# Patient Record
Sex: FEMALE | Race: WHITE | NOT HISPANIC OR LATINO | Employment: UNEMPLOYED | URBAN - METROPOLITAN AREA
[De-identification: names, ages, dates, MRNs, and addresses within clinical notes are randomized per-mention and may not be internally consistent; named-entity substitution may affect disease eponyms.]

---

## 2023-11-29 ENCOUNTER — LAB REQUISITION (OUTPATIENT)
Dept: LAB | Facility: HOSPITAL | Age: 78
End: 2023-11-29
Payer: MEDICARE

## 2023-11-29 DIAGNOSIS — N39.0 URINARY TRACT INFECTION, SITE NOT SPECIFIED: ICD-10-CM

## 2023-11-29 PROBLEM — T81.89XA DELAYED SURGICAL WOUND HEALING: Status: ACTIVE | Noted: 2023-11-29

## 2023-11-29 PROBLEM — N64.4 MASTODYNIA: Status: ACTIVE | Noted: 2023-11-29

## 2023-11-29 PROBLEM — K62.89 MASS OF ANUS: Status: ACTIVE | Noted: 2023-11-29

## 2023-11-29 PROBLEM — I63.9 CEREBROVASCULAR ACCIDENT (CVA) (MULTI): Status: ACTIVE | Noted: 2023-11-29

## 2023-11-29 PROBLEM — N60.01 CYST OF RIGHT BREAST: Status: ACTIVE | Noted: 2023-11-29

## 2023-11-29 PROBLEM — E83.51 HYPOCALCEMIA: Status: ACTIVE | Noted: 2023-11-29

## 2023-11-29 PROBLEM — K52.9 CHRONIC DIARRHEA: Status: ACTIVE | Noted: 2023-11-29

## 2023-11-29 PROBLEM — N64.4 BREAST PAIN IN FEMALE: Status: ACTIVE | Noted: 2023-11-29

## 2023-11-29 PROBLEM — C50.919 BREAST CANCER (MULTI): Status: ACTIVE | Noted: 2023-11-29

## 2023-11-29 PROBLEM — Z90.49 HISTORY OF PARTIAL COLECTOMY: Status: ACTIVE | Noted: 2023-11-29

## 2023-11-29 PROBLEM — K86.89 PANCREATIC INSUFFICIENCY (HHS-HCC): Status: ACTIVE | Noted: 2023-11-29

## 2023-11-29 PROBLEM — Z85.3 PERSONAL HISTORY OF MALIGNANT NEOPLASM OF BREAST: Status: ACTIVE | Noted: 2023-11-29

## 2023-11-29 PROBLEM — C21.0 ANAL CANCER (MULTI): Status: ACTIVE | Noted: 2023-11-29

## 2023-11-29 PROBLEM — K64.9 HEMORRHOIDS: Status: ACTIVE | Noted: 2023-11-29

## 2023-11-29 PROBLEM — Z85.038 HISTORY OF MALIGNANT NEOPLASM OF COLON: Status: ACTIVE | Noted: 2023-11-29

## 2023-11-29 PROBLEM — K58.0 IRRITABLE BOWEL SYNDROME WITH DIARRHEA: Status: ACTIVE | Noted: 2023-11-29

## 2023-11-29 PROBLEM — R30.0 DYSURIA: Status: ACTIVE | Noted: 2023-11-29

## 2023-11-29 PROBLEM — Z85.048 HISTORY OF RECTAL CANCER: Status: ACTIVE | Noted: 2023-11-29

## 2023-11-29 PROBLEM — R19.00 ABDOMINAL WALL BULGE: Status: ACTIVE | Noted: 2023-11-29

## 2023-11-29 PROBLEM — N17.9 ACUTE KIDNEY INJURY (NONTRAUMATIC) (CMS-HCC): Status: ACTIVE | Noted: 2023-11-29

## 2023-11-29 PROBLEM — I82.90 VENOUS THROMBOSIS: Status: ACTIVE | Noted: 2023-11-29

## 2023-11-29 PROBLEM — R10.11 ABDOMINAL PAIN, RUQ (RIGHT UPPER QUADRANT): Status: ACTIVE | Noted: 2023-11-29

## 2023-11-29 PROBLEM — Z85.048 HISTORY OF ANAL CANCER: Status: ACTIVE | Noted: 2023-11-29

## 2023-11-29 PROBLEM — J30.9 ALLERGIC RHINITIS: Status: ACTIVE | Noted: 2023-11-29

## 2023-11-29 PROBLEM — M54.31 NEURALGIA OF RIGHT SCIATIC NERVE: Status: ACTIVE | Noted: 2023-11-29

## 2023-11-29 PROBLEM — N28.9 ABNORMAL KIDNEY FUNCTION: Status: ACTIVE | Noted: 2023-11-29

## 2023-11-29 PROBLEM — Z04.9 CONDITION NOT FOUND: Status: ACTIVE | Noted: 2023-11-29

## 2023-11-29 PROBLEM — E61.2 MAGNESIUM DEFICIENCY: Status: ACTIVE | Noted: 2023-11-29

## 2023-11-29 PROBLEM — R10.9 ABDOMINAL PAIN: Status: ACTIVE | Noted: 2023-11-29

## 2023-11-29 PROBLEM — R10.9 ABDOMINAL WALL PAIN: Status: ACTIVE | Noted: 2023-11-29

## 2023-11-29 PROBLEM — E83.42 HYPOMAGNESEMIA: Status: ACTIVE | Noted: 2023-11-29

## 2023-11-29 PROBLEM — K21.9 GERD (GASTROESOPHAGEAL REFLUX DISEASE): Status: ACTIVE | Noted: 2023-11-29

## 2023-11-29 PROBLEM — K92.1 BLOOD IN STOOL: Status: ACTIVE | Noted: 2023-11-29

## 2023-11-29 PROBLEM — K43.2 INCISIONAL HERNIA: Status: ACTIVE | Noted: 2023-11-29

## 2023-11-29 PROBLEM — K63.1 PERFORATED BOWEL (MULTI): Status: ACTIVE | Noted: 2023-11-29

## 2023-11-29 PROBLEM — E55.9 VITAMIN D DEFICIENCY: Status: ACTIVE | Noted: 2023-11-29

## 2023-11-29 PROBLEM — K60.2 PERIANAL FISSURE: Status: ACTIVE | Noted: 2023-11-29

## 2023-11-29 PROBLEM — R09.89 ALTERED CARDIOPULMONARY TISSUE PERFUSION: Status: ACTIVE | Noted: 2023-11-29

## 2023-11-29 PROCEDURE — 87086 URINE CULTURE/COLONY COUNT: CPT

## 2023-11-29 RX ORDER — PANTOPRAZOLE SODIUM 40 MG/1
1 TABLET, DELAYED RELEASE ORAL DAILY
COMMUNITY
End: 2023-12-01 | Stop reason: SDUPTHER

## 2023-11-29 RX ORDER — ACETAMINOPHEN 500 MG
1 TABLET ORAL EVERY 24 HOURS
COMMUNITY

## 2023-11-29 RX ORDER — ELECTROLYTES/DEXTROSE
SOLUTION, ORAL ORAL EVERY 24 HOURS
COMMUNITY

## 2023-11-29 RX ORDER — PRENATAL VIT CALC,IRON,FOLIC
TABLET ORAL
COMMUNITY
End: 2023-12-01

## 2023-11-29 RX ORDER — CHOLESTYRAMINE 4 G/9G
POWDER, FOR SUSPENSION ORAL DAILY
COMMUNITY

## 2023-11-29 RX ORDER — METRONIDAZOLE 500 MG/1
TABLET ORAL EVERY 8 HOURS
COMMUNITY
Start: 2023-02-22 | End: 2023-12-01

## 2023-11-29 RX ORDER — ESOMEPRAZOLE MAGNESIUM 40 MG/1
1 CAPSULE, DELAYED RELEASE ORAL EVERY 24 HOURS
COMMUNITY
End: 2023-12-01

## 2023-11-29 RX ORDER — PANCRELIPASE 24000; 76000; 120000 [USP'U]/1; [USP'U]/1; [USP'U]/1
CAPSULE, DELAYED RELEASE PELLETS ORAL
COMMUNITY
End: 2023-12-01 | Stop reason: SDUPTHER

## 2023-11-30 LAB — BACTERIA UR CULT: NORMAL

## 2023-12-01 ENCOUNTER — OFFICE VISIT (OUTPATIENT)
Dept: SURGERY | Facility: CLINIC | Age: 78
End: 2023-12-01
Payer: MEDICARE

## 2023-12-01 VITALS
TEMPERATURE: 97.3 F | SYSTOLIC BLOOD PRESSURE: 166 MMHG | HEART RATE: 85 BPM | BODY MASS INDEX: 27.6 KG/M2 | WEIGHT: 150 LBS | DIASTOLIC BLOOD PRESSURE: 98 MMHG | HEIGHT: 62 IN

## 2023-12-01 DIAGNOSIS — K52.9 CHRONIC DIARRHEA: Primary | ICD-10-CM

## 2023-12-01 DIAGNOSIS — K52.9 CHRONIC DIARRHEA: ICD-10-CM

## 2023-12-01 DIAGNOSIS — N60.01 BENIGN BREAST CYST IN FEMALE, RIGHT: Primary | ICD-10-CM

## 2023-12-01 PROCEDURE — 1036F TOBACCO NON-USER: CPT | Performed by: SURGERY

## 2023-12-01 PROCEDURE — 1159F MED LIST DOCD IN RCRD: CPT | Performed by: SURGERY

## 2023-12-01 PROCEDURE — 1126F AMNT PAIN NOTED NONE PRSNT: CPT | Performed by: SURGERY

## 2023-12-01 PROCEDURE — 19000 PUNCTURE ASPIR CYST BREAST: CPT | Performed by: SURGERY

## 2023-12-01 PROCEDURE — 99213 OFFICE O/P EST LOW 20 MIN: CPT | Performed by: SURGERY

## 2023-12-01 PROCEDURE — 1160F RVW MEDS BY RX/DR IN RCRD: CPT | Performed by: SURGERY

## 2023-12-01 RX ORDER — PANTOPRAZOLE SODIUM 40 MG/1
40 TABLET, DELAYED RELEASE ORAL DAILY
Qty: 90 TABLET | Refills: 0 | Status: SHIPPED | OUTPATIENT
Start: 2023-12-01 | End: 2024-02-01

## 2023-12-01 RX ORDER — PANCRELIPASE 24000; 76000; 120000 [USP'U]/1; [USP'U]/1; [USP'U]/1
CAPSULE, DELAYED RELEASE PELLETS ORAL
Qty: 390 CAPSULE | Refills: 0 | Status: SHIPPED | OUTPATIENT
Start: 2023-12-01 | End: 2023-12-22

## 2023-12-01 ASSESSMENT — ENCOUNTER SYMPTOMS: DIARRHEA: 1

## 2023-12-01 NOTE — PROGRESS NOTES
Patient ID: Padilla Campuzano is a 78 y.o. female.  Who is following up for right breast cyst and diarrhea.    Subjective   HPI  Patient has a known history of carcinoma of the right breast.  She has intermittent recurring seromas of the right chest wall.  She is also on Creon for diarrhea.  She has pancreatic insufficiency.  Review of Systems   Gastrointestinal:  Positive for diarrhea.       Objective   Physical Exam  Constitutional:       Appearance: Normal appearance.   Chest:   Breasts:     Left: Normal.      Comments: This with scar and seroma seen on ultrasound.  Abdominal:      Palpations: Abdomen is soft. There is no mass.      Tenderness: There is no abdominal tenderness. There is no guarding.     Patient ID: Padilla Campuzano is a 78 y.o. female.    General    Date/Time: 12/1/2023 11:31 AM    Performed by: Leander Sun MD  Authorized by: Leander Sun MD    Consent:     Consent obtained:  Verbal    Risks discussed:  Bleeding  Universal protocol:     Procedure explained and questions answered to patient or proxy's satisfaction: yes      Relevant documents present and verified: yes      Site/side marked: yes      Immediately prior to procedure, a time out was called: yes      Patient identity confirmed:  Verbally with patient  Pre-procedure details:     Skin preparation:  Chlorhexidine with alcohol  Anesthesia:     Anesthesia method:  Local infiltration  Procedure specific details:      Using the high-frequency ultrasound transducer.  The cyst was identified in the right lateral breast.  Under direct ultrasound guidance and a 16-gauge needle was introduced into the cavity.  6 mL of green fluid was aspirated.  There was complete resolution of the cyst.      Problem List Items Addressed This Visit       Benign breast cyst in female, right - Primary    Chronic diarrhea        Assessment/Plan   Status post aspiration of right breast cyst by ultrasound guidance.  Follow as needed   Creon sent for diarrhea.

## 2023-12-01 NOTE — PATIENT INSTRUCTIONS
Your right breast cyst was aspirated.  Continue to follow this as needed.  Creon has been sent to your pharmacy.  Follow as needed.

## 2023-12-22 DIAGNOSIS — K52.9 CHRONIC DIARRHEA: ICD-10-CM

## 2023-12-22 RX ORDER — PANCRELIPASE 24000; 76000; 120000 [USP'U]/1; [USP'U]/1; [USP'U]/1
CAPSULE, DELAYED RELEASE PELLETS ORAL
Qty: 400 CAPSULE | Refills: 10 | Status: SHIPPED | OUTPATIENT
Start: 2023-12-22

## 2024-02-01 DIAGNOSIS — K52.9 CHRONIC DIARRHEA: ICD-10-CM

## 2024-02-01 RX ORDER — PANTOPRAZOLE SODIUM 40 MG/1
40 TABLET, DELAYED RELEASE ORAL DAILY
Qty: 90 TABLET | Refills: 3 | Status: SHIPPED | OUTPATIENT
Start: 2024-02-01 | End: 2024-03-07 | Stop reason: SDUPTHER

## 2024-02-12 ENCOUNTER — LAB REQUISITION (OUTPATIENT)
Dept: LAB | Facility: HOSPITAL | Age: 79
End: 2024-02-12
Payer: MEDICARE

## 2024-02-12 DIAGNOSIS — N39.0 URINARY TRACT INFECTION, SITE NOT SPECIFIED: ICD-10-CM

## 2024-02-12 PROCEDURE — 87086 URINE CULTURE/COLONY COUNT: CPT

## 2024-02-13 LAB — BACTERIA UR CULT: NORMAL

## 2024-03-01 PROBLEM — N18.30 STAGE 3 CHRONIC KIDNEY DISEASE (MULTI): Status: ACTIVE | Noted: 2024-03-01

## 2024-03-06 ENCOUNTER — OFFICE VISIT (OUTPATIENT)
Dept: PRIMARY CARE | Facility: CLINIC | Age: 79
End: 2024-03-06
Payer: MEDICARE

## 2024-03-06 VITALS
DIASTOLIC BLOOD PRESSURE: 68 MMHG | BODY MASS INDEX: 26.44 KG/M2 | WEIGHT: 149.2 LBS | HEIGHT: 63 IN | HEART RATE: 91 BPM | OXYGEN SATURATION: 96 % | SYSTOLIC BLOOD PRESSURE: 144 MMHG

## 2024-03-06 DIAGNOSIS — Z85.048 HISTORY OF ANAL CANCER: ICD-10-CM

## 2024-03-06 DIAGNOSIS — Z86.79 HISTORY OF CEREBRAL HEMORRHAGE: ICD-10-CM

## 2024-03-06 DIAGNOSIS — Z85.038 HISTORY OF MALIGNANT NEOPLASM OF COLON: ICD-10-CM

## 2024-03-06 DIAGNOSIS — Z85.3 PERSONAL HISTORY OF MALIGNANT NEOPLASM OF BREAST: ICD-10-CM

## 2024-03-06 DIAGNOSIS — J37.0 CHRONIC LARYNGITIS: Primary | ICD-10-CM

## 2024-03-06 DIAGNOSIS — R91.1 LUNG NODULE SEEN ON IMAGING STUDY: ICD-10-CM

## 2024-03-06 DIAGNOSIS — N18.30 STAGE 3 CHRONIC KIDNEY DISEASE, UNSPECIFIED WHETHER STAGE 3A OR 3B CKD (MULTI): ICD-10-CM

## 2024-03-06 PROBLEM — C21.0 ANAL CANCER (MULTI): Status: RESOLVED | Noted: 2023-11-29 | Resolved: 2024-03-06

## 2024-03-06 PROCEDURE — 1036F TOBACCO NON-USER: CPT | Performed by: INTERNAL MEDICINE

## 2024-03-06 PROCEDURE — 99204 OFFICE O/P NEW MOD 45 MIN: CPT | Performed by: INTERNAL MEDICINE

## 2024-03-06 PROCEDURE — 1126F AMNT PAIN NOTED NONE PRSNT: CPT | Performed by: INTERNAL MEDICINE

## 2024-03-06 PROCEDURE — 1160F RVW MEDS BY RX/DR IN RCRD: CPT | Performed by: INTERNAL MEDICINE

## 2024-03-06 PROCEDURE — 1159F MED LIST DOCD IN RCRD: CPT | Performed by: INTERNAL MEDICINE

## 2024-03-06 ASSESSMENT — PATIENT HEALTH QUESTIONNAIRE - PHQ9
SUM OF ALL RESPONSES TO PHQ9 QUESTIONS 1 AND 2: 0
2. FEELING DOWN, DEPRESSED OR HOPELESS: NOT AT ALL
1. LITTLE INTEREST OR PLEASURE IN DOING THINGS: NOT AT ALL

## 2024-03-06 NOTE — PROGRESS NOTES
"Subjective   Patient ID: Padilla Campuzano is a 78 y.o. female who presents for New Patient Visit and Laryngitis (Started in December/Bumps on tongue/Seen at [facility]  HPI    New PCP    Spouse / Bryson present H&P    Patient presented in December with laryngitis.  She was seen and evaluated in the [place] urgent care.  Diagnosed with viral laryngitis.  Voice has still not returned.  Chronic laryngitis  Patient will be referred to ENT for further evaluation and management    H/o lung nodule on CT 2-23  Recheck / h/o smoker    History of breast cancer    History of colon cancer    History of anal cancer    History of cerebral hemorrhage 2012.    Diet and exercise reviewed      Review of Systems   All other systems reviewed and are negative.      Objective   /68   Pulse 91   Ht 1.6 m (5' 3\")   Wt 67.7 kg (149 lb 3.2 oz)   SpO2 96%   BMI 26.43 kg/m²   Lab Results   Component Value Date    WBC 7.7 02/08/2023    HGB 12.6 02/08/2023    HCT 38.8 02/08/2023     02/08/2023    ALT 5 02/08/2023    AST 16 02/08/2023     02/08/2023    K 3.7 02/08/2023     02/08/2023    CREATININE 1.2 02/08/2023    BUN 24 02/08/2023    CO2 21 (L) 02/08/2023    TSH 3.34 02/08/2023    ALBUR 80 (H) 01/10/2023           Physical Exam  Vitals reviewed.   Constitutional:       Appearance: Normal appearance. She is normal weight.   HENT:      Head: Normocephalic and atraumatic.      Comments: No audible voice, whispers     Mouth/Throat:      Pharynx: No posterior oropharyngeal erythema.   Eyes:      General: No scleral icterus.     Conjunctiva/sclera: Conjunctivae normal.      Pupils: Pupils are equal, round, and reactive to light.   Cardiovascular:      Rate and Rhythm: Normal rate and regular rhythm.      Heart sounds: Normal heart sounds.   Pulmonary:      Effort: No respiratory distress.      Breath sounds: No wheezing.   Abdominal:      General: Abdomen is flat. Bowel sounds are normal. There is no " distension.      Palpations: Abdomen is soft. There is no mass.      Tenderness: There is no abdominal tenderness. There is no rebound.   Musculoskeletal:         General: Normal range of motion.      Cervical back: Normal range of motion and neck supple.   Skin:     General: Skin is warm and dry.   Neurological:      General: No focal deficit present.      Mental Status: She is alert and oriented to person, place, and time. Mental status is at baseline.   Psychiatric:         Mood and Affect: Mood normal.         Behavior: Behavior normal.         Thought Content: Thought content normal.         Judgment: Judgment normal.         Problem List Items Addressed This Visit             ICD-10-CM    History of anal cancer Z85.048    Personal history of malignant neoplasm of breast Z85.3    History of malignant neoplasm of colon Z85.038    Stage 3 chronic kidney disease (CMS/HCC) N18.30     Other Visit Diagnoses         Codes    Chronic laryngitis    -  Primary J37.0    Relevant Orders    Referral to ENT    Lung nodule seen on imaging study     R91.1    Relevant Orders    CT chest wo IV contrast    History of cerebral hemorrhage     Z86.79    BMI 26.0-26.9,adult     Z68.26          Assessment/Plan     New PCP    Spouse / Bryson present H&P    Records rev'd    Patient presented in December with laryngitis.  She was seen and evaluated in the [place] urgent care.  Diagnosed with viral laryngitis.  Voice has still not returned.  Chronic laryngitis  Patient will be referred to ENT for further evaluation and management    H/o lung nodule on CT 2-23  Recheck / h/o smoker    History of breast cancer    History of colon cancer    History of anal cancer    History of cerebral hemorrhage 2012.    Diet and exercise reviewed    Follow up 1 month / medicare physical

## 2024-03-07 DIAGNOSIS — K52.9 CHRONIC DIARRHEA: ICD-10-CM

## 2024-03-07 RX ORDER — PANTOPRAZOLE SODIUM 40 MG/1
TABLET, DELAYED RELEASE ORAL
Qty: 180 TABLET | Refills: 0 | Status: SHIPPED | OUTPATIENT
Start: 2024-03-07 | End: 2024-05-02

## 2024-03-21 ENCOUNTER — HOSPITAL ENCOUNTER (OUTPATIENT)
Dept: RADIOLOGY | Facility: HOSPITAL | Age: 79
Discharge: HOME | End: 2024-03-21
Payer: MEDICARE

## 2024-03-21 DIAGNOSIS — R91.1 LUNG NODULE SEEN ON IMAGING STUDY: ICD-10-CM

## 2024-03-21 PROCEDURE — 71250 CT THORAX DX C-: CPT

## 2024-03-21 PROCEDURE — 71250 CT THORAX DX C-: CPT | Performed by: RADIOLOGY

## 2024-03-25 ENCOUNTER — TELEPHONE (OUTPATIENT)
Dept: PRIMARY CARE | Facility: CLINIC | Age: 79
End: 2024-03-25
Payer: MEDICARE

## 2024-03-25 DIAGNOSIS — Z85.3 PERSONAL HISTORY OF MALIGNANT NEOPLASM OF BREAST: ICD-10-CM

## 2024-03-25 DIAGNOSIS — R91.8 ABNORMAL CT SCAN OF LUNG: ICD-10-CM

## 2024-03-25 DIAGNOSIS — R91.1 LUNG NODULE SEEN ON IMAGING STUDY: Primary | ICD-10-CM

## 2024-03-25 DIAGNOSIS — R91.8 ABNORMAL CT LUNG SCREENING: ICD-10-CM

## 2024-03-25 NOTE — TELEPHONE ENCOUNTER
----- Message from Naheed Clements MD sent at 3/25/2024  9:32 AM EDT -----  Please call the patient regarding her abnormal result.  Move up appt to this week

## 2024-03-27 ENCOUNTER — OFFICE VISIT (OUTPATIENT)
Dept: PRIMARY CARE | Facility: CLINIC | Age: 79
End: 2024-03-27
Payer: MEDICARE

## 2024-03-27 VITALS
WEIGHT: 148.2 LBS | HEART RATE: 82 BPM | OXYGEN SATURATION: 92 % | BODY MASS INDEX: 26.25 KG/M2 | SYSTOLIC BLOOD PRESSURE: 134 MMHG | DIASTOLIC BLOOD PRESSURE: 76 MMHG

## 2024-03-27 DIAGNOSIS — R93.89 ABNORMAL CT OF THE CHEST: Primary | ICD-10-CM

## 2024-03-27 DIAGNOSIS — C50.919 MALIGNANT NEOPLASM OF FEMALE BREAST, UNSPECIFIED ESTROGEN RECEPTOR STATUS, UNSPECIFIED LATERALITY, UNSPECIFIED SITE OF BREAST (MULTI): ICD-10-CM

## 2024-03-27 DIAGNOSIS — Z85.048 HISTORY OF ANAL CANCER: ICD-10-CM

## 2024-03-27 DIAGNOSIS — J37.0 CHRONIC LARYNGITIS: ICD-10-CM

## 2024-03-27 DIAGNOSIS — C18.9 MALIGNANT NEOPLASM OF COLON, UNSPECIFIED PART OF COLON (MULTI): ICD-10-CM

## 2024-03-27 PROBLEM — Z86.79 HISTORY OF CEREBRAL HEMORRHAGE: Status: ACTIVE | Noted: 2024-03-27

## 2024-03-27 PROBLEM — K92.1 HEMATOCHEZIA: Status: ACTIVE | Noted: 2023-11-29

## 2024-03-27 PROBLEM — L02.91 ABSCESS: Status: ACTIVE | Noted: 2024-03-27

## 2024-03-27 PROBLEM — R91.1 LUNG NODULE: Status: ACTIVE | Noted: 2024-03-27

## 2024-03-27 PROCEDURE — 1036F TOBACCO NON-USER: CPT | Performed by: INTERNAL MEDICINE

## 2024-03-27 PROCEDURE — 1159F MED LIST DOCD IN RCRD: CPT | Performed by: INTERNAL MEDICINE

## 2024-03-27 PROCEDURE — 1160F RVW MEDS BY RX/DR IN RCRD: CPT | Performed by: INTERNAL MEDICINE

## 2024-03-27 PROCEDURE — 99214 OFFICE O/P EST MOD 30 MIN: CPT | Performed by: INTERNAL MEDICINE

## 2024-03-27 NOTE — PROGRESS NOTES
Subjective   Patient ID: Padilla Campuzano is a 78 y.o. female who presents for Results (CT Chest).  HPI    Spouse / Bryson present H&P    Follow up CT chest     Patient presented in December with laryngitis.  She was seen and evaluated in the [place] urgent care.  Diagnosed with viral laryngitis.  Voice has still not returned.  Chronic laryngitis  Patient will be referred to ENT for further evaluation and management / not done yet     H/o lung nodule on CT 2-23  h/o smoker     History of breast cancer     History of colon cancer     History of anal cancer     History of cerebral hemorrhage 2012.     Diet and exercise reviewed    Review of Systems   All other systems reviewed and are negative.      Objective   /76   Pulse 82   Wt 67.2 kg (148 lb 3.2 oz)   SpO2 92%   BMI 26.25 kg/m²   Lab Results   Component Value Date    WBC 7.7 02/08/2023    HGB 12.6 02/08/2023    HCT 38.8 02/08/2023     02/08/2023    ALT 5 02/08/2023    AST 16 02/08/2023     02/08/2023    K 3.7 02/08/2023     02/08/2023    CREATININE 1.2 02/08/2023    BUN 24 02/08/2023    CO2 21 (L) 02/08/2023    TSH 3.34 02/08/2023    ALBUR 80 (H) 01/10/2023           Physical Exam  Vitals reviewed.   Constitutional:       Appearance: Normal appearance. She is normal weight.   HENT:      Head: Normocephalic and atraumatic.      Comments: Raspy voice     Mouth/Throat:      Pharynx: No posterior oropharyngeal erythema.   Eyes:      General: No scleral icterus.     Conjunctiva/sclera: Conjunctivae normal.      Pupils: Pupils are equal, round, and reactive to light.   Cardiovascular:      Rate and Rhythm: Normal rate and regular rhythm.      Heart sounds: Normal heart sounds.   Pulmonary:      Effort: No respiratory distress.      Breath sounds: No wheezing.   Abdominal:      General: Abdomen is flat. Bowel sounds are normal. There is no distension.      Palpations: Abdomen is soft. There is no mass.      Tenderness: There is no abdominal  tenderness. There is no rebound.   Musculoskeletal:         General: Normal range of motion.      Cervical back: Normal range of motion and neck supple.   Skin:     General: Skin is warm and dry.   Neurological:      General: No focal deficit present.      Mental Status: She is alert and oriented to person, place, and time. Mental status is at baseline.   Psychiatric:         Mood and Affect: Mood normal.         Behavior: Behavior normal.         Thought Content: Thought content normal.         Judgment: Judgment normal.         Problem List Items Addressed This Visit             ICD-10-CM    Breast cancer (CMS/[facility]ly [facility]) C50.919    Relevant Orders    Referral to Hematology and Oncology    History of anal cancer Z85.048    Chronic laryngitis J37.0    Malignant neoplasm of colon (CMS/[facility]ly [facility]) C18.9    Relevant Orders    Referral to Hematology and Oncology     Other Visit Diagnoses         Codes    Abnormal CT of the chest    -  Primary R93.89    Relevant Orders    Referral to Hematology and Oncology    BMI 26.0-26.9,adult     Z68.26          Assessment/Plan     Spouse / Bryson present H&P    Follow up CT chest  CT/PET not approved by insurance  Will consult oncology 4-1-24     Patient presented in December with laryngitis.  She was seen and evaluated in the [place] urgent care.  Diagnosed with viral laryngitis.  Voice has still not returned.  Chronic laryngitis  Patient will be referred to ENT for further evaluation and management / not done yet 4-9-24     H/o lung nodule on CT 2-23  h/o smoker     History of breast cancer     History of colon cancer     History of anal cancer     History of cerebral hemorrhage 2012.     Diet and exercise reviewed    Follow up as scheduled / medicare physical    T=31 min

## 2024-04-01 ENCOUNTER — OFFICE VISIT (OUTPATIENT)
Dept: HEMATOLOGY/ONCOLOGY | Facility: HOSPITAL | Age: 79
End: 2024-04-01
Payer: MEDICARE

## 2024-04-01 VITALS
BODY MASS INDEX: 27.18 KG/M2 | SYSTOLIC BLOOD PRESSURE: 199 MMHG | WEIGHT: 147.71 LBS | HEIGHT: 62 IN | OXYGEN SATURATION: 96 % | TEMPERATURE: 96.8 F | RESPIRATION RATE: 20 BRPM | DIASTOLIC BLOOD PRESSURE: 77 MMHG | HEART RATE: 84 BPM

## 2024-04-01 DIAGNOSIS — C34.32 MALIGNANT NEOPLASM OF LOWER LOBE, LEFT BRONCHUS OR LUNG (MULTI): ICD-10-CM

## 2024-04-01 DIAGNOSIS — C18.9 MALIGNANT NEOPLASM OF COLON, UNSPECIFIED PART OF COLON (MULTI): ICD-10-CM

## 2024-04-01 DIAGNOSIS — C50.919 MALIGNANT NEOPLASM OF FEMALE BREAST, UNSPECIFIED ESTROGEN RECEPTOR STATUS, UNSPECIFIED LATERALITY, UNSPECIFIED SITE OF BREAST (MULTI): ICD-10-CM

## 2024-04-01 DIAGNOSIS — R93.89 ABNORMAL CT OF THE CHEST: ICD-10-CM

## 2024-04-01 DIAGNOSIS — R91.1 SOLITARY PULMONARY NODULE ON LUNG CT: Primary | ICD-10-CM

## 2024-04-01 PROCEDURE — 1125F AMNT PAIN NOTED PAIN PRSNT: CPT | Performed by: INTERNAL MEDICINE

## 2024-04-01 PROCEDURE — 1160F RVW MEDS BY RX/DR IN RCRD: CPT | Performed by: INTERNAL MEDICINE

## 2024-04-01 PROCEDURE — 99205 OFFICE O/P NEW HI 60 MIN: CPT | Performed by: INTERNAL MEDICINE

## 2024-04-01 PROCEDURE — 99215 OFFICE O/P EST HI 40 MIN: CPT | Performed by: INTERNAL MEDICINE

## 2024-04-01 PROCEDURE — 1159F MED LIST DOCD IN RCRD: CPT | Performed by: INTERNAL MEDICINE

## 2024-04-01 ASSESSMENT — PATIENT HEALTH QUESTIONNAIRE - PHQ9
1. LITTLE INTEREST OR PLEASURE IN DOING THINGS: NOT AT ALL
2. FEELING DOWN, DEPRESSED OR HOPELESS: NOT AT ALL
SUM OF ALL RESPONSES TO PHQ9 QUESTIONS 1 AND 2: 0

## 2024-04-01 ASSESSMENT — PAIN SCALES - GENERAL: PAINLEVEL: 1

## 2024-04-01 NOTE — PATIENT INSTRUCTIONS
Orders placed today:    Blood work  CT guided biopsy  Stat ENT referral  PET scan ordered  Referral to thoracic surgery  Please schedule follow up appointment with Dr. Martines    Thank you for coming in to see us today.

## 2024-04-01 NOTE — PROGRESS NOTES
Patient ID: Padilla Campuzano is a 78 y.o. female.  Referring Physician: Naheed Clements MD  [address] [facility], [place] [address]  Primary Care Provider: Naheed Clements MD     DIAGNOSIS    Suspicious left lower lobe pulmonary nodule      STAGING    Pending biopsy, staging with PET scan      CURRENT SITES OF DISEASE     Left lower lobe      MOLECULAR GENOMICS    Pathology not yet obtained      SERUM TUMOR MARKERS    Not applicable      PRIOR THERAPY    None      CURRENT THERAPY    To be determined based on final diagnosis      CURRENT ONCOLOGICAL PROBLEMS    1-history of multiple prior cancers including breast cancer colon cancer and anal cancer  2-hoarseness of voice since December 2023, refer to ENT for evaluation, suspect vocal cord paralysis      HISTORY OF PRESENT ILLNESS    This is a 78-year-old patient.  She presented in late December and January with voice changes diagnosed with possibly laryngitis.  She also noticed during this time a progressive decrease in her energy level.She was referred to ENT but has not yet seen them.  Given her history of malignancy further imaging was ordered.  She had a CT scan of the chest without contrast done on March 21, 2024 showing interval development of the left lower lobe cavitary pulmonary nodule measuring 1.1 cm, interval enlargement of the right lower lobe groundglass nodule measuring 1.1 cm that was previously 0.6 cm.  There was moderate emphysema.  Additional nonspecific pulmonary nodules were stable.  Her CT scan was compared to a chest the CT of January 20, 2022.  Given her prior history of malignancies and a new suspicious pulmonary nodule she has been referred to oncology for evaluation.      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  Diagnosed in 2017.    2- According to patient early stage colon cancer in 2001 s/p surgery alone    3- Breast  cancer in 2012.  Details not available, according to patient early stage with right-sided breast surgery this was treated at [facility] in [place] did receive chemotherapy and radiation.  She developed significant side effects from her chemotherapy and stopped it.  She received no hormonal therapy.    4-cerebral aneurysm with hemorrhage s/p coiling at [facility] in [place].      SOCIAL HISTORY    Patient lives in [facility], she lives with her .  She had 4 children but 1 has passed away.  She also has 3 stepchildren.  She started smoking at the age of 27 to the age of 64.  Smoked 33 years on average 1 pack/day.  She was a housewife.      CURRENT MEDS    See medication list, meds reviewed      ALLERGIES    Allergies include hydrocodone causing dizziness, she notes oxycodone, aspirin causing dizziness and sulfa causing rash.      FAMILY HISTORY     2 brothers had cancer 1 had lung cancer the other 1 colon cancer.  Mother also had colon cancer in her 70s.      Subjective    HPI    Review of Systems   Constitutional:  Positive for fatigue. Negative for appetite change, chills, diaphoresis, fever and unexpected weight change.   HENT:   Positive for voice change. Negative for hearing loss, lump/mass, mouth sores, nosebleeds, sore throat, tinnitus and trouble swallowing.    Eyes:  Negative for eye problems and icterus.   Respiratory:  Negative for chest tightness, cough, hemoptysis, shortness of breath and wheezing.    Cardiovascular:  Negative for chest pain, leg swelling and palpitations.   Gastrointestinal:  Negative for abdominal distention, abdominal pain, blood in stool, constipation, diarrhea, nausea, rectal pain and vomiting.   Endocrine: Negative for hot flashes.   Genitourinary:  Negative for bladder incontinence, difficulty urinating, dysuria, frequency, hematuria and pelvic pain.    Musculoskeletal:  Negative for arthralgias, back pain, flank pain, gait problem, myalgias, neck  "pain and neck stiffness.   Skin:  Negative for itching, rash and wound.   Neurological:  Negative for dizziness, extremity weakness, gait problem, headaches, light-headedness, numbness, seizures and speech difficulty.   Hematological:  Negative for adenopathy. Does not bruise/bleed easily.   Psychiatric/Behavioral:  Negative for confusion, decreased concentration, depression and sleep disturbance. The patient is not nervous/anxious.         Objective   BSA: 1.71 meters squared  BP (!) 199/77 (BP Location: Left arm, Patient Position: Sitting, BP Cuff Size: Adult)   Pulse 84   Temp 36 °C (96.8 °F) (Temporal)   Resp 20   Ht (S) 1.573 m (5' 1.93\")   Wt 67 kg (147 lb 11.3 oz)   SpO2 96%   BMI 27.08 kg/m²       Physical Exam  Constitutional:       General: She is not in acute distress.     Appearance: She is not ill-appearing, toxic-appearing or diaphoretic.   HENT:      Nose: No congestion or rhinorrhea.      Mouth/Throat:      Pharynx: No oropharyngeal exudate or posterior oropharyngeal erythema.   Eyes:      General: No scleral icterus.     Conjunctiva/sclera: Conjunctivae normal.   Cardiovascular:      Rate and Rhythm: Normal rate and regular rhythm.      Pulses: Normal pulses.      Heart sounds: Normal heart sounds. No murmur heard.     No friction rub. No gallop.   Pulmonary:      Effort: No respiratory distress.      Breath sounds: No stridor. No wheezing, rhonchi or rales.   Chest:      Chest wall: No tenderness.   Abdominal:      General: There is no distension.      Palpations: There is no mass.      Tenderness: There is no abdominal tenderness. There is no guarding or rebound.   Musculoskeletal:         General: No swelling, tenderness, deformity or signs of injury.      Cervical back: No tenderness.      Right lower leg: No edema.      Left lower leg: No edema.   Lymphadenopathy:      Cervical: No cervical adenopathy.   Skin:     Coloration: Skin is not jaundiced or pale.      Findings: No bruising or " rash.   Neurological:      General: No focal deficit present.      Mental Status: She is oriented to person, place, and time.      Cranial Nerves: No cranial nerve deficit.      Sensory: No sensory deficit.      Motor: No weakness.      Coordination: Coordination normal.      Gait: Gait normal.      Deep Tendon Reflexes: Reflexes normal.   Psychiatric:         Mood and Affect: Mood normal.         Behavior: Behavior normal.         Performance Status:  Symptomatic; in bed <50% of the day      CT Abdo/pelvis Feb 2023  Impression   1.  Distal descending and rectosigmoid colonic wall thickening and  hyperemia, concerning for infectious colitis.  2. Stable postoperative changes partial colectomy with left  hemiabdomen anastomosis. No bowel dilation.  3. Interval enlargement 9 mm right lower lobe ground-glass opacity,  previously 6 mm in 2022.       CT Chest 3/21/2024  IMPRESSION:  1.  Interval development of a left lower lobe cavitary pulmonary  nodule measuring 1.0 x 1.1 x 0.9 cm. Interval enlargement of a right  lower lobe ground-glass pulmonary nodule measuring 1.1 cm, previously  measuring 0.6 cm. Given patient's cancer history history of smoking,  recommend PET-CT for further evaluation.  2. Moderate emphysema.  3. Additional pulmonary nodules appear relatively stable compared to  prior examinations as described above.  4. Additional chronic findings as noted above.     Latest Reference Range & Units 04/02/24 10:46   GLUCOSE 74 - 99 mg/dL 84   SODIUM 136 - 145 mmol/L 142   POTASSIUM 3.5 - 5.3 mmol/L 3.9   CHLORIDE 98 - 107 mmol/L 109 (H)   Bicarbonate 21 - 32 mmol/L 27   Anion Gap 10 - 20 mmol/L 10   Blood Urea Nitrogen 6 - 23 mg/dL 18   Creatinine 0.50 - 1.05 mg/dL 1.31 (H)   EGFR >60 mL/min/1.73m*2 42 (L)   Calcium 8.6 - 10.3 mg/dL 8.6   Albumin 3.4 - 5.0 g/dL 4.1   Alkaline Phosphatase 33 - 136 U/L 68   ALT 7 - 45 U/L 7   AST 9 - 39 U/L 17   Bilirubin Total 0.0 - 1.2 mg/dL 0.4   Total Protein 6.4 - 8.2 g/dL 6.4    LEUKOCYTES (10*3/UL) IN BLOOD BY AUTOMATED COUNT, [place] 4.4 - 11.3 x10*3/uL 7.3   nRBC 0.0 - 0.0 /100 WBCs 0.3 (H)   ERYTHROCYTES (10*6/UL) IN BLOOD BY AUTOMATED COUNT, [place] 4.00 - 5.20 x10*6/uL 4.01   HEMOGLOBIN 12.0 - 16.0 g/dL 12.8   HEMATOCRIT 36.0 - 46.0 % 40.3   MCV 80 - 100 fL 101 (H)   MCH 26.0 - 34.0 pg 31.9   MCHC 32.0 - 36.0 g/dL 31.8 (L)   RED CELL DISTRIBUTION WIDTH 11.5 - 14.5 % 12.8   PLATELETS (10*3/UL) IN BLOOD AUTOMATED COUNT, [place] 150 - 450 x10*3/uL 320   NEUTROPHILS/100 LEUKOCYTES IN BLOOD BY AUTOMATED COUNT, [place] 40.0 - 80.0 % 55.3   Immature Granulocytes %, Automated 0.0 - 0.9 % 0.1   Lymphocytes % 13.0 - 44.0 % 32.4   Monocytes % 2.0 - 10.0 % 9.8   Eosinophils % 0.0 - 6.0 % 1.9   Basophils % 0.0 - 2.0 % 0.5   NEUTROPHILS (10*3/UL) IN BLOOD BY AUTOMATED COUNT, [place] 1.60 - 5.50 x10*3/uL 4.02   Immature Granulocytes Absolute, Automated 0.00 - 0.50 x10*3/uL 0.01   Lymphocytes Absolute 0.80 - 3.00 x10*3/uL 2.36   Monocytes Absolute 0.05 - 0.80 x10*3/uL 0.71   Eosinophils Absolute 0.00 - 0.40 x10*3/uL 0.14   Basophils Absolute 0.00 - 0.10 x10*3/uL 0.04   (H): Data is abnormally high  (L): Data is abnormally low      Assessment/Plan      This is a very nice 78-year-old patient with a history of multiple prior malignancies including breast cancer, colon cancer, anal cancer who now has a suspicious left lower lobe pulmonary nodule.  This needs to be considered malignant until proven otherwise.  Based on the solitary pulmonary nodule I recommend a PET scan, refer to thoracic surgery, and possibly a CT-guided biopsy.  Given her hoarseness of voice and possible vocal cord paralysis she has been referred to ENT for evaluation.  Upon completion of this she will return to see me for further discussions.      Carroll Martines MD  Professor of Medicine and Oncology  [facility]  Geena Zavala Chair in Lung Cancer  Thoracic Oncology  [facility]  [facility]

## 2024-04-02 ENCOUNTER — TELEPHONE (OUTPATIENT)
Dept: ADMISSION | Facility: HOSPITAL | Age: 79
End: 2024-04-02

## 2024-04-02 ENCOUNTER — OFFICE VISIT (OUTPATIENT)
Dept: CARDIAC SURGERY | Facility: CLINIC | Age: 79
End: 2024-04-02
Payer: MEDICARE

## 2024-04-02 ENCOUNTER — LAB (OUTPATIENT)
Dept: LAB | Facility: LAB | Age: 79
End: 2024-04-02
Payer: MEDICARE

## 2024-04-02 VITALS
DIASTOLIC BLOOD PRESSURE: 85 MMHG | RESPIRATION RATE: 18 BRPM | OXYGEN SATURATION: 95 % | HEIGHT: 61 IN | HEART RATE: 86 BPM | WEIGHT: 147 LBS | BODY MASS INDEX: 27.75 KG/M2 | SYSTOLIC BLOOD PRESSURE: 140 MMHG

## 2024-04-02 DIAGNOSIS — R91.1 SOLITARY PULMONARY NODULE ON LUNG CT: ICD-10-CM

## 2024-04-02 DIAGNOSIS — R91.1 LUNG NODULE: ICD-10-CM

## 2024-04-02 LAB
ALBUMIN SERPL BCP-MCNC: 4.1 G/DL (ref 3.4–5)
ALP SERPL-CCNC: 68 U/L (ref 33–136)
ALT SERPL W P-5'-P-CCNC: 7 U/L (ref 7–45)
ANION GAP SERPL CALC-SCNC: 10 MMOL/L (ref 10–20)
AST SERPL W P-5'-P-CCNC: 17 U/L (ref 9–39)
BASOPHILS # BLD AUTO: 0.04 X10*3/UL (ref 0–0.1)
BASOPHILS NFR BLD AUTO: 0.5 %
BILIRUB SERPL-MCNC: 0.4 MG/DL (ref 0–1.2)
BUN SERPL-MCNC: 18 MG/DL (ref 6–23)
CALCIUM SERPL-MCNC: 8.6 MG/DL (ref 8.6–10.3)
CHLORIDE SERPL-SCNC: 109 MMOL/L (ref 98–107)
CO2 SERPL-SCNC: 27 MMOL/L (ref 21–32)
CREAT SERPL-MCNC: 1.31 MG/DL (ref 0.5–1.05)
EGFRCR SERPLBLD CKD-EPI 2021: 42 ML/MIN/1.73M*2
EOSINOPHIL # BLD AUTO: 0.14 X10*3/UL (ref 0–0.4)
EOSINOPHIL NFR BLD AUTO: 1.9 %
ERYTHROCYTE [DISTWIDTH] IN BLOOD BY AUTOMATED COUNT: 12.8 % (ref 11.5–14.5)
GLUCOSE SERPL-MCNC: 84 MG/DL (ref 74–99)
HCT VFR BLD AUTO: 40.3 % (ref 36–46)
HGB BLD-MCNC: 12.8 G/DL (ref 12–16)
IMM GRANULOCYTES # BLD AUTO: 0.01 X10*3/UL (ref 0–0.5)
IMM GRANULOCYTES NFR BLD AUTO: 0.1 % (ref 0–0.9)
LYMPHOCYTES # BLD AUTO: 2.36 X10*3/UL (ref 0.8–3)
LYMPHOCYTES NFR BLD AUTO: 32.4 %
MCH RBC QN AUTO: 31.9 PG (ref 26–34)
MCHC RBC AUTO-ENTMCNC: 31.8 G/DL (ref 32–36)
MCV RBC AUTO: 101 FL (ref 80–100)
MONOCYTES # BLD AUTO: 0.71 X10*3/UL (ref 0.05–0.8)
MONOCYTES NFR BLD AUTO: 9.8 %
NEUTROPHILS # BLD AUTO: 4.02 X10*3/UL (ref 1.6–5.5)
NEUTROPHILS NFR BLD AUTO: 55.3 %
NRBC BLD-RTO: 0.3 /100 WBCS (ref 0–0)
PLATELET # BLD AUTO: 320 X10*3/UL (ref 150–450)
POTASSIUM SERPL-SCNC: 3.9 MMOL/L (ref 3.5–5.3)
PROT SERPL-MCNC: 6.4 G/DL (ref 6.4–8.2)
RBC # BLD AUTO: 4.01 X10*6/UL (ref 4–5.2)
SODIUM SERPL-SCNC: 142 MMOL/L (ref 136–145)
WBC # BLD AUTO: 7.3 X10*3/UL (ref 4.4–11.3)

## 2024-04-02 PROCEDURE — 36415 COLL VENOUS BLD VENIPUNCTURE: CPT

## 2024-04-02 PROCEDURE — 1126F AMNT PAIN NOTED NONE PRSNT: CPT | Performed by: THORACIC SURGERY (CARDIOTHORACIC VASCULAR SURGERY)

## 2024-04-02 PROCEDURE — 1036F TOBACCO NON-USER: CPT | Performed by: THORACIC SURGERY (CARDIOTHORACIC VASCULAR SURGERY)

## 2024-04-02 PROCEDURE — 99205 OFFICE O/P NEW HI 60 MIN: CPT | Performed by: THORACIC SURGERY (CARDIOTHORACIC VASCULAR SURGERY)

## 2024-04-02 PROCEDURE — 1160F RVW MEDS BY RX/DR IN RCRD: CPT | Performed by: THORACIC SURGERY (CARDIOTHORACIC VASCULAR SURGERY)

## 2024-04-02 PROCEDURE — 1159F MED LIST DOCD IN RCRD: CPT | Performed by: THORACIC SURGERY (CARDIOTHORACIC VASCULAR SURGERY)

## 2024-04-02 PROCEDURE — 99215 OFFICE O/P EST HI 40 MIN: CPT | Performed by: THORACIC SURGERY (CARDIOTHORACIC VASCULAR SURGERY)

## 2024-04-02 ASSESSMENT — ENCOUNTER SYMPTOMS
CHEST TIGHTNESS: 0
PSYCHIATRIC NEGATIVE: 1
UNEXPECTED WEIGHT CHANGE: 0
CHOKING: 0
NEUROLOGICAL NEGATIVE: 1
DIARRHEA: 0
PALPITATIONS: 0
LOSS OF SENSATION IN FEET: 0
DIAPHORESIS: 0
NAUSEA: 0
SHORTNESS OF BREATH: 0
ABDOMINAL DISTENTION: 0
MUSCULOSKELETAL NEGATIVE: 1
CHILLS: 0
APPETITE CHANGE: 0
OCCASIONAL FEELINGS OF UNSTEADINESS: 0
FATIGUE: 0
ALLERGIC/IMMUNOLOGIC NEGATIVE: 1
CONSTIPATION: 0
EYES NEGATIVE: 1
ABDOMINAL PAIN: 0
WHEEZING: 0
VOMITING: 0
ENDOCRINE NEGATIVE: 1
DEPRESSION: 0
FEVER: 0
COUGH: 0
HEMATOLOGIC/LYMPHATIC NEGATIVE: 1
STRIDOR: 0

## 2024-04-02 ASSESSMENT — LIFESTYLE VARIABLES
AUDIT-C TOTAL SCORE: 0
SKIP TO QUESTIONS 9-10: 1
HOW OFTEN DO YOU HAVE SIX OR MORE DRINKS ON ONE OCCASION: NEVER
HOW OFTEN DO YOU HAVE A DRINK CONTAINING ALCOHOL: NEVER
HOW MANY STANDARD DRINKS CONTAINING ALCOHOL DO YOU HAVE ON A TYPICAL DAY: PATIENT DOES NOT DRINK

## 2024-04-02 ASSESSMENT — PATIENT HEALTH QUESTIONNAIRE - PHQ9
2. FEELING DOWN, DEPRESSED OR HOPELESS: NOT AT ALL
1. LITTLE INTEREST OR PLEASURE IN DOING THINGS: NOT AT ALL
SUM OF ALL RESPONSES TO PHQ9 QUESTIONS 1 AND 2: 0

## 2024-04-02 ASSESSMENT — PAIN SCALES - GENERAL: PAINLEVEL: 0-NO PAIN

## 2024-04-02 NOTE — PROGRESS NOTES
Subjective   Patient ID: Padilla Campuzano is a 78 y.o. female who presents for Consult (Multiple lung nodule).  HPI    78-year-old female with multiple medical problems and history of breast cancer colon cancer and anal cancer.  She was found to have a left lower lobe nodule which is new in nature and some cavitation as well.  This nodule is concerning for malignancy.  She is scheduled to have a biopsy in a PET scan.  I will add PFTs.  I think based on those results we can formulate a better plan for her.  See her again with results.  She seems like an active woman we will assess her candidacy for resection if one is needed with her PFTs.    Review of Systems   Constitutional:  Negative for appetite change, chills, diaphoresis, fatigue, fever and unexpected weight change.   HENT: Negative.     Eyes: Negative.    Respiratory:  Negative for cough, choking, chest tightness, shortness of breath, wheezing and stridor.    Cardiovascular:  Negative for chest pain, palpitations and leg swelling.   Gastrointestinal:  Negative for abdominal distention, abdominal pain, constipation, diarrhea, nausea and vomiting.   Endocrine: Negative.    Genitourinary: Negative.    Musculoskeletal: Negative.    Skin: Negative.    Allergic/Immunologic: Negative.    Neurological: Negative.    Hematological: Negative.    Psychiatric/Behavioral: Negative.     All other systems reviewed and are negative.      Objective   Physical Exam  Constitutional:       Appearance: Normal appearance.   HENT:      Head: Normocephalic and atraumatic.      Nose: Nose normal.      Mouth/Throat:      Mouth: Mucous membranes are moist.      Pharynx: Oropharynx is clear.   Eyes:      Extraocular Movements: Extraocular movements intact.      Conjunctiva/sclera: Conjunctivae normal.      Pupils: Pupils are equal, round, and reactive to light.   Cardiovascular:      Rate and Rhythm: Normal rate and regular rhythm.      Pulses: Normal pulses.      Heart sounds: Normal heart  sounds.   Pulmonary:      Effort: Pulmonary effort is normal. No respiratory distress.      Breath sounds: Normal breath sounds. No stridor. No wheezing, rhonchi or rales.   Chest:      Chest wall: No tenderness.   Abdominal:      General: Abdomen is flat. Bowel sounds are normal.      Palpations: Abdomen is soft.   Musculoskeletal:         General: Normal range of motion.      Cervical back: Normal range of motion and neck supple.   Skin:     General: Skin is warm and dry.      Capillary Refill: Capillary refill takes less than 2 seconds.   Neurological:      General: No focal deficit present.      Mental Status: She is alert and oriented to person, place, and time.         Assessment/Plan   Diagnoses and all orders for this visit:  Solitary pulmonary nodule on lung CT  -     IR guided biopsy, PET scan, PFTs.  - Follow-up with results    Abad Pereyra MD  Thoracic and Esophageal Surgery         Abad Linda MD 04/02/24 3:11 PM

## 2024-04-02 NOTE — TELEPHONE ENCOUNTER
Spoke with Bryson, he received a call to schedule appointment in [facility]. The number he wrote down is incorrect, and he can not remember the name or specialty of MD Dr Martines referred them to at [facility]  Please advise

## 2024-04-03 ENCOUNTER — HOSPITAL ENCOUNTER (OUTPATIENT)
Dept: RADIOLOGY | Facility: HOSPITAL | Age: 79
Discharge: HOME | End: 2024-04-03
Payer: MEDICARE

## 2024-04-03 DIAGNOSIS — R93.89 ABNORMAL CT OF THE CHEST: ICD-10-CM

## 2024-04-03 DIAGNOSIS — C34.32 MALIGNANT NEOPLASM OF LOWER LOBE, LEFT BRONCHUS OR LUNG (MULTI): ICD-10-CM

## 2024-04-03 DIAGNOSIS — R91.1 SOLITARY PULMONARY NODULE ON LUNG CT: ICD-10-CM

## 2024-04-03 PROCEDURE — 3430000001 HC RX 343 DIAGNOSTIC RADIOPHARMACEUTICALS: Mod: MUE | Performed by: INTERNAL MEDICINE

## 2024-04-03 PROCEDURE — 78816 PET IMAGE W/CT FULL BODY: CPT | Mod: PS

## 2024-04-03 PROCEDURE — A9552 F18 FDG: HCPCS | Mod: MUE | Performed by: INTERNAL MEDICINE

## 2024-04-03 RX ORDER — FLUDEOXYGLUCOSE F 18 200 MCI/ML
11.9 INJECTION, SOLUTION INTRAVENOUS
Status: COMPLETED | OUTPATIENT
Start: 2024-04-03 | End: 2024-04-03

## 2024-04-03 RX ADMIN — FLUDEOXYGLUCOSE F 18 11.9 MILLICURIE: 200 INJECTION, SOLUTION INTRAVENOUS at 13:15

## 2024-04-09 ENCOUNTER — OFFICE VISIT (OUTPATIENT)
Dept: OTOLARYNGOLOGY | Facility: CLINIC | Age: 79
End: 2024-04-09
Payer: MEDICARE

## 2024-04-09 VITALS — WEIGHT: 146 LBS | TEMPERATURE: 96.8 F | BODY MASS INDEX: 27.56 KG/M2 | HEIGHT: 61 IN

## 2024-04-09 DIAGNOSIS — R91.1 SOLITARY PULMONARY NODULE ON LUNG CT: ICD-10-CM

## 2024-04-09 DIAGNOSIS — J38.01 UNILATERAL COMPLETE PARALYSIS OF VOCAL CORD: ICD-10-CM

## 2024-04-09 DIAGNOSIS — J37.0 CHRONIC LARYNGITIS: ICD-10-CM

## 2024-04-09 DIAGNOSIS — R49.0 HOARSENESS: Primary | ICD-10-CM

## 2024-04-09 PROCEDURE — 99203 OFFICE O/P NEW LOW 30 MIN: CPT | Performed by: OTOLARYNGOLOGY

## 2024-04-09 PROCEDURE — 1159F MED LIST DOCD IN RCRD: CPT | Performed by: OTOLARYNGOLOGY

## 2024-04-09 PROCEDURE — 1160F RVW MEDS BY RX/DR IN RCRD: CPT | Performed by: OTOLARYNGOLOGY

## 2024-04-09 PROCEDURE — 31575 DIAGNOSTIC LARYNGOSCOPY: CPT | Performed by: OTOLARYNGOLOGY

## 2024-04-09 PROCEDURE — 1036F TOBACCO NON-USER: CPT | Performed by: OTOLARYNGOLOGY

## 2024-04-09 NOTE — PROGRESS NOTES
FREDDY  Padilla Campuzano is a 78 y.o. female presents with a few months of hoarseness.  She has a complicated history and recently had a lung nodule identified which she is scheduled to undergo biopsy soon.  She is also scheduled for pulmonary function testing.  Has a history of stroke years ago but the hoarseness is basically new.  She is swallowing well without choking.  She had a PET scan demonstrating a variety of hypermetabolic findings but with regard to the larynx, asymmetric left greater than right activity      Past Medical History:   Diagnosis Date    Diverticulosis of intestine, part unspecified, without perforation or abscess without bleeding     Diverticulosis    Kidney disease     Malignant neoplasm of colon, unspecified (CMS/HCC)     Colon cancer    Personal history of colonic polyps     History of colonic polyps    Personal history of malignant neoplasm of breast     History of malignant neoplasm of female breast    Personal history of other diseases of the respiratory system     History of respiratory failure    Personal history of other malignant neoplasm of skin     Personal history of malignant neoplasm of skin    Personal history of transient ischemic attack (TIA), and cerebral infarction without residual deficits     History of stroke    Right lower quadrant pain 01/22/2016    Right lower quadrant abdominal pain    Right upper quadrant pain 01/22/2016    Abdominal pain, RUQ (right upper quadrant)    Unspecified symptoms and signs involving the genitourinary system 01/22/2016    Urinary symptom or sign            Medications:     Current Outpatient Medications:     biotin 5 mg capsule, once every 24 hours., Disp: , Rfl:     cholecalciferol (Vitamin D-3) 50 mcg (2,000 unit) capsule, 1 capsule (50 mcg) once every 24 hours., Disp: , Rfl:     cholestyramine (Questran) 4 gram packet, MIX 1 SCOOPFUL WITH AT  LEAST 2 TO 6 OUNCES LIQUID  AND DRINK TWICE DAILY for 90, Disp: , Rfl:     lipase-protease-amylase  "(Creon) 24,000-76,000 -120,000 unit capsule, TAKE 3 CAPSULES BY MOUTH 3 TIMES DAILY WITH MEALS AND 1 TO 2  CAPSULES BY MOUTH WITH SNACKS.  MAX 13 CAPSULE DAILY, Disp: 400 capsule, Rfl: 10    pantoprazole (ProtoNix) 40 mg EC tablet, Take 1 tablet by mouth twice a day, Disp: 180 tablet, Rfl: 0     Allergies:  Allergies   Allergen Reactions    Hydrocodone-Acetaminophen Dizziness    Oxycodone Hcl Unknown    Oxycodone-Acetaminophen Dizziness    Aspirin Dizziness and Unknown    Sulfa (Sulfonamide Antibiotics) Rash        Physical Exam:  Last Recorded Vitals  Temperature 36 °C (96.8 °F), height 1.549 m (5' 1\"), weight 66.2 kg (146 lb).  General:     General appearance: Well-developed, well-nourished in no acute distress.       Voice: Very hoarse, almost aphonic       Head/face: Normal appearance; nontender to palpation     Facial nerve function: Normal and symmetric bilaterally.    Oral/oropharynx:     Oral vestibule: Normal labial and gingival mucosa     Tongue/floor of mouth: Normal without lesion     Oropharynx: Clear.  No lesions present of the hard/soft palate, posterior pharynx    Neck:     Neck: Normal appearance, trachea midline     Salivary glands: Normal to palpation bilaterally     Lymph nodes: No cervical lymphadenopathy to palpation     Thyroid: No thyromegaly.  No palpable nodules     Range of motion: Normal    Neurological:     Cortical functions: Alert and oriented x3, appropriate affect       Larynx/hypopharynx:     Laryngeal findings: Mirror exam inadequate or limited secondary to enlarged base of tongue and/or excessive gagging.  Flexible laryngoscopy performed secondary to inadequate mirror examination.  Verbal informed consent the flexible laryngoscope was passed through the nasal cavity.  Normal epiglottis, aryepiglottic folds, arytenoids, false vocal cords; the left vocal cord is immobile in a paramedian position.  She had quite a bit of difficulty with the discomfort and a good view of the true " vocal cords was not able to be had on the left-hand side secondary to hooding from the false vocal cords.  I did not appreciate any obvious obstructive or irregular lesions however.  The right true vocal cord had full mobility    Nasopharynx:     Nasopharynx: Normal    Ear:     Ear canal: Normal bilaterally     Tympanic membrane: Intact and mobile bilaterally     Pinna: Normal bilaterally     Hearing:  Gross hearing assessment normal by voice    Nose:     Visualized using: Anterior rhinoscopy     Nasopharynx: Inadequate mirror exam secondary to gag, anatomy.       Nasal dorsum: Nontraumatic midline appearance     Septum: Deviation   inferior turbinates: Normally sized     Mucosa: Bilateral, pink, normal appearing       ASSESSMENT/PLAN:  She has left vocal cord paralysis which may be related to her thoracic findings although this is a left lower lobe nodule.  I do not appreciate any obvious lesion or mass of the larynx proper but the exam was somewhat limited by her tolerance of flexible laryngoscopy.  I have talked to her about proceeding for direct laryngoscopy and considering injection medialization of the left cord if no other lesions are seen.  The risks of failure, airway loss, bleeding, infection, need for further procedures were all discussed and informed consent was indicated.  We will see about scheduling this after she has had her upcoming procedures for biopsy and pulmonary function testing      Adrian Peralta MD

## 2024-04-15 ASSESSMENT — ENCOUNTER SYMPTOMS
DIAPHORESIS: 0
DIFFICULTY URINATING: 0
NUMBNESS: 0
ABDOMINAL DISTENTION: 0
DIARRHEA: 0
SORE THROAT: 0
HEMATURIA: 0
DIZZINESS: 0
BLOOD IN STOOL: 0
BACK PAIN: 0
SPEECH DIFFICULTY: 0
HEADACHES: 0
SHORTNESS OF BREATH: 0
PALPITATIONS: 0
VOICE CHANGE: 1
NERVOUS/ANXIOUS: 0
ADENOPATHY: 0
WHEEZING: 0
APPETITE CHANGE: 0
SEIZURES: 0
RECTAL PAIN: 0
LIGHT-HEADEDNESS: 0
DEPRESSION: 0
ARTHRALGIAS: 0
WOUND: 0
EYE PROBLEMS: 0
CONSTIPATION: 0
FATIGUE: 1
FEVER: 0
DYSURIA: 0
COUGH: 0
CONFUSION: 0
DECREASED CONCENTRATION: 0
LEG SWELLING: 0
NECK PAIN: 0
SLEEP DISTURBANCE: 0
UNEXPECTED WEIGHT CHANGE: 0
TROUBLE SWALLOWING: 0
HEMOPTYSIS: 0
FLANK PAIN: 0
HOT FLASHES: 0
VOMITING: 0
NECK STIFFNESS: 0
ABDOMINAL PAIN: 0
SCLERAL ICTERUS: 0
CHEST TIGHTNESS: 0
FREQUENCY: 0
CHILLS: 0
EXTREMITY WEAKNESS: 0
MYALGIAS: 0
NAUSEA: 0
BRUISES/BLEEDS EASILY: 0

## 2024-04-17 ENCOUNTER — HOSPITAL ENCOUNTER (OUTPATIENT)
Dept: RESPIRATORY THERAPY | Facility: HOSPITAL | Age: 79
Setting detail: THERAPIES SERIES
Discharge: HOME | End: 2024-04-17
Payer: MEDICARE

## 2024-04-17 ENCOUNTER — APPOINTMENT (OUTPATIENT)
Dept: PRIMARY CARE | Facility: CLINIC | Age: 79
End: 2024-04-17
Payer: MEDICARE

## 2024-04-17 DIAGNOSIS — R91.1 LUNG NODULE: ICD-10-CM

## 2024-04-17 PROCEDURE — 94726 PLETHYSMOGRAPHY LUNG VOLUMES: CPT

## 2024-04-22 ENCOUNTER — APPOINTMENT (OUTPATIENT)
Dept: HEMATOLOGY/ONCOLOGY | Facility: HOSPITAL | Age: 79
End: 2024-04-22
Payer: MEDICARE

## 2024-04-23 ENCOUNTER — HOSPITAL ENCOUNTER (OUTPATIENT)
Dept: RADIOLOGY | Facility: HOSPITAL | Age: 79
Discharge: HOME | End: 2024-04-23
Payer: MEDICARE

## 2024-04-23 ENCOUNTER — APPOINTMENT (OUTPATIENT)
Dept: CARDIAC SURGERY | Facility: CLINIC | Age: 79
End: 2024-04-23
Payer: MEDICARE

## 2024-04-23 VITALS
TEMPERATURE: 97.7 F | SYSTOLIC BLOOD PRESSURE: 148 MMHG | OXYGEN SATURATION: 94 % | RESPIRATION RATE: 18 BRPM | DIASTOLIC BLOOD PRESSURE: 117 MMHG | HEART RATE: 79 BPM

## 2024-04-23 DIAGNOSIS — R93.89 ABNORMAL CT OF THE CHEST: ICD-10-CM

## 2024-04-23 DIAGNOSIS — R91.1 SOLITARY PULMONARY NODULE ON LUNG CT: ICD-10-CM

## 2024-04-23 LAB
POCT INTERNATIONAL NORMALIZATION RATIO: 1
POCT PROTHROMBIN TIME: 11.8 SECONDS

## 2024-04-23 PROCEDURE — 88305 TISSUE EXAM BY PATHOLOGIST: CPT | Mod: TC | Performed by: STUDENT IN AN ORGANIZED HEALTH CARE EDUCATION/TRAINING PROGRAM

## 2024-04-23 PROCEDURE — 2720000007 HC OR 272 NO HCPCS

## 2024-04-23 PROCEDURE — 2500000005 HC RX 250 GENERAL PHARMACY W/O HCPCS: Performed by: STUDENT IN AN ORGANIZED HEALTH CARE EDUCATION/TRAINING PROGRAM

## 2024-04-23 PROCEDURE — 88305 TISSUE EXAM BY PATHOLOGIST: CPT | Performed by: PATHOLOGY

## 2024-04-23 PROCEDURE — 71045 X-RAY EXAM CHEST 1 VIEW: CPT

## 2024-04-23 PROCEDURE — 32408 CORE NDL BX LNG/MED PERQ: CPT

## 2024-04-23 PROCEDURE — 32408 CORE NDL BX LNG/MED PERQ: CPT | Performed by: STUDENT IN AN ORGANIZED HEALTH CARE EDUCATION/TRAINING PROGRAM

## 2024-04-23 RX ORDER — LIDOCAINE HYDROCHLORIDE 10 MG/ML
INJECTION, SOLUTION EPIDURAL; INFILTRATION; INTRACAUDAL; PERINEURAL
Status: COMPLETED | OUTPATIENT
Start: 2024-04-23 | End: 2024-04-23

## 2024-04-23 RX ORDER — DEXTROSE MONOHYDRATE AND SODIUM CHLORIDE 5; .45 G/100ML; G/100ML
50 INJECTION, SOLUTION INTRAVENOUS CONTINUOUS
Status: DISCONTINUED | OUTPATIENT
Start: 2024-04-23 | End: 2024-04-25 | Stop reason: HOSPADM

## 2024-04-23 RX ADMIN — LIDOCAINE HYDROCHLORIDE 7 ML: 10 INJECTION, SOLUTION EPIDURAL; INFILTRATION; INTRACAUDAL; PERINEURAL at 11:01

## 2024-04-23 ASSESSMENT — PAIN SCALES - GENERAL
PAINLEVEL_OUTOF10: 0 - NO PAIN
PAINLEVEL_OUTOF10: 3
PAINLEVEL_OUTOF10: 0 - NO PAIN
PAINLEVEL_OUTOF10: 0 - NO PAIN
PAINLEVEL_OUTOF10: 4
PAINLEVEL_OUTOF10: 0 - NO PAIN
PAINLEVEL_OUTOF10: 4
PAINLEVEL_OUTOF10: 0 - NO PAIN
PAINLEVEL_OUTOF10: 0 - NO PAIN

## 2024-04-23 ASSESSMENT — PAIN - FUNCTIONAL ASSESSMENT: PAIN_FUNCTIONAL_ASSESSMENT: 0-10

## 2024-04-23 ASSESSMENT — COLUMBIA-SUICIDE SEVERITY RATING SCALE - C-SSRS
1. IN THE PAST MONTH, HAVE YOU WISHED YOU WERE DEAD OR WISHED YOU COULD GO TO SLEEP AND NOT WAKE UP?: NO
2. HAVE YOU ACTUALLY HAD ANY THOUGHTS OF KILLING YOURSELF?: NO

## 2024-04-23 NOTE — Clinical Note
Biopsy samples taken.  Needle out and 4x4 folded with a clear tegaderm placed to left back chest.  Pt denies any pain

## 2024-04-23 NOTE — Clinical Note
Dr says no sedation at this time so dr can instruct pt to hold her breath at certain times.  Pt states she has to urinate.

## 2024-04-23 NOTE — Clinical Note
Pt able to scoot over onto cart with 1 person help.  Off the o2 and o2 sats 93% on room air.  Denies any shortness of breath.  Pt states she peed herself.  Will get pt upstairs to help change.  Pt denies any pain

## 2024-04-23 NOTE — Clinical Note
Pt right side down and left side up on ct table and secured pt on the ct table.  Placed on 3liters nc.

## 2024-04-24 LAB
LABORATORY COMMENT REPORT: NORMAL
PATH REPORT.FINAL DX SPEC: NORMAL
PATH REPORT.GROSS SPEC: NORMAL
PATH REPORT.RELEVANT HX SPEC: NORMAL
PATH REPORT.TOTAL CANCER: NORMAL

## 2024-04-29 ENCOUNTER — OFFICE VISIT (OUTPATIENT)
Dept: PRIMARY CARE | Facility: CLINIC | Age: 79
End: 2024-04-29
Payer: MEDICARE

## 2024-04-29 VITALS
BODY MASS INDEX: 27.28 KG/M2 | SYSTOLIC BLOOD PRESSURE: 152 MMHG | TEMPERATURE: 98.9 F | HEART RATE: 68 BPM | WEIGHT: 144.4 LBS | OXYGEN SATURATION: 92 % | DIASTOLIC BLOOD PRESSURE: 68 MMHG

## 2024-04-29 DIAGNOSIS — Z12.31 ENCOUNTER FOR SCREENING MAMMOGRAM FOR MALIGNANT NEOPLASM OF BREAST: ICD-10-CM

## 2024-04-29 DIAGNOSIS — C50.919 MALIGNANT NEOPLASM OF FEMALE BREAST, UNSPECIFIED ESTROGEN RECEPTOR STATUS, UNSPECIFIED LATERALITY, UNSPECIFIED SITE OF BREAST (MULTI): ICD-10-CM

## 2024-04-29 DIAGNOSIS — E78.00 HYPERCHOLESTEREMIA: ICD-10-CM

## 2024-04-29 DIAGNOSIS — I10 HYPERTENSION, UNSPECIFIED TYPE: ICD-10-CM

## 2024-04-29 DIAGNOSIS — Z85.048 HISTORY OF ANAL CANCER: ICD-10-CM

## 2024-04-29 DIAGNOSIS — J38.01 UNILATERAL VOCAL CORD PARALYSIS: ICD-10-CM

## 2024-04-29 DIAGNOSIS — R93.89 ABNORMAL CT OF THE CHEST: ICD-10-CM

## 2024-04-29 DIAGNOSIS — Z00.00 ENCOUNTER FOR SUBSEQUENT ANNUAL WELLNESS VISIT (AWV) IN MEDICARE PATIENT: Primary | ICD-10-CM

## 2024-04-29 DIAGNOSIS — E55.9 VITAMIN D DEFICIENCY: ICD-10-CM

## 2024-04-29 PROCEDURE — G0439 PPPS, SUBSEQ VISIT: HCPCS | Performed by: INTERNAL MEDICINE

## 2024-04-29 PROCEDURE — 1159F MED LIST DOCD IN RCRD: CPT | Performed by: INTERNAL MEDICINE

## 2024-04-29 PROCEDURE — 3078F DIAST BP <80 MM HG: CPT | Performed by: INTERNAL MEDICINE

## 2024-04-29 PROCEDURE — 1124F ACP DISCUSS-NO DSCNMKR DOCD: CPT | Performed by: INTERNAL MEDICINE

## 2024-04-29 PROCEDURE — 1170F FXNL STATUS ASSESSED: CPT | Performed by: INTERNAL MEDICINE

## 2024-04-29 PROCEDURE — 1160F RVW MEDS BY RX/DR IN RCRD: CPT | Performed by: INTERNAL MEDICINE

## 2024-04-29 PROCEDURE — 3077F SYST BP >= 140 MM HG: CPT | Performed by: INTERNAL MEDICINE

## 2024-04-29 PROCEDURE — 99214 OFFICE O/P EST MOD 30 MIN: CPT | Performed by: INTERNAL MEDICINE

## 2024-04-29 PROCEDURE — 1036F TOBACCO NON-USER: CPT | Performed by: INTERNAL MEDICINE

## 2024-04-29 RX ORDER — LOSARTAN POTASSIUM 25 MG/1
25 TABLET ORAL DAILY
Qty: 30 TABLET | Refills: 0 | Status: SHIPPED | OUTPATIENT
Start: 2024-04-29 | End: 2024-05-28 | Stop reason: SDUPTHER

## 2024-04-29 ASSESSMENT — ENCOUNTER SYMPTOMS
DEPRESSION: 0
HYPERTENSION: 1
LOSS OF SENSATION IN FEET: 0
OCCASIONAL FEELINGS OF UNSTEADINESS: 0

## 2024-04-29 ASSESSMENT — ACTIVITIES OF DAILY LIVING (ADL)
BATHING: INDEPENDENT
DOING_HOUSEWORK: INDEPENDENT
DRESSING: INDEPENDENT
TAKING_MEDICATION: INDEPENDENT

## 2024-04-29 ASSESSMENT — PATIENT HEALTH QUESTIONNAIRE - PHQ9
SUM OF ALL RESPONSES TO PHQ9 QUESTIONS 1 AND 2: 0
1. LITTLE INTEREST OR PLEASURE IN DOING THINGS: NOT AT ALL
2. FEELING DOWN, DEPRESSED OR HOPELESS: NOT AT ALL

## 2024-04-30 NOTE — PROGRESS NOTES
Subjective   Reason for Visit: Padilla Campuzano is an 78 y.o. female here for a Medicare Wellness visit / follow up    Spouse / Bryson present H&P    Past Medical, Surgical, and Family History reviewed and updated in chart.    Reviewed all medications by prescribing practitioner or clinical pharmacist (such as prescriptions, OTCs, herbal therapies and supplements) and documented in the medical record.    Hypertension        Medicare wellness    Mammogram  11-21  Dexa n/a  Colonoscopy n/a  CT chest lung cancer screening n/a  GYN n/a  immunizations rev'd RSV, shingrix, pneumo  BMI 27.2    Follow up    Hypertension  Counseled  Start losartan 25 mg daily  Risk / benefits rev'd    Follow up CT chest  CT/PET rev'd / abnormal  oncology following     Patient presented in December with laryngitis.  She was seen and evaluated in the [facility] urgent care.  Diagnosed with viral laryngitis.  Voice has still not returned.  Chronic laryngitis due to unilateral vocal cord paralysis  ENT following     H/o lung nodule on CT 2-23  Bx negative  h/o smoker     History of breast cancer  mammogram ordered     History of colon cancer     History of anal cancer     History of cerebral hemorrhage 2012.     Diet and exercise reviewed    Patient Care Team:  Naheed Clements MD as PCP - General (Internal Medicine)  DO Carroll Calabrese MD as Consulting Physician (Hematology and Oncology)     Review of Systems   All other systems reviewed and are negative.      Objective   Vitals:  /68   Pulse 68   Temp 37.2 °C (98.9 °F)   Wt 65.5 kg (144 lb 6.4 oz)   SpO2 92%   BMI 27.28 kg/m²       Physical Exam  Vitals reviewed.   Constitutional:       Appearance: Normal appearance. She is normal weight.      Comments: No voice / whispers   HENT:      Head: Normocephalic and atraumatic.      Mouth/Throat:      Pharynx: No posterior oropharyngeal erythema.   Eyes:      General: No scleral icterus.     Conjunctiva/sclera: Conjunctivae  normal.      Pupils: Pupils are equal, round, and reactive to light.   Cardiovascular:      Rate and Rhythm: Normal rate and regular rhythm.      Heart sounds: Normal heart sounds.   Pulmonary:      Effort: No respiratory distress.      Breath sounds: No wheezing.   Abdominal:      General: Abdomen is flat. Bowel sounds are normal. There is no distension.      Palpations: Abdomen is soft. There is no mass.      Tenderness: There is no abdominal tenderness. There is no rebound.   Musculoskeletal:         General: Normal range of motion.      Cervical back: Normal range of motion and neck supple.   Skin:     General: Skin is warm and dry.   Neurological:      General: No focal deficit present.      Mental Status: She is alert and oriented to person, place, and time. Mental status is at baseline.   Psychiatric:         Mood and Affect: Mood normal.         Behavior: Behavior normal.         Thought Content: Thought content normal.         Judgment: Judgment normal.         Assessment/Plan   Problem List Items Addressed This Visit       Breast cancer (Multi)    Relevant Orders    CBC and Auto Differential    History of anal cancer    Relevant Orders    CBC and Auto Differential    Vitamin D deficiency    Relevant Orders    Vitamin D 25-Hydroxy,Total (for eval of Vitamin D levels)     Other Visit Diagnoses       Encounter for subsequent annual wellness visit (AWV) in Medicare patient    -  Primary    Unilateral vocal cord paralysis        Abnormal CT of the chest        Hypercholesteremia        Relevant Orders    Comprehensive Metabolic Panel    Lipid Panel    TSH with reflex to Free T4 if abnormal    Hypertension, unspecified type        Relevant Medications    losartan (Cozaar) 25 mg tablet    Encounter for screening mammogram for malignant neoplasm of breast        Relevant Orders    BI mammo bilateral screening tomosynthesis    BMI 27.0-27.9,adult              Medicare wellness    Mammogram  11-21  Dexa  n/a  Colonoscopy n/a  CT chest lung cancer screening n/a  GYN n/a  immunizations rev'd RSV, shingrix, pneumo  BMI 27.2    Follow up    Hypertension  Counseled  Start losartan 25 mg daily  Risk / benefits rev'd    Follow up CT chest  CT/PET rev'd / abnormal  oncology following     Patient presented in December with laryngitis.  She was seen and evaluated in the [place] urgent care.  Diagnosed with viral laryngitis.  Voice has still not returned.  Chronic laryngitis due to unilateral vocal cord paralysis  ENT following     H/o lung nodule on CT 2-23  Bx negative  h/o smoker     History of breast cancer  mammogram ordered     History of colon cancer     History of anal cancer     History of cerebral hemorrhage 2012.     Diet and exercise reviewed    Check labs, mammogram  before appt    Follow up 1 month

## 2024-05-01 ENCOUNTER — PREP FOR PROCEDURE (OUTPATIENT)
Dept: OTOLARYNGOLOGY | Facility: HOSPITAL | Age: 79
End: 2024-05-01

## 2024-05-01 ENCOUNTER — TELEPHONE (OUTPATIENT)
Dept: OTOLARYNGOLOGY | Facility: CLINIC | Age: 79
End: 2024-05-01

## 2024-05-01 ENCOUNTER — LAB (OUTPATIENT)
Dept: LAB | Facility: LAB | Age: 79
End: 2024-05-01
Payer: MEDICARE

## 2024-05-01 DIAGNOSIS — E55.9 VITAMIN D DEFICIENCY: ICD-10-CM

## 2024-05-01 DIAGNOSIS — E78.00 HYPERCHOLESTEREMIA: ICD-10-CM

## 2024-05-01 DIAGNOSIS — C50.919 MALIGNANT NEOPLASM OF FEMALE BREAST, UNSPECIFIED ESTROGEN RECEPTOR STATUS, UNSPECIFIED LATERALITY, UNSPECIFIED SITE OF BREAST (MULTI): ICD-10-CM

## 2024-05-01 DIAGNOSIS — K52.9 CHRONIC DIARRHEA: ICD-10-CM

## 2024-05-01 DIAGNOSIS — Z85.048 HISTORY OF ANAL CANCER: ICD-10-CM

## 2024-05-01 DIAGNOSIS — J38.01 UNILATERAL COMPLETE PARALYSIS OF VOCAL CORD: Primary | ICD-10-CM

## 2024-05-01 LAB
25(OH)D3 SERPL-MCNC: 53 NG/ML (ref 30–100)
ALBUMIN SERPL BCP-MCNC: 4 G/DL (ref 3.4–5)
ALP SERPL-CCNC: 57 U/L (ref 33–136)
ALT SERPL W P-5'-P-CCNC: 9 U/L (ref 7–45)
ANION GAP SERPL CALC-SCNC: 12 MMOL/L (ref 10–20)
AST SERPL W P-5'-P-CCNC: 18 U/L (ref 9–39)
BASOPHILS # BLD AUTO: 0.05 X10*3/UL (ref 0–0.1)
BASOPHILS NFR BLD AUTO: 0.8 %
BILIRUB SERPL-MCNC: 0.4 MG/DL (ref 0–1.2)
BUN SERPL-MCNC: 29 MG/DL (ref 6–23)
CALCIUM SERPL-MCNC: 8.5 MG/DL (ref 8.6–10.3)
CHLORIDE SERPL-SCNC: 109 MMOL/L (ref 98–107)
CHOLEST SERPL-MCNC: 123 MG/DL (ref 0–199)
CHOLESTEROL/HDL RATIO: 2.7
CO2 SERPL-SCNC: 25 MMOL/L (ref 21–32)
CREAT SERPL-MCNC: 1.35 MG/DL (ref 0.5–1.05)
EGFRCR SERPLBLD CKD-EPI 2021: 40 ML/MIN/1.73M*2
EOSINOPHIL # BLD AUTO: 0.19 X10*3/UL (ref 0–0.4)
EOSINOPHIL NFR BLD AUTO: 3.1 %
ERYTHROCYTE [DISTWIDTH] IN BLOOD BY AUTOMATED COUNT: 12.8 % (ref 11.5–14.5)
GLUCOSE SERPL-MCNC: 85 MG/DL (ref 74–99)
HCT VFR BLD AUTO: 38.1 % (ref 36–46)
HDLC SERPL-MCNC: 44.8 MG/DL
HGB BLD-MCNC: 12.2 G/DL (ref 12–16)
IMM GRANULOCYTES # BLD AUTO: 0.01 X10*3/UL (ref 0–0.5)
IMM GRANULOCYTES NFR BLD AUTO: 0.2 % (ref 0–0.9)
LDLC SERPL CALC-MCNC: 52 MG/DL
LYMPHOCYTES # BLD AUTO: 2.31 X10*3/UL (ref 0.8–3)
LYMPHOCYTES NFR BLD AUTO: 37.6 %
MCH RBC QN AUTO: 31.3 PG (ref 26–34)
MCHC RBC AUTO-ENTMCNC: 32 G/DL (ref 32–36)
MCV RBC AUTO: 98 FL (ref 80–100)
MONOCYTES # BLD AUTO: 0.64 X10*3/UL (ref 0.05–0.8)
MONOCYTES NFR BLD AUTO: 10.4 %
NEUTROPHILS # BLD AUTO: 2.94 X10*3/UL (ref 1.6–5.5)
NEUTROPHILS NFR BLD AUTO: 47.9 %
NON HDL CHOLESTEROL: 78 MG/DL (ref 0–149)
NRBC BLD-RTO: 0 /100 WBCS (ref 0–0)
PLATELET # BLD AUTO: 335 X10*3/UL (ref 150–450)
POTASSIUM SERPL-SCNC: 3.7 MMOL/L (ref 3.5–5.3)
PROT SERPL-MCNC: 6.3 G/DL (ref 6.4–8.2)
RBC # BLD AUTO: 3.9 X10*6/UL (ref 4–5.2)
SODIUM SERPL-SCNC: 142 MMOL/L (ref 136–145)
TRIGL SERPL-MCNC: 130 MG/DL (ref 0–149)
TSH SERPL-ACNC: 3.83 MIU/L (ref 0.44–3.98)
VLDL: 26 MG/DL (ref 0–40)
WBC # BLD AUTO: 6.1 X10*3/UL (ref 4.4–11.3)

## 2024-05-01 PROCEDURE — 82306 VITAMIN D 25 HYDROXY: CPT

## 2024-05-01 PROCEDURE — 36415 COLL VENOUS BLD VENIPUNCTURE: CPT

## 2024-05-01 NOTE — TELEPHONE ENCOUNTER
Patient called to schedule DL with bx and vocal cord medialization. Workup for pulmonary nodule came back benign per her . She would like to schedule for 5/20/2024. Would you please place the order in epic? Thanks!

## 2024-05-02 RX ORDER — PANTOPRAZOLE SODIUM 40 MG/1
TABLET, DELAYED RELEASE ORAL
Qty: 180 TABLET | Refills: 0 | Status: SHIPPED | OUTPATIENT
Start: 2024-05-02

## 2024-05-06 ENCOUNTER — APPOINTMENT (OUTPATIENT)
Dept: HEMATOLOGY/ONCOLOGY | Facility: HOSPITAL | Age: 79
End: 2024-05-06
Payer: MEDICARE

## 2024-05-06 ENCOUNTER — OFFICE VISIT (OUTPATIENT)
Dept: HEMATOLOGY/ONCOLOGY | Facility: HOSPITAL | Age: 79
End: 2024-05-06
Payer: MEDICARE

## 2024-05-06 VITALS
RESPIRATION RATE: 20 BRPM | HEART RATE: 74 BPM | SYSTOLIC BLOOD PRESSURE: 163 MMHG | DIASTOLIC BLOOD PRESSURE: 73 MMHG | OXYGEN SATURATION: 94 % | WEIGHT: 144.18 LBS | BODY MASS INDEX: 27.24 KG/M2 | TEMPERATURE: 97.2 F

## 2024-05-06 DIAGNOSIS — R91.1 SOLITARY PULMONARY NODULE ON LUNG CT: ICD-10-CM

## 2024-05-06 PROCEDURE — 1125F AMNT PAIN NOTED PAIN PRSNT: CPT | Performed by: INTERNAL MEDICINE

## 2024-05-06 PROCEDURE — 1160F RVW MEDS BY RX/DR IN RCRD: CPT | Performed by: INTERNAL MEDICINE

## 2024-05-06 PROCEDURE — 1159F MED LIST DOCD IN RCRD: CPT | Performed by: INTERNAL MEDICINE

## 2024-05-06 PROCEDURE — 99214 OFFICE O/P EST MOD 30 MIN: CPT | Performed by: INTERNAL MEDICINE

## 2024-05-06 ASSESSMENT — PAIN SCALES - GENERAL: PAINLEVEL: 10-WORST PAIN EVER

## 2024-05-06 NOTE — PROGRESS NOTES
Patient ID: Padilla Campuzano is a 78 y.o. female.    DIAGNOSIS     Suspicious left lower lobe pulmonary nodule, biopsy showed absence of malignancy on April 23, 2024.  However lesion remains clinically suspicious.        STAGING     Solitary left pulmonary nodule       CURRENT SITES OF DISEASE      Left lower lobe        MOLECULAR GENOMICS     Not applicable        SERUM TUMOR MARKERS     Not applicable        PRIOR THERAPY     None        CURRENT THERAPY     Patient to see thoracic surgery for decision of resection versus follow-up CT scan.        CURRENT ONCOLOGICAL PROBLEMS     1-history of multiple prior cancers including breast cancer colon cancer and anal cancer,   2-hoarseness of voice since December 2023, refer to ENT for evaluation, suspect vocal cord paralysis, seen by ENT on April 9, 2024, left vocal cord paralysis.  No obvious CT scan finding to explain this.  Plan for medialization surgery.  3-May 2024  PET uptake in anorectal region given prior history of squamous cell cancer of the anus, contacted her surgeon Dr. Panchal to arrange for follow-up.         HISTORY OF PRESENT ILLNESS     This is a 78-year-old patient.  She presented in late December and January with voice changes diagnosed with possibly laryngitis.  She also noticed during this time a progressive decrease in her energy level.She was referred to ENT but has not yet seen them.  Given her history of malignancy further imaging was ordered.  She had a CT scan of the chest without contrast done on March 21, 2024 showing interval development of the left lower lobe cavitary pulmonary nodule measuring 1.1 cm, interval enlargement of the right lower lobe groundglass nodule measuring 1.1 cm that was previously 0.6 cm.  There was moderate emphysema.  Additional nonspecific pulmonary nodules were stable.  Her CT scan was compared to a chest the CT of January 20, 2022.  Given her prior history of malignancies and a new suspicious pulmonary nodule she has  been referred to oncology for evaluation.        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  Diagnosed in 2017.     2- According to patient early stage colon cancer in 2001 s/p surgery alone     3- Breast cancer in 2012.  Details not available, according to patient early stage with right-sided breast surgery this was treated at [facility] in [place] did receive chemotherapy and radiation.  She developed significant side effects from her chemotherapy and stopped it.  She received no hormonal therapy.     4-cerebral aneurysm with hemorrhage s/p coiling at [facility] in [place].        SOCIAL HISTORY     Patient lives in [facility], she lives with her .  She had 4 children but 1 has passed away.  She also has 3 stepchildren.  She started smoking at the age of 27 to the age of 64.  Smoked 33 years on average 1 pack/day.  She was a housewife.        CURRENT MEDS     See medication list, meds reviewed        ALLERGIES     Allergies include hydrocodone causing dizziness, she notes oxycodone, aspirin causing dizziness and sulfa causing rash.        FAMILY HISTORY      2 brothers had cancer 1 had lung cancer the other 1 colon cancer.  Mother also had colon cancer in her 70s.    Subjective    Cancer  Pertinent negatives include no abdominal pain, anorexia, arthralgias, change in bowel habit, chest pain, chills, congestion, coughing, diaphoresis, fatigue, fever, headaches, joint swelling, myalgias, nausea, neck pain, numbness, rash, sore throat, swollen glands, urinary symptoms, vertigo, visual change, vomiting or weakness.         Objective    BSA: 1.68 meters squared  /73 (BP Location: Left arm, Patient Position: Sitting, BP Cuff Size: Adult)   Pulse 74   Temp 36.2 °C (97.2 °F) (Temporal)   Resp 20   Wt 65.4 kg (144 lb 2.9 oz)   SpO2 94%   BMI 27.24 kg/m²      Physical Exam  Constitutional:       General:  She is not in acute distress.     Appearance: She is not ill-appearing, toxic-appearing or diaphoretic.   HENT:      Nose: No congestion or rhinorrhea.      Mouth/Throat:      Pharynx: No oropharyngeal exudate or posterior oropharyngeal erythema.   Eyes:      General: No scleral icterus.     Conjunctiva/sclera: Conjunctivae normal.   Cardiovascular:      Rate and Rhythm: Normal rate and regular rhythm.      Pulses: Normal pulses.      Heart sounds: Normal heart sounds. No murmur heard.     No friction rub. No gallop.   Pulmonary:      Effort: Pulmonary effort is normal. No respiratory distress.      Breath sounds: Normal breath sounds. No stridor. No wheezing, rhonchi or rales.   Chest:      Chest wall: No tenderness.   Abdominal:      General: Abdomen is flat. Bowel sounds are normal. There is no distension.      Palpations: Abdomen is soft. There is no mass.      Tenderness: There is no abdominal tenderness. There is no guarding or rebound.   Musculoskeletal:      Cervical back: No tenderness.      Right lower leg: No edema.      Left lower leg: No edema.   Lymphadenopathy:      Cervical: No cervical adenopathy.   Skin:     Coloration: Skin is not jaundiced.      Findings: No bruising.   Neurological:      General: No focal deficit present.      Mental Status: She is oriented to person, place, and time.   Psychiatric:         Mood and Affect: Mood normal.         Behavior: Behavior normal.         Performance Status:  Asymptomatic      PET 4/3/204  IMPRESSION:  1. Hypermetabolic cavitary left lower lobe nodule consistent with  malignancy.  2. Focal hypermetabolic activity in the anus is nonspecific, but  concerning for malignancy. Correlate with direct visualization given  patient's history.  3. Diffuse hypermetabolic activity in the rectosigmoid colon is also  nonspecific and possibly physiologic, but given the recent findings  on anatomic imaging, may be infectious or inflammatory in nature.  4. Postsurgical  changes from partial colectomy without evidence of  local disease recurrence.  5. Right upper quadrant hypermetabolic focus which appears to be  centered in the small bowel is favored to be physiologic, however a  small inferior liver lesion cannot be definitely excluded. Attention  on follow-up imaging versus MRI liver is recommended.  6. Nonspecific asymmetric left greater than right vocal cord  metabolic activity, correlate with concern for right vocal cord  dysfunction.    Collected 4/23/2024 11:18       Status: Final result       Visible to patient: Yes (seen)       Dx: Abnormal CT of the chest; Solitary pu...    0 Result Notes      Component    FINAL DIAGNOSIS      LEFT LUNG, LOWER LOBE, CORE BIOPSY:              Lung parenchyma with focal intra-alveolar fibrin/neutrophils, lymphoid aggregate and recent hemorrhage, negative for malignancy.           Assessment/Plan     This is a very nice 78-year-old patient with a history of multiple prior malignancies who has a PET positive small left cavitary lung lesion.  This is clearly suspicious however a CT-guided biopsy was negative.  I explained to the patient that this remains somewhat suspicious despite a negative biopsy.  Options include resection versus observation with repeat CT scans.  I have contacted her surgeon who will be seeing her soon and the decision for follow-up versus resection will be made by them.  She has a history of anal cancer, this area shows a PET positivity and most likely is postinflammatory from prior treatment however I did reach out to her colorectal surgeon and he will be seeing her for follow-up for repeat scoping.      Cancer Staging   No matching staging information was found for the patient.      Carroll Martines MD  Professor of Medicine and Oncology  [facility]  Geena Zavala Chair in Lung Cancer  Thoracic Oncology  [facility]

## 2024-05-07 ENCOUNTER — OFFICE VISIT (OUTPATIENT)
Dept: CARDIAC SURGERY | Facility: CLINIC | Age: 79
End: 2024-05-07
Payer: MEDICARE

## 2024-05-07 VITALS
WEIGHT: 144 LBS | HEART RATE: 78 BPM | RESPIRATION RATE: 18 BRPM | BODY MASS INDEX: 27.19 KG/M2 | OXYGEN SATURATION: 99 % | SYSTOLIC BLOOD PRESSURE: 165 MMHG | HEIGHT: 61 IN | DIASTOLIC BLOOD PRESSURE: 70 MMHG

## 2024-05-07 DIAGNOSIS — R91.1 LUNG NODULE: Primary | ICD-10-CM

## 2024-05-07 DIAGNOSIS — R91.1 LUNG NODULE: ICD-10-CM

## 2024-05-07 PROCEDURE — 1160F RVW MEDS BY RX/DR IN RCRD: CPT | Performed by: THORACIC SURGERY (CARDIOTHORACIC VASCULAR SURGERY)

## 2024-05-07 PROCEDURE — 1159F MED LIST DOCD IN RCRD: CPT | Performed by: THORACIC SURGERY (CARDIOTHORACIC VASCULAR SURGERY)

## 2024-05-07 PROCEDURE — 1126F AMNT PAIN NOTED NONE PRSNT: CPT | Performed by: THORACIC SURGERY (CARDIOTHORACIC VASCULAR SURGERY)

## 2024-05-07 PROCEDURE — 99215 OFFICE O/P EST HI 40 MIN: CPT | Performed by: THORACIC SURGERY (CARDIOTHORACIC VASCULAR SURGERY)

## 2024-05-07 PROCEDURE — 1036F TOBACCO NON-USER: CPT | Performed by: THORACIC SURGERY (CARDIOTHORACIC VASCULAR SURGERY)

## 2024-05-07 ASSESSMENT — ENCOUNTER SYMPTOMS
PSYCHIATRIC NEGATIVE: 1
APPETITE CHANGE: 0
DIAPHORESIS: 0
PALPITATIONS: 0
UNEXPECTED WEIGHT CHANGE: 0
DIARRHEA: 0
CHEST TIGHTNESS: 0
NEUROLOGICAL NEGATIVE: 1
EYES NEGATIVE: 1
FEVER: 0
ABDOMINAL PAIN: 0
ENDOCRINE NEGATIVE: 1
DEPRESSION: 0
SHORTNESS OF BREATH: 0
CONSTIPATION: 0
CHOKING: 0
VOMITING: 0
COUGH: 0
CHILLS: 0
WHEEZING: 0
ALLERGIC/IMMUNOLOGIC NEGATIVE: 1
HEMATOLOGIC/LYMPHATIC NEGATIVE: 1
FATIGUE: 0
STRIDOR: 0
NAUSEA: 0
LOSS OF SENSATION IN FEET: 0
MUSCULOSKELETAL NEGATIVE: 1
ABDOMINAL DISTENTION: 0
OCCASIONAL FEELINGS OF UNSTEADINESS: 0

## 2024-05-07 ASSESSMENT — LIFESTYLE VARIABLES
SKIP TO QUESTIONS 9-10: 1
HOW OFTEN DO YOU HAVE A DRINK CONTAINING ALCOHOL: NEVER
AUDIT-C TOTAL SCORE: 0
HOW OFTEN DO YOU HAVE SIX OR MORE DRINKS ON ONE OCCASION: NEVER
HOW MANY STANDARD DRINKS CONTAINING ALCOHOL DO YOU HAVE ON A TYPICAL DAY: PATIENT DOES NOT DRINK

## 2024-05-07 ASSESSMENT — PAIN SCALES - GENERAL: PAINLEVEL: 0-NO PAIN

## 2024-05-07 NOTE — PROGRESS NOTES
Subjective   Patient ID: Padilla Campuzano is a 78 y.o. female who presents for Follow-up (Test results).  HPI    78-year-old female with multiple medical problems and history of breast cancer colon cancer and anal cancer.  She was found to have a left lower lobe nodule which is new in nature and some cavitation as well.  This nodule is concerning for malignancy.  She is scheduled to have a biopsy in a PET scan.  I will add PFTs.  I think based on those results we can formulate a better plan for her.  See her again with results.  She seems like an active woman we will assess her candidacy for resection if one is needed with her PFTs.    Update 5/7/2024  PFTs show an FEV1 of 110% of predicted and a DLCO of 72%.  She underwent an IR guided biopsy that showed lung parenchyma with focal intra-alveolar fibrin and neutrophils with some lymphoid aggregates and recent hemorrhage but negative for malignancy.  I discussion with her about the next steps.  We discussed the option of close follow-up with a CT scan in 3 months versus surgical resection via wedge resection to deal with his lung nodule.  She wants to wait 3 months with a CT scan she is not interested in the surgery at this point.  I will see her again in 3 months with a CT of the chest.    Review of Systems   Constitutional:  Negative for appetite change, chills, diaphoresis, fatigue, fever and unexpected weight change.   HENT: Negative.     Eyes: Negative.    Respiratory:  Negative for cough, choking, chest tightness, shortness of breath, wheezing and stridor.    Cardiovascular:  Negative for chest pain, palpitations and leg swelling.   Gastrointestinal:  Negative for abdominal distention, abdominal pain, constipation, diarrhea, nausea and vomiting.   Endocrine: Negative.    Genitourinary: Negative.    Musculoskeletal: Negative.    Skin: Negative.    Allergic/Immunologic: Negative.    Neurological: Negative.    Hematological: Negative.    Psychiatric/Behavioral:  Negative.     All other systems reviewed and are negative.      Objective   Physical Exam  Constitutional:       Appearance: Normal appearance.   HENT:      Head: Normocephalic and atraumatic.      Nose: Nose normal.      Mouth/Throat:      Mouth: Mucous membranes are moist.      Pharynx: Oropharynx is clear.   Eyes:      Extraocular Movements: Extraocular movements intact.      Conjunctiva/sclera: Conjunctivae normal.      Pupils: Pupils are equal, round, and reactive to light.   Cardiovascular:      Rate and Rhythm: Normal rate and regular rhythm.      Pulses: Normal pulses.      Heart sounds: Normal heart sounds.   Pulmonary:      Effort: Pulmonary effort is normal. No respiratory distress.      Breath sounds: Normal breath sounds. No stridor. No wheezing, rhonchi or rales.   Chest:      Chest wall: No tenderness.   Abdominal:      General: Abdomen is flat. Bowel sounds are normal.      Palpations: Abdomen is soft.   Musculoskeletal:         General: Normal range of motion.      Cervical back: Normal range of motion and neck supple.   Skin:     General: Skin is warm and dry.      Capillary Refill: Capillary refill takes less than 2 seconds.   Neurological:      General: No focal deficit present.      Mental Status: She is alert and oriented to person, place, and time.         Assessment/Plan   Diagnoses and all orders for this visit:  Solitary pulmonary nodule on lung CT  -     Follow-up in 3 months with a CT of the chest.    Abad Pereyra MD  Thoracic and Esophageal Surgery         Abad Linda MD 05/07/24 11:56 AM

## 2024-05-13 ASSESSMENT — ENCOUNTER SYMPTOMS
ABDOMINAL PAIN: 0
FATIGUE: 0
JOINT SWELLING: 0
HEADACHES: 0
ARTHRALGIAS: 0
NUMBNESS: 0
NAUSEA: 0
ANOREXIA: 0
VOMITING: 0
COUGH: 0
FEVER: 0
WEAKNESS: 0
CHILLS: 0
SORE THROAT: 0
VISUAL CHANGE: 0
CHANGE IN BOWEL HABIT: 0
VERTIGO: 0
SWOLLEN GLANDS: 0
NECK PAIN: 0
DIAPHORESIS: 0
MYALGIAS: 0

## 2024-05-15 ENCOUNTER — PRE-ADMISSION TESTING (OUTPATIENT)
Dept: PREADMISSION TESTING | Facility: HOSPITAL | Age: 79
End: 2024-05-15
Payer: MEDICARE

## 2024-05-15 VITALS
SYSTOLIC BLOOD PRESSURE: 161 MMHG | HEART RATE: 81 BPM | WEIGHT: 142 LBS | BODY MASS INDEX: 26.13 KG/M2 | OXYGEN SATURATION: 95 % | RESPIRATION RATE: 16 BRPM | TEMPERATURE: 97.2 F | HEIGHT: 62 IN | DIASTOLIC BLOOD PRESSURE: 73 MMHG

## 2024-05-15 PROCEDURE — 99203 OFFICE O/P NEW LOW 30 MIN: CPT | Performed by: PHYSICIAN ASSISTANT

## 2024-05-15 RX ORDER — ACETAMINOPHEN 500 MG
1000 TABLET ORAL EVERY 6 HOURS PRN
COMMUNITY

## 2024-05-15 ASSESSMENT — DUKE ACTIVITY SCORE INDEX (DASI)
CAN YOU PARTICIPATE IN STRENOUS SPORTS LIKE SWIMMING, SINGLES TENNIS, FOOTBALL, BASKETBALL, OR SKIING: NO
CAN YOU DO LIGHT WORK AROUND THE HOUSE LIKE DUSTING OR WASHING DISHES: YES
CAN YOU RUN A SHORT DISTANCE: NO
CAN YOU DO YARD WORK LIKE RAKING LEAVES, WEEDING OR PUSHING A MOWER: YES
CAN YOU HAVE SEXUAL RELATIONS: NO
DASI METS SCORE: 5.6
CAN YOU PARTICIPATE IN MODERATE RECREATIONAL ACTIVITIES LIKE GOLF, BOWLING, DANCING, DOUBLES TENNIS OR THROWING A BASEBALL OR FOOTBALL: NO
CAN YOU DO HEAVY WORK AROUND THE HOUSE LIKE SCRUBBING FLOORS OR LIFTING AND MOVING HEAVY FURNITURE: NO
CAN YOU CLIMB A FLIGHT OF STAIRS OR WALK UP A HILL: YES
CAN YOU TAKE CARE OF YOURSELF (EAT, DRESS, BATHE, OR USE TOILET): YES
CAN YOU WALK A BLOCK OR TWO ON LEVEL GROUND: YES
CAN YOU DO MODERATE WORK AROUND THE HOUSE LIKE VACUUMING, SWEEPING FLOORS OR CARRYING GROCERIES: YES
CAN YOU WALK INDOORS, SUCH AS AROUND YOUR HOUSE: YES
TOTAL_SCORE: 23.45

## 2024-05-15 ASSESSMENT — ENCOUNTER SYMPTOMS
PSYCHIATRIC NEGATIVE: 1
DIARRHEA: 1
CONSTITUTIONAL NEGATIVE: 1
ENDOCRINE NEGATIVE: 1
ARTHRALGIAS: 1
NEUROLOGICAL NEGATIVE: 1
RESPIRATORY NEGATIVE: 1
CARDIOVASCULAR NEGATIVE: 1
EYES NEGATIVE: 1

## 2024-05-15 NOTE — H&P (VIEW-ONLY)
"CPM/PAT Evaluation       Name: Padilla Campuzano (Padilla Campuzano)  /Age: 1945/ y.o.     In-Person       Chief Complaint: \"hoarseness\"    HPI  The patient is a 78 year old female.  In  she developed sudden hoarseness.  This has been constant in nature.  She denies dysphagia.  Over the past month she has had a productive cough, but no fever or chills.  She was seen by Dr. Peralta and a direct laryngoscopy is recommended.    Past Medical History:   Diagnosis Date    Cerebral hemorrhage (Multi)     CKD (chronic kidney disease)     Colorectal cancer (Multi)     Diverticulosis of intestine, part unspecified, without perforation or abscess without bleeding     Diverticulosis    GERD (gastroesophageal reflux disease)     Hypertension     Irritable bowel syndrome     Kidney disease     Malignant neoplasm of colon, unspecified (Multi)     Colon cancer    Personal history of colonic polyps     History of colonic polyps    Personal history of malignant neoplasm of breast     History of malignant neoplasm of female breast    Personal history of other diseases of the respiratory system     History of respiratory failure    Personal history of other malignant neoplasm of skin     Personal history of malignant neoplasm of skin    Right upper quadrant pain 2016    Abdominal pain, RUQ (right upper quadrant)    Stroke (Multi)     Unspecified symptoms and signs involving the genitourinary system 2016    Urinary symptom or sign       Past Surgical History:   Procedure Laterality Date    BI BREAST CYST ASPIRATION RIGHT Right 2019    BI BREAST CYST ASPIRATION RIGHT [facility]    BREAST LUMPECTOMY  2014    Breast Surgery Lumpectomy    CHOLECYSTECTOMY      CT GUIDED IMAGING FOR ABSCESS DRAIN  2015    CT GUIDED IMAGING FOR ABSCESS DRAIN [facility]    HYSTERECTOMY  2014    Hysterectomy    ILEOSTOMY  2015    Ileostomy    ILEOSTOMY CLOSURE  2015    " Ileostomy Closure    OTHER SURGICAL HISTORY      Cerebral artery coiling       Family History   Problem Relation Name Age of Onset    Colon cancer Mother      Liver cancer Mother      Kidney cancer Mother      Heart attack Father      Blood clot Father      Cancer Brother      Cancer Brother       Social History     Tobacco Use    Smoking status: Former     Current packs/day: 0.00     Types: Cigarettes     Quit date:      Years since quittin.3     Passive exposure: Past    Smokeless tobacco: Never   Substance Use Topics    Alcohol use: Never     Social History     Substance and Sexual Activity   Drug Use Never     Allergies   Allergen Reactions    Hydrocodone-Acetaminophen Dizziness    Oxycodone Hcl Unknown    Oxycodone-Acetaminophen Dizziness    Aspirin Dizziness and Unknown    Sulfa (Sulfonamide Antibiotics) Rash     Current Outpatient Medications   Medication Sig Dispense Refill    acetaminophen (Tylenol) 500 mg tablet Take 2 tablets (1,000 mg) by mouth every 6 hours if needed for mild pain (1 - 3).      biotin 5 mg capsule once every 24 hours.      cholecalciferol (Vitamin D-3) 50 mcg (2,000 unit) capsule 1 capsule (50 mcg) once every 24 hours.      cholestyramine (Questran) 4 gram powder once daily.      lipase-protease-amylase (Creon) 24,000-76,000 -120,000 unit capsule TAKE 3 CAPSULES BY MOUTH 3 TIMES DAILY WITH MEALS AND 1 TO 2  CAPSULES BY MOUTH WITH SNACKS.  MAX 13 CAPSULE DAILY 400 capsule 10    losartan (Cozaar) 25 mg tablet Take 1 tablet (25 mg) by mouth once daily. 30 tablet 0    pantoprazole (ProtoNix) 40 mg EC tablet TAKE 1 TABLET BY MOUTH TWICE  DAILY 180 tablet 0     No current facility-administered medications for this visit.     Review of Systems   Constitutional: Negative.    HENT: Negative.     Eyes: Negative.    Respiratory: Negative.     Cardiovascular: Negative.    Gastrointestinal:  Positive for diarrhea.        Occasional bright red rectal bleeding   Endocrine: Negative.   "  Genitourinary: Negative.    Musculoskeletal:  Positive for arthralgias.   Neurological: Negative.    Psychiatric/Behavioral: Negative.       /73   Pulse 81   Temp 36.2 °C (97.2 °F) (Temporal)   Resp 16   Ht 1.575 m (5' 2\")   Wt 64.4 kg (142 lb)   SpO2 95%   BMI 25.97 kg/m²     Physical Exam  Vitals reviewed.   Constitutional:       Appearance: Normal appearance.   HENT:      Head: Normocephalic and atraumatic.      Mouth/Throat:      Mouth: Mucous membranes are moist.      Pharynx: Oropharynx is clear.   Eyes:      Extraocular Movements: Extraocular movements intact.      Pupils: Pupils are equal, round, and reactive to light.   Cardiovascular:      Rate and Rhythm: Normal rate and regular rhythm.      Heart sounds: Normal heart sounds.   Pulmonary:      Breath sounds: Normal breath sounds.   Abdominal:      General: Bowel sounds are normal.      Palpations: Abdomen is soft.   Musculoskeletal:         General: No swelling.   Skin:     General: Skin is warm and dry.   Neurological:      General: No focal deficit present.      Mental Status: She is alert and oriented to person, place, and time.   Psychiatric:         Mood and Affect: Mood normal.         Behavior: Behavior normal.        PAT AIRWAY:   Airway:     Mallampati::  II    TM distance::  >3 FB    Neck ROM::  Full   Teeth intact    ASA:  III  DASI RISK SCORE:  23.45  METS SCORE:  5.6  CHAD2 SCORE:  8.5%  REVISED CARDIAC RISK INDEX:  0.9%  STOP BANG SCORE:  2    CBC, CMP  5/1/2024    Assessment and Plan:     Unilateral complete paralysis of vocal cord:  Left direct laryngoscopy  H/O CVA/Cerebral hemorrhage - 2012 - s/p coiling procedure - currently stable  Chronic kidney disease - stage 3 - followed by PCP  Hypertension - currently managed with Estefani Quiñones PA-C          "

## 2024-05-15 NOTE — PREPROCEDURE INSTRUCTIONS
PAT DISCHARGE INSTRUCTIONS    Please call the Same Day Surgery (SDS) Department of the hospital where your procedure will be performed after 2:00 PM the day before your surgery. If you are scheduled on a Monday, or a Tuesday following a Monday holiday, you will need to call on the last business day prior to your surgery.    [facility]  [address], [address]  [phone]    [facility]  [address] [place] [address] [phone]    [facility]  [address]  [place] [id number]  [phone]    Please let your surgeon know if:      You develop any open sores, shingles, burning or painful urination as these may increase your risk of an infection.   You no longer wish to have the surgery.   Any other personal circumstances change that may lead to the need to cancel or defer this surgery-such as being sick or getting admitted to any hospital within one week of your planned procedure.    Your contact details change, such as a change of address or phone number.    Starting now:     Please DO NOT drink alcohol or smoke for 24 hours before surgery. It is well known that quitting smoking can make a huge difference to your health and recovery from surgery. The longer you abstain from smoking, the better your chances of a healthy recovery. If you need help with quitting, call 9-800-QUIT-NOW to be connected to a trained counselor who will discuss the best methods to help you quit.     Before your surgery:    Please stop all supplements 7 days prior to surgery. Or as directed by your surgeon.   Please stop taking NSAID pain medicine such as Advil and Motrin 7 days before surgery.    If you develop any fever, cough, cold, rashes, cuts, scratches, scrapes, urinary symptoms or infection anywhere on your body (including teeth and gums) prior to surgery, please call your surgeon’s office as soon as  possible. This may require treatment to reduce the chance of cancellation on the day of surgery.    The day before your surgery:   Get a good night’s rest.  Use the special soap for bathing if you have been instructed to use one.    Scheduled surgery times may change and you will be notified if this occurs - please check your personal voicemail for any updates.     On the morning of surgery:   Wear comfortable, loose fitting clothes which open in the front. Please do not wear moisturizers, creams, lotions, makeup or perfume.    Please bring with you to surgery:   Photo ID and insurance card   Current list of medicines and allergies   Pacemaker/ Defibrillator/Heart stent cards   CPAP machine and mask    Slings/ splints/ crutches   A copy of your complete advanced directive/DHPOA.    Please do NOT bring with you to surgery:   All jewelry and valuables should be left at home.   Prosthetic devices such as contact lenses, hearing aids, dentures, eyelash extensions, hairpins and body piercings must be removed prior to going in to the surgical suite.    After outpatient surgery:   A responsible adult MUST accompany you at the time of discharge and stay with you for 24 hours after your surgery. You may NOT drive yourself home after surgery.    Do not drive, operate machinery, make critical decisions or do activities that require co-ordination or balance until after a night’s sleep.   Do not drink alcoholic beverages for 24 hours.   Instructions for resuming your medications will be provided by your surgeon.    CALL YOUR DOCTOR AFTER SURGERY IF YOU HAVE:     Chills and/or a fever of 101° F or higher.    Redness, swelling, pus or drainage from your surgical wound or a bad smell from the wound.    Lightheadedness, fainting or confusion.    Persistent vomiting (throwing up) and are not able to eat or drink for 12 hours.    Three or more loose, watery bowel movements in 24 hours (diarrhea).   Difficulty or pain while urinating(  after non-urological surgery)    Pain and swelling in your legs, especially if it is only on one side.    Difficulty breathing or are breathing faster than normal.    Any new concerning symptoms.            Why must I stop eating and drinking near surgery time?  With sedation, food or liquid in your stomach can enter your lungs causing serious complications  Increases nausea and vomiting    When do I need to stop eating and drinking before my surgery?   Do not eat or drink after midnight the night before your surgery/procedure.  You may have small sips of water to take your medication.     Medication List            Accurate as of May 15, 2024 12:55 PM. Always use your most recent med list.                acetaminophen 500 mg tablet  Commonly known as: Tylenol  Medication Adjustments for Surgery: Other (Comment)  Notes to patient: CONTINUE IF NEEDED     biotin 5 mg capsule  Medication Adjustments for Surgery: Stop 7 days before surgery     cholecalciferol 50 mcg (2,000 unit) capsule  Commonly known as: Vitamin D-3  Medication Adjustments for Surgery: Stop 7 days before surgery     cholestyramine 4 gram powder  Commonly known as: Questran  Medication Adjustments for Surgery: Other (Comment)  Notes to patient: HOLD DAY OF SURGERY     Creon 24,000-76,000 -120,000 unit capsule  Generic drug: lipase-protease-amylase  TAKE 3 CAPSULES BY MOUTH 3 TIMES DAILY WITH MEALS AND 1 TO 2  CAPSULES BY MOUTH WITH SNACKS.  MAX 13 CAPSULE DAILY  Medication Adjustments for Surgery: Other (Comment)  Notes to patient: HOLD DAY OF SURGERY     losartan 25 mg tablet  Commonly known as: Cozaar  Take 1 tablet (25 mg) by mouth once daily.  Medication Adjustments for Surgery: Other (Comment)  Notes to patient: HOLD NIGHT BEFORE SURGERY DOSE/LAST DOSE WILL BE MAY 18 NIGHT     pantoprazole 40 mg EC tablet  Commonly known as: ProtoNix  TAKE 1 TABLET BY MOUTH TWICE  DAILY  Medication Adjustments for Surgery: Take morning of surgery with sip of  water, no other fluids

## 2024-05-20 ENCOUNTER — ANESTHESIA EVENT (OUTPATIENT)
Dept: OPERATING ROOM | Facility: HOSPITAL | Age: 79
End: 2024-05-20
Payer: MEDICARE

## 2024-05-20 ENCOUNTER — ANESTHESIA (OUTPATIENT)
Dept: OPERATING ROOM | Facility: HOSPITAL | Age: 79
End: 2024-05-20
Payer: MEDICARE

## 2024-05-20 ENCOUNTER — HOSPITAL ENCOUNTER (OUTPATIENT)
Facility: HOSPITAL | Age: 79
Setting detail: OUTPATIENT SURGERY
Discharge: HOME | End: 2024-05-20
Attending: OTOLARYNGOLOGY | Admitting: OTOLARYNGOLOGY
Payer: MEDICARE

## 2024-05-20 VITALS
RESPIRATION RATE: 16 BRPM | DIASTOLIC BLOOD PRESSURE: 52 MMHG | TEMPERATURE: 97.2 F | HEART RATE: 83 BPM | SYSTOLIC BLOOD PRESSURE: 107 MMHG | OXYGEN SATURATION: 94 %

## 2024-05-20 DIAGNOSIS — J38.01 UNILATERAL COMPLETE PARALYSIS OF VOCAL CORD: ICD-10-CM

## 2024-05-20 PROCEDURE — 2500000002 HC RX 250 W HCPCS SELF ADMINISTERED DRUGS (ALT 637 FOR MEDICARE OP, ALT 636 FOR OP/ED): Performed by: STUDENT IN AN ORGANIZED HEALTH CARE EDUCATION/TRAINING PROGRAM

## 2024-05-20 PROCEDURE — 7100000001 HC RECOVERY ROOM TIME - INITIAL BASE CHARGE: Performed by: OTOLARYNGOLOGY

## 2024-05-20 PROCEDURE — 2720000007 HC OR 272 NO HCPCS: Performed by: OTOLARYNGOLOGY

## 2024-05-20 PROCEDURE — 3600000003 HC OR TIME - INITIAL BASE CHARGE - PROCEDURE LEVEL THREE: Performed by: OTOLARYNGOLOGY

## 2024-05-20 PROCEDURE — A31535 PR LARYNGOSCOPY,DIRCT,OP,BIOPSY: Performed by: NURSE ANESTHETIST, CERTIFIED REGISTERED

## 2024-05-20 PROCEDURE — 88305 TISSUE EXAM BY PATHOLOGIST: CPT | Performed by: PATHOLOGY

## 2024-05-20 PROCEDURE — 2500000005 HC RX 250 GENERAL PHARMACY W/O HCPCS: Performed by: NURSE ANESTHETIST, CERTIFIED REGISTERED

## 2024-05-20 PROCEDURE — 31535 LARYNGOSCOPY W/BIOPSY: CPT | Performed by: OTOLARYNGOLOGY

## 2024-05-20 PROCEDURE — A31535 PR LARYNGOSCOPY,DIRCT,OP,BIOPSY: Performed by: STUDENT IN AN ORGANIZED HEALTH CARE EDUCATION/TRAINING PROGRAM

## 2024-05-20 PROCEDURE — 2500000004 HC RX 250 GENERAL PHARMACY W/ HCPCS (ALT 636 FOR OP/ED): Performed by: STUDENT IN AN ORGANIZED HEALTH CARE EDUCATION/TRAINING PROGRAM

## 2024-05-20 PROCEDURE — 2500000004 HC RX 250 GENERAL PHARMACY W/ HCPCS (ALT 636 FOR OP/ED): Performed by: NURSE ANESTHETIST, CERTIFIED REGISTERED

## 2024-05-20 PROCEDURE — 7100000009 HC PHASE TWO TIME - INITIAL BASE CHARGE: Performed by: OTOLARYNGOLOGY

## 2024-05-20 PROCEDURE — 7100000002 HC RECOVERY ROOM TIME - EACH INCREMENTAL 1 MINUTE: Performed by: OTOLARYNGOLOGY

## 2024-05-20 PROCEDURE — 3600000008 HC OR TIME - EACH INCREMENTAL 1 MINUTE - PROCEDURE LEVEL THREE: Performed by: OTOLARYNGOLOGY

## 2024-05-20 PROCEDURE — 2500000005 HC RX 250 GENERAL PHARMACY W/O HCPCS: Performed by: OTOLARYNGOLOGY

## 2024-05-20 PROCEDURE — 3700000002 HC GENERAL ANESTHESIA TIME - EACH INCREMENTAL 1 MINUTE: Performed by: OTOLARYNGOLOGY

## 2024-05-20 PROCEDURE — 99100 ANES PT EXTEME AGE<1 YR&>70: CPT | Performed by: STUDENT IN AN ORGANIZED HEALTH CARE EDUCATION/TRAINING PROGRAM

## 2024-05-20 PROCEDURE — 7100000010 HC PHASE TWO TIME - EACH INCREMENTAL 1 MINUTE: Performed by: OTOLARYNGOLOGY

## 2024-05-20 PROCEDURE — 3700000001 HC GENERAL ANESTHESIA TIME - INITIAL BASE CHARGE: Performed by: OTOLARYNGOLOGY

## 2024-05-20 PROCEDURE — 88305 TISSUE EXAM BY PATHOLOGIST: CPT | Mod: TC | Performed by: OTOLARYNGOLOGY

## 2024-05-20 RX ORDER — FENTANYL CITRATE 50 UG/ML
25 INJECTION, SOLUTION INTRAMUSCULAR; INTRAVENOUS EVERY 5 MIN PRN
Status: DISCONTINUED | OUTPATIENT
Start: 2024-05-20 | End: 2024-05-20 | Stop reason: HOSPADM

## 2024-05-20 RX ORDER — LABETALOL HYDROCHLORIDE 5 MG/ML
5 INJECTION, SOLUTION INTRAVENOUS ONCE AS NEEDED
Status: COMPLETED | OUTPATIENT
Start: 2024-05-20 | End: 2024-05-20

## 2024-05-20 RX ORDER — ALBUTEROL SULFATE 0.83 MG/ML
2.5 SOLUTION RESPIRATORY (INHALATION) ONCE AS NEEDED
Status: COMPLETED | OUTPATIENT
Start: 2024-05-20 | End: 2024-05-20

## 2024-05-20 RX ORDER — IPRATROPIUM BROMIDE 0.5 MG/2.5ML
500 SOLUTION RESPIRATORY (INHALATION) AS NEEDED
Status: DISCONTINUED | OUTPATIENT
Start: 2024-05-20 | End: 2024-05-20 | Stop reason: HOSPADM

## 2024-05-20 RX ORDER — HYDRALAZINE HYDROCHLORIDE 20 MG/ML
5 INJECTION INTRAMUSCULAR; INTRAVENOUS EVERY 30 MIN PRN
Status: DISCONTINUED | OUTPATIENT
Start: 2024-05-20 | End: 2024-05-20 | Stop reason: HOSPADM

## 2024-05-20 RX ORDER — PHENYLEPHRINE HCL IN 0.9% NACL 1 MG/10 ML
SYRINGE (ML) INTRAVENOUS AS NEEDED
Status: DISCONTINUED | OUTPATIENT
Start: 2024-05-20 | End: 2024-05-20

## 2024-05-20 RX ORDER — ONDANSETRON HYDROCHLORIDE 2 MG/ML
4 INJECTION, SOLUTION INTRAVENOUS ONCE AS NEEDED
Status: DISCONTINUED | OUTPATIENT
Start: 2024-05-20 | End: 2024-05-20 | Stop reason: HOSPADM

## 2024-05-20 RX ORDER — MEPERIDINE HYDROCHLORIDE 25 MG/ML
12.5 INJECTION INTRAMUSCULAR; INTRAVENOUS; SUBCUTANEOUS EVERY 10 MIN PRN
Status: DISCONTINUED | OUTPATIENT
Start: 2024-05-20 | End: 2024-05-20 | Stop reason: HOSPADM

## 2024-05-20 RX ORDER — LIDOCAINE HYDROCHLORIDE 40 MG/ML
SOLUTION TOPICAL AS NEEDED
Status: DISCONTINUED | OUTPATIENT
Start: 2024-05-20 | End: 2024-05-20 | Stop reason: HOSPADM

## 2024-05-20 RX ORDER — LIDOCAINE HYDROCHLORIDE 10 MG/ML
INJECTION INFILTRATION; PERINEURAL AS NEEDED
Status: DISCONTINUED | OUTPATIENT
Start: 2024-05-20 | End: 2024-05-20

## 2024-05-20 RX ORDER — DIPHENHYDRAMINE HYDROCHLORIDE 50 MG/ML
12.5 INJECTION INTRAMUSCULAR; INTRAVENOUS ONCE AS NEEDED
Status: DISCONTINUED | OUTPATIENT
Start: 2024-05-20 | End: 2024-05-20 | Stop reason: HOSPADM

## 2024-05-20 RX ORDER — ONDANSETRON HYDROCHLORIDE 2 MG/ML
INJECTION, SOLUTION INTRAVENOUS AS NEEDED
Status: DISCONTINUED | OUTPATIENT
Start: 2024-05-20 | End: 2024-05-20

## 2024-05-20 RX ORDER — PROCHLORPERAZINE EDISYLATE 5 MG/ML
5 INJECTION INTRAMUSCULAR; INTRAVENOUS ONCE AS NEEDED
Status: DISCONTINUED | OUTPATIENT
Start: 2024-05-20 | End: 2024-05-20 | Stop reason: HOSPADM

## 2024-05-20 RX ORDER — LABETALOL HYDROCHLORIDE 5 MG/ML
10 INJECTION, SOLUTION INTRAVENOUS
Status: DISCONTINUED | OUTPATIENT
Start: 2024-05-20 | End: 2024-05-20 | Stop reason: HOSPADM

## 2024-05-20 RX ORDER — FENTANYL CITRATE 50 UG/ML
50 INJECTION, SOLUTION INTRAMUSCULAR; INTRAVENOUS EVERY 5 MIN PRN
Status: DISCONTINUED | OUTPATIENT
Start: 2024-05-20 | End: 2024-05-20 | Stop reason: HOSPADM

## 2024-05-20 RX ORDER — PROPOFOL 10 MG/ML
INJECTION, EMULSION INTRAVENOUS AS NEEDED
Status: DISCONTINUED | OUTPATIENT
Start: 2024-05-20 | End: 2024-05-20

## 2024-05-20 RX ORDER — SUCCINYLCHOLINE CHLORIDE 20 MG/ML
INJECTION INTRAMUSCULAR; INTRAVENOUS AS NEEDED
Status: DISCONTINUED | OUTPATIENT
Start: 2024-05-20 | End: 2024-05-20

## 2024-05-20 RX ORDER — FENTANYL CITRATE 50 UG/ML
INJECTION, SOLUTION INTRAMUSCULAR; INTRAVENOUS AS NEEDED
Status: DISCONTINUED | OUTPATIENT
Start: 2024-05-20 | End: 2024-05-20

## 2024-05-20 RX ORDER — SODIUM CHLORIDE, SODIUM LACTATE, POTASSIUM CHLORIDE, CALCIUM CHLORIDE 600; 310; 30; 20 MG/100ML; MG/100ML; MG/100ML; MG/100ML
100 INJECTION, SOLUTION INTRAVENOUS CONTINUOUS
Status: DISCONTINUED | OUTPATIENT
Start: 2024-05-20 | End: 2024-05-20 | Stop reason: HOSPADM

## 2024-05-20 RX ADMIN — FENTANYL CITRATE 25 MCG: 0.05 INJECTION, SOLUTION INTRAMUSCULAR; INTRAVENOUS at 16:36

## 2024-05-20 RX ADMIN — LABETALOL HYDROCHLORIDE 10 MG: 5 INJECTION, SOLUTION INTRAVENOUS at 17:37

## 2024-05-20 RX ADMIN — PROPOFOL 50 MG: 10 INJECTION, EMULSION INTRAVENOUS at 16:27

## 2024-05-20 RX ADMIN — DEXAMETHASONE SODIUM PHOSPHATE 10 MG: 4 INJECTION, SOLUTION INTRAMUSCULAR; INTRAVENOUS at 16:22

## 2024-05-20 RX ADMIN — FENTANYL CITRATE 50 MCG: 0.05 INJECTION, SOLUTION INTRAMUSCULAR; INTRAVENOUS at 16:12

## 2024-05-20 RX ADMIN — FENTANYL CITRATE 25 MCG: 50 INJECTION INTRAMUSCULAR; INTRAVENOUS at 17:39

## 2024-05-20 RX ADMIN — HYDRALAZINE HYDROCHLORIDE 5 MG: 20 INJECTION INTRAMUSCULAR; INTRAVENOUS at 18:02

## 2024-05-20 RX ADMIN — IPRATROPIUM BROMIDE 500 MCG: 0.5 SOLUTION RESPIRATORY (INHALATION) at 18:58

## 2024-05-20 RX ADMIN — ONDANSETRON HYDROCHLORIDE 4 MG: 2 INJECTION INTRAMUSCULAR; INTRAVENOUS at 16:46

## 2024-05-20 RX ADMIN — SUCCINYLCHOLINE CHLORIDE 100 MG: 20 INJECTION, SOLUTION INTRAMUSCULAR; INTRAVENOUS at 16:12

## 2024-05-20 RX ADMIN — SODIUM CHLORIDE, SODIUM LACTATE, POTASSIUM CHLORIDE, AND CALCIUM CHLORIDE: 600; 310; 30; 20 INJECTION, SOLUTION INTRAVENOUS at 16:00

## 2024-05-20 RX ADMIN — Medication 100 MCG: at 16:22

## 2024-05-20 RX ADMIN — Medication 100 MCG: at 16:33

## 2024-05-20 RX ADMIN — FENTANYL CITRATE 25 MCG: 50 INJECTION INTRAMUSCULAR; INTRAVENOUS at 17:14

## 2024-05-20 RX ADMIN — PROPOFOL 150 MG: 10 INJECTION, EMULSION INTRAVENOUS at 16:12

## 2024-05-20 RX ADMIN — LABETALOL HYDROCHLORIDE 5 MG: 5 INJECTION, SOLUTION INTRAVENOUS at 17:26

## 2024-05-20 RX ADMIN — PROPOFOL 50 MG: 10 INJECTION, EMULSION INTRAVENOUS at 16:36

## 2024-05-20 RX ADMIN — Medication 100 MCG: at 16:36

## 2024-05-20 RX ADMIN — ALBUTEROL SULFATE 2.5 MG: 2.5 SOLUTION RESPIRATORY (INHALATION) at 18:58

## 2024-05-20 RX ADMIN — LIDOCAINE HYDROCHLORIDE 50 MG: 10 INJECTION, SOLUTION INFILTRATION; PERINEURAL at 16:12

## 2024-05-20 ASSESSMENT — COLUMBIA-SUICIDE SEVERITY RATING SCALE - C-SSRS
1. IN THE PAST MONTH, HAVE YOU WISHED YOU WERE DEAD OR WISHED YOU COULD GO TO SLEEP AND NOT WAKE UP?: NO
2. HAVE YOU ACTUALLY HAD ANY THOUGHTS OF KILLING YOURSELF?: NO
6. HAVE YOU EVER DONE ANYTHING, STARTED TO DO ANYTHING, OR PREPARED TO DO ANYTHING TO END YOUR LIFE?: NO

## 2024-05-20 ASSESSMENT — PAIN SCALES - GENERAL
PAINLEVEL_OUTOF10: 4
PAINLEVEL_OUTOF10: 0 - NO PAIN
PAINLEVEL_OUTOF10: 5 - MODERATE PAIN
PAINLEVEL_OUTOF10: 4
PAINLEVEL_OUTOF10: 4
PAINLEVEL_OUTOF10: 5 - MODERATE PAIN

## 2024-05-20 ASSESSMENT — PAIN - FUNCTIONAL ASSESSMENT
PAIN_FUNCTIONAL_ASSESSMENT: 0-10

## 2024-05-20 ASSESSMENT — PAIN DESCRIPTION - DESCRIPTORS
DESCRIPTORS: SORE

## 2024-05-20 ASSESSMENT — PAIN DESCRIPTION - LOCATION
LOCATION: THROAT
LOCATION: THROAT

## 2024-05-20 NOTE — ANESTHESIA POSTPROCEDURE EVALUATION
Patient: Padilla Campuzano    Procedure Summary       Date: 05/20/24 Room / Location: [facility] OR 01 / [facility]    Anesthesia Start: 1554 Anesthesia Stop: 1713    Procedure: Direct Laryngoscopy (Left: Throat) Diagnosis:       Unilateral complete paralysis of vocal cord      (Unilateral complete paralysis of vocal cord [J38.01])    Surgeons: Adrian Peralta MD Responsible Provider: Lincoln Parisi MD    Anesthesia Type: general ASA Status: 3            Anesthesia Type: general    Vitals Value Taken Time   /65 05/20/24 1746   Temp 36 °C (96.8 °F) 05/20/24 1707   Pulse 70 05/20/24 1748   Resp 13 05/20/24 1748   SpO2 95 % 05/20/24 1748   Vitals shown include unfiled device data.    Anesthesia Post Evaluation    Patient location during evaluation: PACU  Patient participation: complete - patient participated  Level of consciousness: awake  Pain management: adequate  Multimodal analgesia pain management approach  Airway patency: patent  Cardiovascular status: acceptable and hemodynamically stable  Respiratory status: acceptable and spontaneous ventilation  Hydration status: euvolemic  Postoperative Nausea and Vomiting: none  Comments: No nausea or vomiting  Her oxygen saturation at baseline is 94-95%, with a history of smoking. Post-op she was breathing fine with no complaints, but her finger pulse ox would read around 90-91, while an ear clip showed >95%. She received duoneb and used incentive spirometry with consistent findings. She was discharged with that reassuring ear saturation. There were no other complications.        There were no known notable events for this encounter.

## 2024-05-20 NOTE — ANESTHESIA PROCEDURE NOTES
Airway  Date/Time: 5/20/2024 4:16 PM  Urgency: elective    Airway not difficult    Staffing  Performed: CRNA   Authorized by: Lincoln Parisi MD    Performed by: ÁNGEL Garcia-CRNA  Patient location during procedure: OR    Indications and Patient Condition  Indications for airway management: anesthesia  Spontaneous Ventilation: absent  Sedation level: deep  Preoxygenated: yes  Patient position: sniffing  MILS maintained throughout  Mask difficulty assessment: 1 - vent by mask    Final Airway Details  Final airway type: endotracheal airway      Successful airway: ETT  Cuffed: yes   Successful intubation technique: direct laryngoscopy  Facilitating devices/methods: intubating stylet  Endotracheal tube insertion site: oral  Blade: Adore  Blade size: #3  ETT size (mm): 6.5  Cormack-Lehane Classification: grade I - full view of glottis  Placement verified by: chest auscultation and capnometry   Measured from: teeth  ETT to teeth (cm): 21  Number of attempts at approach: 1

## 2024-05-20 NOTE — OP NOTE
OP NOTE     Date: 2024  OR Location: [facility]    Name: Padilla Campuzano : 1945 Age: 78 y.o. MRN: [id number]  Sex: female      Diagnosis  Pre-op Diagnosis    Pre-Op Diagnosis Codes:     * Unilateral complete paralysis of vocal cord [J38.01] Post-op Diagnosis     Pre-Op Diagnosis Codes:     * Unilateral complete paralysis of vocal cord [J38.01]     Procedures    Direct Laryngoscopy  [address] - SD LARYNGOSCOPY DIRECT OPERATIVE W/BIOPSY       Surgeons      Adrian Peralta MD     Assistant:  See OR log    Procedure Summary  Anesthesia: General  Anesthesia Staff: Anesthesiologist: Lincoln Parisi MD  CRNA: LAUREANO Garcia    Estimated Blood Loss: 0      Specimen:   ID Type Source Tests Collected by Time   1 : sub glottis Tissue LARYNX BIOPSY (SPECIFY SITE) SURGICAL PATHOLOGY EXAM Adrian Peralta MD 2024 1634         Drains and/or Catheters: None     Implants: NONE     Findings: Irregular mucosa extending from the left true vocal cord subglottic around midline and beneath the right true vocal cord    Indications: Padilla Campuzano is a 78 y.o. year old female patient who is having surgery for hoarseness, left vocal cord paralysis, PET scan with irregularity left greater than right.      The patient was seen in the preoperative area. The risks, benefits, complications, treatment options, non-operative alternatives, expected recovery and outcomes were discussed with the patient. The patient concurred with the proposed plan, giving informed consent.  The site of surgery was properly noted/marked if necessary per policy. The patient has been actively warmed in preoperative area.     Procedure Details: The patient was seen and identified in the preoperative area and transported to the operating room and positioned on the table in a supine fashion. General anesthesia was induced and the patient was orotracheally intubated with a 6.5 endotracheal tube.    She was turned 90 degrees and draped for laryngoscopy.  A Isaiaho  laryngoscope was introduced into the oral cavity after protecting the gingiva with a moist Ray-Olya gauze.  Excellent visualization was had.  After advancing the laryngoscope into the supraglottis, the left true vocal cord could be seen to be irregular with mucosal irregularity extending into the ventricle on the left.  It extended inferiorly into the subglottis and around midline and up to the undersurface of the right true vocal cord.  Generous biopsies were taken of various locations and sent fresh for routine histopathology.  There was not much bleeding.  Epinephrine soaked pledgets were placed in the endolarynx and subglottis and removed and there was no bleeding.  Endotracheal anesthesia was administered topically and suctioned.  The hypopharynx appeared to be without lesion.  The laryngoscope was withdrawn and the gingival protection was removed she was returned to her initial position, awakened, extubated and transported to recovery room in good condition.  She tolerated the procedure well and there were no apparent intraoperative complications.      Complications:  None; patient tolerated the procedure well.    Disposition: PACU - hemodynamically stable.  Condition: stable             Adrian Peralta MD  Phone Number: [phone]

## 2024-05-20 NOTE — ANESTHESIA PREPROCEDURE EVALUATION
Patient: Padilla Campuzano    Procedure Information       Date/Time: 05/20/24 1415    Procedure: Direct Laryngoscopy (Left)    Location: [facility] / [facility]    Surgeons: Adrian Peralta MD            Relevant Problems   Anesthesia (within normal limits)      Cardiac   (+) Breast pain in female   (+) Mastodynia   (-) History of coronary artery bypass graft   (-) Pacemaker      Pulmonary   (-) Asthma (HHS-HCC)   (-) Chronic obstructive pulmonary disease (Multi)   (-) STACEY (obstructive sleep apnea)      Neuro  No residual deficits   (+) Cerebrovascular accident (CVA) (Multi)   (-) Seizures (Multi)      GI   (+) Chronic diarrhea   (+) GERD (gastroesophageal reflux disease)   (+) Irritable bowel syndrome with diarrhea   (+) Malignant neoplasm of colon (Multi)      /Renal   (+) Acute UTI   (+) Acute kidney injury (nontraumatic) (CMS-HCC)      Liver   (+) Malignant neoplasm of colon (Multi)      Endocrine   (-) Diabetes mellitus, type 2 (Multi)      Hematology   (-) Coagulopathy (Multi)      ID   (+) Acute UTI      GYN   (+) Breast cancer (Multi)       Clinical information reviewed:   Tobacco  Allergies  Meds   Med Hx  Surg Hx  OB Status  Fam Hx  Soc   Hx        NPO Detail:  NPO/Void Status  Carbohydrate Drink Given Prior to Surgery? : N  Date of Last Liquid: 05/19/24  Time of Last Liquid: 2200  Date of Last Solid: 05/19/24  Time of Last Solid: 1700  Last Intake Type: Clear fluids  Time of Last Void: 1300         Physical Exam    Airway  Mallampati: I  TM distance: >3 FB  Neck ROM: full     Cardiovascular    Dental   (+) upper dentures, lower dentures     Pulmonary    Abdominal            Anesthesia Plan    History of general anesthesia?: yes  History of complications of general anesthesia?: no    ASA 3     general     intravenous induction   Postoperative administration of opioids is intended.  Anesthetic plan and risks discussed with patient.

## 2024-05-20 NOTE — ANESTHESIA PROCEDURE NOTES
Peripheral IV  Date/Time: 5/20/2024 3:59 PM  Inserted by: ÁNGEL Garcia-KURTIS    Placement  Needle size: 22 G  Laterality: right  Location: wrist  Local anesthetic: none  Site prep: alcohol  Technique: anatomical landmarks  Attempts: 1

## 2024-05-22 LAB
LABORATORY COMMENT REPORT: NORMAL
PATH REPORT.COMMENTS IMP SPEC: NORMAL
PATH REPORT.FINAL DX SPEC: NORMAL
PATH REPORT.GROSS SPEC: NORMAL
PATH REPORT.RELEVANT HX SPEC: NORMAL
PATH REPORT.TOTAL CANCER: NORMAL

## 2024-05-28 ENCOUNTER — APPOINTMENT (OUTPATIENT)
Dept: SURGERY | Facility: CLINIC | Age: 79
End: 2024-05-28
Payer: MEDICARE

## 2024-05-28 ENCOUNTER — OFFICE VISIT (OUTPATIENT)
Dept: PRIMARY CARE | Facility: CLINIC | Age: 79
End: 2024-05-28
Payer: MEDICARE

## 2024-05-28 ENCOUNTER — OFFICE VISIT (OUTPATIENT)
Dept: OTOLARYNGOLOGY | Facility: CLINIC | Age: 79
End: 2024-05-28
Payer: MEDICARE

## 2024-05-28 ENCOUNTER — TELEPHONE (OUTPATIENT)
Dept: OTOLARYNGOLOGY | Facility: CLINIC | Age: 79
End: 2024-05-28
Payer: MEDICARE

## 2024-05-28 VITALS
SYSTOLIC BLOOD PRESSURE: 120 MMHG | WEIGHT: 144.4 LBS | DIASTOLIC BLOOD PRESSURE: 68 MMHG | HEART RATE: 80 BPM | BODY MASS INDEX: 27.28 KG/M2 | TEMPERATURE: 97.6 F | OXYGEN SATURATION: 98 %

## 2024-05-28 VITALS — BODY MASS INDEX: 27 KG/M2 | WEIGHT: 143 LBS | HEIGHT: 61 IN | TEMPERATURE: 96.8 F

## 2024-05-28 DIAGNOSIS — C32.9 MALIGNANT NEOPLASM OF LARYNX (MULTI): Primary | ICD-10-CM

## 2024-05-28 DIAGNOSIS — R49.0 HOARSENESS: ICD-10-CM

## 2024-05-28 DIAGNOSIS — Z85.038 HISTORY OF MALIGNANT NEOPLASM OF COLON: ICD-10-CM

## 2024-05-28 DIAGNOSIS — Z85.048 HISTORY OF ANAL CANCER: ICD-10-CM

## 2024-05-28 DIAGNOSIS — N18.32 STAGE 3B CHRONIC KIDNEY DISEASE (MULTI): ICD-10-CM

## 2024-05-28 DIAGNOSIS — Z71.2 ENCOUNTER TO DISCUSS TEST RESULTS: Primary | ICD-10-CM

## 2024-05-28 DIAGNOSIS — I10 HYPERTENSION, UNSPECIFIED TYPE: ICD-10-CM

## 2024-05-28 DIAGNOSIS — C32.9 LARYNGEAL CANCER (MULTI): ICD-10-CM

## 2024-05-28 DIAGNOSIS — R91.1 SOLITARY PULMONARY NODULE ON LUNG CT: ICD-10-CM

## 2024-05-28 DIAGNOSIS — R91.1 LUNG NODULE SEEN ON IMAGING STUDY: ICD-10-CM

## 2024-05-28 DIAGNOSIS — Z85.3 H/O MALIGNANT NEOPLASM OF FEMALE BREAST: ICD-10-CM

## 2024-05-28 PROCEDURE — 99214 OFFICE O/P EST MOD 30 MIN: CPT | Performed by: INTERNAL MEDICINE

## 2024-05-28 PROCEDURE — 3074F SYST BP LT 130 MM HG: CPT | Performed by: INTERNAL MEDICINE

## 2024-05-28 PROCEDURE — 1160F RVW MEDS BY RX/DR IN RCRD: CPT | Performed by: OTOLARYNGOLOGY

## 2024-05-28 PROCEDURE — 1159F MED LIST DOCD IN RCRD: CPT | Performed by: INTERNAL MEDICINE

## 2024-05-28 PROCEDURE — 1160F RVW MEDS BY RX/DR IN RCRD: CPT | Performed by: INTERNAL MEDICINE

## 2024-05-28 PROCEDURE — 1036F TOBACCO NON-USER: CPT | Performed by: OTOLARYNGOLOGY

## 2024-05-28 PROCEDURE — 3078F DIAST BP <80 MM HG: CPT | Performed by: INTERNAL MEDICINE

## 2024-05-28 PROCEDURE — 1036F TOBACCO NON-USER: CPT | Performed by: INTERNAL MEDICINE

## 2024-05-28 PROCEDURE — 99213 OFFICE O/P EST LOW 20 MIN: CPT | Performed by: OTOLARYNGOLOGY

## 2024-05-28 PROCEDURE — 1159F MED LIST DOCD IN RCRD: CPT | Performed by: OTOLARYNGOLOGY

## 2024-05-28 RX ORDER — LOSARTAN POTASSIUM 25 MG/1
25 TABLET ORAL DAILY
Qty: 90 TABLET | Refills: 0 | Status: SHIPPED | OUTPATIENT
Start: 2024-05-28 | End: 2024-08-26

## 2024-05-28 NOTE — PROGRESS NOTES
Subjective   Patient ID: Padilla Campuzano is a 78 y.o. female who presents for 1 month follow up  (Patient just found out today 5/28/24  that she has throat cancer ).  HPI    Follow up labs, bp    Hypertension stable on rx no side effects  On losartan 25 mg daily  Risk / benefits rev'd    CKD stage 3b  GFR 40     Follow up CT chest  CT/PET rev'd / abnormal  oncology following     Patient presented in December 2023  with laryngitis.  She was seen and evaluated in the [place] urgent care.  Diagnosed with laryngeal cancer  Voice has still not returned.  ENT following     H/o lung nodule on CT 2-23  Bx negative  h/o smoker     History of breast cancer  mammogram ordered     History of colon cancer     History of anal cancer     History of cerebral hemorrhage 2012.     Diet and exercise reviewed    Review of Systems   All other systems reviewed and are negative.      Objective   /68   Pulse 80   Temp 36.4 °C (97.6 °F)   Wt 65.5 kg (144 lb 6.4 oz)   SpO2 98%   BMI 27.28 kg/m²   Lab Results   Component Value Date    WBC 6.1 05/01/2024    HGB 12.2 05/01/2024    HCT 38.1 05/01/2024     05/01/2024    CHOL 123 05/01/2024    TRIG 130 05/01/2024    HDL 44.8 05/01/2024    ALT 9 05/01/2024    AST 18 05/01/2024     05/01/2024    K 3.7 05/01/2024     (H) 05/01/2024    CREATININE 1.35 (H) 05/01/2024    BUN 29 (H) 05/01/2024    CO2 25 05/01/2024    TSH 3.83 05/01/2024    INR 1.0 04/23/2024    ALBUR 80 (H) 01/10/2023           Physical Exam  Vitals reviewed.   Constitutional:       Appearance: Normal appearance. She is normal weight.   HENT:      Head: Normocephalic and atraumatic.      Mouth/Throat:      Pharynx: No posterior oropharyngeal erythema.      Comments: Raspy voice  Eyes:      General: No scleral icterus.     Conjunctiva/sclera: Conjunctivae normal.      Pupils: Pupils are equal, round, and reactive to light.   Cardiovascular:      Rate and Rhythm: Normal rate and regular rhythm.      Heart  sounds: Normal heart sounds.   Pulmonary:      Effort: No respiratory distress.      Breath sounds: No wheezing.   Abdominal:      General: Abdomen is flat. Bowel sounds are normal. There is no distension.      Palpations: Abdomen is soft. There is no mass.      Tenderness: There is no abdominal tenderness. There is no rebound.   Musculoskeletal:         General: Normal range of motion.      Cervical back: Normal range of motion and neck supple.   Skin:     General: Skin is warm and dry.   Neurological:      General: No focal deficit present.      Mental Status: She is alert and oriented to person, place, and time. Mental status is at baseline.   Psychiatric:         Mood and Affect: Mood normal.         Behavior: Behavior normal.         Thought Content: Thought content normal.         Judgment: Judgment normal.         Problem List Items Addressed This Visit             ICD-10-CM    History of anal cancer Z85.048    History of malignant neoplasm of colon Z85.038    Stage 3 chronic kidney disease (Multi) N18.30     Other Visit Diagnoses         Codes    Encounter to discuss test results    -  Primary Z71.2    Hypertension, unspecified type     I10    Relevant Medications    losartan (Cozaar) 25 mg tablet    Laryngeal cancer (Multi)     C32.9    Lung nodule seen on imaging study     R91.1    H/O malignant neoplasm of female breast     Z85.3    BMI 27.0-27.9,adult     Z68.27          Assessment/Plan     Follow up labs, bp    Hypertension stable on rx no side effects  On losartan 25 mg daily  Risk / benefits rev'd    CKD stage 3b  GFR 40     Follow up CT chest  CT/PET rev'd / abnormal  oncology following     Patient presented in December 2023  with laryngitis.  She was seen and evaluated in the [place] urgent care.  Diagnosed with laryngeal cancer  Voice has still not returned.  ENT following     H/o lung nodule on CT 2-23  Bx negative  h/o smoker     History of breast cancer  mammogram ordered     History of colon  cancer     History of anal cancer     History of cerebral hemorrhage 2012.     Diet and exercise reviewed    Mammogram  11-21 / ordered not done  Dexa n/a  Colonoscopy n/a  CT chest lung cancer screening n/a  GYN n/a  immunizations rev'd RSV, shingrix, pneumo  BMI 27.2    Follow up 3 months

## 2024-05-29 ENCOUNTER — HOSPITAL ENCOUNTER (OUTPATIENT)
Dept: RADIOLOGY | Facility: HOSPITAL | Age: 79
Discharge: HOME | End: 2024-05-29
Payer: MEDICARE

## 2024-05-29 VITALS — HEIGHT: 61 IN | WEIGHT: 144 LBS | BODY MASS INDEX: 27.19 KG/M2

## 2024-05-29 DIAGNOSIS — Z12.31 ENCOUNTER FOR SCREENING MAMMOGRAM FOR MALIGNANT NEOPLASM OF BREAST: ICD-10-CM

## 2024-05-29 PROCEDURE — 77067 SCR MAMMO BI INCL CAD: CPT

## 2024-05-29 PROCEDURE — 77063 BREAST TOMOSYNTHESIS BI: CPT | Performed by: RADIOLOGY

## 2024-05-29 PROCEDURE — 77067 SCR MAMMO BI INCL CAD: CPT | Performed by: RADIOLOGY

## 2024-06-03 ENCOUNTER — OFFICE VISIT (OUTPATIENT)
Dept: OTOLARYNGOLOGY | Facility: CLINIC | Age: 79
End: 2024-06-03
Payer: MEDICARE

## 2024-06-03 VITALS — TEMPERATURE: 97.2 F | HEIGHT: 62 IN | HEART RATE: 74 BPM | BODY MASS INDEX: 26.68 KG/M2 | WEIGHT: 145 LBS

## 2024-06-03 DIAGNOSIS — C32.9 MALIGNANT NEOPLASM OF LARYNX (MULTI): ICD-10-CM

## 2024-06-03 PROCEDURE — 31575 DIAGNOSTIC LARYNGOSCOPY: CPT | Performed by: OTOLARYNGOLOGY

## 2024-06-03 PROCEDURE — 99214 OFFICE O/P EST MOD 30 MIN: CPT | Performed by: OTOLARYNGOLOGY

## 2024-06-03 PROCEDURE — 1159F MED LIST DOCD IN RCRD: CPT | Performed by: OTOLARYNGOLOGY

## 2024-06-03 PROCEDURE — 99204 OFFICE O/P NEW MOD 45 MIN: CPT | Performed by: OTOLARYNGOLOGY

## 2024-06-03 PROCEDURE — 1125F AMNT PAIN NOTED PAIN PRSNT: CPT | Performed by: OTOLARYNGOLOGY

## 2024-06-03 PROCEDURE — 1036F TOBACCO NON-USER: CPT | Performed by: OTOLARYNGOLOGY

## 2024-06-03 SDOH — ECONOMIC STABILITY: FOOD INSECURITY: WITHIN THE PAST 12 MONTHS, YOU WORRIED THAT YOUR FOOD WOULD RUN OUT BEFORE YOU GOT MONEY TO BUY MORE.: NEVER TRUE

## 2024-06-03 SDOH — ECONOMIC STABILITY: FOOD INSECURITY: WITHIN THE PAST 12 MONTHS, THE FOOD YOU BOUGHT JUST DIDN'T LAST AND YOU DIDN'T HAVE MONEY TO GET MORE.: NEVER TRUE

## 2024-06-03 ASSESSMENT — ENCOUNTER SYMPTOMS
OCCASIONAL FEELINGS OF UNSTEADINESS: 0
DEPRESSION: 0
LOSS OF SENSATION IN FEET: 0

## 2024-06-03 ASSESSMENT — COLUMBIA-SUICIDE SEVERITY RATING SCALE - C-SSRS
6. HAVE YOU EVER DONE ANYTHING, STARTED TO DO ANYTHING, OR PREPARED TO DO ANYTHING TO END YOUR LIFE?: NO
2. HAVE YOU ACTUALLY HAD ANY THOUGHTS OF KILLING YOURSELF?: NO
1. IN THE PAST MONTH, HAVE YOU WISHED YOU WERE DEAD OR WISHED YOU COULD GO TO SLEEP AND NOT WAKE UP?: NO

## 2024-06-03 ASSESSMENT — PAIN SCALES - GENERAL: PAINLEVEL: 5

## 2024-06-03 NOTE — PROGRESS NOTES
ENT New Patient Visit    Chief complaint: vocal cord cancer    History Of Present Illness  Padilla Campuzano is a 78 y.o. 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 She has had hoarseness since about February of this. She had a CT chest that showed a left pulmonary nodule, FNA was neg but then she had a PET that showed it was hypermetabolic, but also found a vocal cord lesion.  She saw Dr. Peralta and was taken for a biopsy showing invasive scca.    She has no pain or trouble breathing, even with mild exertion. No bleeding and no trouble eating/drinking.    She has a history anal cancer, treated in 2018, breast cancer, and various cutaneous malignancies.    She smoked < 1ppd for 30 years, quit 15 years ago.     Past Medical History  She has a past medical history of Breast cancer (Multi), Cerebral hemorrhage (Multi) (2012), CKD (chronic kidney disease), Colorectal cancer (Multi), Diverticulosis of intestine, part unspecified, without perforation or abscess without bleeding, GERD (gastroesophageal reflux disease), Hypertension, Irritable bowel syndrome, Kidney disease, Malignant neoplasm of colon, unspecified (Multi), Personal history of colonic polyps, Personal history of irradiation, Personal history of malignant neoplasm of breast, Personal history of other diseases of the respiratory system, Personal history of other malignant neoplasm of skin, Right upper quadrant pain (01/22/2016), Stroke (Multi) (2012), and Unspecified symptoms and signs involving the genitourinary system (01/22/2016).    Surgical History  She has a past surgical history that includes Breast lumpectomy (05/16/2014); Hysterectomy (1984); Ileostomy closure (11/23/2015); Ileostomy (11/23/2015); BI breast right cyst aspiration (Right, 12/31/2019); CT guided imaging for abscess drain (07/08/2015); Cholecystectomy; and Other surgical history (2012).     Social History  She reports that she quit smoking about 13 years ago. Her smoking use  included cigarettes. She has been exposed to tobacco smoke. She has never used smokeless tobacco. She reports that she does not drink alcohol and does not use drugs.    Family History  Family History   Problem Relation Name Age of Onset    Colon cancer Mother      Liver cancer Mother      Kidney cancer Mother      Heart attack Father      Blood clot Father      Cancer Brother      Cancer Brother          Allergies  Hydrocodone-acetaminophen, Oxycodone hcl, Oxycodone-acetaminophen, Aspirin, and Sulfa (sulfonamide antibiotics)    Review of Systems     Physical Exam:  CONSTITUTIONAL:  No acute distress  VOICE:  voice breathy, rough, pitch breaks  RESPIRATION:  Breathing comfortably, no stridor  CV:  No clubbing/cyanosis/edema in hands  EYES:  EOM intact, sclera normal  NEURO:  Alert and oriented times 3, Cranial nerves II-XII grossly intact and symmetric bilaterally  HEAD AND FACE:  Symmetric facial features, no masses or lesions, sinuses non-tender to palpation  SALIVARY GLANDS:  Parotid and submandibular glands normal bilaterally  EARS:  Normal external ears, external auditory canals, and TMs to otoscopy, normal hearing to whispered voice.  NOSE:  External nose midline, anterior rhinoscopy is normal with limited visualization to the anterior aspect of the interior turbinates, no bleeding or drainage, no lesions  ORAL CAVITY/OROPHARYNX/LIPS:  Normal mucous membranes, normal floor of mouth/tongue/OP, no masses or lesions; full upper and lower dentures  PHARYNGEAL WALLS:  No masses or lesions  NECK/LYMPH:  No LAD, no thyroid masses, trachea midline  SKIN:  Neck skin is without scar or injury  PSYCH:  Alert and oriented with appropriate mood and affect    Procedure Note: Flexible Nasolaryngoscopy  Verbal informed consent was obtained from the patient/patient's guardian. 4% lidocaine mixed with phenylephrine was prepared and dripped into the nose. It was placed in the left naris. Following an appropriate amount of time to  "allow for adequate anesthesia, a flexible fiberoptic nasolaryngoscope was placed into the patient's left naris. The nasal cavity, nasopharynx, oropharynx, hypopharynx, and all endolaryngeal structures were visualized and were normal except as listed below. Significant findings included:  -left vocal cord mass involving the entire cord, extends anterior around the commissure to involved the anterior portion of the right cord (about 1/4). There is some extension subglottically. The left cord is immobile  Airway is still patent      Last Recorded Vitals  Pulse 74, temperature 36.2 °C (97.2 °F), temperature source Temporal, height 1.562 m (5' 1.5\"), weight 65.8 kg (145 lb).    Relevant Results  BI mammo bilateral screening tomosynthesis    Result Date: 5/31/2024  Interpreted By:  Solomon Santiago, STUDY: BI MAMMO BILATERAL SCREENING TOMOSYNTHESIS;  5/29/2024 1:16 pm   INDICATION: Screening.   COMPARISON: Mammograms dated 11/16/2021 and 09/14/2020   ORDERING CLINICIAN: FREDERICK READ   ACCESSION NUMBER(S): [id number]   TECHNIQUE: Tomosynthesis and 2D mammograms were reviewed at 1 mm slice thickness.   FINDINGS: Density:  There are areas of scattered fibroglandular tissue.   There is focal asymmetry in the central right breast at the mid to posterior depth. No suspicious mass or asymmetry is evident in the left breast.  No suspicious calcification or architectural distortion is evident.       1. Asymmetry in the right breast that requires further assessment with spot compression CC and MLO mammograms with tomosynthesis imaging and potentially ultrasound. 2. No mammographic finding is evident in the left breast to suggest malignancy.   BI-RADS CATEGORY: BI-RADS Category:  0 Incomplete; Need Additional Imaging Evaluation and/or Prior Mammograms for Comparison. Recommendation:  Additional Imaging Diagnostic Mammogram. Recommended Date:  Immediate. Laterality:  Right.   For any future breast imaging appointments, please " call [phone]).   MACRO: None   Signed by: Solomon Santiago 5/31/2024 2:45 PM Dictation workstation:   [id number]    Assessment and Plan  78 y.o. female with at least a T3N0M0 left glottic/subglottic scca  Reviewed PET- uptake left vocal cord extending  to the right with limited subglottic extension. No obvious thyroid cartilage erosion on limited CT scan with PET, no yamel disease, left lung hypermetabolic nodule, fna was neg    -discussed with pt that options include chemoradiation vs surgery + radiation.  -surgery would unfortunately involved a total laryngectomy in this case as a partial would not be possible due to extend of the involvement  of tumor  -I think chemoradiation is still reasonable, I do not see any obvious extralaryngeal extension on CT. As long as she can reasonably tolerate cisplatin  -pt does not want a laryngectomy at this point  -will make med/onc rad onc referrals      Ferny Rhoades MD

## 2024-06-05 ENCOUNTER — OFFICE VISIT (OUTPATIENT)
Dept: HEMATOLOGY/ONCOLOGY | Facility: HOSPITAL | Age: 79
End: 2024-06-05
Payer: MEDICARE

## 2024-06-05 ENCOUNTER — LAB (OUTPATIENT)
Dept: LAB | Facility: HOSPITAL | Age: 79
End: 2024-06-05
Payer: MEDICARE

## 2024-06-05 VITALS
BODY MASS INDEX: 26.29 KG/M2 | WEIGHT: 142.86 LBS | SYSTOLIC BLOOD PRESSURE: 144 MMHG | RESPIRATION RATE: 20 BRPM | OXYGEN SATURATION: 96 % | HEART RATE: 96 BPM | HEIGHT: 62 IN | DIASTOLIC BLOOD PRESSURE: 67 MMHG | TEMPERATURE: 96.8 F

## 2024-06-05 DIAGNOSIS — C32.9 MALIGNANT NEOPLASM OF LARYNX (MULTI): ICD-10-CM

## 2024-06-05 DIAGNOSIS — C32.9 MALIGNANT NEOPLASM OF LARYNX (MULTI): Primary | ICD-10-CM

## 2024-06-05 DIAGNOSIS — C32.0 SQUAMOUS CELL CARCINOMA OF LEFT VOCAL CORD (MULTI): ICD-10-CM

## 2024-06-05 LAB
ALBUMIN SERPL BCP-MCNC: 4 G/DL (ref 3.4–5)
ALP SERPL-CCNC: 62 U/L (ref 33–136)
ALT SERPL W P-5'-P-CCNC: 8 U/L (ref 7–45)
ANION GAP SERPL CALC-SCNC: 16 MMOL/L (ref 10–20)
AST SERPL W P-5'-P-CCNC: 17 U/L (ref 9–39)
BASOPHILS # BLD AUTO: 0.04 X10*3/UL (ref 0–0.1)
BASOPHILS NFR BLD AUTO: 0.5 %
BILIRUB SERPL-MCNC: 0.4 MG/DL (ref 0–1.2)
BUN SERPL-MCNC: 24 MG/DL (ref 6–23)
CALCIUM SERPL-MCNC: 8.8 MG/DL (ref 8.6–10.3)
CHLORIDE SERPL-SCNC: 107 MMOL/L (ref 98–107)
CO2 SERPL-SCNC: 24 MMOL/L (ref 21–32)
CREAT SERPL-MCNC: 1.36 MG/DL (ref 0.5–1.05)
EGFRCR SERPLBLD CKD-EPI 2021: 40 ML/MIN/1.73M*2
EOSINOPHIL # BLD AUTO: 0.12 X10*3/UL (ref 0–0.4)
EOSINOPHIL NFR BLD AUTO: 1.6 %
ERYTHROCYTE [DISTWIDTH] IN BLOOD BY AUTOMATED COUNT: 13 % (ref 11.5–14.5)
GLUCOSE SERPL-MCNC: 97 MG/DL (ref 74–99)
HCT VFR BLD AUTO: 36.9 % (ref 36–46)
HGB BLD-MCNC: 12 G/DL (ref 12–16)
IMM GRANULOCYTES # BLD AUTO: 0.02 X10*3/UL (ref 0–0.5)
IMM GRANULOCYTES NFR BLD AUTO: 0.3 % (ref 0–0.9)
LYMPHOCYTES # BLD AUTO: 2.57 X10*3/UL (ref 0.8–3)
LYMPHOCYTES NFR BLD AUTO: 33.8 %
MCH RBC QN AUTO: 31 PG (ref 26–34)
MCHC RBC AUTO-ENTMCNC: 32.5 G/DL (ref 32–36)
MCV RBC AUTO: 95 FL (ref 80–100)
MONOCYTES # BLD AUTO: 0.66 X10*3/UL (ref 0.05–0.8)
MONOCYTES NFR BLD AUTO: 8.7 %
NEUTROPHILS # BLD AUTO: 4.19 X10*3/UL (ref 1.6–5.5)
NEUTROPHILS NFR BLD AUTO: 55.1 %
NRBC BLD-RTO: 0 /100 WBCS (ref 0–0)
PLATELET # BLD AUTO: 300 X10*3/UL (ref 150–450)
POTASSIUM SERPL-SCNC: 4.1 MMOL/L (ref 3.5–5.3)
PROT SERPL-MCNC: 6.8 G/DL (ref 6.4–8.2)
RBC # BLD AUTO: 3.87 X10*6/UL (ref 4–5.2)
SODIUM SERPL-SCNC: 143 MMOL/L (ref 136–145)
WBC # BLD AUTO: 7.6 X10*3/UL (ref 4.4–11.3)

## 2024-06-05 PROCEDURE — 99215 OFFICE O/P EST HI 40 MIN: CPT | Performed by: INTERNAL MEDICINE

## 2024-06-05 PROCEDURE — 36415 COLL VENOUS BLD VENIPUNCTURE: CPT

## 2024-06-05 PROCEDURE — 80053 COMPREHEN METABOLIC PANEL: CPT

## 2024-06-05 PROCEDURE — 1126F AMNT PAIN NOTED NONE PRSNT: CPT | Performed by: INTERNAL MEDICINE

## 2024-06-05 PROCEDURE — 85025 COMPLETE CBC W/AUTO DIFF WBC: CPT

## 2024-06-05 PROCEDURE — 1159F MED LIST DOCD IN RCRD: CPT | Performed by: INTERNAL MEDICINE

## 2024-06-05 PROCEDURE — G2212 PROLONG OUTPT/OFFICE VIS: HCPCS | Performed by: INTERNAL MEDICINE

## 2024-06-05 PROCEDURE — 1036F TOBACCO NON-USER: CPT | Performed by: INTERNAL MEDICINE

## 2024-06-05 ASSESSMENT — PAIN SCALES - GENERAL: PAINLEVEL: 0-NO PAIN

## 2024-06-05 NOTE — PROGRESS NOTES
Patient ID: Padilla Campuzano is a 78 y.o. female.  Diagnosis: Laryngeal cancer  Staging: T3N0M0  Date of Diagnosis: 5/22/24    Providers:  ENT Surgeon: Dr. Ferny PearsonOn:   Dr. Kyunghee Burkitt/Megan Arauz PA-C  RadOnc: Dr. Sue Oliva    Ms. Campuzano is 79 y/o female with recent diagnosis of laryngeal cancer who is referred to  med onc  to discuss treatment plan.    -2/2024: pt presented with hoarse voice  -3/21/24: CT chest showed left pulmonary nodule  -4/3/24: PET/CT showed uptake in left vocal cord extending  to the right with limited subglottic extension  -4/23/24: biopsy of LLL nodule showed negative for malignancy  -5/7/24: seen by thoracic surgeon Dr. Linda, plan is to follow up image in 3 months  -5/20/24: s/p direct laryngoscopy. She was found to have transglottic lesion extending from primarily the left glottis into the subglottis and wrapping around anteriorly. Biopsy of subglottis lesion showed SCCa      Past Medical History:   Past Medical History:  No date: Breast cancer (Multi)  2012: Cerebral hemorrhage (Multi)  No date: CKD (chronic kidney disease)  No date: Colorectal cancer (Multi)  No date: Diverticulosis of intestine, part unspecified, without   perforation or abscess without bleeding      Comment:  Diverticulosis  No date: GERD (gastroesophageal reflux disease)  No date: Hypertension  No date: Irritable bowel syndrome  No date: Kidney disease  No date: Malignant neoplasm of colon, unspecified (Multi)      Comment:  Colon cancer  No date: Personal history of colonic polyps      Comment:  History of colonic polyps  No date: Personal history of irradiation  No date: Personal history of malignant neoplasm of breast      Comment:  History of malignant neoplasm of female breast  No date: Personal history of other diseases of the respiratory system      Comment:  History of respiratory failure  No date: Personal history of other malignant neoplasm of skin      Comment:  Personal history of  malignant neoplasm of skin  2016: Right upper quadrant pain      Comment:  Abdominal pain, RUQ (right upper quadrant)  2012: Stroke (Multi)  2016: Unspecified symptoms and signs involving the   genitourinary system      Comment:  Urinary symptom or sign   Surgical History:    Past Surgical History:   Procedure Laterality Date    BI BREAST CYST ASPIRATION RIGHT Right 2019    BI BREAST CYST ASPIRATION RIGHT [facility]    BREAST LUMPECTOMY  2014    Breast Surgery Lumpectomy    CHOLECYSTECTOMY      CT GUIDED IMAGING FOR ABSCESS DRAIN  2015    CT GUIDED IMAGING FOR ABSCESS DRAIN [facility]    HYSTERECTOMY  1984    Hysterectomy    ILEOSTOMY  2015    Ileostomy    ILEOSTOMY CLOSURE  2015    Ileostomy Closure    OTHER SURGICAL HISTORY      Cerebral artery coiling      Family History:    Family History   Problem Relation Name Age of Onset    Colon cancer Mother      Liver cancer Mother      Kidney cancer Mother      Heart attack Father      Blood clot Father      Cancer Brother      Cancer Brother       Family Oncology History:    Cancer-related family history includes Cancer in her brother and brother; Colon cancer in her mother; Kidney cancer in her mother; Liver cancer in her mother.  Social History:    Social History     Tobacco Use    Smoking status: Former     Current packs/day: 0.00     Types: Cigarettes     Quit date:      Years since quittin.4     Passive exposure: Past    Smokeless tobacco: Never   Vaping Use    Vaping status: Never Used   Substance Use Topics    Alcohol use: Never    Drug use: Never          Subjective   Chief Complaint: Laryngeal cancer    HPI    Interval History  Pt denies odynophagia, dysphagia, but admits hoarse voice and mild sore throat.  She has good appetite, but reports having mild fatigue. Wt has been largely stable.  Denies fever, chills, nausea, vomiting, abdominal pain, constipation and diarrhea.      ROS  Review  of Systems - Oncology    Allergies  Allergies   Allergen Reactions    Hydrocodone-Acetaminophen Dizziness    Oxycodone Hcl Unknown    Oxycodone-Acetaminophen Dizziness    Aspirin GI Upset and Unknown    Sulfa (Sulfonamide Antibiotics) Rash        Medications  Current Outpatient Medications   Medication Instructions    acetaminophen (TYLENOL) 1,000 mg, oral, Every 6 hours PRN    biotin 5 mg capsule Every 24 hours    cholecalciferol (Vitamin D-3) 50 mcg (2,000 unit) capsule 1 capsule, Every 24 hours    cholestyramine (Questran) 4 gram powder once daily.    lipase-protease-amylase (Creon) 24,000-76,000 -120,000 unit capsule TAKE 3 CAPSULES BY MOUTH 3 TIMES DAILY WITH MEALS AND 1 TO 2  CAPSULES BY MOUTH WITH SNACKS.  MAX 13 CAPSULE DAILY    losartan (COZAAR) 25 mg, oral, Daily    pantoprazole (ProtoNix) 40 mg EC tablet TAKE 1 TABLET BY MOUTH TWICE  DAILY          Objective   VS: There were no vitals taken for this visit.  Weight: Daily Weight  06/03/24 : 65.8 kg (145 lb)  05/29/24 : 65.3 kg (144 lb)  05/28/24 : 65.5 kg (144 lb 6.4 oz)  05/28/24 : 64.9 kg (143 lb)  05/15/24 : 64.4 kg (142 lb)  05/07/24 : 65.3 kg (144 lb)  05/06/24 : 65.4 kg (144 lb 2.9 oz)      Physical Exam  Constitutional:       Appearance: Normal appearance.   HENT:      Head: Normocephalic and atraumatic.      Mouth/Throat:      Mouth: Mucous membranes are moist.   Eyes:      Extraocular Movements: Extraocular movements intact.      Pupils: Pupils are equal, round, and reactive to light.   Cardiovascular:      Rate and Rhythm: Normal rate and regular rhythm.      Pulses: Normal pulses.   Pulmonary:      Effort: Pulmonary effort is normal.      Breath sounds: Normal breath sounds.   Abdominal:      General: Bowel sounds are normal.   Musculoskeletal:         General: Normal range of motion.      Cervical back: Normal range of motion.   Neurological:      General: No focal deficit present.      Mental Status: She is alert and oriented to person, place,  and time.         Diagnostic Results   Labs  Below labs are reviewed today.                         Images       Pathology  Lab Results   Component Value Date    PATHREP  12/06/2021                                                     MRN: [id number]  Patient Name RONALD LENNON  Accession #: [id number]  Date of Procedure:  12/6/2021       Date Reported: 12/9/2021  Date Received:  12/7/2021  Date of Birth / Sex 1945 (Age: 76) / F  Race: WHITE  Submitting Physician: ROD VALLE MD    Other External #          FINAL CYTOLOGICAL INTERPRETATION    A.   FINE NEEDLE ASPIRATION BREAST - RIGHT, CYTOLOGY AND CELL BLOCK:  --NO MALIGNANT CELLS IDENTIFIED.  --SPECIMEN CONSISTS OF PROTEINACEOUS DEBRIS, OCCASIONAL FOAMY MACROPHAGES, FEW  INFLAMMATORY CELLS AND RARE EPITHELIAL GROUP.        Slide(s) initially screened by a Cytotechnologist at [place] [id number]      Electronically Signed Out By GABINO GARCES MD    By the signature on this report, the individual or group listed as making the  Final Interpretation/Diagnosis certifies that they have reviewed this case.  Slide(s) initially screened by a Cytotechnologist at [facility]     Clinical History      Clinical Diagnosis History: Cyst of right breast - (N60.01)   Source of Specimen  A:  FINE NEEDLE ASPIRATION BREAST - RIGHT     Specimen Submitted as:  A:   FINE NEEDLE ASPIRATION BREAST - RIGHT        Pap non-gyn ThinPrep slide, CELL BLOCK, H&E, Initial    Gross Description  A.   FINE NEEDLE ASPIRATION BREAST - RIGHT:  35cc MILKY YELLOW NEEDLE RINSE IN  CYTOLYT                 [facility]  Department of Pathology  [address] [place] [address]        PATHREP  09/09/2019     Name RONALD LENNON                                                                                                   Accession #: [id number]            Pathologist:          "          YUE FIGUEROA MD  Date of Procedure:    9/9/2019  Date Received:          9/9/2019  Date Reported           9/13/2019  Submitting Physician:   SALIMA WHEELER MD  Location:                    [facility]  Other External #                                                                    FINAL DIAGNOSIS  A.  BIOPSY OF ANAL CANAL:  MINUTE FRAGMENTS OF SOFT TISSUE WITH NO SIGNIFICANT  PATHOLOGICAL FINDINGS, SEE NOTE.    Note: Deeper sections were obtained.                                                                                                                                                                                                                                                                                                                                                                                                                                                                                       Electronically Signed Out By YUE FIGUEROA MD/ANASTASIYA  By the signature on this report, the individual or group listed as making the  Final Interpretation/Diagnosis certifies that they have reviewed this case.           Clinical History:  Colorectal cancer, squamous cell, status post chemo radiation    Specimens Submitted As:  A: BIOPSY OF ANAL CANAL     Gross Description:  Received in formalin, labeled with the patient's name and hospital number and  \"anal canal\", are multiple fragments of tan, soft tissue aggregating to 1.7 x  1.0 x 0.1 cm. The specimen is submitted in toto in one cassette.  CJN    cjn/9/9/2019               [facility]  Department of Pathology   [address] [place] [address]        COMDX  05/20/2024     Case reviewed at ENT consensus conference via [employer] technology on 5/22/2024.           Assessment/Plan   Ms. Campuzano is 79 y/o female with recent diagnosis of laryngeal cancer who is referred to  med onc  to discuss treatment plan.    # " Laryngeal cancer (T3N0M0)  -Based on PET/CT from 4/3/24, there was uptake in LLL nodule, however, biopsy of LLL nodule showed negative for malignancy  -seen by Thoracic surgeon, plan to have repeat CT chest without contrast in 3 month  -since her GFR is 40 (no hearing deficit), he is not a candidate for cisplatin, will treat her with weekly carboplatin and paclitaxel, reviewed chemo related side effects with patient in detail.  Consent obtained.    # Hx of anal, breast and cutaneous malignancies  -s/p lumpectomy with radiation in 2012  -s/p radiation to anal cancer in 2018  -s/p resection of skin cancer on nose in 2010    # Chronic diarrhea   -from previous colonic surgery  -3-4 episodes per day, while on 3 imodium three times a day, 3-4 episodes of diarrhea daily.    PLAN  -treatment plan is tentatively set for 6/25, but will adjust start date accordingly with Dr. Oliva

## 2024-06-06 NOTE — PROGRESS NOTES
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 Campuzano is a 78 y.o. woman who was previously treated for breast and anal cancer, as above. She then presented to ENT in April, with a history of worsening hoarseness and throat pain since the end of January. Scope exam showed an immobile left vocal cord, with limited exam due to intolerance. She had a PET/CT on 4/3/2024 for workup of an enlarging left lower lobe pulmonary nodule that was seen on CT chest from 3/21/2024. Increased FDG uptake (SUV 5.4) was seen within the left lower lobe pulmonary nodule. Increased uptake was also seen within a left true vocal cord mass, with uptake that extended to the superior aspect of the subglottis. No evidence of regional lymphadenopathy or distant metastatic disease was seen. CT-guided biopsy of the left lower lobe lung mass on 2024 showed lung parenchyma with focal intra-alveolar fibrin/neutrophils, lymphoid aggregate, negative for malignancy. She then was taken to the operating room for direct  laryngoscopy and biopsy on 5/28/2024, with findings that showed mucosal irregularity of the left true vocal cord, which extended to the ventricle on the left. This continued to extend inferiorly to the subglottis to midline, and up to the undersurface of the right true vocal cord. Pathology was positive for invasive moderately differentiated keratinizing squamous cell carcinoma. She was seen by head and neck surgery earlier this month, with exam that showed an immobile left vocal cord with a left vocal cord mass, which involved the entire cord, and extended anteriorly around the commissure to involve the anterior portion of the right vocal cord, with some extension subglottically. Airway was patent.    Clinically, she is overall doing relatively well. She has very occasional mild discomfort with swallowing, but denies any significant odynophagia. She denies any dysphagia or coughing/choking on foods. She is tolerating a full p.o. diet without significant difficulty. She has significant hoarseness, but denies any breathing difficulties or stridor. Of note, asymmetry was seen in the right breast on mammogram from the end of May, with follow-up ultrasound scheduled next week. She also has a repeat lung CT scheduled for August.        The patient was seen for an opinion regarding radiation options for the diagnosis above. I personally reviewed the available outside records and imaging studies as detailed in the HPI. The allergies, medications, past medical history, surgical history, social history, family history, and review of systems were reviewed.     Prior Radiotherapy: Prior breast radiation in outside facility; anal cancer radiation in this department  No radiation treatments to show. (Treatments may have been administered in another system.)       Past Medical History:    Past Medical History:   Diagnosis Date    Anal cancer (Multi)     Breast cancer (Multi)     Cerebral hemorrhage (Multi) 2012    CKD (chronic  kidney disease)     Colon cancer (Multi)     Colorectal cancer (Multi)     Diverticulosis of intestine, part unspecified, without perforation or abscess without bleeding     Diverticulosis    GERD (gastroesophageal reflux disease)     Hypertension     Irritable bowel syndrome     Kidney disease     Malignant neoplasm of colon, unspecified (Multi)     Colon cancer    Personal history of colonic polyps     History of colonic polyps    Personal history of irradiation     Personal history of malignant neoplasm of breast     History of malignant neoplasm of female breast    Personal history of other diseases of the respiratory system     History of respiratory failure    Personal history of other malignant neoplasm of skin     Personal history of malignant neoplasm of skin    Right upper quadrant pain 2016    Abdominal pain, RUQ (right upper quadrant)    Stroke (Multi)     Unspecified symptoms and signs involving the genitourinary system 2016    Urinary symptom or sign        Past Surgical History:    Past Surgical History:   Procedure Laterality Date    BI BREAST CYST ASPIRATION RIGHT Right 2019    BI BREAST CYST ASPIRATION RIGHT [facility]    BREAST LUMPECTOMY  2014    Breast Surgery Lumpectomy    CHOLECYSTECTOMY      CT GUIDED IMAGING FOR ABSCESS DRAIN  2015    CT GUIDED IMAGING FOR ABSCESS DRAIN [facility]    HYSTERECTOMY  1984    Hysterectomy    ILEOSTOMY  2015    Ileostomy    ILEOSTOMY CLOSURE  2015    Ileostomy Closure    OTHER SURGICAL HISTORY      Cerebral artery coiling        Family History:  Cancer-related family history includes Cancer in her brother and brother; Colon cancer in her mother; Kidney cancer in her mother; Liver cancer in her mother.    Social History:    Social History     Tobacco Use    Smoking status: Former     Current packs/day: 0.00     Types: Cigarettes     Quit date:      Years since quittin.4     Passive  "exposure: Past    Smokeless tobacco: Never   Vaping Use    Vaping status: Never Used   Substance Use Topics    Alcohol use: Never    Drug use: Never       Allergies:    Allergies   Allergen Reactions    Hydrocodone-Acetaminophen Dizziness    Oxycodone Hcl Unknown    Oxycodone-Acetaminophen Dizziness    Aspirin GI Upset and Unknown    Sulfa (Sulfonamide Antibiotics) Rash        Medications:    Current Outpatient Medications:     acetaminophen (Tylenol) 500 mg tablet, Take 2 tablets (1,000 mg) by mouth every 6 hours if needed for mild pain (1 - 3)., Disp: , Rfl:     biotin 5 mg capsule, once every 24 hours., Disp: , Rfl:     cholecalciferol (Vitamin D-3) 50 mcg (2,000 unit) capsule, 1 capsule (50 mcg) once every 24 hours., Disp: , Rfl:     cholestyramine (Questran) 4 gram powder, once daily., Disp: , Rfl:     lipase-protease-amylase (Creon) 24,000-76,000 -120,000 unit capsule, TAKE 3 CAPSULES BY MOUTH 3 TIMES DAILY WITH MEALS AND 1 TO 2  CAPSULES BY MOUTH WITH SNACKS.  MAX 13 CAPSULE DAILY, Disp: 400 capsule, Rfl: 10    losartan (Cozaar) 25 mg tablet, Take 1 tablet (25 mg) by mouth once daily., Disp: 90 tablet, Rfl: 0    pantoprazole (ProtoNix) 40 mg EC tablet, TAKE 1 TABLET BY MOUTH TWICE  DAILY, Disp: 180 tablet, Rfl: 0  No current facility-administered medications for this encounter.      Review of Systems:  Review of Systems - Oncology    Performance Status:  The Karnofsky performance scale today is 90, Able to carry on normal activity; minor signs or symptoms of disease (ECOG equivalent 0).        OBJECTIVE  /81 (BP Location: Left arm, Patient Position: Sitting, BP Cuff Size: Adult long)   Pulse 69   Temp 36.6 °C (97.9 °F) (Temporal)   Resp 16   Ht 1.58 m (5' 2.21\")   Wt 65.7 kg (144 lb 13.1 oz)   SpO2 96%   BMI 26.31 kg/m²   Daily Weight  06/10/24 : 65.7 kg (144 lb 13.1 oz)  06/05/24 : 64.8 kg (142 lb 13.7 oz)  06/03/24 : 65.8 kg (145 lb)  05/29/24 : 65.3 kg (144 lb)  05/28/24 : 65.5 kg (144 lb 6.4 " oz)  05/28/24 : 64.9 kg (143 lb)  05/15/24 : 64.4 kg (142 lb)  05/07/24 : 65.3 kg (144 lb)  05/06/24 : 65.4 kg (144 lb 2.9 oz)  04/29/24 : 65.5 kg (144 lb 6.4 oz)     Physical Exam  Vitals reviewed.   Constitutional:       General: She is not in acute distress.     Appearance: Normal appearance. She is not ill-appearing.   HENT:      Head: Normocephalic and atraumatic.      Mouth/Throat:      Mouth: Mucous membranes are moist.      Comments: Examination of the oral cavity reveals no visible masses or lesions. Limited exam. Using indirect mirror, showed immobility of the left true vocal cord.    Nasopharyngoscopy was performed after oral consent was obtained. The nasopharyngoscope entered into the left nasal cavity without difficulty. The nasal cavity, septum and mucosa all appeared benign. The scope was then advanced to the nasopharynx where the torus tubarius and fossae were visualized and there were no significant findings. At this time, patient removed the scope due to intolerance, and declined to proceed with the rest of the examination.  Eyes:      Conjunctiva/sclera: Conjunctivae normal.   Cardiovascular:      Rate and Rhythm: Normal rate.   Pulmonary:      Effort: Pulmonary effort is normal.   Abdominal:      General: There is no distension.   Musculoskeletal:         General: Normal range of motion.   Lymphadenopathy:      Cervical: No cervical adenopathy.   Skin:     General: Skin is warm and dry.   Neurological:      Mental Status: She is alert and oriented to person, place, and time.   Psychiatric:         Mood and Affect: Mood normal.         Behavior: Behavior normal.          Laboratory Review:  There are no laboratory contraindications to radiation therapy.    The pertinent lab results were reviewed and discussed with the patient.  Notably, per HPI      Pathology Review:  The pertinent pathology results were reviewed and discussed with the patient.  Notably, per HPI     Imaging:  The pertinent imaging  results were reviewed and discussed with the patient.  Notably, per HPI       ASSESSMENT:   Ms. Campuzano is a 78 y.o. 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    PLAN:   We had an extensive discussion regarding the role of radiation in this setting. Given the locally advanced nature, and no definitive extralaryngeal extension, we would recommend definitive treatment with radiation and concurrent chemotherapy, to a dose of 70 Gy in 35 fractions. We will attempt to obtain her outside radiation records from her breast treatment, though any significant overlap would be unlikely. Given her extensive malignancy history, as well as smoking history, we agree with the plan to closely follow her lung lesion, and this could be addressed in the future if malignancy is proven. We will await for the follow-up ultrasound with regards to the breast changes seen on mammogram, but would favor addressing her head and neck cancer with priority over any additional breast treatment, if needed.    We reviewed what a typical course of radiation would entail, which would extend over a several-week time period and is given Monday through Friday, 5 days per week. The acute side effects of radiation and potential late toxicities were reviewed in detail. During the course of treatment, side effects could include, but are not limited to fatigue, skin irritation, taste changes, pain/difficulty swallowing, weight loss and possible feeding tube placement, dry mouth, and mouth/throat pain. The typical timeline for the resolution of these effects as well as available supportive therapies were reviewed. Potential long term side  effects could include, but are not limited to, neck fibrosis, skin changes, tooth decay, voice changes, long-term swallowing difficulties or taste changes, dry mouth, chronic vessel changes, bone damage, and a small risk of second malignancies.     After the discussion, the patient was given the opportunity to ask questions which were subsequently answered, and our contact information was given.    Please contact our department with any further questions regarding the plan of management.    The length of the visit was 60 minutes. We spent more than 50% of this time discussing treatment options with the patient.    Recommendations:  -Radiation treatment to a dose of 70 Gy in 35 fractions, with consideration of concurrent systemic therapy  -Follow-up breast ultrasound results, as well as CT lung in August  - CT simulation in the coming weeks.  NCCN Guidelines were applicable to guide this patients treatment plan.   Sue Oliva DO

## 2024-06-07 NOTE — PROGRESS NOTES
Chief Complaint:  FUV  Anal cancer      History Of Present Illness  Padilla Campuzano is a 78 y.o. female with history of SCCA of the anal canal and perianal skin, status post definitive chemoradiation therapy. She underwent EUA and transperineal Finn-Cut needle biopsies on 9/9/19 with no significant pathologic findings. Last clinic visit 7/26/22.    Recently diagnosed with laryngeal cancer. Staging PET scan showed hypermetabolic activity in anus and rectum. Here today for exam to exclude recurrent anal cancer. Bowel function is fairly poor with episodes of incontinence, but patient not interested in colostomy at this time.    4/3/24 PET/CT  IMPRESSION:  1. Hypermetabolic cavitary left lower lobe nodule consistent with  malignancy.  2. Focal hypermetabolic activity in the anus is nonspecific, but  concerning for malignancy. Correlate with direct visualization given  patient's history.  3. Diffuse hypermetabolic activity in the rectosigmoid colon is also  nonspecific and possibly physiologic, but given the recent findings  on anatomic imaging, may be infectious or inflammatory in nature.  4. Postsurgical changes from partial colectomy without evidence of  local disease recurrence.  5. Right upper quadrant hypermetabolic focus which appears to be  centered in the small bowel is favored to be physiologic, however a  small inferior liver lesion cannot be definitely excluded. Attention  on follow-up imaging versus MRI liver is recommended.  6. Nonspecific asymmetric left greater than right vocal cord  metabolic activity, correlate with concern for right vocal cord  dysfunction.      -4/23/24: biopsy of LLL nodule showed negative for malignancy  -5/7/24: seen by thoracic surgeon Dr. Linda, plan is to follow up image in 3 months         Past Medical History  She has a past medical history of Breast cancer (Multi), Cerebral hemorrhage (Multi) (2012), CKD (chronic kidney disease), Colorectal cancer (Multi), Diverticulosis of  intestine, part unspecified, without perforation or abscess without bleeding, GERD (gastroesophageal reflux disease), Hypertension, Irritable bowel syndrome, Kidney disease, Malignant neoplasm of colon, unspecified (Multi), Personal history of colonic polyps, Personal history of irradiation, Personal history of malignant neoplasm of breast, Personal history of other diseases of the respiratory system, Personal history of other malignant neoplasm of skin, Right upper quadrant pain (01/22/2016), Stroke (Multi) (2012), and Unspecified symptoms and signs involving the genitourinary system (01/22/2016).    Surgical History  She has a past surgical history that includes Breast lumpectomy (05/16/2014); Hysterectomy (1984); Ileostomy closure (11/23/2015); Ileostomy (11/23/2015); BI breast right cyst aspiration (Right, 12/31/2019); CT guided imaging for abscess drain (07/08/2015); Cholecystectomy; and Other surgical history (2012).     Social History  She reports that she quit smoking about 13 years ago. Her smoking use included cigarettes. She has been exposed to tobacco smoke. She has never used smokeless tobacco. She reports that she does not drink alcohol and does not use drugs.    Family History  Family History   Problem Relation Name Age of Onset    Colon cancer Mother      Liver cancer Mother      Kidney cancer Mother      Heart attack Father      Blood clot Father      Cancer Brother      Cancer Brother          Allergies  Hydrocodone-acetaminophen, Oxycodone hcl, Oxycodone-acetaminophen, Aspirin, and Sulfa (sulfonamide antibiotics)    Home Medications  Prior to Admission medications    Medication Sig Start Date End Date Taking? Authorizing Provider   acetaminophen (Tylenol) 500 mg tablet Take 2 tablets (1,000 mg) by mouth every 6 hours if needed for mild pain (1 - 3).    Historical Provider, MD   biotin 5 mg capsule once every 24 hours.    Historical Provider, MD   cholecalciferol (Vitamin D-3) 50 mcg (2,000 unit)  capsule 1 capsule (50 mcg) once every 24 hours.    Historical Provider, MD   cholestyramine (Questran) 4 gram powder once daily.    Historical Provider, MD   lipase-protease-amylase (Creon) 24,000-76,000 -120,000 unit capsule TAKE 3 CAPSULES BY MOUTH 3 TIMES DAILY WITH MEALS AND 1 TO 2  CAPSULES BY MOUTH WITH SNACKS.  MAX 13 CAPSULE DAILY 12/22/23   Leander Sun MD   losartan (Cozaar) 25 mg tablet Take 1 tablet (25 mg) by mouth once daily. 5/28/24 8/26/24  Naheed Clements MD   pantoprazole (ProtoNix) 40 mg EC tablet TAKE 1 TABLET BY MOUTH TWICE  DAILY 5/2/24   Naheed Clements MD       Review of Systems      Constitutional: Negative for fever, chills, anorexia, weight loss, malaise   ENMT: Negative for nasal discharge, congestion, ear pain, mouth pain, throat pain  Respiratory: Negative for cough, hemoptysis, wheezing, shortness of breath  Cardiac: Negative for chest pain, dyspnea on exertion, orthopnea, palpitations, syncope  Gastrointestinal: Negative for nausea, vomiting, diarrhea, constipation, abdominal pain  Genitourinary: Negative for discharge, dysuria, flank pain, frequency, hematuria  Musculoskeletal: Negative for decreased ROM, pain, swelling, weakness   Neurological: Negative for dizziness, confusion, headache, seizures, syncope   Psychiatric: Negative for mood changes, anxiety, hallucinations, sleep changes, suicidal ideas  Skin: Negative for mass, pain, itching, rash, ulcer   Endocrine: Negative for heat intolerance, cold intolerance, excessive sweating, polyuria, excess thirst   Hematologic/Lymph: Negative for anemia, bruising, easy bleeding, night sweats, petechiae, history of DVT/PE. + anal cancer  Allergic/Immunologic: Negative for anaphylaxis, itchy/ teary eyes, itching, sneezing, swelling       Physical Exam    Constitutional: Well developed, awake/alert/oriented x3, no distress, alert and cooperative   Eyes: Sclera anicteric, no conjunctival inflammation, conjugate gaze   ENMT:  mucous membranes moist, no apparent injury,   Head/Neck: Neck supple, no apparent injury, trachea midline, no bruits   Respiratory/Thorax: Patent airways, CTAB, normal breath sounds with good chest expansion  Cardiovascular: Regular, rate and rhythm, no murmurs, normal S1 and S2   Gastrointestinal: Nondistended, soft, non-tender, no rebound tenderness or guarding, no masses palpable, no organomegaly, +BS, no bruits   Extremities: normal extremities, no edema, no inguinal adenopathy  Neurological: alert and oriented x3, normal strength, Normal gait   Lymphatic: No palpable inguinal lymphadenopathy   Psychological: Appropriate mood and behavior   Skin: Warm and dry, no lesions, no rashes   Anorectal: radiation changes of perianal skin. Digital exam with radiation changes in anal canal but no masses and no evidence of recurrent anal cancer.     Last Recorded Vitals  BP (!) 189/67   Pulse 76   Temp 36.3 °C (97.3 °F)   Wt 65.5 kg (144 lb 8 oz)   BMI 26.26 kg/m²         Imaging  BI mammo bilateral screening tomosynthesis    Result Date: 5/31/2024  Interpreted By:  Solomon Santiago, STUDY: BI MAMMO BILATERAL SCREENING TOMOSYNTHESIS;  5/29/2024 1:16 pm   INDICATION: Screening.   COMPARISON: Mammograms dated 11/16/2021 and 09/14/2020   ORDERING CLINICIAN: FREDERICK READ   ACCESSION NUMBER(S): [id number]   TECHNIQUE: Tomosynthesis and 2D mammograms were reviewed at 1 mm slice thickness.   FINDINGS: Density:  There are areas of scattered fibroglandular tissue.   There is focal asymmetry in the central right breast at the mid to posterior depth. No suspicious mass or asymmetry is evident in the left breast.  No suspicious calcification or architectural distortion is evident.       1. Asymmetry in the right breast that requires further assessment with spot compression CC and MLO mammograms with tomosynthesis imaging and potentially ultrasound. 2. No mammographic finding is evident in the left breast to suggest  malignancy.   BI-RADS CATEGORY: BI-RADS Category:  0 Incomplete; Need Additional Imaging Evaluation and/or Prior Mammograms for Comparison. Recommendation:  Additional Imaging Diagnostic Mammogram. Recommended Date:  Immediate. Laterality:  Right.   For any future breast imaging appointments, please call [phone] ([phone]).   MACRO: None   Signed by: Solomon Santiago 5/31/2024 2:45 PM Dictation workstation:   [id number]          Lab Review  Lab Results   Component Value Date    AST 17 06/05/2024    ALT 8 06/05/2024    ALKPHOS 62 06/05/2024    PROT 6.8 06/05/2024    ALBUMIN 4.0 06/05/2024       Assessment/Plan   Newly diagnosed laryngeal cancer and PET scan with hypermetabolic activity in anus. No evidence of recurrent anal cancer on exam today. 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 Patient reassured. RTC in one year for surveillance.    Jovani Panchal MD    6/11/24

## 2024-06-10 ENCOUNTER — HOSPITAL ENCOUNTER (OUTPATIENT)
Dept: RADIATION ONCOLOGY | Facility: CLINIC | Age: 79
Setting detail: RADIATION/ONCOLOGY SERIES
Discharge: HOME | End: 2024-06-10
Payer: MEDICARE

## 2024-06-10 ENCOUNTER — SOCIAL WORK (OUTPATIENT)
Dept: CASE MANAGEMENT | Facility: HOSPITAL | Age: 79
End: 2024-06-10

## 2024-06-10 VITALS
WEIGHT: 144.82 LBS | SYSTOLIC BLOOD PRESSURE: 174 MMHG | OXYGEN SATURATION: 96 % | HEART RATE: 69 BPM | TEMPERATURE: 97.9 F | DIASTOLIC BLOOD PRESSURE: 81 MMHG | HEIGHT: 62 IN | RESPIRATION RATE: 16 BRPM | BODY MASS INDEX: 26.65 KG/M2

## 2024-06-10 DIAGNOSIS — C32.0 SQUAMOUS CELL CARCINOMA OF LEFT VOCAL CORD (MULTI): Primary | ICD-10-CM

## 2024-06-10 DIAGNOSIS — C32.9 MALIGNANT NEOPLASM OF LARYNX (MULTI): ICD-10-CM

## 2024-06-10 PROCEDURE — 2500000005 HC RX 250 GENERAL PHARMACY W/O HCPCS: Performed by: STUDENT IN AN ORGANIZED HEALTH CARE EDUCATION/TRAINING PROGRAM

## 2024-06-10 PROCEDURE — 99215 OFFICE O/P EST HI 40 MIN: CPT | Performed by: STUDENT IN AN ORGANIZED HEALTH CARE EDUCATION/TRAINING PROGRAM

## 2024-06-10 PROCEDURE — 99205 OFFICE O/P NEW HI 60 MIN: CPT | Performed by: STUDENT IN AN ORGANIZED HEALTH CARE EDUCATION/TRAINING PROGRAM

## 2024-06-10 RX ORDER — LIDOCAINE HYDROCHLORIDE 20 MG/ML
2 INJECTION, SOLUTION EPIDURAL; INFILTRATION; INTRACAUDAL; PERINEURAL ONCE
Status: COMPLETED | OUTPATIENT
Start: 2024-06-10 | End: 2024-06-10

## 2024-06-10 RX ADMIN — PHENYLEPHRINE HYDROCHLORIDE 2 SPRAY: 0.5 SPRAY NASAL at 10:50

## 2024-06-10 RX ADMIN — LIDOCAINE HYDROCHLORIDE 40 MG: 20 INJECTION, SOLUTION EPIDURAL; INFILTRATION; INTRACAUDAL; PERINEURAL at 10:50

## 2024-06-10 SDOH — ECONOMIC STABILITY: INCOME INSECURITY: IN THE LAST 12 MONTHS, WAS THERE A TIME WHEN YOU WERE NOT ABLE TO PAY THE MORTGAGE OR RENT ON TIME?: NO

## 2024-06-10 SDOH — ECONOMIC STABILITY: TRANSPORTATION INSECURITY
IN THE PAST 12 MONTHS, HAS THE LACK OF TRANSPORTATION KEPT YOU FROM MEDICAL APPOINTMENTS OR FROM GETTING MEDICATIONS?: NO

## 2024-06-10 SDOH — ECONOMIC STABILITY: HOUSING INSECURITY
IN THE LAST 12 MONTHS, WAS THERE A TIME WHEN YOU DID NOT HAVE A STEADY PLACE TO SLEEP OR SLEPT IN A SHELTER (INCLUDING NOW)?: NO

## 2024-06-10 SDOH — ECONOMIC STABILITY: TRANSPORTATION INSECURITY
IN THE PAST 12 MONTHS, HAS LACK OF TRANSPORTATION KEPT YOU FROM MEETINGS, WORK, OR FROM GETTING THINGS NEEDED FOR DAILY LIVING?: NO

## 2024-06-10 ASSESSMENT — SOCIAL DETERMINANTS OF HEALTH (SDOH)
WITHIN THE LAST YEAR, HAVE YOU BEEN KICKED, HIT, SLAPPED, OR OTHERWISE PHYSICALLY HURT BY YOUR PARTNER OR EX-PARTNER?: NO
WITHIN THE LAST YEAR, HAVE YOU BEEN HUMILIATED OR EMOTIONALLY ABUSED IN OTHER WAYS BY YOUR PARTNER OR EX-PARTNER?: NO
HOW HARD IS IT FOR YOU TO PAY FOR THE VERY BASICS LIKE FOOD, HOUSING, MEDICAL CARE, AND HEATING?: NOT HARD AT ALL
WITHIN THE LAST YEAR, HAVE YOU BEEN AFRAID OF YOUR PARTNER OR EX-PARTNER?: NO
WITHIN THE LAST YEAR, HAVE TO BEEN RAPED OR FORCED TO HAVE ANY KIND OF SEXUAL ACTIVITY BY YOUR PARTNER OR EX-PARTNER?: NO

## 2024-06-10 ASSESSMENT — ENCOUNTER SYMPTOMS
DEPRESSION: 1
OCCASIONAL FEELINGS OF UNSTEADINESS: 0
LOSS OF SENSATION IN FEET: 1

## 2024-06-10 ASSESSMENT — PAIN SCALES - GENERAL: PAINLEVEL: 0-NO PAIN

## 2024-06-10 NOTE — PROGRESS NOTES
CA  met with patient while she was here for a consultation. She was accompanied on this visit by her . She is retired and fully independent. She expresses no distress at this time. She and her  work very well together and help each other out. CA explained her role here at the center. SW provided business card and will remain available for future needs.

## 2024-06-10 NOTE — PROGRESS NOTES
New patient here today with spouse to see Dr. Oliva for cancer of the vocal cords. Patient states that she developed a hoarse voice in 1/2024. Patient denies trouble swallowing and east anything she wants. Dr. Oliva did scope the patient today. Per Dr. Oliva, we will set the patient up for CT simulation. Sent referrals for nutrition, speech, and palliative care.   We reviewed what the sim will entail including but not limited to IV contrast, shoulder retractors, large mask, head pad, bite block, knee sponge. We also reviewed side effects of radiation including but not limited to sore throat, hard to swallow, hoarseness, skin irritation,  dry mouth and fatigue and ways to manage side effects. Patient asking appropriate questions. Patient verbalizes understanding with verbal teach back. Marina Ahn MSN, RN, OCN

## 2024-06-10 NOTE — ADDENDUM NOTE
Encounter addended by: Marina Ahn RN on: 6/10/2024 12:41 PM   Actions taken: Diagnosis association updated, Order list changed

## 2024-06-10 NOTE — PROGRESS NOTES
Radiation Oncology Nursing Note    Prior Radiotherapy:  Yes, describe: radiation for anal cancer and right breast  No radiation treatments to show. (Treatments may have been administered in another system.)     Current Systemic Treatment:  No     Presence of Pacemaker or ICD:  No    History of Autoimmune or Connective Tissue Disorders:  No    Pain: The patient's current pain level was assessed.  They report currently having a pain of 0 out of 10.  They feel their pain is under control without the use of pain medications.    Review of Systems:  Review of Systems - Oncology

## 2024-06-10 NOTE — ADDENDUM NOTE
Encounter addended by: Sue Oliva DO on: 6/10/2024 12:27 PM   Actions taken: Order list changed, Diagnosis association updated

## 2024-06-10 NOTE — ADDENDUM NOTE
Encounter addended by: Marina Ahn RN on: 6/10/2024 12:29 PM   Actions taken: Patient Education assessment filed, Patient Education title added, Patient Education documented on

## 2024-06-11 ENCOUNTER — OFFICE VISIT (OUTPATIENT)
Dept: SURGERY | Facility: CLINIC | Age: 79
End: 2024-06-11
Payer: MEDICARE

## 2024-06-11 ENCOUNTER — TELEPHONE (OUTPATIENT)
Dept: PALLIATIVE MEDICINE | Facility: HOSPITAL | Age: 79
End: 2024-06-11

## 2024-06-11 VITALS
DIASTOLIC BLOOD PRESSURE: 67 MMHG | BODY MASS INDEX: 26.26 KG/M2 | HEART RATE: 76 BPM | TEMPERATURE: 97.3 F | WEIGHT: 144.5 LBS | SYSTOLIC BLOOD PRESSURE: 189 MMHG

## 2024-06-11 DIAGNOSIS — Z85.048 HISTORY OF ANAL CANCER: Primary | ICD-10-CM

## 2024-06-11 PROCEDURE — 1126F AMNT PAIN NOTED NONE PRSNT: CPT | Performed by: COLON & RECTAL SURGERY

## 2024-06-11 PROCEDURE — 1036F TOBACCO NON-USER: CPT | Performed by: COLON & RECTAL SURGERY

## 2024-06-11 PROCEDURE — 99213 OFFICE O/P EST LOW 20 MIN: CPT | Performed by: COLON & RECTAL SURGERY

## 2024-06-11 ASSESSMENT — PAIN SCALES - GENERAL: PAINLEVEL: 0-NO PAIN

## 2024-06-11 NOTE — TELEPHONE ENCOUNTER
1st attempt made to contact patient after referral to supportive oncology by Dr Sue Oliva.  Busy signal.  Unable to leave voicemail message at this time for patient to schedule appointment.

## 2024-06-11 NOTE — RESULT ENCOUNTER NOTE
Please call the patient regarding her abnormal result.  Mammogram is abnormal   Please call radiology to schedule follow up imaging now

## 2024-06-12 ENCOUNTER — TELEPHONE (OUTPATIENT)
Dept: PALLIATIVE MEDICINE | Facility: HOSPITAL | Age: 79
End: 2024-06-12
Payer: MEDICARE

## 2024-06-12 NOTE — TELEPHONE ENCOUNTER
Patient called a 2nd time for appointment with supportive oncology after referral from Dr Sue Oliva.  Patient not interested in scheduling at this time with a provider.  Patient provided with contact information to reach out to office if interested in scheduling in the future.

## 2024-06-14 ENCOUNTER — TUMOR BOARD CONFERENCE (OUTPATIENT)
Dept: HEMATOLOGY/ONCOLOGY | Facility: HOSPITAL | Age: 79
End: 2024-06-14
Payer: MEDICARE

## 2024-06-14 NOTE — TUMOR BOARD NOTE
OTOLARYNGOLOGY - HEAD AND NECK SURGERY MULTIDISCIPLINARY TUMOR BOARD NOTE    Padilla Campuzano is a 78 y.o. woman who was presented at the multidisciplinary Head and Neck Tumor Board on 2024.    Review of history:  She originally presented to Dr. Ferny Rhoades on 6/3/2024 for complaints of hoarseness for the past 6 months since 2023. 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 There is some extension subglottically. There were no signs of neck adenopathy.    Patient is also being followed by thoracic and GI for surveillance of left lower lung nodule and recurrent anal cancer.    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    Patient has a 33 pack year history but quit smoking 13 years ago in .    Social history:  Social History     Tobacco Use    Smoking status: Former     Current packs/day: 0.00     Types: Cigarettes     Quit date:      Years since quittin.4     Passive exposure: Past    Smokeless tobacco: Never   Vaping Use    Vaping status: Never Used   Substance Use Topics    Alcohol use: Never    Drug use: Never       Workup:  1. Imaging:   NM PET CT FD[facility] WHOLEBODY; 4/3/2024  FINDINGS:  NECK:  No focal hypermetabolic soft tissue lesion is seen in the neck.  No hypermetabolic cervical lymphadenopathy is present.  Asymmetric left greater than right metabolic activity in the region  of the vocal cords is favored seen.      CHEST:  Hypermetabolic cavitary left lower lobe nodule demonstrates max SUV  5.4. No evidence of hypermetabolic mediastinal, hilar or axillary  lymphadenopathy. A right breast fluid collection compatible with a  seroma is seen with minimal peripheral FDG avidity.      ABDOMEN AND  PELVIS:  Diffuse hypermetabolic rectosigmoid wall thickening is present.  More focal hypermetabolic activity seen in the anal region with max  SUV of 7.5. Postsurgical changes from partial colectomy with a left  lower quadrant anastomosis are seen. No focal hypermetabolic activity  seen along the surgical anastomosis. Apparent right upper quadrant  hypermetabolic focus in the small bowel at the inferior margin of the  liver demonstrates max SUV of 4.7 No evidence of hypermetabolic  lymphadenopathy. Physiologic radiotracer uptake is present in the  liver and spleen with excretion into the bowel and the urinary tract.      MUSCULOSKELETAL:  There is no focal hypermetabolic lesion to suggest osseous metastasis.      IMPRESSION:  1. Hypermetabolic cavitary left lower lobe nodule consistent with  malignancy.  2. Focal hypermetabolic activity in the anus is nonspecific, but  concerning for malignancy. Correlate with direct visualization given  patient's history.  3. Diffuse hypermetabolic activity in the rectosigmoid colon is also  nonspecific and possibly physiologic, but given the recent findings  on anatomic imaging, may be infectious or inflammatory in nature.  4. Postsurgical changes from partial colectomy without evidence of  local disease recurrence.  5. Right upper quadrant hypermetabolic focus which appears to be  centered in the small bowel is favored to be physiologic, however a  small inferior liver lesion cannot be definitely excluded. Attention  on follow-up imaging versus MRI liver is recommended.  6. Nonspecific asymmetric left greater than right vocal cord  metabolic activity, correlate with concern for right vocal cord  dysfunction.    2. Pathology:   Surgical Pathology, 5/20/2024  FINAL DIAGNOSIS   A. 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          Staging: [id number] left glottic and subglottic squamous cell carcinoma    Tumor board  recommendations:   Management would include either total laryngectomy and radiation or chemoradiation.    During the office visit, patient expressed that she did not want TL. Patient was referred to and seen by medical and radiation oncology on 6/5 and 6/10, respectively, who recommended definitive chemoradiation with carboplatin and paclitaxel, with treatment set to begin 6/25.    Note: Current national standards and recommendations, as well as review of the current literature, were included in the discussions that led to the recommendations above.

## 2024-06-17 ENCOUNTER — HOSPITAL ENCOUNTER (OUTPATIENT)
Dept: RADIOLOGY | Facility: HOSPITAL | Age: 79
Discharge: HOME | End: 2024-06-17
Payer: MEDICARE

## 2024-06-17 DIAGNOSIS — R92.8 ABNORMAL MAMMOGRAM: ICD-10-CM

## 2024-06-17 PROCEDURE — 77061 BREAST TOMOSYNTHESIS UNI: CPT | Mod: RT

## 2024-06-17 PROCEDURE — 76642 ULTRASOUND BREAST LIMITED: CPT | Mod: RIGHT SIDE | Performed by: RADIOLOGY

## 2024-06-17 PROCEDURE — 76642 ULTRASOUND BREAST LIMITED: CPT | Mod: RT

## 2024-06-17 PROCEDURE — G0279 TOMOSYNTHESIS, MAMMO: HCPCS | Mod: RIGHT SIDE | Performed by: RADIOLOGY

## 2024-06-17 PROCEDURE — 77065 DX MAMMO INCL CAD UNI: CPT | Mod: RIGHT SIDE | Performed by: RADIOLOGY

## 2024-06-20 ENCOUNTER — HOSPITAL ENCOUNTER (OUTPATIENT)
Dept: RADIOLOGY | Facility: HOSPITAL | Age: 79
Discharge: HOME | End: 2024-06-20
Payer: MEDICARE

## 2024-06-20 DIAGNOSIS — C32.0 SQUAMOUS CELL CARCINOMA OF LEFT VOCAL CORD (MULTI): ICD-10-CM

## 2024-06-20 PROCEDURE — 74230 X-RAY XM SWLNG FUNCJ C+: CPT

## 2024-06-20 PROCEDURE — 92611 MOTION FLUOROSCOPY/SWALLOW: CPT | Mod: GN

## 2024-06-20 PROCEDURE — 2500000005 HC RX 250 GENERAL PHARMACY W/O HCPCS: Performed by: STUDENT IN AN ORGANIZED HEALTH CARE EDUCATION/TRAINING PROGRAM

## 2024-06-20 PROCEDURE — 92526 ORAL FUNCTION THERAPY: CPT | Mod: GN

## 2024-06-20 NOTE — PROCEDURES
Speech-Language Pathology      Modified Barium Swallow Study completed. Informed verbal consent obtained prior to completion of exam. The study was completed per protocol with various liquid barium consistencies, pudding, solids and a 13mm barium tablet. A 1.9 cm ring was placed on the chin in the lateral view and on the lateral left side of the neck in the a-p view to; complete objective measurements during swallowing. The anatomic structures and function of the oropharynx, larynx, hypopharynx and cervical esophagus were evaluated.      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 Campuzano is a 78 y.o. woman who was previously treated for breast and anal cancer, as above. She then presented to ENT in April, with a history of worsening hoarseness and throat pain since the end of January. Scope exam showed an immobile left vocal cord, with limited exam due to intolerance. She had a PET/CT on 4/3/2024 for workup of an enlarging left lower lobe pulmonary nodule that was seen on CT chest from 3/21/2024. Increased FDG uptake (SUV 5.4) was seen within the left lower lobe pulmonary nodule. Increased uptake was also seen within a left true vocal cord mass, with uptake that extended to the superior aspect of the subglottis. No evidence of regional lymphadenopathy or distant metastatic disease was seen. CT-guided biopsy of the left lower lobe lung mass on 4/23/2024 showed lung parenchyma with focal intra-alveolar fibrin/neutrophils, lymphoid aggregate, negative for malignancy. She then was taken to the operating room for direct laryngoscopy and biopsy on 5/28/2024, with findings that showed mucosal irregularity of the left true vocal cord, which extended to the ventricle on the left. This continued to extend inferiorly to the subglottis to midline, and up to the undersurface of the right true vocal  cord. Pathology was positive for invasive moderately differentiated keratinizing squamous cell carcinoma. She was seen by head and neck surgery earlier this month, with exam that showed an immobile left vocal cord with a left vocal cord mass, which involved the entire cord, and extended anteriorly around the commissure to involve the anterior portion of the right vocal cord, with some extension subglottically. Airway was patent.    Respiratory status: Room air  Current diet: regular/ thin liquids    FINAL SPEECH RECOMMENDATIONS  Diet/Follow up indicated:   1. Regular/thin liquids  2. Medications in puree or thin liquids. If not coated - place more posterior on tongue to assist with swallow onset.  3. Complete lingual stretching and anterior neck massage/stretching throughout treatment as able.  4. Contact SLP with any needs throughout treatment  5. Call for a follow-up appointment with SLP to be completed 2 weeks following completion of radiation treatment     Swallow Strategies:  - Medications whole in puree or as best tolerated    OUTCOME MEASURES:  Rosenbek's Penetration Aspiration Scale  Thin Liquids: 1. NO ASPIRATION & NO PENETRATION - no aspiration, contrast does not enter airway  [place] Thick Liquids: 1. NO ASPIRATION & NO PENETRATION - no aspiration, contrast does not enter airway  Puree: 1. NO ASPIRATION & NO PENETRATION - no aspiration, contrast does not enter airway  Solids: 1. NO ASPIRATION & NO PENETRATION - no aspiration, contrast does not enter airway       Eating Assessment Tool (EAT-10)   0=No problem, 1=Mild problem, 2=Mild to moderate problem, 3=Moderate problem, 4=Severe problem  Score: 0     A total score of 3 or above may indicate difficulty with swallowing safely and/or efficiently    Functional Oral Intake Scale  Functional Oral Intake Scale: Level 7        total oral diet with no restrictions    Plan:  Treatment/Interventions: Pharyngeal exercises, Oral motor exercises, Patient/family  education, Bolus trials. Diet tolerance/advancement      Discussed POC: Patient  Discussed Risks/Benefits: Yes  Patient/Caregiver Agreeable: Yes    Short term goals established 06/20/24:   1. Patient will maintain oral intake throughout radiation treatment  2. Patient will tolerate oral intake without pulmonary compromise  3. Patient will complete home exercise program throughout XRT to maintain swallow function  4. Patient/family education throughout treatment   5. Management of taste and saliva function.       Long term goals 06/20/24:   Patient will resume/maintain oral intake in order to promote optimal oropharyngeal swallow function and reduce risk for dehydration, malnutrition and weight loss.     Education provided: ST provided education to Patient regarding MBSS impressions, recommendations and POC at this time. Verbal understanding and agreement given on all accounts.     Treatment Provided today:   Reviewed MBS results. Education of role of SLP, side effects of radiation treatment and instruction on compensatory techniques provided for xerostomia, changes in taste and saliva. Instructed anterior neck and lingual stretching. Patient provided with SLP contact information. He will contact SLP throughout treatment as indicated. He will contact SLP following completion of radiation to set up a reassessment appointment. He understands all aspiration risks and effects of radiation treatment on swallow function.       SLP Impressions with Severity Rating:   Swallowing is within functional limits at this time. No pharyngeal residue, no laryngeal penetration, no aspiration oral stage normal function despite mass. SLP reviewed MBS results, role of SLP throughout treatment and following, and instructed anterior neck and lingual stretches to be completed during treatment. Reviewed effects of radiation and instructed patient to contact SLP with any questions. Contact information provided. Patient will schedule a virtual  appointment and an appointment for a follow-up 2 weeks following completion of radiation treatment. She understands and agrees with plan.     Strategies attempted-n/a    Mechanics of the swallow summary:    ORAL PHASE:  Lip Closure - No labial escape/anterior loss of bolus   Tongue Control During Bolus Hold - Escape to lateral buccal cavity and/or floor of mouth   Bolus prep/mastication - Timely and efficient mastication skills   Bolus transport/lingual motion - Brisk tongue motion for A-P movement of the bolus   Oral residue - Residue collection on oral structure , reduced with reswallow    PHARYNGEAL PHASE:  Initiation of pharyngeal swallow - Bolus head at vallecular pit   Soft palate elevation - No bolus between soft palate/pharyngeal wall   Laryngeal elevation - Complete superior movement of thyroid cartilage with contact of arytenoids to epiglottic petiole   Anterior hyoid excursion - Complete anterior movement   Epiglottic movement - Complete inversion    Laryngeal vestibule closure - Complete - no air/contrast in laryngeal vestibule   Pharyngeal stripping wave - Complete  Pharyngoesophageal segment opening - Complete distension and complete duration/no obstruction of flow of bolus   Tongue base retraction - No bolus between tongue base and posterior pharyngeal wall   Pharyngeal residue - Trace residue within or on the pharyngeal structures     ESOPHAGEAL PHASE:  Esophageal clearance - Complete clearance

## 2024-06-21 ENCOUNTER — LAB (OUTPATIENT)
Dept: LAB | Facility: LAB | Age: 79
End: 2024-06-21
Payer: MEDICARE

## 2024-06-21 ENCOUNTER — TELEPHONE (OUTPATIENT)
Dept: RADIATION ONCOLOGY | Facility: CLINIC | Age: 79
End: 2024-06-21
Payer: MEDICARE

## 2024-06-21 DIAGNOSIS — C32.0 SQUAMOUS CELL CARCINOMA OF LEFT VOCAL CORD (MULTI): ICD-10-CM

## 2024-06-21 LAB
ALBUMIN SERPL BCP-MCNC: 4.1 G/DL (ref 3.4–5)
ALP SERPL-CCNC: 56 U/L (ref 33–136)
ALT SERPL W P-5'-P-CCNC: 7 U/L (ref 7–45)
ANION GAP SERPL CALC-SCNC: 11 MMOL/L (ref 10–20)
AST SERPL W P-5'-P-CCNC: 15 U/L (ref 9–39)
BASOPHILS # BLD AUTO: 0.04 X10*3/UL (ref 0–0.1)
BASOPHILS NFR BLD AUTO: 0.6 %
BILIRUB SERPL-MCNC: 0.3 MG/DL (ref 0–1.2)
BUN SERPL-MCNC: 25 MG/DL (ref 6–23)
CALCIUM SERPL-MCNC: 9 MG/DL (ref 8.6–10.3)
CHLORIDE SERPL-SCNC: 108 MMOL/L (ref 98–107)
CO2 SERPL-SCNC: 24 MMOL/L (ref 21–32)
CREAT SERPL-MCNC: 1.26 MG/DL (ref 0.5–1.05)
EGFRCR SERPLBLD CKD-EPI 2021: 44 ML/MIN/1.73M*2
EOSINOPHIL # BLD AUTO: 0.09 X10*3/UL (ref 0–0.4)
EOSINOPHIL NFR BLD AUTO: 1.3 %
ERYTHROCYTE [DISTWIDTH] IN BLOOD BY AUTOMATED COUNT: 13.1 % (ref 11.5–14.5)
GLUCOSE SERPL-MCNC: 87 MG/DL (ref 74–99)
HBV CORE AB SER QL: NONREACTIVE
HBV SURFACE AB SER-ACNC: <3.1 MIU/ML
HBV SURFACE AG SERPL QL IA: NONREACTIVE
HCT VFR BLD AUTO: 36.3 % (ref 36–46)
HGB BLD-MCNC: 11.6 G/DL (ref 12–16)
IMM GRANULOCYTES # BLD AUTO: 0.01 X10*3/UL (ref 0–0.5)
IMM GRANULOCYTES NFR BLD AUTO: 0.1 % (ref 0–0.9)
LYMPHOCYTES # BLD AUTO: 2.68 X10*3/UL (ref 0.8–3)
LYMPHOCYTES NFR BLD AUTO: 37.4 %
MAGNESIUM SERPL-MCNC: 1.38 MG/DL (ref 1.6–2.4)
MCH RBC QN AUTO: 31.5 PG (ref 26–34)
MCHC RBC AUTO-ENTMCNC: 32 G/DL (ref 32–36)
MCV RBC AUTO: 99 FL (ref 80–100)
MONOCYTES # BLD AUTO: 0.63 X10*3/UL (ref 0.05–0.8)
MONOCYTES NFR BLD AUTO: 8.8 %
NEUTROPHILS # BLD AUTO: 3.72 X10*3/UL (ref 1.6–5.5)
NEUTROPHILS NFR BLD AUTO: 51.8 %
NRBC BLD-RTO: 0 /100 WBCS (ref 0–0)
PLATELET # BLD AUTO: 324 X10*3/UL (ref 150–450)
POTASSIUM SERPL-SCNC: 4.1 MMOL/L (ref 3.5–5.3)
PROT SERPL-MCNC: 6.5 G/DL (ref 6.4–8.2)
RBC # BLD AUTO: 3.68 X10*6/UL (ref 4–5.2)
SODIUM SERPL-SCNC: 139 MMOL/L (ref 136–145)
T4 FREE SERPL-MCNC: 1.16 NG/DL (ref 0.61–1.12)
TSH SERPL-ACNC: 4.06 MIU/L (ref 0.44–3.98)
WBC # BLD AUTO: 7.2 X10*3/UL (ref 4.4–11.3)

## 2024-06-21 PROCEDURE — 86704 HEP B CORE ANTIBODY TOTAL: CPT

## 2024-06-21 PROCEDURE — 36415 COLL VENOUS BLD VENIPUNCTURE: CPT

## 2024-06-21 PROCEDURE — 87340 HEPATITIS B SURFACE AG IA: CPT

## 2024-06-21 PROCEDURE — 86706 HEP B SURFACE ANTIBODY: CPT

## 2024-06-21 NOTE — TELEPHONE ENCOUNTER
Patient to go to a  lab today for labs for CT sim on Monday. Patient verbalizes understanding with verbal teach back. Marina Ahn MSN, RN, OCN

## 2024-06-24 ENCOUNTER — APPOINTMENT (OUTPATIENT)
Dept: HEMATOLOGY/ONCOLOGY | Facility: HOSPITAL | Age: 79
End: 2024-06-24
Payer: MEDICARE

## 2024-06-24 ENCOUNTER — APPOINTMENT (OUTPATIENT)
Dept: HEMATOLOGY/ONCOLOGY | Facility: CLINIC | Age: 79
End: 2024-06-24
Payer: MEDICARE

## 2024-06-24 ENCOUNTER — HOSPITAL ENCOUNTER (OUTPATIENT)
Dept: RADIATION ONCOLOGY | Facility: CLINIC | Age: 79
Setting detail: RADIATION/ONCOLOGY SERIES
Discharge: HOME | End: 2024-06-24
Payer: MEDICARE

## 2024-06-24 ENCOUNTER — HOSPITAL ENCOUNTER (OUTPATIENT)
Dept: RADIOLOGY | Facility: EXTERNAL LOCATION | Age: 79
Discharge: HOME | End: 2024-06-24

## 2024-06-24 DIAGNOSIS — C32.0 SQUAMOUS CELL CARCINOMA OF LEFT VOCAL CORD (MULTI): ICD-10-CM

## 2024-06-24 DIAGNOSIS — C32.0 MALIGNANT NEOPLASM OF GLOTTIS (MULTI): ICD-10-CM

## 2024-06-24 PROCEDURE — 77334 RADIATION TREATMENT AID(S): CPT | Performed by: STUDENT IN AN ORGANIZED HEALTH CARE EDUCATION/TRAINING PROGRAM

## 2024-06-24 PROCEDURE — 77470 SPECIAL RADIATION TREATMENT: CPT | Performed by: STUDENT IN AN ORGANIZED HEALTH CARE EDUCATION/TRAINING PROGRAM

## 2024-06-25 ENCOUNTER — APPOINTMENT (OUTPATIENT)
Dept: HEMATOLOGY/ONCOLOGY | Facility: CLINIC | Age: 79
End: 2024-06-25
Payer: MEDICARE

## 2024-06-26 DIAGNOSIS — C32.0 SQUAMOUS CELL CARCINOMA OF LEFT VOCAL CORD (MULTI): Primary | ICD-10-CM

## 2024-06-26 RX ORDER — DIPHENHYDRAMINE HCL 25 MG
50 CAPSULE ORAL ONCE
OUTPATIENT
Start: 2024-07-02

## 2024-06-26 RX ORDER — FAMOTIDINE 10 MG/ML
20 INJECTION INTRAVENOUS ONCE AS NEEDED
OUTPATIENT
Start: 2024-07-16

## 2024-06-26 RX ORDER — FAMOTIDINE 10 MG/ML
20 INJECTION INTRAVENOUS ONCE AS NEEDED
OUTPATIENT
Start: 2024-07-02

## 2024-06-26 RX ORDER — PALONOSETRON 0.05 MG/ML
0.25 INJECTION, SOLUTION INTRAVENOUS ONCE
OUTPATIENT
Start: 2024-07-02

## 2024-06-26 RX ORDER — FAMOTIDINE 10 MG/ML
20 INJECTION INTRAVENOUS ONCE
OUTPATIENT
Start: 2024-07-16

## 2024-06-26 RX ORDER — PROCHLORPERAZINE EDISYLATE 5 MG/ML
10 INJECTION INTRAMUSCULAR; INTRAVENOUS EVERY 6 HOURS PRN
OUTPATIENT
Start: 2024-07-16

## 2024-06-26 RX ORDER — DIPHENHYDRAMINE HYDROCHLORIDE 50 MG/ML
50 INJECTION INTRAMUSCULAR; INTRAVENOUS AS NEEDED
OUTPATIENT
Start: 2024-07-16

## 2024-06-26 RX ORDER — PALONOSETRON 0.05 MG/ML
0.25 INJECTION, SOLUTION INTRAVENOUS ONCE
OUTPATIENT
Start: 2024-07-16

## 2024-06-26 RX ORDER — EPINEPHRINE 0.3 MG/.3ML
0.3 INJECTION SUBCUTANEOUS EVERY 5 MIN PRN
OUTPATIENT
Start: 2024-07-16

## 2024-06-26 RX ORDER — ALBUTEROL SULFATE 0.83 MG/ML
3 SOLUTION RESPIRATORY (INHALATION) AS NEEDED
OUTPATIENT
Start: 2024-07-02

## 2024-06-26 RX ORDER — PROCHLORPERAZINE EDISYLATE 5 MG/ML
10 INJECTION INTRAMUSCULAR; INTRAVENOUS EVERY 6 HOURS PRN
OUTPATIENT
Start: 2024-07-02

## 2024-06-26 RX ORDER — EPINEPHRINE 0.3 MG/.3ML
0.3 INJECTION SUBCUTANEOUS EVERY 5 MIN PRN
OUTPATIENT
Start: 2024-07-02

## 2024-06-26 RX ORDER — ALBUTEROL SULFATE 0.83 MG/ML
3 SOLUTION RESPIRATORY (INHALATION) AS NEEDED
OUTPATIENT
Start: 2024-07-16

## 2024-06-26 RX ORDER — DIPHENHYDRAMINE HYDROCHLORIDE 50 MG/ML
50 INJECTION INTRAMUSCULAR; INTRAVENOUS AS NEEDED
OUTPATIENT
Start: 2024-07-02

## 2024-06-26 RX ORDER — DIPHENHYDRAMINE HCL 25 MG
50 CAPSULE ORAL ONCE
OUTPATIENT
Start: 2024-07-16

## 2024-06-26 RX ORDER — PROCHLORPERAZINE MALEATE 10 MG
10 TABLET ORAL EVERY 6 HOURS PRN
OUTPATIENT
Start: 2024-07-02

## 2024-06-26 RX ORDER — FAMOTIDINE 10 MG/ML
20 INJECTION INTRAVENOUS ONCE
OUTPATIENT
Start: 2024-07-02

## 2024-06-26 RX ORDER — PROCHLORPERAZINE MALEATE 10 MG
10 TABLET ORAL EVERY 6 HOURS PRN
OUTPATIENT
Start: 2024-07-16

## 2024-06-27 ENCOUNTER — APPOINTMENT (OUTPATIENT)
Dept: HEMATOLOGY/ONCOLOGY | Facility: CLINIC | Age: 79
End: 2024-06-27
Payer: MEDICARE

## 2024-06-27 DIAGNOSIS — C32.0 SQUAMOUS CELL CARCINOMA OF LEFT VOCAL CORD (MULTI): Primary | ICD-10-CM

## 2024-06-28 ENCOUNTER — HOSPITAL ENCOUNTER (OUTPATIENT)
Dept: RADIATION ONCOLOGY | Facility: CLINIC | Age: 79
Setting detail: RADIATION/ONCOLOGY SERIES
Discharge: HOME | End: 2024-06-28
Payer: MEDICARE

## 2024-06-28 ENCOUNTER — APPOINTMENT (OUTPATIENT)
Dept: HEMATOLOGY/ONCOLOGY | Facility: CLINIC | Age: 79
End: 2024-06-28
Payer: MEDICARE

## 2024-06-28 PROCEDURE — 77338 DESIGN MLC DEVICE FOR IMRT: CPT | Performed by: STUDENT IN AN ORGANIZED HEALTH CARE EDUCATION/TRAINING PROGRAM

## 2024-06-28 PROCEDURE — 77300 RADIATION THERAPY DOSE PLAN: CPT | Performed by: STUDENT IN AN ORGANIZED HEALTH CARE EDUCATION/TRAINING PROGRAM

## 2024-06-28 PROCEDURE — 77301 RADIOTHERAPY DOSE PLAN IMRT: CPT | Performed by: STUDENT IN AN ORGANIZED HEALTH CARE EDUCATION/TRAINING PROGRAM

## 2024-07-01 ENCOUNTER — OFFICE VISIT (OUTPATIENT)
Dept: HEMATOLOGY/ONCOLOGY | Facility: CLINIC | Age: 79
End: 2024-07-01
Payer: MEDICARE

## 2024-07-01 VITALS
TEMPERATURE: 96.9 F | DIASTOLIC BLOOD PRESSURE: 74 MMHG | HEART RATE: 72 BPM | WEIGHT: 142.86 LBS | OXYGEN SATURATION: 96 % | SYSTOLIC BLOOD PRESSURE: 162 MMHG | RESPIRATION RATE: 18 BRPM | BODY MASS INDEX: 25.96 KG/M2

## 2024-07-01 DIAGNOSIS — M54.9 UPPER BACK PAIN ON LEFT SIDE: ICD-10-CM

## 2024-07-01 DIAGNOSIS — E83.42 HYPOMAGNESEMIA: ICD-10-CM

## 2024-07-01 DIAGNOSIS — C32.0 SQUAMOUS CELL CARCINOMA OF LEFT VOCAL CORD (MULTI): Primary | ICD-10-CM

## 2024-07-01 DIAGNOSIS — Z51.11 ENCOUNTER FOR ANTINEOPLASTIC CHEMOTHERAPY: ICD-10-CM

## 2024-07-01 PROCEDURE — 99215 OFFICE O/P EST HI 40 MIN: CPT | Performed by: NURSE PRACTITIONER

## 2024-07-01 PROCEDURE — 1126F AMNT PAIN NOTED NONE PRSNT: CPT | Performed by: NURSE PRACTITIONER

## 2024-07-01 PROCEDURE — 1036F TOBACCO NON-USER: CPT | Performed by: NURSE PRACTITIONER

## 2024-07-01 RX ORDER — PROCHLORPERAZINE MALEATE 10 MG
10 TABLET ORAL EVERY 6 HOURS PRN
Qty: 30 TABLET | Refills: 5 | Status: SHIPPED | OUTPATIENT
Start: 2024-07-01

## 2024-07-01 RX ORDER — ONDANSETRON HYDROCHLORIDE 8 MG/1
8 TABLET, FILM COATED ORAL EVERY 8 HOURS PRN
Qty: 30 TABLET | Refills: 5 | Status: SHIPPED | OUTPATIENT
Start: 2024-07-01

## 2024-07-01 ASSESSMENT — PAIN SCALES - GENERAL: PAINLEVEL: 0-NO PAIN

## 2024-07-01 ASSESSMENT — PATIENT HEALTH QUESTIONNAIRE - PHQ9
2. FEELING DOWN, DEPRESSED OR HOPELESS: NOT AT ALL
SUM OF ALL RESPONSES TO PHQ9 QUESTIONS 1 AND 2: 0
1. LITTLE INTEREST OR PLEASURE IN DOING THINGS: NOT AT ALL

## 2024-07-01 NOTE — PROGRESS NOTES
Patient ID: Padilla Campuzano is a 78 y.o. female.  Diagnosis: Laryngeal cancer  Staging: T3N0M0  Date of Diagnosis: 5/22/24    Providers:  ENT Surgeon: Dr. Ferny Rhoades  [facility]:   Dr. Kyunghee Burkitt/Megan Arauz APRARNEL  [facility]: Dr. Sue Oliva    Ms. Campuzano is 79 y/o female with recent diagnosis of laryngeal cancer who is referred to  med onc  to discuss treatment plan.    -2/2024: pt presented with hoarse voice  -3/21/24: CT chest showed left pulmonary nodule  -4/3/24: PET/CT showed uptake in left vocal cord extending  to the right with limited subglottic extension  -4/23/24: biopsy of LLL nodule showed negative for malignancy  -5/7/24: seen by thoracic surgeon Dr. Linda, plan is to follow up image in 3 months  -5/20/24: s/p direct laryngoscopy. She was found to have transglottic lesion extending from primarily the left glottis into the subglottis and wrapping around anteriorly. 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)  No date: Breast cancer (Multi)  2012: Cerebral hemorrhage (Multi)  No date: CKD (chronic kidney disease)  No date: Colon cancer (Multi)  No date: Colorectal cancer (Multi)  No date: Diverticulosis of intestine, part unspecified, without   perforation or abscess without bleeding      Comment:  Diverticulosis  No date: GERD (gastroesophageal reflux disease)  No date: Hypertension  No date: Irritable bowel syndrome  No date: Kidney disease  No date: Malignant neoplasm of colon, unspecified (Multi)      Comment:  Colon cancer  No date: Personal history of colonic polyps      Comment:  History of colonic polyps  No date: Personal history of irradiation  No date: Personal history of malignant neoplasm of breast      Comment:  History of malignant neoplasm of female breast  No date: Personal history of other diseases of the respiratory system      Comment:  History of respiratory failure  No date: Personal history of other  malignant neoplasm of skin      Comment:  Personal history of malignant neoplasm of skin  2016: Right upper quadrant pain      Comment:  Abdominal pain, RUQ (right upper quadrant)  2012: Stroke (Multi)  2016: Unspecified symptoms and signs involving the   genitourinary system      Comment:  Urinary symptom or sign   Surgical History:    Past Surgical History:   Procedure Laterality Date    BI BREAST RIGHT CYST ASPIRATION Right 2019    BI BREAST CYST ASPIRATION RIGHT [facility]    BREAST LUMPECTOMY  2014    Breast Surgery Lumpectomy    CHOLECYSTECTOMY      CT GUIDED IMAGING FOR ABSCESS DRAIN  2015    CT GUIDED IMAGING FOR ABSCESS DRAIN [facility] LEGAC[facility]    HYSTERECTOMY  1984    Hysterectomy    ILEOSTOMY  2015    Ileostomy    ILEOSTOMY CLOSURE  2015    Ileostomy Closure    OTHER SURGICAL HISTORY      Cerebral artery coiling      Family History:    Family History   Problem Relation Name Age of Onset    Colon cancer Mother      Liver cancer Mother      Kidney cancer Mother      Heart attack Father      Blood clot Father      Cancer Brother      Cancer Brother       Family Oncology History:    Cancer-related family history includes Cancer in her brother and brother; Colon cancer in her mother; Kidney cancer in her mother; Liver cancer in her mother.  Social History:    Social History     Tobacco Use    Smoking status: Former     Current packs/day: 0.00     Types: Cigarettes     Quit date:      Years since quittin.5     Passive exposure: Past    Smokeless tobacco: Never   Vaping Use    Vaping status: Never Used   Substance Use Topics    Alcohol use: Never    Drug use: Never        Subjective   Chief Complaint: Laryngeal cancer    HPI    Interval History  Patient present in clinic for readiness to treat visit.  Does not have home Rx's - will send.  Breathing is stable.  Appetite is good.  Denies dysphagia or odynophagia.  Denies n/v/c.  Hx bowel resection.   Routine creon and questran to manage her BM's.  Concern for diarrhea/loose stools with chemotherapy.  Has planned ahead.  Chronic since 2014.  Reports pain on the left side of her neck.  Rates pain 2/10.  Managed with PRN tylenol.  Also experiencing back pain. Area between her shoulder blades.  More on the left side.  Started a few months ago.  Has gotten worse in the last few weeks.  Pain with activity.  Pain resolves with rest.  No acute injury.  No falls.  No other concerns today.      ROS  Review of Systems   Constitutional: Negative.    HENT:   Positive for voice change.    Respiratory: Negative.     Gastrointestinal:  Positive for diarrhea.   Genitourinary: Negative.     Musculoskeletal:  Positive for neck pain.   Neurological: Negative.        Allergies  Allergies   Allergen Reactions    Hydrocodone-Acetaminophen Dizziness    Oxycodone Hcl Unknown    Oxycodone-Acetaminophen Dizziness    Aspirin GI Upset and Unknown    Sulfa (Sulfonamide Antibiotics) Rash        Medications  Current Outpatient Medications   Medication Instructions    acetaminophen (TYLENOL) 1,000 mg, oral, Every 6 hours PRN    biotin 5 mg capsule Every 24 hours    cholecalciferol (Vitamin D-3) 50 mcg (2,000 unit) capsule 1 capsule, Every 24 hours    cholestyramine (Questran) 4 gram powder once daily.    lipase-protease-amylase (Creon) 24,000-76,000 -120,000 unit capsule TAKE 3 CAPSULES BY MOUTH 3 TIMES DAILY WITH MEALS AND 1 TO 2  CAPSULES BY MOUTH WITH SNACKS.  MAX 13 CAPSULE DAILY    losartan (COZAAR) 25 mg, oral, Daily    ondansetron (ZOFRAN) 8 mg, oral, Every 8 hours PRN    pantoprazole (ProtoNix) 40 mg EC tablet TAKE 1 TABLET BY MOUTH TWICE  DAILY    prochlorperazine (COMPAZINE) 10 mg, oral, Every 6 hours PRN          Objective   VS: /74 (BP Location: Left arm, Patient Position: Sitting, BP Cuff Size: Adult long) Comment: manual  Pulse 72   Temp 36.1 °C (96.9 °F) (Temporal)   Resp 18   Wt 64.8 kg (142 lb 13.7 oz)   SpO2 96%    BMI 25.96 kg/m²   Weight: Daily Weight  07/02/24 : 65 kg (143 lb 4.8 oz)  07/02/24 : 65 kg (143 lb 4.8 oz)  07/01/24 : 64.8 kg (142 lb 13.7 oz)  06/11/24 : 65.5 kg (144 lb 8 oz)  06/10/24 : 65.7 kg (144 lb 13.1 oz)  06/05/24 : 64.8 kg (142 lb 13.7 oz)  06/03/24 : 65.8 kg (145 lb)      Physical Exam  Constitutional:       Appearance: Normal appearance.   HENT:      Head: Normocephalic and atraumatic.      Mouth/Throat:      Mouth: Mucous membranes are moist.   Eyes:      Extraocular Movements: Extraocular movements intact.      Pupils: Pupils are equal, round, and reactive to light.   Cardiovascular:      Rate and Rhythm: Normal rate and regular rhythm.      Pulses: Normal pulses.   Pulmonary:      Effort: Pulmonary effort is normal.      Breath sounds: Normal breath sounds.   Abdominal:      General: Bowel sounds are normal.   Musculoskeletal:         General: Normal range of motion.      Cervical back: Normal range of motion and neck supple.   Neurological:      General: No focal deficit present.      Mental Status: She is alert and oriented to person, place, and time.   Psychiatric:         Mood and Affect: Mood normal.         Behavior: Behavior normal.       Diagnostic Results   Labs    Results from last 7 days   Lab Units 07/02/24  1250   WBC AUTO x10*3/uL 7.8   HEMOGLOBIN g/dL 12.1   HEMATOCRIT % 36.7   PLATELETS AUTO x10*3/uL 306   NEUTROS ABS x10*3/uL 4.40   LYMPHS ABS AUTO x10*3/uL 2.56   MONOS ABS AUTO x10*3/uL 0.68   EOS ABS AUTO x10*3/uL 0.10   NEUTROS PCT AUTO % 56.3   LYMPHS PCT AUTO % 32.7   MONOS PCT AUTO % 8.7   EOS PCT AUTO % 1.3      Results from last 7 days   Lab Units 07/02/24  1250   GLUCOSE mg/dL 92   SODIUM mmol/L 143   POTASSIUM mmol/L 4.1   CHLORIDE mmol/L 109*   CO2 mmol/L 26   BUN mg/dL 24*   CREATININE mg/dL 1.24*   EGFR mL/min/1.73m*2 45*   CALCIUM mg/dL 8.9   MAGNESIUM mg/dL 1.41*   ALBUMIN g/dL 4.1   PROTEIN TOTAL g/dL 6.8   BILIRUBIN TOTAL mg/dL 0.4   ALK PHOS U/L 70   ALT U/L 6*    AST U/L 15                   Images  No new imaging.     Pathology  Lab Results   Component Value Date    PATHREP  12/06/2021                                                     MRN: [id number]  Patient Name RONALD LENNON  Accession #: [id number]  Date of Procedure:  12/6/2021       Date Reported: 12/9/2021  Date Received:  12/7/2021  Date of Birth / Sex 1945 (Age: 76) / F  Race: WHITE  Submitting Physician: ROD VALLE MD    Other External #          FINAL CYTOLOGICAL INTERPRETATION    A.   FINE NEEDLE ASPIRATION BREAST - RIGHT, CYTOLOGY AND CELL BLOCK:  --NO MALIGNANT CELLS IDENTIFIED.  --SPECIMEN CONSISTS OF PROTEINACEOUS DEBRIS, OCCASIONAL FOAMY MACROPHAGES, FEW  INFLAMMATORY CELLS AND RARE EPITHELIAL GROUP.        Slide(s) initially screened by a Cytotechnologist at [place] [id number]      Electronically Signed Out By GABINO GARCES MD    By the signature on this report, the individual or group listed as making the  Final Interpretation/Diagnosis certifies that they have reviewed this case.  Slide(s) initially screened by a Cytotechnologist at [facility]     Clinical History      Clinical Diagnosis History: Cyst of right breast - (N60.01)   Source of Specimen  A:  FINE NEEDLE ASPIRATION BREAST - RIGHT     Specimen Submitted as:  A:   FINE NEEDLE ASPIRATION BREAST - RIGHT        Pap non-gyn ThinPrep slide, CELL BLOCK, H&E, Initial    Gross Description  A.   FINE NEEDLE ASPIRATION BREAST - RIGHT:  35cc MILKY YELLOW NEEDLE RINSE IN  CYTOLYT                 [facility]  Department of Pathology  [address] [place] [address]        PATHREP  09/09/2019     Name RONALD LENNON                                                                                                   Accession #: [id number]            Pathologist:                   YUE FIGUEROA MD  Date of Procedure:     "9/9/2019  Date Received:          9/9/2019  Date Reported           9/13/2019  Submitting Physician:   SALIMA WHEELER MD  Location:                    [facility]  Other External #                                                                    FINAL DIAGNOSIS  A.  BIOPSY OF ANAL CANAL:  MINUTE FRAGMENTS OF SOFT TISSUE WITH NO SIGNIFICANT  PATHOLOGICAL FINDINGS, SEE NOTE.    Note: Deeper sections were obtained.                                                                                                                                                                                                                                                                                                                                                                                                                                                                                       Electronically Signed Out By YUE FIGUEROA MD/ANASTASIYA  By the signature on this report, the individual or group listed as making the  Final Interpretation/Diagnosis certifies that they have reviewed this case.           Clinical History:  Colorectal cancer, squamous cell, status post chemo radiation    Specimens Submitted As:  A: BIOPSY OF ANAL CANAL     Gross Description:  Received in formalin, labeled with the patient's name and hospital number and  \"anal canal\", are multiple fragments of tan, soft tissue aggregating to 1.7 x  1.0 x 0.1 cm. The specimen is submitted in toto in one cassette.  CJN    cjn/9/9/2019               [facility]  Department of Pathology   [address] [place] [id number]        COMDX  05/20/2024     Case reviewed at ENT consensus conference via Q-Bot technology on 5/22/2024.       Assessment/Plan   Ms. Campuzano is 77 y/o female with recent diagnosis of laryngeal cancer who is referred to  med onc  to discuss treatment plan.    # Laryngeal cancer (T3N0M0)  -Based on PET/CT from 4/3/24, " there was uptake in LLL nodule, however, biopsy of LLL nodule showed negative for malignancy  -seen by Thoracic surgeon, plan to have repeat CT chest without contrast in 3 month  -since her GFR is 40 (no hearing deficit), she is not a candidate for cisplatin, will treat her with weekly carboplatin and paclitaxel, reviewed chemo related side effects with patient in detail.  Consent obtained.    # Hx of anal, breast and cutaneous malignancies  -s/p lumpectomy with radiation in 2012  -s/p radiation to anal cancer in 2018  -s/p resection of skin cancer on nose in 2010    # Chronic diarrhea   - from previous colonic surgery  -3-4 episodes per day, while on 3 imodium three times a day, 3-4 episodes of diarrhea daily.  - Concern for flares with chemotherapy tx's  - Currently managed with Creon and Jose   - ongoing monitoring    # Back pain, area between her shoulder blades, more so on the left.  Has worsened over the last few weeks.  Pain with activity.  Resolves with rest.  No acute injury.  No falls.    - Secure chat with Dr. Burkitt.  Will order MRI C & T (+/-) next available.  MRI's scheduled 7/8/24.      # Hypomagnesia: 7/2/24 Mg 1.41  - Will start oral Mag Oxide - 1 tab/day, Rx sent (not covered by pt's insurance)  - Will replete with IV hydration visit 7/5/24.  - If consistently low, will replete weekly via IV     # Mediport placement, per pt request  - IR consult placed  - INR lab ordered    # Elevated Cr/BUN 1.24/24  - at/near baseline  - ongoing monitoring      PLAN  - CRT 7/2/24  - RTC 7/2 for C1.  - supportive onc referral placed.  - IV hydration - each Friday

## 2024-07-02 ENCOUNTER — INFUSION (OUTPATIENT)
Dept: HEMATOLOGY/ONCOLOGY | Facility: CLINIC | Age: 79
End: 2024-07-02
Payer: MEDICARE

## 2024-07-02 ENCOUNTER — APPOINTMENT (OUTPATIENT)
Dept: RADIATION ONCOLOGY | Facility: CLINIC | Age: 79
End: 2024-07-02
Payer: MEDICARE

## 2024-07-02 ENCOUNTER — APPOINTMENT (OUTPATIENT)
Dept: OTOLARYNGOLOGY | Facility: CLINIC | Age: 79
End: 2024-07-02
Payer: MEDICARE

## 2024-07-02 ENCOUNTER — NUTRITION (OUTPATIENT)
Dept: HEMATOLOGY/ONCOLOGY | Facility: CLINIC | Age: 79
End: 2024-07-02

## 2024-07-02 ENCOUNTER — APPOINTMENT (OUTPATIENT)
Dept: HEMATOLOGY/ONCOLOGY | Facility: CLINIC | Age: 79
End: 2024-07-02
Payer: MEDICARE

## 2024-07-02 VITALS — HEIGHT: 62 IN | BODY MASS INDEX: 26.37 KG/M2 | WEIGHT: 143.3 LBS

## 2024-07-02 VITALS
HEIGHT: 62 IN | DIASTOLIC BLOOD PRESSURE: 74 MMHG | SYSTOLIC BLOOD PRESSURE: 158 MMHG | RESPIRATION RATE: 18 BRPM | TEMPERATURE: 96.8 F | WEIGHT: 143.3 LBS | OXYGEN SATURATION: 97 % | HEART RATE: 71 BPM | BODY MASS INDEX: 26.37 KG/M2

## 2024-07-02 DIAGNOSIS — C32.0 SQUAMOUS CELL CARCINOMA OF LEFT VOCAL CORD (MULTI): ICD-10-CM

## 2024-07-02 DIAGNOSIS — C32.0 SQUAMOUS CELL CARCINOMA OF LEFT VOCAL CORD (MULTI): Primary | ICD-10-CM

## 2024-07-02 PROBLEM — M54.9 UPPER BACK PAIN ON LEFT SIDE: Status: ACTIVE | Noted: 2024-07-02

## 2024-07-02 PROBLEM — Z51.11 ENCOUNTER FOR ANTINEOPLASTIC CHEMOTHERAPY: Status: ACTIVE | Noted: 2024-07-02

## 2024-07-02 LAB
ALBUMIN SERPL BCP-MCNC: 4.1 G/DL (ref 3.4–5)
ALP SERPL-CCNC: 70 U/L (ref 33–136)
ALT SERPL W P-5'-P-CCNC: 6 U/L (ref 7–45)
ANION GAP SERPL CALC-SCNC: 12 MMOL/L (ref 10–20)
AST SERPL W P-5'-P-CCNC: 15 U/L (ref 9–39)
BASOPHILS # BLD AUTO: 0.05 X10*3/UL (ref 0–0.1)
BASOPHILS NFR BLD AUTO: 0.6 %
BILIRUB SERPL-MCNC: 0.4 MG/DL (ref 0–1.2)
BUN SERPL-MCNC: 24 MG/DL (ref 6–23)
CALCIUM SERPL-MCNC: 8.9 MG/DL (ref 8.6–10.3)
CHLORIDE SERPL-SCNC: 109 MMOL/L (ref 98–107)
CO2 SERPL-SCNC: 26 MMOL/L (ref 21–32)
CREAT SERPL-MCNC: 1.24 MG/DL (ref 0.5–1.05)
EGFRCR SERPLBLD CKD-EPI 2021: 45 ML/MIN/1.73M*2
EOSINOPHIL # BLD AUTO: 0.1 X10*3/UL (ref 0–0.4)
EOSINOPHIL NFR BLD AUTO: 1.3 %
ERYTHROCYTE [DISTWIDTH] IN BLOOD BY AUTOMATED COUNT: 13.1 % (ref 11.5–14.5)
GLUCOSE SERPL-MCNC: 92 MG/DL (ref 74–99)
HCT VFR BLD AUTO: 36.7 % (ref 36–46)
HGB BLD-MCNC: 12.1 G/DL (ref 12–16)
IMM GRANULOCYTES # BLD AUTO: 0.03 X10*3/UL (ref 0–0.5)
IMM GRANULOCYTES NFR BLD AUTO: 0.4 % (ref 0–0.9)
INR PPP: 1 (ref 0.9–1.1)
LYMPHOCYTES # BLD AUTO: 2.56 X10*3/UL (ref 0.8–3)
LYMPHOCYTES NFR BLD AUTO: 32.7 %
MAGNESIUM SERPL-MCNC: 1.41 MG/DL (ref 1.6–2.4)
MCH RBC QN AUTO: 31.7 PG (ref 26–34)
MCHC RBC AUTO-ENTMCNC: 33 G/DL (ref 32–36)
MCV RBC AUTO: 96 FL (ref 80–100)
MONOCYTES # BLD AUTO: 0.68 X10*3/UL (ref 0.05–0.8)
MONOCYTES NFR BLD AUTO: 8.7 %
NEUTROPHILS # BLD AUTO: 4.4 X10*3/UL (ref 1.6–5.5)
NEUTROPHILS NFR BLD AUTO: 56.3 %
NRBC BLD-RTO: 0 /100 WBCS (ref 0–0)
PLATELET # BLD AUTO: 306 X10*3/UL (ref 150–450)
POTASSIUM SERPL-SCNC: 4.1 MMOL/L (ref 3.5–5.3)
PROT SERPL-MCNC: 6.8 G/DL (ref 6.4–8.2)
PROTHROMBIN TIME: 11 SECONDS (ref 9.8–12.8)
RBC # BLD AUTO: 3.82 X10*6/UL (ref 4–5.2)
SODIUM SERPL-SCNC: 143 MMOL/L (ref 136–145)
WBC # BLD AUTO: 7.8 X10*3/UL (ref 4.4–11.3)

## 2024-07-02 PROCEDURE — 85025 COMPLETE CBC W/AUTO DIFF WBC: CPT | Performed by: NURSE PRACTITIONER

## 2024-07-02 PROCEDURE — 83735 ASSAY OF MAGNESIUM: CPT | Performed by: NURSE PRACTITIONER

## 2024-07-02 PROCEDURE — 2500000004 HC RX 250 GENERAL PHARMACY W/ HCPCS (ALT 636 FOR OP/ED): Performed by: NURSE PRACTITIONER

## 2024-07-02 PROCEDURE — 96417 CHEMO IV INFUS EACH ADDL SEQ: CPT

## 2024-07-02 PROCEDURE — 96413 CHEMO IV INFUSION 1 HR: CPT

## 2024-07-02 PROCEDURE — 84075 ASSAY ALKALINE PHOSPHATASE: CPT | Performed by: NURSE PRACTITIONER

## 2024-07-02 PROCEDURE — 2500000001 HC RX 250 WO HCPCS SELF ADMINISTERED DRUGS (ALT 637 FOR MEDICARE OP): Performed by: NURSE PRACTITIONER

## 2024-07-02 PROCEDURE — 96375 TX/PRO/DX INJ NEW DRUG ADDON: CPT | Mod: INF

## 2024-07-02 PROCEDURE — 85610 PROTHROMBIN TIME: CPT | Performed by: NURSE PRACTITIONER

## 2024-07-02 RX ORDER — FAMOTIDINE 10 MG/ML
20 INJECTION INTRAVENOUS ONCE AS NEEDED
Status: DISCONTINUED | OUTPATIENT
Start: 2024-07-02 | End: 2024-07-02 | Stop reason: HOSPADM

## 2024-07-02 RX ORDER — FAMOTIDINE 10 MG/ML
20 INJECTION INTRAVENOUS ONCE
Status: COMPLETED | OUTPATIENT
Start: 2024-07-02 | End: 2024-07-02

## 2024-07-02 RX ORDER — PROCHLORPERAZINE MALEATE 10 MG
10 TABLET ORAL EVERY 6 HOURS PRN
Status: DISCONTINUED | OUTPATIENT
Start: 2024-07-02 | End: 2024-07-02 | Stop reason: HOSPADM

## 2024-07-02 RX ORDER — PROCHLORPERAZINE EDISYLATE 5 MG/ML
10 INJECTION INTRAMUSCULAR; INTRAVENOUS EVERY 6 HOURS PRN
Status: DISCONTINUED | OUTPATIENT
Start: 2024-07-02 | End: 2024-07-02 | Stop reason: HOSPADM

## 2024-07-02 RX ORDER — ALBUTEROL SULFATE 0.83 MG/ML
3 SOLUTION RESPIRATORY (INHALATION) AS NEEDED
Status: DISCONTINUED | OUTPATIENT
Start: 2024-07-02 | End: 2024-07-02 | Stop reason: HOSPADM

## 2024-07-02 RX ORDER — LANOLIN ALCOHOL/MO/W.PET/CERES
400 CREAM (GRAM) TOPICAL DAILY
Qty: 30 TABLET | Refills: 2 | Status: SHIPPED | OUTPATIENT
Start: 2024-07-02 | End: 2024-08-01

## 2024-07-02 RX ORDER — DIPHENHYDRAMINE HYDROCHLORIDE 50 MG/ML
50 INJECTION INTRAMUSCULAR; INTRAVENOUS AS NEEDED
Status: DISCONTINUED | OUTPATIENT
Start: 2024-07-02 | End: 2024-07-02 | Stop reason: HOSPADM

## 2024-07-02 RX ORDER — MAGNESIUM SULFATE HEPTAHYDRATE 40 MG/ML
2 INJECTION, SOLUTION INTRAVENOUS ONCE
Status: CANCELLED | OUTPATIENT
Start: 2024-07-02

## 2024-07-02 RX ORDER — PALONOSETRON 0.05 MG/ML
0.25 INJECTION, SOLUTION INTRAVENOUS ONCE
Status: COMPLETED | OUTPATIENT
Start: 2024-07-02 | End: 2024-07-02

## 2024-07-02 RX ORDER — EPINEPHRINE 0.3 MG/.3ML
0.3 INJECTION SUBCUTANEOUS EVERY 5 MIN PRN
Status: DISCONTINUED | OUTPATIENT
Start: 2024-07-02 | End: 2024-07-02 | Stop reason: HOSPADM

## 2024-07-02 RX ORDER — DIPHENHYDRAMINE HCL 25 MG
50 CAPSULE ORAL ONCE
Status: COMPLETED | OUTPATIENT
Start: 2024-07-02 | End: 2024-07-02

## 2024-07-02 ASSESSMENT — ENCOUNTER SYMPTOMS
NEUROLOGICAL NEGATIVE: 1
DIARRHEA: 1
CONSTITUTIONAL NEGATIVE: 1
NECK PAIN: 1
RESPIRATORY NEGATIVE: 1
VOICE CHANGE: 1

## 2024-07-02 ASSESSMENT — PAIN SCALES - GENERAL: PAINLEVEL: 0-NO PAIN

## 2024-07-02 NOTE — PROGRESS NOTES
Patient here for 1st dose chemotherapy. Patient was educated on the following topics: Chemo medication, infusion routine, red chemo bag, safe handling of body fluids, thermometer, yellow card for ED, the need to call the doctor for any signs of infection, how to safely eat food while getting chemo, the supplementation of nutritional drinks if patient loses appetite, height/weight for chemo, follow up plan, hypersensitivity reaction S&S, how to reach the doctor, lab results, nutrition & fluid, and line access. Patient is understanding of this information and teaches it back. Patient handed a [employer] print out about Paclitaxel & Carboplatin.         Patient is a difficult IV stick - pt educated on the option of getting a chest mediport - pt states she is interested in getting a mediport as she has had issues with IV access for a while. Made Dr. Burkitt & Megan Arauz CNP aware of the fact pt would like to get a port - scheduling orders were placed & INR was drawn before patient left infusion center. Pt was directed to  schedulers on the way out to get her mediport placement scheduled.     Pt tolerated 1st chemo well. States that she feels well upon discharge with no questions or complaints. Pt ambulates independently with  upon discharge.      Sign & held Magnesium that was ordered by Megan Arauz CNP was not given to patient today - RN made Megan aware that the magnesium was not given today, incase Megan wants to order PO mag for patient to take at home. Patient is back this Friday for IVF - Magnesium can be given with fluids on Friday as long as that's what Megan would like.

## 2024-07-02 NOTE — PROGRESS NOTES
"NUTRITION Assessment NOTE    Nutrition Assessment     Reason for Visit:  Padilla Campuzano is a 78 y.o. female who presents for nutrition consult 2/2 dx.  DX scc L vocal cord  TX concurrent chemoradiation, Paclitaxel/Carboplatin. XRT 7/2-8/20  Hx anal and breast cancer. Chronic diarrhea not related to anal cancer per     7/2- Week 1. Visit in infusion with pt and .     Lab Results   Component Value Date/Time    GLUCOSE 92 07/02/2024 1250     07/02/2024 1250    K 4.1 07/02/2024 1250     (H) 07/02/2024 1250    CO2 26 07/02/2024 1250    ANIONGAP 12 07/02/2024 1250    BUN 24 (H) 07/02/2024 1250    CREATININE 1.24 (H) 07/02/2024 1250    EGFR 45 (L) 07/02/2024 1250    CALCIUM 8.9 07/02/2024 1250    ALBUMIN 4.1 07/02/2024 1250    ALKPHOS 70 07/02/2024 1250    PROT 6.8 07/02/2024 1250    AST 15 07/02/2024 1250    BILITOT 0.4 07/02/2024 1250    ALT 6 (L) 07/02/2024 1250     Lab Results   Component Value Date/Time    VITD25 53 05/01/2024 0904       Anthropometrics:  Anthropometrics  Height: 158.1 cm (5' 2.24\")  Weight: 65 kg (143 lb 4.8 oz)  BMI (Calculated): 26  IBW/kg (Dietitian Calculated): 50.4 kg  Percent of IBW: 122 %  Weight Change  Weight History / % Weight Change: none        Wt Readings from Last 10 Encounters:   07/02/24 65 kg (143 lb 4.8 oz)   07/02/24 65 kg (143 lb 4.8 oz)   07/01/24 64.8 kg (142 lb 13.7 oz)   06/11/24 65.5 kg (144 lb 8 oz)   06/10/24 65.7 kg (144 lb 13.1 oz)   06/05/24 64.8 kg (142 lb 13.7 oz)   06/03/24 65.8 kg (145 lb)   05/29/24 65.3 kg (144 lb)   05/28/24 65.5 kg (144 lb 6.4 oz)   05/28/24 64.9 kg (143 lb)   7/2- first chemotherapy 65 kg     Food And Nutrient Intake:  Food and Nutrient History  Food and Nutrient History: tolerating regular diet  Energy Intake: Good > 75 %  GI Symptoms: diarrhea (on Questran and Creon. Diarrhea is not every day, intermittent but can come on without warning)  GI Symptoms greater than 2 weeks: yes  Oral Problems: denies (hoarse voice. Has " "Bliss lozenges)  Dentition: lower denture/partial, upper denture/partial     Food Intake  Amount of Food: eating consistent meals. Consumes protein sources like eggs, cottage cheese, yogurt, PNB    Food Preparation  Cooking: Spouse/Significant Other, Patient  Grocery Shopping: Patient, Spouse/Significant Other              Enteral Nutrition Intake  Enteral Nutrition Formula/Solution: had a feeding tube when she had a stroke                             Medication and Complementary/Alternative Medicine Use  OTC Medication Use: Biotin, Vitamin D      Nutrition Focused Physical Exam Findings: 7/2/24      Subcutaneous Fat Loss  Orbital Fat Pads: Well nourished (slightly bulging fat pads)  Buccal Fat Pads: Well nourished (full, rounded cheeks)  Triceps: Defer  Ribs: Defer    Muscle Wasting  Temporalis: Well nourished (well-defined muscle)  Pectoralis (Clavicular Region): Well nourished (clavicle not visible)  Deltoid/Trapezius: Well nourished (rounded appearance at arm, shoulder, neck)  Interosseous: Mild-Moderate (slightly depressed area between thumb and forefinger)  Trapezius/Infraspinatus/Supraspinatus (Scapular Region): Well nourished (bones not prominent, muscle taut)    Edema  Edema: none    Physical Findings (Nutrition Deficiency/Toxicity)  Hair: Negative  Eyes: Negative  Mouth: Negative  Nails: Negative    Energy Needs  Calculated Energy Needs Using Equations  Height: 158.1 cm (5' 2.24\")  Weight Used for Equation Calculations: 65 kg (143 lb 4.8 oz)  Samantha- [place] Equation (Overweight or Obese Patients): 1087  Estimated Energy Needs  Total Energy Estimated Needs (kCal): 1825 kCal  Total Estimated Energy Need per Day (kCal/kg): 28 kCal/kg (actual BW used 65 kg)  Estimated Fluid Needs  Total Fluid Estimated Needs (mL): 1825 mL  Method for Estimating Needs: 1 ml/kcal  Estimated Protein Needs  Total Protein Estimated Needs (g): 78 g  Total Protein Estimated Needs (g/kg): 1.2 g/kg        Nutrition Diagnosis "   Malnutrition Diagnosis  Patient has Malnutrition Diagnosis: No    Nutrition Diagnosis  Patient has Nutrition Diagnosis: Yes  Diagnosis Status (1): New  Nutrition Diagnosis 1: Predicted inadequate energy intake  Related to (1): pathophysiology of disease  As Evidenced by (1): high risk for nutrition impact symptoms impairing ability to meet estimated energy needs, affect oral intake and weight status    Nutrition Interventions/Recommendations   Nutrition Prescription  Individualized Nutrition Prescription Provided for : diet during cancer treatment    Food and Nutrition Delivery  Food and Nutrition Delivery  Meals & Snacks: Energy-modified diet, Modify Composition of Meals/Snacks, Protein-modified diet, Fluid-modified diet  Goals: incorporate soft high calorie high protein foods modified texture as needed, moist foods as needed. Include consistent meals and snacks. Avoid dry harsh  foods and acidic foods/beverages. Include adequate fluids  Medical Food Supplement: Commercial beverage (provided with sample of Ensure Complete and information on Boost VHC/obtaining from [facility] retail pharmacy. Encouraged to  both, start with 1 Ensure/day, have VHC for later on in treatment)    Nutrition Education  Nutrition Education  Nutrition Education Content: Content related nutrition education  Goals: Provided with Eating Hints Book    Coordination of Care       Explained calorie and protein needs are increased with diagnosis and treatment. The importance of maintaining weight, adequate oral intake and fluids. Explained the role of protein, examples of protein sources provided and how to work into diet.    Circled recipe for s/s rinses in Eating Hints book, encouraged to start  Provided information on Medtrition Banatrol Plus which may be helpful to add to regime for diarrhea      Nutrition Monitoring and Evaluation   Food/Nutrient Related History Monitoring  Monitoring and Evaluation Plan: Energy intake, Meal/snack  pattern, Protein intake, Fluid intake  Energy Intake: Estimated energy intake  Criteria: Meet >75% estimated energy needs  Fluid Intake: Estimated fluid intake  Criteria: maintain hydration  Meal/Snack Pattern: Estimated meal and snack pattern  Criteria: 4-6 meals/snacks daily  Estimated protein intake: Estimated protein intake  Criteria: include high protein foods with meals and/or snacks 75% of meals  Body Composition/Growth/Weight History  Monitoring and Evaluation Plan: Weight  Weight: Measured weight  Criteria: maintain weight    Following through treatment  Contact information provided     Time Spent  Prep time on day of patient encounter: 5 minutes  Time spent directly with patient, family or caregiver: 25 minutes  Additional Time Spent on Patient Care Activities: 5 minutes  Documentation Time: 25 minutes  Other Time Spent: 0 minutes  Total: 60 minutes        Readiness to Change : Good  Level of Understanding : Good  Anticipated Compliant : Good

## 2024-07-03 ENCOUNTER — TELEPHONE (OUTPATIENT)
Dept: PALLIATIVE MEDICINE | Facility: HOSPITAL | Age: 79
End: 2024-07-03
Payer: MEDICARE

## 2024-07-03 ENCOUNTER — HOSPITAL ENCOUNTER (OUTPATIENT)
Dept: RADIATION ONCOLOGY | Facility: CLINIC | Age: 79
Setting detail: RADIATION/ONCOLOGY SERIES
Discharge: HOME | End: 2024-07-03
Payer: MEDICARE

## 2024-07-03 DIAGNOSIS — C32.0 MALIGNANT NEOPLASM OF GLOTTIS (MULTI): ICD-10-CM

## 2024-07-03 DIAGNOSIS — Z51.0 ENCOUNTER FOR ANTINEOPLASTIC RADIATION THERAPY: ICD-10-CM

## 2024-07-03 LAB
RAD ONC MSQ ACTUAL FRACTIONS DELIVERED: 1
RAD ONC MSQ ACTUAL SESSION DELIVERED DOSE: 200 CGRAY
RAD ONC MSQ ACTUAL TOTAL DOSE: 200 CGRAY
RAD ONC MSQ ELAPSED DAYS: 0
RAD ONC MSQ LAST DATE: NORMAL
RAD ONC MSQ PRESCRIBED FRACTIONAL DOSE: 200 CGRAY
RAD ONC MSQ PRESCRIBED NUMBER OF FRACTIONS: 35
RAD ONC MSQ PRESCRIBED TECHNIQUE: NORMAL
RAD ONC MSQ PRESCRIBED TOTAL DOSE: 7000 CGRAY
RAD ONC MSQ PRESCRIPTION PATTERN COMMENT: NORMAL
RAD ONC MSQ START DATE: NORMAL
RAD ONC MSQ TREATMENT COURSE NUMBER: 2
RAD ONC MSQ TREATMENT SITE: NORMAL

## 2024-07-03 PROCEDURE — 77386 HC INTENSITY-MODULATED RADIATION THERAPY (IMRT), COMPLEX: CPT | Performed by: STUDENT IN AN ORGANIZED HEALTH CARE EDUCATION/TRAINING PROGRAM

## 2024-07-03 NOTE — TELEPHONE ENCOUNTER
Attempted to contact patient again after referral for supportive oncology by Megan Arauz NP.  No answer.  Voicemail message left with patient to call office to schedule appointment.

## 2024-07-05 ENCOUNTER — INFUSION (OUTPATIENT)
Dept: HEMATOLOGY/ONCOLOGY | Facility: CLINIC | Age: 79
End: 2024-07-05
Payer: MEDICARE

## 2024-07-05 ENCOUNTER — HOSPITAL ENCOUNTER (OUTPATIENT)
Dept: RADIATION ONCOLOGY | Facility: CLINIC | Age: 79
Setting detail: RADIATION/ONCOLOGY SERIES
Discharge: HOME | End: 2024-07-05
Payer: MEDICARE

## 2024-07-05 ENCOUNTER — APPOINTMENT (OUTPATIENT)
Dept: HEMATOLOGY/ONCOLOGY | Facility: CLINIC | Age: 79
End: 2024-07-05
Payer: MEDICARE

## 2024-07-05 VITALS
SYSTOLIC BLOOD PRESSURE: 158 MMHG | BODY MASS INDEX: 26.03 KG/M2 | TEMPERATURE: 96.6 F | DIASTOLIC BLOOD PRESSURE: 67 MMHG | RESPIRATION RATE: 16 BRPM | OXYGEN SATURATION: 95 % | HEART RATE: 83 BPM | WEIGHT: 143.43 LBS

## 2024-07-05 DIAGNOSIS — C32.0 MALIGNANT NEOPLASM OF GLOTTIS (MULTI): ICD-10-CM

## 2024-07-05 DIAGNOSIS — C32.0 SQUAMOUS CELL CARCINOMA OF LEFT VOCAL CORD (MULTI): ICD-10-CM

## 2024-07-05 DIAGNOSIS — Z51.0 ENCOUNTER FOR ANTINEOPLASTIC RADIATION THERAPY: ICD-10-CM

## 2024-07-05 LAB
RAD ONC MSQ ACTUAL FRACTIONS DELIVERED: 2
RAD ONC MSQ ACTUAL SESSION DELIVERED DOSE: 200 CGRAY
RAD ONC MSQ ACTUAL TOTAL DOSE: 400 CGRAY
RAD ONC MSQ ELAPSED DAYS: 2
RAD ONC MSQ LAST DATE: NORMAL
RAD ONC MSQ PRESCRIBED FRACTIONAL DOSE: 200 CGRAY
RAD ONC MSQ PRESCRIBED NUMBER OF FRACTIONS: 35
RAD ONC MSQ PRESCRIBED TECHNIQUE: NORMAL
RAD ONC MSQ PRESCRIBED TOTAL DOSE: 7000 CGRAY
RAD ONC MSQ PRESCRIPTION PATTERN COMMENT: NORMAL
RAD ONC MSQ START DATE: NORMAL
RAD ONC MSQ TREATMENT COURSE NUMBER: 2
RAD ONC MSQ TREATMENT SITE: NORMAL

## 2024-07-05 PROCEDURE — 2500000004 HC RX 250 GENERAL PHARMACY W/ HCPCS (ALT 636 FOR OP/ED): Performed by: NURSE PRACTITIONER

## 2024-07-05 PROCEDURE — 96365 THER/PROPH/DIAG IV INF INIT: CPT | Mod: INF

## 2024-07-05 PROCEDURE — 77386 HC INTENSITY-MODULATED RADIATION THERAPY (IMRT), COMPLEX: CPT | Performed by: STUDENT IN AN ORGANIZED HEALTH CARE EDUCATION/TRAINING PROGRAM

## 2024-07-05 RX ORDER — MAGNESIUM SULFATE HEPTAHYDRATE 40 MG/ML
2 INJECTION, SOLUTION INTRAVENOUS ONCE
Status: COMPLETED | OUTPATIENT
Start: 2024-07-05 | End: 2024-07-05

## 2024-07-05 ASSESSMENT — PAIN SCALES - GENERAL: PAINLEVEL: 4

## 2024-07-05 NOTE — PROGRESS NOTES
Pt tolerated IVF & magnesium infusion well. States that she feels well upon discharge with no questions or complaints. Ambulates independently to exit.

## 2024-07-08 ENCOUNTER — LAB (OUTPATIENT)
Dept: LAB | Facility: CLINIC | Age: 79
End: 2024-07-08
Payer: MEDICARE

## 2024-07-08 ENCOUNTER — HOSPITAL ENCOUNTER (OUTPATIENT)
Dept: RADIOLOGY | Facility: CLINIC | Age: 79
Discharge: HOME | End: 2024-07-08
Payer: MEDICARE

## 2024-07-08 ENCOUNTER — OFFICE VISIT (OUTPATIENT)
Dept: HEMATOLOGY/ONCOLOGY | Facility: CLINIC | Age: 79
End: 2024-07-08
Payer: MEDICARE

## 2024-07-08 ENCOUNTER — HOSPITAL ENCOUNTER (OUTPATIENT)
Dept: RADIATION ONCOLOGY | Facility: CLINIC | Age: 79
Setting detail: RADIATION/ONCOLOGY SERIES
Discharge: HOME | End: 2024-07-08
Payer: MEDICARE

## 2024-07-08 VITALS
DIASTOLIC BLOOD PRESSURE: 65 MMHG | TEMPERATURE: 96.5 F | BODY MASS INDEX: 25.95 KG/M2 | OXYGEN SATURATION: 91 % | SYSTOLIC BLOOD PRESSURE: 96 MMHG | WEIGHT: 141.87 LBS | RESPIRATION RATE: 18 BRPM | HEART RATE: 84 BPM

## 2024-07-08 DIAGNOSIS — C32.0 SQUAMOUS CELL CARCINOMA OF LEFT VOCAL CORD (MULTI): Primary | ICD-10-CM

## 2024-07-08 DIAGNOSIS — Z51.0 ENCOUNTER FOR ANTINEOPLASTIC RADIATION THERAPY: ICD-10-CM

## 2024-07-08 DIAGNOSIS — M54.9 UPPER BACK PAIN ON LEFT SIDE: ICD-10-CM

## 2024-07-08 DIAGNOSIS — Z51.11 ENCOUNTER FOR ANTINEOPLASTIC CHEMOTHERAPY: ICD-10-CM

## 2024-07-08 DIAGNOSIS — C32.0 MALIGNANT NEOPLASM OF GLOTTIS (MULTI): ICD-10-CM

## 2024-07-08 DIAGNOSIS — C32.0 SQUAMOUS CELL CARCINOMA OF LEFT VOCAL CORD (MULTI): ICD-10-CM

## 2024-07-08 LAB
ALBUMIN SERPL BCP-MCNC: 4.1 G/DL (ref 3.4–5)
ALP SERPL-CCNC: 60 U/L (ref 33–136)
ALT SERPL W P-5'-P-CCNC: 6 U/L (ref 7–45)
ANION GAP SERPL CALC-SCNC: 15 MMOL/L (ref 10–20)
AST SERPL W P-5'-P-CCNC: 15 U/L (ref 9–39)
BASOPHILS # BLD AUTO: 0.03 X10*3/UL (ref 0–0.1)
BASOPHILS NFR BLD AUTO: 0.5 %
BILIRUB SERPL-MCNC: 0.4 MG/DL (ref 0–1.2)
BUN SERPL-MCNC: 23 MG/DL (ref 6–23)
CALCIUM SERPL-MCNC: 8.8 MG/DL (ref 8.6–10.3)
CHLORIDE SERPL-SCNC: 107 MMOL/L (ref 98–107)
CO2 SERPL-SCNC: 23 MMOL/L (ref 21–32)
CREAT SERPL-MCNC: 1.17 MG/DL (ref 0.5–1.05)
EGFRCR SERPLBLD CKD-EPI 2021: 48 ML/MIN/1.73M*2
EOSINOPHIL # BLD AUTO: 0.08 X10*3/UL (ref 0–0.4)
EOSINOPHIL NFR BLD AUTO: 1.3 %
ERYTHROCYTE [DISTWIDTH] IN BLOOD BY AUTOMATED COUNT: 12.9 % (ref 11.5–14.5)
GLUCOSE SERPL-MCNC: 96 MG/DL (ref 74–99)
HCT VFR BLD AUTO: 36.3 % (ref 36–46)
HGB BLD-MCNC: 11.7 G/DL (ref 12–16)
IMM GRANULOCYTES # BLD AUTO: 0.04 X10*3/UL (ref 0–0.5)
IMM GRANULOCYTES NFR BLD AUTO: 0.6 % (ref 0–0.9)
LYMPHOCYTES # BLD AUTO: 2.22 X10*3/UL (ref 0.8–3)
LYMPHOCYTES NFR BLD AUTO: 36 %
MAGNESIUM SERPL-MCNC: 1.71 MG/DL (ref 1.6–2.4)
MCH RBC QN AUTO: 31.5 PG (ref 26–34)
MCHC RBC AUTO-ENTMCNC: 32.2 G/DL (ref 32–36)
MCV RBC AUTO: 98 FL (ref 80–100)
MONOCYTES # BLD AUTO: 0.3 X10*3/UL (ref 0.05–0.8)
MONOCYTES NFR BLD AUTO: 4.9 %
NEUTROPHILS # BLD AUTO: 3.5 X10*3/UL (ref 1.6–5.5)
NEUTROPHILS NFR BLD AUTO: 56.7 %
NRBC BLD-RTO: 0 /100 WBCS (ref 0–0)
PLATELET # BLD AUTO: 276 X10*3/UL (ref 150–450)
POTASSIUM SERPL-SCNC: 4.5 MMOL/L (ref 3.5–5.3)
PROT SERPL-MCNC: 6.7 G/DL (ref 6.4–8.2)
RAD ONC MSQ ACTUAL FRACTIONS DELIVERED: 3
RAD ONC MSQ ACTUAL SESSION DELIVERED DOSE: 200 CGRAY
RAD ONC MSQ ACTUAL TOTAL DOSE: 600 CGRAY
RAD ONC MSQ ELAPSED DAYS: 5
RAD ONC MSQ LAST DATE: NORMAL
RAD ONC MSQ PRESCRIBED FRACTIONAL DOSE: 200 CGRAY
RAD ONC MSQ PRESCRIBED NUMBER OF FRACTIONS: 35
RAD ONC MSQ PRESCRIBED TECHNIQUE: NORMAL
RAD ONC MSQ PRESCRIBED TOTAL DOSE: 7000 CGRAY
RAD ONC MSQ PRESCRIPTION PATTERN COMMENT: NORMAL
RAD ONC MSQ START DATE: NORMAL
RAD ONC MSQ TREATMENT COURSE NUMBER: 2
RAD ONC MSQ TREATMENT SITE: NORMAL
RBC # BLD AUTO: 3.72 X10*6/UL (ref 4–5.2)
SODIUM SERPL-SCNC: 140 MMOL/L (ref 136–145)
WBC # BLD AUTO: 6.2 X10*3/UL (ref 4.4–11.3)

## 2024-07-08 PROCEDURE — 72157 MRI CHEST SPINE W/O & W/DYE: CPT

## 2024-07-08 PROCEDURE — 77386 HC INTENSITY-MODULATED RADIATION THERAPY (IMRT), COMPLEX: CPT | Performed by: STUDENT IN AN ORGANIZED HEALTH CARE EDUCATION/TRAINING PROGRAM

## 2024-07-08 PROCEDURE — 2500000004 HC RX 250 GENERAL PHARMACY W/ HCPCS (ALT 636 FOR OP/ED): Performed by: NURSE PRACTITIONER

## 2024-07-08 PROCEDURE — 85025 COMPLETE CBC W/AUTO DIFF WBC: CPT

## 2024-07-08 PROCEDURE — 83735 ASSAY OF MAGNESIUM: CPT

## 2024-07-08 PROCEDURE — 99215 OFFICE O/P EST HI 40 MIN: CPT | Performed by: NURSE PRACTITIONER

## 2024-07-08 PROCEDURE — 36415 COLL VENOUS BLD VENIPUNCTURE: CPT

## 2024-07-08 PROCEDURE — A9575 INJ GADOTERATE MEGLUMI 0.1ML: HCPCS | Performed by: NURSE PRACTITIONER

## 2024-07-08 PROCEDURE — 1126F AMNT PAIN NOTED NONE PRSNT: CPT | Performed by: NURSE PRACTITIONER

## 2024-07-08 PROCEDURE — 72156 MRI NECK SPINE W/O & W/DYE: CPT

## 2024-07-08 PROCEDURE — 82040 ASSAY OF SERUM ALBUMIN: CPT

## 2024-07-08 RX ORDER — PALONOSETRON 0.05 MG/ML
0.25 INJECTION, SOLUTION INTRAVENOUS ONCE
Status: CANCELLED | OUTPATIENT
Start: 2024-07-09

## 2024-07-08 RX ORDER — PROCHLORPERAZINE EDISYLATE 5 MG/ML
10 INJECTION INTRAMUSCULAR; INTRAVENOUS EVERY 6 HOURS PRN
Status: CANCELLED | OUTPATIENT
Start: 2024-07-09

## 2024-07-08 RX ORDER — GADOTERATE MEGLUMINE 376.9 MG/ML
13 INJECTION INTRAVENOUS
Status: COMPLETED | OUTPATIENT
Start: 2024-07-08 | End: 2024-07-08

## 2024-07-08 RX ORDER — EPINEPHRINE 0.3 MG/.3ML
0.3 INJECTION SUBCUTANEOUS EVERY 5 MIN PRN
Status: CANCELLED | OUTPATIENT
Start: 2024-07-09

## 2024-07-08 RX ORDER — DIPHENHYDRAMINE HCL 25 MG
50 CAPSULE ORAL ONCE
Status: CANCELLED | OUTPATIENT
Start: 2024-07-09

## 2024-07-08 RX ORDER — ALBUTEROL SULFATE 0.83 MG/ML
3 SOLUTION RESPIRATORY (INHALATION) AS NEEDED
Status: CANCELLED | OUTPATIENT
Start: 2024-07-09

## 2024-07-08 RX ORDER — FAMOTIDINE 10 MG/ML
20 INJECTION INTRAVENOUS ONCE
Status: CANCELLED | OUTPATIENT
Start: 2024-07-09

## 2024-07-08 RX ORDER — DIPHENHYDRAMINE HYDROCHLORIDE 50 MG/ML
50 INJECTION INTRAMUSCULAR; INTRAVENOUS AS NEEDED
Status: CANCELLED | OUTPATIENT
Start: 2024-07-09

## 2024-07-08 RX ORDER — PROCHLORPERAZINE MALEATE 10 MG
10 TABLET ORAL EVERY 6 HOURS PRN
Status: CANCELLED | OUTPATIENT
Start: 2024-07-09

## 2024-07-08 RX ORDER — FAMOTIDINE 10 MG/ML
20 INJECTION INTRAVENOUS ONCE AS NEEDED
Status: CANCELLED | OUTPATIENT
Start: 2024-07-09

## 2024-07-08 ASSESSMENT — ENCOUNTER SYMPTOMS
NEUROLOGICAL NEGATIVE: 1
CARDIOVASCULAR NEGATIVE: 1
DIARRHEA: 0
FATIGUE: 1
VOICE CHANGE: 1
BACK PAIN: 1
NECK PAIN: 1
GASTROINTESTINAL NEGATIVE: 1
RESPIRATORY NEGATIVE: 1

## 2024-07-08 ASSESSMENT — PAIN SCALES - GENERAL: PAINLEVEL: 0-NO PAIN

## 2024-07-08 NOTE — H&P (VIEW-ONLY)
Patient ID: Padilla Campuzano is a 78 y.o. female.  Diagnosis: Laryngeal cancer  Staging: T3N0M0  Date of Diagnosis: 5/22/24    Providers:  ENT Surgeon: Dr. Ferny Rhoades  [facility]:   Dr. Kyunghee Burkitt/Megan Arauz APRARNEL  [facility]: Dr. Sue Oliva    Ms. Campuzano is 77 y/o female with recent diagnosis of laryngeal cancer who is referred to  med onc  to discuss treatment plan.    -2/2024: pt presented with hoarse voice  -3/21/24: CT chest showed left pulmonary nodule  -4/3/24: PET/CT showed uptake in left vocal cord extending  to the right with limited subglottic extension  -4/23/24: biopsy of LLL nodule showed negative for malignancy  -5/7/24: seen by thoracic surgeon Dr. Linda, plan is to follow up image in 3 months  -5/20/24: s/p direct laryngoscopy. She was found to have transglottic lesion extending from primarily the left glottis into the subglottis and wrapping around anteriorly. 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)  No date: Breast cancer (Multi)  2012: Cerebral hemorrhage (Multi)  No date: CKD (chronic kidney disease)  No date: Colon cancer (Multi)  No date: Colorectal cancer (Multi)  No date: Diverticulosis of intestine, part unspecified, without   perforation or abscess without bleeding      Comment:  Diverticulosis  No date: GERD (gastroesophageal reflux disease)  No date: Hypertension  No date: Irritable bowel syndrome  No date: Kidney disease  No date: Malignant neoplasm of colon, unspecified (Multi)      Comment:  Colon cancer  No date: Personal history of colonic polyps      Comment:  History of colonic polyps  No date: Personal history of irradiation  No date: Personal history of malignant neoplasm of breast      Comment:  History of malignant neoplasm of female breast  No date: Personal history of other diseases of the respiratory system      Comment:  History of respiratory failure  No date: Personal history of other malignant  neoplasm of skin      Comment:  Personal history of malignant neoplasm of skin  2016: Right upper quadrant pain      Comment:  Abdominal pain, RUQ (right upper quadrant)  2012: Stroke (Multi)  2016: Unspecified symptoms and signs involving the   genitourinary system      Comment:  Urinary symptom or sign   Surgical History:    Past Surgical History:   Procedure Laterality Date    BI BREAST RIGHT CYST ASPIRATION Right 2019    BI BREAST CYST ASPIRATION RIGHT [facility]    BREAST LUMPECTOMY  2014    Breast Surgery Lumpectomy    CHOLECYSTECTOMY      CT GUIDED IMAGING FOR ABSCESS DRAIN  2015    CT GUIDED IMAGING FOR ABSCESS DRAIN [facility] LEGAC[facility]    HYSTERECTOMY  1984    Hysterectomy    ILEOSTOMY  2015    Ileostomy    ILEOSTOMY CLOSURE  2015    Ileostomy Closure    OTHER SURGICAL HISTORY      Cerebral artery coiling      Family History:    Family History   Problem Relation Name Age of Onset    Colon cancer Mother      Liver cancer Mother      Kidney cancer Mother      Heart attack Father      Blood clot Father      Cancer Brother      Cancer Brother       Family Oncology History:    Cancer-related family history includes Cancer in her brother and brother; Colon cancer in her mother; Kidney cancer in her mother; Liver cancer in her mother.  Social History:    Social History     Tobacco Use    Smoking status: Former     Current packs/day: 0.00     Types: Cigarettes     Quit date:      Years since quittin.5     Passive exposure: Past    Smokeless tobacco: Never   Vaping Use    Vaping status: Never Used   Substance Use Topics    Alcohol use: Never    Drug use: Never        Subjective   Chief Complaint: Laryngeal cancer    HPI    Interval History  Patient present in clinic for follow-up and readiness to treat visit.  Her spouse is present for visit.  Reports she tolerated her C1 tx.  Mild fatigue, taking naps.  No change in her breathing.    Appetite is good.   Using blis lozenges. Denies n/v/c.  Norm - formed, soft stools.  Back pain - denies pain as long as she is sitting. Pain starts once she gets up.  Pain 0/10 at time of visit.  Yesterday evening 7/10.  Took a nap -> 0/10.  Managed with tylenol - 1000mg - taken in the afternoon.   No fevers, chills, or CP.  Labs WNL.  Ready for treatment tomorrow.  MRI C&T results pending.        ROS  Review of Systems   Constitutional:  Positive for fatigue.   HENT:   Positive for voice change.    Respiratory: Negative.     Cardiovascular: Negative.    Gastrointestinal: Negative.  Negative for diarrhea.   Genitourinary: Negative.     Musculoskeletal:  Positive for back pain and neck pain.   Skin: Negative.    Neurological: Negative.        Allergies  Allergies   Allergen Reactions    Hydrocodone-Acetaminophen Dizziness    Oxycodone Hcl Unknown    Oxycodone-Acetaminophen Dizziness    Aspirin GI Upset and Unknown    Sulfa (Sulfonamide Antibiotics) Rash        Medications  Current Outpatient Medications   Medication Instructions    acetaminophen (TYLENOL) 1,000 mg, oral, Every 6 hours PRN    biotin 5 mg capsule Every 24 hours    cholecalciferol (Vitamin D-3) 50 mcg (2,000 unit) capsule 1 capsule, Every 24 hours    cholestyramine (Questran) 4 gram powder once daily.    lipase-protease-amylase (Creon) 24,000-76,000 -120,000 unit capsule TAKE 3 CAPSULES BY MOUTH 3 TIMES DAILY WITH MEALS AND 1 TO 2  CAPSULES BY MOUTH WITH SNACKS.  MAX 13 CAPSULE DAILY    losartan (COZAAR) 25 mg, oral, Daily    magnesium oxide (MAG-OX) 400 mg, oral, Daily    ondansetron (ZOFRAN) 8 mg, oral, Every 8 hours PRN    pantoprazole (ProtoNix) 40 mg EC tablet TAKE 1 TABLET BY MOUTH TWICE  DAILY    prochlorperazine (COMPAZINE) 10 mg, oral, Every 6 hours PRN          Objective   VS: BP 96/65 (BP Location: Right arm, Patient Position: Sitting, BP Cuff Size: Adult long)   Pulse 84   Temp 35.8 °C (96.5 °F) (Temporal)   Resp 18   Wt 64.4 kg (141 lb 13.9 oz)   SpO2  91%   BMI 25.95 kg/m²   Weight: Daily Weight  07/08/24 : 64.4 kg (141 lb 13.9 oz)  07/05/24 : 65.1 kg (143 lb 6.9 oz)  07/02/24 : 65 kg (143 lb 4.8 oz)  07/02/24 : 65 kg (143 lb 4.8 oz)  07/08/24 : 64.4 kg (142 lb)  07/01/24 : 64.8 kg (142 lb 13.7 oz)  06/11/24 : 65.5 kg (144 lb 8 oz)      Physical Exam  Constitutional:       Appearance: Normal appearance.   HENT:      Head: Normocephalic and atraumatic.      Mouth/Throat:      Mouth: Mucous membranes are moist.   Eyes:      Extraocular Movements: Extraocular movements intact.      Pupils: Pupils are equal, round, and reactive to light.   Cardiovascular:      Rate and Rhythm: Normal rate and regular rhythm.      Pulses: Normal pulses.      Heart sounds: Normal heart sounds.   Pulmonary:      Effort: Pulmonary effort is normal.      Breath sounds: Normal breath sounds.   Abdominal:      General: Bowel sounds are normal.      Palpations: Abdomen is soft.   Musculoskeletal:         General: Normal range of motion.      Cervical back: Normal range of motion and neck supple.   Skin:     General: Skin is warm and dry.   Neurological:      General: No focal deficit present.      Mental Status: She is alert and oriented to person, place, and time.   Psychiatric:         Mood and Affect: Mood normal.         Behavior: Behavior normal.       Diagnostic Results   Labs    Results from last 7 days   Lab Units 07/08/24  1258 07/02/24  1250   WBC AUTO x10*3/uL 6.2 7.8   HEMOGLOBIN g/dL 11.7* 12.1   HEMATOCRIT % 36.3 36.7   PLATELETS AUTO x10*3/uL 276 306   NEUTROS ABS x10*3/uL 3.50 4.40   LYMPHS ABS AUTO x10*3/uL 2.22 2.56   MONOS ABS AUTO x10*3/uL 0.30 0.68   EOS ABS AUTO x10*3/uL 0.08 0.10   NEUTROS PCT AUTO % 56.7 56.3   LYMPHS PCT AUTO % 36.0 32.7   MONOS PCT AUTO % 4.9 8.7   EOS PCT AUTO % 1.3 1.3      Results from last 7 days   Lab Units 07/08/24  1258 07/02/24  1250   GLUCOSE mg/dL 96 92   SODIUM mmol/L 140 143   POTASSIUM mmol/L 4.5 4.1   CHLORIDE mmol/L 107 109*   CO2  mmol/L 23 26   BUN mg/dL 23 24*   CREATININE mg/dL 1.17* 1.24*   EGFR mL/min/1.73m*2 48* 45*   CALCIUM mg/dL 8.8 8.9   MAGNESIUM mg/dL 1.71 1.41*   ALBUMIN g/dL 4.1 4.1   PROTEIN TOTAL g/dL 6.7 6.8   BILIRUBIN TOTAL mg/dL 0.4 0.4   ALK PHOS U/L 60 70   ALT U/L 6* 6*   AST U/L 15 15                 Images  MRI Cervical and Thoracic Spine 7/8/24:    Cervical, Thoracic Spine: Diffuse soft tissue thickening and enhancement of the glottic larynx extending into the subglottic larynx and involving the left cricoid, arytenoid, and thyroid cartilages, consistent with known tumor.  No evidence of metastatic disease within the cervical or thoracic  spine.    Pathology  Lab Results   Component Value Date    PATHREP  12/06/2021                                                     MRN: [id number]  Patient Name RONALD LENNON  Accession #: [id number]  Date of Procedure:  12/6/2021       Date Reported: 12/9/2021  Date Received:  12/7/2021  Date of Birth / Sex 1945 (Age: 76) / F  Race: WHITE  Submitting Physician: ROD VALLE MD    Other External #          FINAL CYTOLOGICAL INTERPRETATION    A.   FINE NEEDLE ASPIRATION BREAST - RIGHT, CYTOLOGY AND CELL BLOCK:  --NO MALIGNANT CELLS IDENTIFIED.  --SPECIMEN CONSISTS OF PROTEINACEOUS DEBRIS, OCCASIONAL FOAMY MACROPHAGES, FEW  INFLAMMATORY CELLS AND RARE EPITHELIAL GROUP.        Slide(s) initially screened by a Cytotechnologist at [place] [address]      Electronically Signed Out By GABINO GARCES MD    By the signature on this report, the individual or group listed as making the  Final Interpretation/Diagnosis certifies that they have reviewed this case.  Slide(s) initially screened by a Cytotechnologist at [facility]     Clinical History      Clinical Diagnosis History: Cyst of right breast - (N60.01)   Source of Specimen  A:  FINE NEEDLE ASPIRATION BREAST - RIGHT     Specimen Submitted as:  A:   " FINE NEEDLE ASPIRATION BREAST - RIGHT        Pap non-gyn ThinPrep slide, CELL BLOCK, H&E, Initial    Gross Description  A.   FINE NEEDLE ASPIRATION BREAST - RIGHT:  35cc MILKY YELLOW NEEDLE RINSE IN  CYTOLYT                 [facility]  Department of Pathology  [address] [place] [address]        PATHREP  09/09/2019     Name RONALD LENNON                                                                                                   Accession #: [id number]            Pathologist:                   YUE FIGUEROA MD  Date of Procedure:    9/9/2019  Date Received:          9/9/2019  Date Reported           9/13/2019  Submitting Physician:   SALIMA WHEELER MD  [facility]                    [facility]  Other External #                                                                    FINAL DIAGNOSIS  A.  BIOPSY OF ANAL CANAL:  MINUTE FRAGMENTS OF SOFT TISSUE WITH NO SIGNIFICANT  PATHOLOGICAL FINDINGS, SEE NOTE.    Note: Deeper sections were obtained.                                                                                                                                                                                                                                                                                                                                                                                                                                                                                       Electronically Signed Out By YUE FIGUEROA MD/ANASTASIYA  By the signature on this report, the individual or group listed as making the  Final Interpretation/Diagnosis certifies that they have reviewed this case.           Clinical History:  Colorectal cancer, squamous cell, status post chemo radiation    Specimens Submitted As:  A: BIOPSY OF ANAL CANAL     Gross Description:  Received in formalin, labeled with the patient's name and hospital number and  \"anal canal\", " are multiple fragments of tan, soft tissue aggregating to 1.7 x  1.0 x 0.1 cm. The specimen is submitted in toto in one cassette.  CJN    cjn/9/9/2019               [facility]  Department of Pathology   [address] [place] [address]        COMDX  05/20/2024     Case reviewed at ENT consensus conference via [employer] technology on 5/22/2024.       Assessment/Plan   Ms. Campuzano is 77 y/o female with recent diagnosis of laryngeal cancer who is referred to  med onc  to discuss treatment plan.    # Laryngeal cancer (T3N0M0)  -Based on PET/CT from 4/3/24, there was uptake in LLL nodule, however, biopsy of LLL nodule showed negative for malignancy  -seen by Thoracic surgeon, plan to have repeat CT chest without contrast in 3 month  -since her GFR is 40 (no hearing deficit), she is not a candidate for cisplatin, will treat her with weekly carboplatin and paclitaxel, reviewed chemo related side effects with patient in detail.  Consent obtained.    # Hx of anal, breast and cutaneous malignancies  -s/p lumpectomy with radiation in 2012  -s/p radiation to anal cancer in 2018  -s/p resection of skin cancer on nose in 2010    # Chronic diarrhea   - from previous colonic surgery  -3-4 episodes per day, while on 3 imodium three times a day, 3-4 episodes of diarrhea daily.  - Concern for flares with chemotherapy tx's  - Tolerated C1  - Currently managed with Creon and Questran - stools forms, soft  - ongoing monitoring    # Back pain, area between her shoulder blades, more so on the left.  Has worsened over the last few weeks.  Pain with activity.  Resolves with rest.  No acute injury.  No falls.    - Secure chat with Dr. Burkitt.  Will order MRI C & T (+/-) next available.  MRI's scheduled 7/8/24.    - 7/8:  Cervical, Thoracic Spine: Diffuse soft tissue thickening and enhancement of the glottic larynx extending into the subglottic larynx and involving the left cricoid, arytenoid, and thyroid  cartilages, consistent with known tumor.  No evidence of metastatic disease within the cervical or thoracic  spine.   - Currently managed with routine Tylenol 1,000mg/daily  - Supportive onc referral placed.  Pt to return sup onc call.    # Hypomagnesia: 7/2/24 Mg 1.41  - Will start oral Mag Oxide - 1 tab/day, Rx sent (not covered by pt's insurance)  - Will replete with IV hydration visit 7/5/24.  - If consistently low, will replete weekly via IV   - 7/8/24 Mg 1.71.      # Mediport placement, per pt request  - IR consult order in place  - INR lab completed.  - 7/8:  Encouraged pt to return [facility] call to schedule placement.      # Elevated Cr/BUN 1/24/24  - at/near baseline  - 1.17 7/8/24  - ongoing monitoring      PLAN  - CRT 7/2/24  - RTC 7/9 for C2  - NP visit 7/15/24  - IV hydration - each Friday

## 2024-07-08 NOTE — PROGRESS NOTES
Patient ID: Padilla Campuzano is a 78 y.o. female.  Diagnosis: Laryngeal cancer  Staging: T3N0M0  Date of Diagnosis: 5/22/24    Providers:  ENT Surgeon: Dr. Ferny Rhoades  [facility]:   Dr. Kyunghee Burkitt/Megan Arauz APRARNEL  [facility]: Dr. Sue Oliva    Ms. Campuzano is 77 y/o female with recent diagnosis of laryngeal cancer who is referred to  med onc  to discuss treatment plan.    -2/2024: pt presented with hoarse voice  -3/21/24: CT chest showed left pulmonary nodule  -4/3/24: PET/CT showed uptake in left vocal cord extending  to the right with limited subglottic extension  -4/23/24: biopsy of LLL nodule showed negative for malignancy  -5/7/24: seen by thoracic surgeon Dr. Linda, plan is to follow up image in 3 months  -5/20/24: s/p direct laryngoscopy. She was found to have transglottic lesion extending from primarily the left glottis into the subglottis and wrapping around anteriorly. 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)  No date: Breast cancer (Multi)  2012: Cerebral hemorrhage (Multi)  No date: CKD (chronic kidney disease)  No date: Colon cancer (Multi)  No date: Colorectal cancer (Multi)  No date: Diverticulosis of intestine, part unspecified, without   perforation or abscess without bleeding      Comment:  Diverticulosis  No date: GERD (gastroesophageal reflux disease)  No date: Hypertension  No date: Irritable bowel syndrome  No date: Kidney disease  No date: Malignant neoplasm of colon, unspecified (Multi)      Comment:  Colon cancer  No date: Personal history of colonic polyps      Comment:  History of colonic polyps  No date: Personal history of irradiation  No date: Personal history of malignant neoplasm of breast      Comment:  History of malignant neoplasm of female breast  No date: Personal history of other diseases of the respiratory system      Comment:  History of respiratory failure  No date: Personal history of other malignant  neoplasm of skin      Comment:  Personal history of malignant neoplasm of skin  2016: Right upper quadrant pain      Comment:  Abdominal pain, RUQ (right upper quadrant)  2012: Stroke (Multi)  2016: Unspecified symptoms and signs involving the   genitourinary system      Comment:  Urinary symptom or sign   Surgical History:    Past Surgical History:   Procedure Laterality Date    BI BREAST RIGHT CYST ASPIRATION Right 2019    BI BREAST CYST ASPIRATION RIGHT [facility]    BREAST LUMPECTOMY  2014    Breast Surgery Lumpectomy    CHOLECYSTECTOMY      CT GUIDED IMAGING FOR ABSCESS DRAIN  2015    CT GUIDED IMAGING FOR ABSCESS DRAIN [facility]    HYSTERECTOMY  1984    Hysterectomy    ILEOSTOMY  2015    Ileostomy    ILEOSTOMY CLOSURE  2015    Ileostomy Closure    OTHER SURGICAL HISTORY      Cerebral artery coiling      Family History:    Family History   Problem Relation Name Age of Onset    Colon cancer Mother      Liver cancer Mother      Kidney cancer Mother      Heart attack Father      Blood clot Father      Cancer Brother      Cancer Brother       Family Oncology History:    Cancer-related family history includes Cancer in her brother and brother; Colon cancer in her mother; Kidney cancer in her mother; Liver cancer in her mother.  Social History:    Social History     Tobacco Use    Smoking status: Former     Current packs/day: 0.00     Types: Cigarettes     Quit date:      Years since quittin.5     Passive exposure: Past    Smokeless tobacco: Never   Vaping Use    Vaping status: Never Used   Substance Use Topics    Alcohol use: Never    Drug use: Never        Subjective   Chief Complaint: Laryngeal cancer    HPI    Interval History  Patient present in clinic for follow-up and readiness to treat visit.  Her spouse is present for visit.  Reports she tolerated her C1 tx.  Mild fatigue, taking naps.  No change in her breathing.    Appetite is good.   Using blis lozenges. Denies n/v/c.  Norm - formed, soft stools.  Back pain - denies pain as long as she is sitting. Pain starts once she gets up.  Pain 0/10 at time of visit.  Yesterday evening 7/10.  Took a nap -> 0/10.  Managed with tylenol - 1000mg - taken in the afternoon.   No fevers, chills, or CP.  Labs WNL.  Ready for treatment tomorrow.  MRI C&T results pending.        ROS  Review of Systems   Constitutional:  Positive for fatigue.   HENT:   Positive for voice change.    Respiratory: Negative.     Cardiovascular: Negative.    Gastrointestinal: Negative.  Negative for diarrhea.   Genitourinary: Negative.     Musculoskeletal:  Positive for back pain and neck pain.   Skin: Negative.    Neurological: Negative.        Allergies  Allergies   Allergen Reactions    Hydrocodone-Acetaminophen Dizziness    Oxycodone Hcl Unknown    Oxycodone-Acetaminophen Dizziness    Aspirin GI Upset and Unknown    Sulfa (Sulfonamide Antibiotics) Rash        Medications  Current Outpatient Medications   Medication Instructions    acetaminophen (TYLENOL) 1,000 mg, oral, Every 6 hours PRN    biotin 5 mg capsule Every 24 hours    cholecalciferol (Vitamin D-3) 50 mcg (2,000 unit) capsule 1 capsule, Every 24 hours    cholestyramine (Questran) 4 gram powder once daily.    lipase-protease-amylase (Creon) 24,000-76,000 -120,000 unit capsule TAKE 3 CAPSULES BY MOUTH 3 TIMES DAILY WITH MEALS AND 1 TO 2  CAPSULES BY MOUTH WITH SNACKS.  MAX 13 CAPSULE DAILY    losartan (COZAAR) 25 mg, oral, Daily    magnesium oxide (MAG-OX) 400 mg, oral, Daily    ondansetron (ZOFRAN) 8 mg, oral, Every 8 hours PRN    pantoprazole (ProtoNix) 40 mg EC tablet TAKE 1 TABLET BY MOUTH TWICE  DAILY    prochlorperazine (COMPAZINE) 10 mg, oral, Every 6 hours PRN          Objective   VS: BP 96/65 (BP Location: Right arm, Patient Position: Sitting, BP Cuff Size: Adult long)   Pulse 84   Temp 35.8 °C (96.5 °F) (Temporal)   Resp 18   Wt 64.4 kg (141 lb 13.9 oz)   SpO2  91%   BMI 25.95 kg/m²   Weight: Daily Weight  07/08/24 : 64.4 kg (141 lb 13.9 oz)  07/05/24 : 65.1 kg (143 lb 6.9 oz)  07/02/24 : 65 kg (143 lb 4.8 oz)  07/02/24 : 65 kg (143 lb 4.8 oz)  07/08/24 : 64.4 kg (142 lb)  07/01/24 : 64.8 kg (142 lb 13.7 oz)  06/11/24 : 65.5 kg (144 lb 8 oz)      Physical Exam  Constitutional:       Appearance: Normal appearance.   HENT:      Head: Normocephalic and atraumatic.      Mouth/Throat:      Mouth: Mucous membranes are moist.   Eyes:      Extraocular Movements: Extraocular movements intact.      Pupils: Pupils are equal, round, and reactive to light.   Cardiovascular:      Rate and Rhythm: Normal rate and regular rhythm.      Pulses: Normal pulses.      Heart sounds: Normal heart sounds.   Pulmonary:      Effort: Pulmonary effort is normal.      Breath sounds: Normal breath sounds.   Abdominal:      General: Bowel sounds are normal.      Palpations: Abdomen is soft.   Musculoskeletal:         General: Normal range of motion.      Cervical back: Normal range of motion and neck supple.   Skin:     General: Skin is warm and dry.   Neurological:      General: No focal deficit present.      Mental Status: She is alert and oriented to person, place, and time.   Psychiatric:         Mood and Affect: Mood normal.         Behavior: Behavior normal.       Diagnostic Results   Labs    Results from last 7 days   Lab Units 07/08/24  1258 07/02/24  1250   WBC AUTO x10*3/uL 6.2 7.8   HEMOGLOBIN g/dL 11.7* 12.1   HEMATOCRIT % 36.3 36.7   PLATELETS AUTO x10*3/uL 276 306   NEUTROS ABS x10*3/uL 3.50 4.40   LYMPHS ABS AUTO x10*3/uL 2.22 2.56   MONOS ABS AUTO x10*3/uL 0.30 0.68   EOS ABS AUTO x10*3/uL 0.08 0.10   NEUTROS PCT AUTO % 56.7 56.3   LYMPHS PCT AUTO % 36.0 32.7   MONOS PCT AUTO % 4.9 8.7   EOS PCT AUTO % 1.3 1.3      Results from last 7 days   Lab Units 07/08/24  1258 07/02/24  1250   GLUCOSE mg/dL 96 92   SODIUM mmol/L 140 143   POTASSIUM mmol/L 4.5 4.1   CHLORIDE mmol/L 107 109*   CO2  mmol/L 23 26   BUN mg/dL 23 24*   CREATININE mg/dL 1.17* 1.24*   EGFR mL/min/1.73m*2 48* 45*   CALCIUM mg/dL 8.8 8.9   MAGNESIUM mg/dL 1.71 1.41*   ALBUMIN g/dL 4.1 4.1   PROTEIN TOTAL g/dL 6.7 6.8   BILIRUBIN TOTAL mg/dL 0.4 0.4   ALK PHOS U/L 60 70   ALT U/L 6* 6*   AST U/L 15 15                 Images  MRI Cervical and Thoracic Spine 7/8/24:    Cervical, Thoracic Spine: Diffuse soft tissue thickening and enhancement of the glottic larynx extending into the subglottic larynx and involving the left cricoid, arytenoid, and thyroid cartilages, consistent with known tumor.  No evidence of metastatic disease within the cervical or thoracic  spine.    Pathology  Lab Results   Component Value Date    PATHREP  12/06/2021                                                     MRN: [id number]  Patient Name RONALD LENNON  Accession #: [id number]  Date of Procedure:  12/6/2021       Date Reported: 12/9/2021  Date Received:  12/7/2021  Date of Birth / Sex 1945 (Age: 76) / F  Race: WHITE  Submitting Physician: ROD VALLE MD    Other External #          FINAL CYTOLOGICAL INTERPRETATION    A.   FINE NEEDLE ASPIRATION BREAST - RIGHT, CYTOLOGY AND CELL BLOCK:  --NO MALIGNANT CELLS IDENTIFIED.  --SPECIMEN CONSISTS OF PROTEINACEOUS DEBRIS, OCCASIONAL FOAMY MACROPHAGES, FEW  INFLAMMATORY CELLS AND RARE EPITHELIAL GROUP.        Slide(s) initially screened by a Cytotechnologist at [place] [address]      Electronically Signed Out By GABINO GARCES MD    By the signature on this report, the individual or group listed as making the  Final Interpretation/Diagnosis certifies that they have reviewed this case.  Slide(s) initially screened by a Cytotechnologist at [facility]     Clinical History      Clinical Diagnosis History: Cyst of right breast - (N60.01)   Source of Specimen  A:  FINE NEEDLE ASPIRATION BREAST - RIGHT     Specimen Submitted as:  A:   " FINE NEEDLE ASPIRATION BREAST - RIGHT        Pap non-gyn ThinPrep slide, CELL BLOCK, H&E, Initial    Gross Description  A.   FINE NEEDLE ASPIRATION BREAST - RIGHT:  35cc MILKY YELLOW NEEDLE RINSE IN  CYTOLYT                 [facility]  Department of Pathology  [address] [place] [address]        PATHREP  09/09/2019     Name RONALD LENNON                                                                                                   Accession #: [id number]            Pathologist:                   YUE FIGUEROA MD  Date of Procedure:    9/9/2019  Date Received:          9/9/2019  Date Reported           9/13/2019  Submitting Physician:   SALIMA WHEELER MD  [facility]  Other External #                                                                    FINAL DIAGNOSIS  A.  BIOPSY OF ANAL CANAL:  MINUTE FRAGMENTS OF SOFT TISSUE WITH NO SIGNIFICANT  PATHOLOGICAL FINDINGS, SEE NOTE.    Note: Deeper sections were obtained.                                                                                                                                                                                                                                                                                                                                                                                                                                                                                       Electronically Signed Out By YUE FIGUEROA MD/ANASTASIYA  By the signature on this report, the individual or group listed as making the  Final Interpretation/Diagnosis certifies that they have reviewed this case.           Clinical History:  Colorectal cancer, squamous cell, status post chemo radiation    Specimens Submitted As:  A: BIOPSY OF ANAL CANAL     Gross Description:  Received in formalin, labeled with the patient's name and hospital number and  \"anal canal\", " are multiple fragments of tan, soft tissue aggregating to 1.7 x  1.0 x 0.1 cm. The specimen is submitted in toto in one cassette.  CJN    cjn/9/9/2019               [facility]  Department of Pathology   [address] [place] [address]        COMDX  05/20/2024     Case reviewed at ENT consensus conference via [employer] technology on 5/22/2024.       Assessment/Plan   Ms. Campuzano is 77 y/o female with recent diagnosis of laryngeal cancer who is referred to  med onc  to discuss treatment plan.    # Laryngeal cancer (T3N0M0)  -Based on PET/CT from 4/3/24, there was uptake in LLL nodule, however, biopsy of LLL nodule showed negative for malignancy  -seen by Thoracic surgeon, plan to have repeat CT chest without contrast in 3 month  -since her GFR is 40 (no hearing deficit), she is not a candidate for cisplatin, will treat her with weekly carboplatin and paclitaxel, reviewed chemo related side effects with patient in detail.  Consent obtained.    # Hx of anal, breast and cutaneous malignancies  -s/p lumpectomy with radiation in 2012  -s/p radiation to anal cancer in 2018  -s/p resection of skin cancer on nose in 2010    # Chronic diarrhea   - from previous colonic surgery  -3-4 episodes per day, while on 3 imodium three times a day, 3-4 episodes of diarrhea daily.  - Concern for flares with chemotherapy tx's  - Tolerated C1  - Currently managed with Creon and Questran - stools forms, soft  - ongoing monitoring    # Back pain, area between her shoulder blades, more so on the left.  Has worsened over the last few weeks.  Pain with activity.  Resolves with rest.  No acute injury.  No falls.    - Secure chat with Dr. Burkitt.  Will order MRI C & T (+/-) next available.  MRI's scheduled 7/8/24.    - 7/8:  Cervical, Thoracic Spine: Diffuse soft tissue thickening and enhancement of the glottic larynx extending into the subglottic larynx and involving the left cricoid, arytenoid, and thyroid  cartilages, consistent with known tumor.  No evidence of metastatic disease within the cervical or thoracic  spine.   - Currently managed with routine Tylenol 1,000mg/daily  - Supportive onc referral placed.  Pt to return sup onc call.    # Hypomagnesia: 7/2/24 Mg 1.41  - Will start oral Mag Oxide - 1 tab/day, Rx sent (not covered by pt's insurance)  - Will replete with IV hydration visit 7/5/24.  - If consistently low, will replete weekly via IV   - 7/8/24 Mg 1.71.      # Mediport placement, per pt request  - IR consult order in place  - INR lab completed.  - 7/8:  Encouraged pt to return [place] [place] call to schedule placement.      # Elevated Cr/BUN 1/24/24  - at/near baseline  - 1.17 7/8/24  - ongoing monitoring      PLAN  - CRT 7/2/24  - RTC 7/9 for C2  - NP visit 7/15/24  - IV hydration - each Friday

## 2024-07-09 ENCOUNTER — APPOINTMENT (OUTPATIENT)
Dept: RADIATION ONCOLOGY | Facility: CLINIC | Age: 79
End: 2024-07-09
Payer: MEDICARE

## 2024-07-09 ENCOUNTER — INFUSION (OUTPATIENT)
Dept: HEMATOLOGY/ONCOLOGY | Facility: CLINIC | Age: 79
End: 2024-07-09
Payer: MEDICARE

## 2024-07-09 ENCOUNTER — TELEPHONE (OUTPATIENT)
Dept: HEMATOLOGY/ONCOLOGY | Facility: CLINIC | Age: 79
End: 2024-07-09
Payer: MEDICARE

## 2024-07-09 ENCOUNTER — NUTRITION (OUTPATIENT)
Dept: HEMATOLOGY/ONCOLOGY | Facility: CLINIC | Age: 79
End: 2024-07-09
Payer: MEDICARE

## 2024-07-09 ENCOUNTER — APPOINTMENT (OUTPATIENT)
Dept: HEMATOLOGY/ONCOLOGY | Facility: CLINIC | Age: 79
End: 2024-07-09
Payer: MEDICARE

## 2024-07-09 VITALS
TEMPERATURE: 97.2 F | OXYGEN SATURATION: 93 % | RESPIRATION RATE: 18 BRPM | BODY MASS INDEX: 26.23 KG/M2 | WEIGHT: 143.41 LBS | DIASTOLIC BLOOD PRESSURE: 86 MMHG | HEART RATE: 79 BPM | SYSTOLIC BLOOD PRESSURE: 161 MMHG

## 2024-07-09 DIAGNOSIS — C32.0 SQUAMOUS CELL CARCINOMA OF LEFT VOCAL CORD (MULTI): Primary | ICD-10-CM

## 2024-07-09 DIAGNOSIS — C32.0 SQUAMOUS CELL CARCINOMA OF LEFT VOCAL CORD (MULTI): ICD-10-CM

## 2024-07-09 PROCEDURE — 2500000004 HC RX 250 GENERAL PHARMACY W/ HCPCS (ALT 636 FOR OP/ED): Performed by: NURSE PRACTITIONER

## 2024-07-09 PROCEDURE — 77336 RADIATION PHYSICS CONSULT: CPT | Performed by: STUDENT IN AN ORGANIZED HEALTH CARE EDUCATION/TRAINING PROGRAM

## 2024-07-09 PROCEDURE — 2500000001 HC RX 250 WO HCPCS SELF ADMINISTERED DRUGS (ALT 637 FOR MEDICARE OP): Performed by: NURSE PRACTITIONER

## 2024-07-09 PROCEDURE — 96375 TX/PRO/DX INJ NEW DRUG ADDON: CPT | Mod: INF

## 2024-07-09 PROCEDURE — 96413 CHEMO IV INFUSION 1 HR: CPT

## 2024-07-09 RX ORDER — PROCHLORPERAZINE MALEATE 10 MG
10 TABLET ORAL EVERY 6 HOURS PRN
Status: DISCONTINUED | OUTPATIENT
Start: 2024-07-09 | End: 2024-07-09 | Stop reason: HOSPADM

## 2024-07-09 RX ORDER — ALBUTEROL SULFATE 0.83 MG/ML
3 SOLUTION RESPIRATORY (INHALATION) AS NEEDED
Status: DISCONTINUED | OUTPATIENT
Start: 2024-07-09 | End: 2024-07-09 | Stop reason: HOSPADM

## 2024-07-09 RX ORDER — EPINEPHRINE 0.3 MG/.3ML
0.3 INJECTION SUBCUTANEOUS EVERY 5 MIN PRN
Status: DISCONTINUED | OUTPATIENT
Start: 2024-07-09 | End: 2024-07-09 | Stop reason: HOSPADM

## 2024-07-09 RX ORDER — PALONOSETRON 0.05 MG/ML
0.25 INJECTION, SOLUTION INTRAVENOUS ONCE
Status: COMPLETED | OUTPATIENT
Start: 2024-07-09 | End: 2024-07-09

## 2024-07-09 RX ORDER — DIPHENHYDRAMINE HCL 25 MG
50 CAPSULE ORAL ONCE
Status: COMPLETED | OUTPATIENT
Start: 2024-07-09 | End: 2024-07-09

## 2024-07-09 RX ORDER — PROCHLORPERAZINE EDISYLATE 5 MG/ML
10 INJECTION INTRAMUSCULAR; INTRAVENOUS EVERY 6 HOURS PRN
Status: DISCONTINUED | OUTPATIENT
Start: 2024-07-09 | End: 2024-07-09 | Stop reason: HOSPADM

## 2024-07-09 RX ORDER — FAMOTIDINE 10 MG/ML
20 INJECTION INTRAVENOUS ONCE AS NEEDED
Status: DISCONTINUED | OUTPATIENT
Start: 2024-07-09 | End: 2024-07-09 | Stop reason: HOSPADM

## 2024-07-09 RX ORDER — DIPHENHYDRAMINE HYDROCHLORIDE 50 MG/ML
50 INJECTION INTRAMUSCULAR; INTRAVENOUS AS NEEDED
Status: DISCONTINUED | OUTPATIENT
Start: 2024-07-09 | End: 2024-07-09 | Stop reason: HOSPADM

## 2024-07-09 RX ORDER — FAMOTIDINE 10 MG/ML
20 INJECTION INTRAVENOUS ONCE
Status: COMPLETED | OUTPATIENT
Start: 2024-07-09 | End: 2024-07-09

## 2024-07-09 ASSESSMENT — PAIN SCALES - GENERAL: PAINLEVEL: 0-NO PAIN

## 2024-07-09 NOTE — PROGRESS NOTES
"Adverse Event Note     Name:Padilla Campuzano  : 1945  MRN: [id number]      Adverse Event:  Medication: Paclitaxel  Administered Date/Time: 24 at 1333  Reactions/Symptoms Started Time: 1419   Symptoms: (check all that apply)   [] Back Pain   [] Erythema Face     [] Hypotension     [] Rash/Rigors [x] Other   [] Bleeding    [] Erythema Hands  [] Itching  [] Swelling/Edema [] Unknown   [] Chest Pain [] Hives/Urticaria     [] Low platelet Ct  [] Syncope   [] Cytopenia  [x] Hypertension       [] Neutropenia    Severity: Mild   Provider Notified: Yes   Medications Given(Add all medications to the chart via , or treatment plan)   [] Acetaminophen-Tylenol       [] Famotidine-Pepcid  [] Nitroglycerine-NTG   [] Albuterol                               [] Hydrocortisone       [] Ondansetron-Zofran   [] Diphenhydramine-Benadryl  [] Lorazepam-Ativan  [] Naloxone-Narcan   [] Epinephrine-Epi                     [] Methylprednisone   Additional Details/ Comments:   Pt rings at 1419 stating that her stomach hurts, she feels nauseated & she's hot/sweaty. Taxol infusion immediately stopped. Patient was given compazine for upset stomach. Yusef Pierce CNP came to chairside to assess patient - she states to have patient rest & not to restart taxol until Dr. Burkitt is made aware of the situation. Pt keeps stating that her \"body just doesn't like chemo\" as she has had this same issue in the past when she got chemo for breast & anal cancer. Dr. Burkitt wanted to restart taxol at 50% of the original rate, patient refuses to get anymore chemo at all. Pt states her body doesn't like chemo and she just wants to go home. Pt educated on the importance of getting chemo to help kill her cancer & the fact that a slower rate may not cause these side effects, but pt is adamant that she does not want to re challenge her chemo at all. Patient observed until her BP came down & she was feeling better then she was discharged home.   "

## 2024-07-09 NOTE — TELEPHONE ENCOUNTER
TC to pt with MRI C & T results.   Call goes to [facility].  No message left.  Will attempt repeat call on Wednesday.      Dr. Burkitt notified via secure chat of below results.      MPRESSION:  Diffuse soft tissue thickening and enhancement of the glottic larynx extending into the subglottic larynx and involving the left cricoid, arytenoid, and thyroid cartilages, consistent with known tumor.      No evidence of metastatic disease within the cervical or thoracic spine.

## 2024-07-09 NOTE — PROGRESS NOTES
"Pt rings at 1419 stating that her stomach hurts, she feels nauseated & she's hot/sweaty. Taxol infusion immediately stopped. Patient was given compazine for upset stomach. Yusef Pierce CNP came to chairside to assess patient within 5 minutes of symptom onset- she states to have patient rest & not to restart taxol until Dr. Burkitt is made aware of the situation. Pt keeps stating that her \"body just doesn't like chemo\" as she has had this same issue in the past when she got chemo for breast & anal cancer. Dr. Burkitt wanted to restart taxol at 50% of the original rate, patient refuses to get anymore chemo at all. Pt states her body doesn't like chemo and she just wants to go home. Pt educated on the importance of getting chemo to help kill her cancer & the fact that a slower rate may not cause these side effects, but pt is adamant that she does not want to re challenge her chemo at all. Patient observed until her BP came down & she was feeling better. Patient also refused to go to radiation, once again patient educated on the importance of not missing treatments as that can prolong treatment time or even decrease effectiveness of treatment. Patient still states that she wants to go home & rest. Dr. Burkitt & her team were kept up to date on all of this - Dr. Burkitt is having a phone visit with pt tomorrow 7/10 to discuss further treatment options. Upon discharge pt states that her stomach is still a little upset, but is much better than it was - RN asked pt if she felt safe to go home at this time & pt insisted that she was ready to go home. BP came down since the start of her reaction. Pt ambulates independently with her  to exit. Schedule reviewed with pt before discharge. Pt educated to go to ED with any new or worsening symptoms - pt understands & uses teach back method   "

## 2024-07-09 NOTE — PROGRESS NOTES
"NUTRITION Assessment NOTE    Nutrition Assessment     Reason for Visit:  Padilla Campuzano is a 78 y.o. female who presents for nutrition consult 2/2 dx.  DX scc L vocal cord  TX concurrent chemoradiation, Paclitaxel/Carboplatin. XRT 7/2-8/20  Hx anal and breast cancer. Chronic diarrhea not related to anal cancer per   7/9- Week 2. CNP notes from 7/8 reviewed. Pt seen in infusion with . Tolerated cycle 1, feels well, eating well    Lab Results   Component Value Date/Time    GLUCOSE 96 07/08/2024 1258     07/08/2024 1258    K 4.5 07/08/2024 1258     07/08/2024 1258    CO2 23 07/08/2024 1258    ANIONGAP 15 07/08/2024 1258    BUN 23 07/08/2024 1258    CREATININE 1.17 (H) 07/08/2024 1258    EGFR 48 (L) 07/08/2024 1258    CALCIUM 8.8 07/08/2024 1258    ALBUMIN 4.1 07/08/2024 1258    ALKPHOS 60 07/08/2024 1258    PROT 6.7 07/08/2024 1258    AST 15 07/08/2024 1258    BILITOT 0.4 07/08/2024 1258    ALT 6 (L) 07/08/2024 1258     Lab Results   Component Value Date/Time    VITD25 53 05/01/2024 0904       Anthropometrics:           Wt Readings from Last 10 Encounters:   07/09/24 65.1 kg (143 lb 6.6 oz)   07/08/24 64.4 kg (141 lb 13.9 oz)   07/05/24 65.1 kg (143 lb 6.9 oz)   07/02/24 65 kg (143 lb 4.8 oz)   07/02/24 65 kg (143 lb 4.8 oz)   07/08/24 64.4 kg (142 lb)   07/01/24 64.8 kg (142 lb 13.7 oz)   06/11/24 65.5 kg (144 lb 8 oz)   06/10/24 65.7 kg (144 lb 13.1 oz)   06/05/24 64.8 kg (142 lb 13.7 oz)   7/2- first chemotherapy 65 kg  7/9- 65.1 kg    Food And Nutrient Intake:  Food and Nutrient History  Food and Nutrient History: tolerating regular diet  Energy Intake: Good > 75 %  GI Symptoms: diarrhea (+Questran and Creon. Diarrhea is intermittent,can come without warning. Purchased Banatrol Plus, tried once \"went right through me\"  states they will try again as it is hard to determine if it was the actual product vs her normal diarrhea episodes)  GI Symptoms greater than 2 weeks: yes  Oral " Problems: denies (hoarse voice. Has Darragh lozenges, has taken a few thinking there would be some immediate affect. Advised to take as directed by blaine NAYAK beginning now through treatment)  Dentition: lower denture/partial, upper denture/partial     Food Intake  Amount of Food: eating consistent meals. Consumes protein sources like eggs, cottage cheese, yogurt, PNB  Meal 3: spaghetti in slow cooker for today, cautioned about acidic foods going forward    Food Preparation  Cooking: Spouse/Significant Other, Patient  Grocery Shopping: Patient, Spouse/Significant Other              Enteral Nutrition Intake  Enteral Nutrition Formula/Solution: had a feeding tube when she had a stroke                             Medication and Complementary/Alternative Medicine Use  OTC Medication Use: Biotin, Vitamin D      Nutrition Focused Physical Exam Findings: 7/2/24                          Energy Needs  Estimated Energy Needs  Total Energy Estimated Needs (kCal): 1825 kCal  Total Estimated Energy Need per Day (kCal/kg): 28 kCal/kg  Estimated Fluid Needs  Total Fluid Estimated Needs (mL): 1825 mL  Method for Estimating Needs: 1 ml/kcal  Estimated Protein Needs  Total Protein Estimated Needs (g): 78 g  Total Protein Estimated Needs (g/kg): 1.2 g/kg        Nutrition Diagnosis   Malnutrition Diagnosis  Patient has Malnutrition Diagnosis: No    Nutrition Diagnosis  Patient has Nutrition Diagnosis: Yes  Diagnosis Status (1): Ongoing  Nutrition Diagnosis 1: Predicted inadequate energy intake  Related to (1): pathophysiology of disease  As Evidenced by (1): high risk for nutrition impact symptoms impairing ability to meet estimated energy needs, affect oral intake and weight status    Nutrition Interventions/Recommendations   Nutrition Prescription  Individualized Nutrition Prescription Provided for : diet during cancer treatment    Food and Nutrition Delivery  Food and Nutrition Delivery  Meals & Snacks: Energy-modified diet, Modify  Composition of Meals/Snacks, Protein-modified diet, Fluid-modified diet  Goals: incorporate soft high calorie high protein foods modified texture as needed, moist foods as needed. Include consistent meals and snacks. Avoid dry harsh  foods and acidic foods/beverages. Include adequate fluids  Medical Food Supplement: Commercial beverage (Encouraged by next week ONS once daily. Given chronic diarrhea, suggested FK std 1.4. Provided  ordering information. She feels she tolerated sample of Ens last week and has taken in the past, but difficult to conclude with chronic diarrhea)    Nutrition Education - provided 7/22  Nutrition Education  Nutrition Education Content: Content related nutrition education  Goals: Eating Hints Book    Coordination of Care     7/9- Explained Banatrol Plus is banana flakes with prebiotics, so food based. She will try again to see if helps with diarrhea  7/2- Explained calorie and protein needs are increased with diagnosis and treatment. The importance of maintaining weight, adequate oral intake and fluids. Explained the role of protein, examples of protein sources provided and how to work into diet.    Circled recipe for s/s rinses in Eating Hints book, encouraged to start  Provided information on Medtrition Banatrol Plus which may be helpful to add to regime for diarrhea      Nutrition Monitoring and Evaluation   Food/Nutrient Related History Monitoring  Monitoring and Evaluation Plan: Energy intake, Meal/snack pattern, Protein intake, Fluid intake  Energy Intake: Estimated energy intake  Criteria: Meet >75% estimated energy needs  Fluid Intake: Estimated fluid intake  Criteria: maintain hydration  Meal/Snack Pattern: Estimated meal and snack pattern  Criteria: 4-6 meals/snacks daily  Estimated protein intake: Estimated protein intake  Criteria: include high protein foods with meals and/or snacks 75% of meals  Body Composition/Growth/Weight History  Monitoring and Evaluation Plan:  Weight  Weight: Measured weight  Criteria: maintain weight    Following through treatment  Contact information provided     Time Spent  Prep time on day of patient encounter: 5 minutes  Time spent directly with patient, family or caregiver: 20 minutes  Additional Time Spent on Patient Care Activities: 0 minutes  Documentation Time: 20 minutes  Other Time Spent: 0 minutes  Total: 45 minutes          Readiness to Change : Good  Level of Understanding : Good  Anticipated Compliant : Good

## 2024-07-10 ENCOUNTER — HOSPITAL ENCOUNTER (OUTPATIENT)
Dept: RADIATION ONCOLOGY | Facility: CLINIC | Age: 79
Setting detail: RADIATION/ONCOLOGY SERIES
Discharge: HOME | End: 2024-07-10
Payer: MEDICARE

## 2024-07-10 ENCOUNTER — TELEMEDICINE (OUTPATIENT)
Dept: HEMATOLOGY/ONCOLOGY | Facility: HOSPITAL | Age: 79
End: 2024-07-10
Payer: MEDICARE

## 2024-07-10 DIAGNOSIS — C32.0 SQUAMOUS CELL CARCINOMA OF LEFT VOCAL CORD (MULTI): Primary | ICD-10-CM

## 2024-07-10 DIAGNOSIS — Z51.0 ENCOUNTER FOR ANTINEOPLASTIC RADIATION THERAPY: ICD-10-CM

## 2024-07-10 DIAGNOSIS — K52.9 CHRONIC DIARRHEA: ICD-10-CM

## 2024-07-10 DIAGNOSIS — C32.0 MALIGNANT NEOPLASM OF GLOTTIS (MULTI): ICD-10-CM

## 2024-07-10 LAB
RAD ONC MSQ ACTUAL FRACTIONS DELIVERED: 4
RAD ONC MSQ ACTUAL SESSION DELIVERED DOSE: 200 CGRAY
RAD ONC MSQ ACTUAL TOTAL DOSE: 800 CGRAY
RAD ONC MSQ ELAPSED DAYS: 7
RAD ONC MSQ LAST DATE: NORMAL
RAD ONC MSQ PRESCRIBED FRACTIONAL DOSE: 200 CGRAY
RAD ONC MSQ PRESCRIBED NUMBER OF FRACTIONS: 35
RAD ONC MSQ PRESCRIBED TECHNIQUE: NORMAL
RAD ONC MSQ PRESCRIBED TOTAL DOSE: 7000 CGRAY
RAD ONC MSQ PRESCRIPTION PATTERN COMMENT: NORMAL
RAD ONC MSQ START DATE: NORMAL
RAD ONC MSQ TREATMENT COURSE NUMBER: 2
RAD ONC MSQ TREATMENT SITE: NORMAL

## 2024-07-10 PROCEDURE — 77386 HC INTENSITY-MODULATED RADIATION THERAPY (IMRT), COMPLEX: CPT | Performed by: STUDENT IN AN ORGANIZED HEALTH CARE EDUCATION/TRAINING PROGRAM

## 2024-07-10 PROCEDURE — 99215 OFFICE O/P EST HI 40 MIN: CPT | Performed by: INTERNAL MEDICINE

## 2024-07-10 RX ORDER — DIPHENHYDRAMINE HYDROCHLORIDE 50 MG/ML
50 INJECTION INTRAMUSCULAR; INTRAVENOUS AS NEEDED
OUTPATIENT
Start: 2024-07-16

## 2024-07-10 RX ORDER — PROCHLORPERAZINE MALEATE 10 MG
10 TABLET ORAL EVERY 6 HOURS PRN
OUTPATIENT
Start: 2024-07-16

## 2024-07-10 RX ORDER — PROCHLORPERAZINE EDISYLATE 5 MG/ML
10 INJECTION INTRAMUSCULAR; INTRAVENOUS EVERY 6 HOURS PRN
OUTPATIENT
Start: 2024-07-16

## 2024-07-10 RX ORDER — MAGNESIUM SULFATE HEPTAHYDRATE 40 MG/ML
2 INJECTION, SOLUTION INTRAVENOUS ONCE AS NEEDED
OUTPATIENT
Start: 2024-07-16

## 2024-07-10 RX ORDER — ALBUTEROL SULFATE 0.83 MG/ML
3 SOLUTION RESPIRATORY (INHALATION) AS NEEDED
OUTPATIENT
Start: 2024-07-16

## 2024-07-10 RX ORDER — EPINEPHRINE 0.3 MG/.3ML
0.3 INJECTION SUBCUTANEOUS EVERY 5 MIN PRN
OUTPATIENT
Start: 2024-07-16

## 2024-07-10 RX ORDER — MAGNESIUM SULFATE HEPTAHYDRATE 40 MG/ML
4 INJECTION, SOLUTION INTRAVENOUS ONCE AS NEEDED
OUTPATIENT
Start: 2024-07-16

## 2024-07-10 RX ORDER — FAMOTIDINE 10 MG/ML
20 INJECTION INTRAVENOUS ONCE AS NEEDED
OUTPATIENT
Start: 2024-07-16

## 2024-07-10 ASSESSMENT — ENCOUNTER SYMPTOMS
GASTROINTESTINAL NEGATIVE: 1
CARDIOVASCULAR NEGATIVE: 1
FATIGUE: 1
VOICE CHANGE: 1
DIARRHEA: 0
NECK PAIN: 1
BACK PAIN: 1
NEUROLOGICAL NEGATIVE: 1
RESPIRATORY NEGATIVE: 1

## 2024-07-10 NOTE — PROGRESS NOTES
Patient ID: Padilla Campuzano is a 78 y.o. female.  Diagnosis: Laryngeal cancer  Staging: T3N0M0  Date of Diagnosis: 5/22/24    Providers:  ENT Surgeon: Dr. Ferny Rhoades  [facility]:   Dr. Kyunghee Burkitt/Megan Arauz APRARNEL  [facility]: Dr. Sue Oliva    Ms. Campuzano is 79 y/o female with recent diagnosis of laryngeal cancer who is referred to  med onc  to discuss treatment plan.    -2/2024: pt presented with hoarse voice  -3/21/24: CT chest showed left pulmonary nodule  -4/3/24: PET/CT showed uptake in left vocal cord extending  to the right with limited subglottic extension  -4/23/24: biopsy of LLL nodule showed negative for malignancy  -5/7/24: seen by thoracic surgeon Dr. Linda, plan is to follow up image in 3 months  -5/20/24: s/p direct laryngoscopy. She was found to have transglottic lesion extending from primarily the left glottis into the subglottis and wrapping around anteriorly. 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)  No date: Breast cancer (Multi)  2012: Cerebral hemorrhage (Multi)  No date: CKD (chronic kidney disease)  No date: Colon cancer (Multi)  No date: Colorectal cancer (Multi)  No date: Diverticulosis of intestine, part unspecified, without   perforation or abscess without bleeding      Comment:  Diverticulosis  No date: GERD (gastroesophageal reflux disease)  No date: Hypertension  No date: Irritable bowel syndrome  No date: Kidney disease  No date: Malignant neoplasm of colon, unspecified (Multi)      Comment:  Colon cancer  No date: Personal history of colonic polyps      Comment:  History of colonic polyps  No date: Personal history of irradiation  No date: Personal history of malignant neoplasm of breast      Comment:  History of malignant neoplasm of female breast  No date: Personal history of other diseases of the respiratory system      Comment:  History of respiratory failure  No date: Personal history of other malignant  neoplasm of skin      Comment:  Personal history of malignant neoplasm of skin  2016: Right upper quadrant pain      Comment:  Abdominal pain, RUQ (right upper quadrant)  2012: Stroke (Multi)  2016: Unspecified symptoms and signs involving the   genitourinary system      Comment:  Urinary symptom or sign   Surgical History:    Past Surgical History:   Procedure Laterality Date    BI BREAST RIGHT CYST ASPIRATION Right 2019    BI BREAST CYST ASPIRATION RIGHT [facility]    BREAST LUMPECTOMY  2014    Breast Surgery Lumpectomy    CHOLECYSTECTOMY      CT GUIDED IMAGING FOR ABSCESS DRAIN  2015    CT GUIDED IMAGING FOR ABSCESS DRAIN [facility] LEGAC[facility]    HYSTERECTOMY  1984    Hysterectomy    ILEOSTOMY  2015    Ileostomy    ILEOSTOMY CLOSURE  2015    Ileostomy Closure    OTHER SURGICAL HISTORY      Cerebral artery coiling      Family History:    Family History   Problem Relation Name Age of Onset    Colon cancer Mother      Liver cancer Mother      Kidney cancer Mother      Heart attack Father      Blood clot Father      Cancer Brother      Cancer Brother       Family Oncology History:    Cancer-related family history includes Cancer in her brother and brother; Colon cancer in her mother; Kidney cancer in her mother; Liver cancer in her mother.  Social History:    Social History     Tobacco Use    Smoking status: Former     Current packs/day: 0.00     Types: Cigarettes     Quit date:      Years since quittin.5     Passive exposure: Past    Smokeless tobacco: Never   Vaping Use    Vaping status: Never Used   Substance Use Topics    Alcohol use: Never    Drug use: Never        Subjective   Chief Complaint: Laryngeal cancer    HPI    Interval History  Patient is on the phone to discuss about treatment changes.  Pt started to have abdominal pain after C1 last chemo, and yesterday  she was having worsening abodominal pain from paclitaxel and refused to receive  further chemo.  She reported having similar side effects when she was treated for anal cancer. She is feeling little better today.  Denies fever, chills, nausea, vomiting,  diarrhea and constipation.    ROS  Review of Systems   Constitutional:  Positive for fatigue.   HENT:   Positive for voice change.    Respiratory: Negative.     Cardiovascular: Negative.    Gastrointestinal: Negative.  Negative for diarrhea.   Genitourinary: Negative.     Musculoskeletal:  Positive for back pain and neck pain.   Skin: Negative.    Neurological: Negative.        Allergies  Allergies   Allergen Reactions    Hydrocodone-Acetaminophen Dizziness    Oxycodone Hcl Unknown    Oxycodone-Acetaminophen Dizziness    Aspirin GI Upset and Unknown    Sulfa (Sulfonamide Antibiotics) Rash        Medications  Current Outpatient Medications   Medication Instructions    acetaminophen (TYLENOL) 1,000 mg, oral, Every 6 hours PRN    biotin 5 mg capsule Every 24 hours    cholecalciferol (Vitamin D-3) 50 mcg (2,000 unit) capsule 1 capsule, Every 24 hours    cholestyramine (Questran) 4 gram powder once daily.    lipase-protease-amylase (Creon) 24,000-76,000 -120,000 unit capsule TAKE 3 CAPSULES BY MOUTH 3 TIMES DAILY WITH MEALS AND 1 TO 2  CAPSULES BY MOUTH WITH SNACKS.  MAX 13 CAPSULE DAILY    losartan (COZAAR) 25 mg, oral, Daily    magnesium oxide (MAG-OX) 400 mg, oral, Daily    ondansetron (ZOFRAN) 8 mg, oral, Every 8 hours PRN    pantoprazole (ProtoNix) 40 mg EC tablet TAKE 1 TABLET BY MOUTH TWICE  DAILY    prochlorperazine (COMPAZINE) 10 mg, oral, Every 6 hours PRN          Objective   VS: There were no vitals taken for this visit.  Weight: Daily Weight  07/09/24 : 65.1 kg (143 lb 6.6 oz)  07/08/24 : 64.4 kg (141 lb 13.9 oz)  07/05/24 : 65.1 kg (143 lb 6.9 oz)  07/02/24 : 65 kg (143 lb 4.8 oz)  07/02/24 : 65 kg (143 lb 4.8 oz)  07/08/24 : 64.4 kg (142 lb)  07/01/24 : 64.8 kg (142 lb 13.7 oz)      Physical Exam  Constitutional:       Appearance:  Normal appearance.   HENT:      Head: Normocephalic and atraumatic.      Mouth/Throat:      Mouth: Mucous membranes are moist.   Eyes:      Extraocular Movements: Extraocular movements intact.      Pupils: Pupils are equal, round, and reactive to light.   Cardiovascular:      Rate and Rhythm: Normal rate and regular rhythm.      Pulses: Normal pulses.      Heart sounds: Normal heart sounds.   Pulmonary:      Effort: Pulmonary effort is normal.      Breath sounds: Normal breath sounds.   Abdominal:      General: Bowel sounds are normal.      Palpations: Abdomen is soft.   Musculoskeletal:         General: Normal range of motion.      Cervical back: Normal range of motion and neck supple.   Skin:     General: Skin is warm and dry.   Neurological:      General: No focal deficit present.      Mental Status: She is alert and oriented to person, place, and time.   Psychiatric:         Mood and Affect: Mood normal.         Behavior: Behavior normal.       Diagnostic Results   Labs    Results from last 7 days   Lab Units 07/08/24  1258   WBC AUTO x10*3/uL 6.2   HEMOGLOBIN g/dL 11.7*   HEMATOCRIT % 36.3   PLATELETS AUTO x10*3/uL 276   NEUTROS ABS x10*3/uL 3.50   LYMPHS ABS AUTO x10*3/uL 2.22   MONOS ABS AUTO x10*3/uL 0.30   EOS ABS AUTO x10*3/uL 0.08   NEUTROS PCT AUTO % 56.7   LYMPHS PCT AUTO % 36.0   MONOS PCT AUTO % 4.9   EOS PCT AUTO % 1.3      Results from last 7 days   Lab Units 07/08/24  1258   GLUCOSE mg/dL 96   SODIUM mmol/L 140   POTASSIUM mmol/L 4.5   CHLORIDE mmol/L 107   CO2 mmol/L 23   BUN mg/dL 23   CREATININE mg/dL 1.17*   EGFR mL/min/1.73m*2 48*   CALCIUM mg/dL 8.8   MAGNESIUM mg/dL 1.71   ALBUMIN g/dL 4.1   PROTEIN TOTAL g/dL 6.7   BILIRUBIN TOTAL mg/dL 0.4   ALK PHOS U/L 60   ALT U/L 6*   AST U/L 15                 Images  MRI Cervical and Thoracic Spine 7/8/24:    Cervical, Thoracic Spine: Diffuse soft tissue thickening and enhancement of the glottic larynx extending into the subglottic larynx and  involving the left cricoid, arytenoid, and thyroid cartilages, consistent with known tumor.  No evidence of metastatic disease within the cervical or thoracic  spine.    Pathology  Lab Results   Component Value Date    PATHREP  12/06/2021                                                     MRN: [id number]  Patient Name RONALD LENNON  Accession #: [id number]  Date of Procedure:  12/6/2021       Date Reported: 12/9/2021  Date Received:  12/7/2021  Date of Birth / Sex 1945 (Age: 76) / F  Race: WHITE  Submitting Physician: ROD VALLE MD    Other External #          FINAL CYTOLOGICAL INTERPRETATION    A.   FINE NEEDLE ASPIRATION BREAST - RIGHT, CYTOLOGY AND CELL BLOCK:  --NO MALIGNANT CELLS IDENTIFIED.  --SPECIMEN CONSISTS OF PROTEINACEOUS DEBRIS, OCCASIONAL FOAMY MACROPHAGES, FEW  INFLAMMATORY CELLS AND RARE EPITHELIAL GROUP.        Slide(s) initially screened by a Cytotechnologist at [place] [address]      Electronically Signed Out By GABINO GARCES MD    By the signature on this report, the individual or group listed as making the  Final Interpretation/Diagnosis certifies that they have reviewed this case.  Slide(s) initially screened by a Cytotechnologist at [facility]     Clinical History      Clinical Diagnosis History: Cyst of right breast - (N60.01)   Source of Specimen  A:  FINE NEEDLE ASPIRATION BREAST - RIGHT     Specimen Submitted as:  A:   FINE NEEDLE ASPIRATION BREAST - RIGHT        Pap non-gyn ThinPrep slide, CELL BLOCK, H&E, Initial    Gross Description  A.   FINE NEEDLE ASPIRATION BREAST - RIGHT:  35cc MILKY YELLOW NEEDLE RINSE IN  CYTOLYT                 [facility]  Department of Pathology  [address] [place] [address]        PATHREP  09/09/2019     Name RONALD LENNON.                                                                                                  "  Accession #: [id number]            Pathologist:                   YUE FIGUEROA MD  Date of Procedure:    9/9/2019  Date Received:          9/9/2019  Date Reported           9/13/2019  Submitting Physician:   SALIMA WHEELER MD  [facility]  Other External #                                                                    FINAL DIAGNOSIS  A.  BIOPSY OF ANAL CANAL:  MINUTE FRAGMENTS OF SOFT TISSUE WITH NO SIGNIFICANT  PATHOLOGICAL FINDINGS, SEE NOTE.    Note: Deeper sections were obtained.                                                                                                                                                                                                                                                                                                                                                                                                                                                                                       Electronically Signed Out By YUE FIGUEROA MD/ANASTASIYA  By the signature on this report, the individual or group listed as making the  Final Interpretation/Diagnosis certifies that they have reviewed this case.           Clinical History:  Colorectal cancer, squamous cell, status post chemo radiation    Specimens Submitted As:  A: BIOPSY OF ANAL CANAL     Gross Description:  Received in formalin, labeled with the patient's name and hospital number and  \"anal canal\", are multiple fragments of tan, soft tissue aggregating to 1.7 x  1.0 x 0.1 cm. The specimen is submitted in toto in one cassette.  CJN    cjn/9/9/2019               [facility]  Department of Pathology   [address] [place] [id number]        COMDX  05/20/2024     Case reviewed at ENT consensus conference via [employer] on 5/22/2024.       Assessment/Plan   Ms. Campuzano is 79 y/o female with recent diagnosis of laryngeal cancer who is " referred to  med onc  to discuss treatment plan.    # Laryngeal cancer (T3N0M0)  -Based on PET/CT from 4/3/24, there was uptake in LLL nodule, however, biopsy of LLL nodule showed negative for malignancy  -seen by Thoracic surgeon, plan to have repeat CT chest without contrast in 3 month  -since her GFR is 40 (no hearing deficit), she is not a candidate for cisplatin, will treat her with weekly carboplatin and paclitaxel, reviewed chemo related side effects with patient in detail.  Consent obtained.  -pt developed abdominal pain post C1, during C2 infusion of paclitaxel,  she experienced worsening of abdominal pain, pt wanted to stop chemo on 7/9 and also didn't want further chemotherapy.  -7/10/24: discussed with aptient about changing treatment to cetuximab. Expalined to patient about main side effects (skin rash and hypomagnesemia), she is good with the plan.  I couldn't place consent due to epic issue, I will place it later.    # Hx of anal, breast and cutaneous malignancies  -s/p lumpectomy with radiation in 2012  -s/p radiation to anal cancer in 2018  -s/p resection of skin cancer on nose in 2010    # Chronic diarrhea   - from previous colonic surgery  -3-4 episodes per day, while on 3 imodium three times a day, 3-4 episodes of diarrhea daily.  - Concern for flares with chemotherapy tx's  - Tolerated C1  - Currently managed with Creon and Questran - stools forms, soft  - ongoing monitoring    # Back pain, area between her shoulder blades, more so on the left.  Has worsened over the last few weeks.  Pain with activity.  Resolves with rest.  No acute injury.  No falls.    - Will order MRI C & T (+/-) next available.  MRI's scheduled 7/8/24.    - 7/8:  Cervical, Thoracic Spine: Diffuse soft tissue thickening and enhancement of the glottic larynx extending into the subglottic larynx and involving the left cricoid, arytenoid, and thyroid cartilages, consistent with known tumor.  No evidence of metastatic disease  within the cervical or thoracic  spine.   - Currently managed with routine Tylenol 1,000mg/daily  - Supportive onc referral placed.  Pt to return sup onc call.    # Hypomagnesia: 7/2/24 Mg 1.41  - Will start oral Mag Oxide - 1 tab/day, Rx sent (not covered by pt's insurance)  - Will replete with IV hydration visit 7/5/24.  - If consistently low, will replete weekly via IV   - 7/8/24 Mg 1.71.      # Mediport placement, per pt request  - IR consult order in place  - INR lab completed.  - 7/8:  Encouraged pt to return [facility] [place], operated by [facility] call to schedule placement.      # Elevated Cr/BUN 1/24/24  - at/near baseline  - 1.17 7/8/24  - ongoing monitoring      PLAN  - Will change treatment to Cetuximab starting 7/16  - IV hydration - each Friday

## 2024-07-11 ENCOUNTER — RADIATION ONCOLOGY OTV (OUTPATIENT)
Dept: RADIATION ONCOLOGY | Facility: CLINIC | Age: 79
End: 2024-07-11
Payer: MEDICARE

## 2024-07-11 ENCOUNTER — HOSPITAL ENCOUNTER (OUTPATIENT)
Dept: RADIATION ONCOLOGY | Facility: CLINIC | Age: 79
Setting detail: RADIATION/ONCOLOGY SERIES
Discharge: HOME | End: 2024-07-11
Payer: MEDICARE

## 2024-07-11 VITALS
TEMPERATURE: 96.1 F | RESPIRATION RATE: 18 BRPM | BODY MASS INDEX: 25.79 KG/M2 | OXYGEN SATURATION: 98 % | SYSTOLIC BLOOD PRESSURE: 132 MMHG | WEIGHT: 140.98 LBS | DIASTOLIC BLOOD PRESSURE: 64 MMHG | HEART RATE: 87 BPM

## 2024-07-11 DIAGNOSIS — C32.0 MALIGNANT NEOPLASM OF GLOTTIS (MULTI): ICD-10-CM

## 2024-07-11 DIAGNOSIS — Z51.0 ENCOUNTER FOR ANTINEOPLASTIC RADIATION THERAPY: ICD-10-CM

## 2024-07-11 LAB
RAD ONC MSQ ACTUAL FRACTIONS DELIVERED: 5
RAD ONC MSQ ACTUAL SESSION DELIVERED DOSE: 200 CGRAY
RAD ONC MSQ ACTUAL TOTAL DOSE: 1000 CGRAY
RAD ONC MSQ ELAPSED DAYS: 8
RAD ONC MSQ LAST DATE: NORMAL
RAD ONC MSQ PRESCRIBED FRACTIONAL DOSE: 200 CGRAY
RAD ONC MSQ PRESCRIBED NUMBER OF FRACTIONS: 35
RAD ONC MSQ PRESCRIBED TECHNIQUE: NORMAL
RAD ONC MSQ PRESCRIBED TOTAL DOSE: 7000 CGRAY
RAD ONC MSQ PRESCRIPTION PATTERN COMMENT: NORMAL
RAD ONC MSQ START DATE: NORMAL
RAD ONC MSQ TREATMENT COURSE NUMBER: 2
RAD ONC MSQ TREATMENT SITE: NORMAL

## 2024-07-11 PROCEDURE — 77386 HC INTENSITY-MODULATED RADIATION THERAPY (IMRT), COMPLEX: CPT | Performed by: STUDENT IN AN ORGANIZED HEALTH CARE EDUCATION/TRAINING PROGRAM

## 2024-07-11 RX ORDER — PANTOPRAZOLE SODIUM 40 MG/1
TABLET, DELAYED RELEASE ORAL
Qty: 60 TABLET | Refills: 0 | Status: SHIPPED | OUTPATIENT
Start: 2024-07-11 | End: 2024-07-14

## 2024-07-11 ASSESSMENT — PAIN SCALES - GENERAL: PAINLEVEL: 0-NO PAIN

## 2024-07-11 NOTE — PROGRESS NOTES
Radiation Oncology On Treatment Visit    Patient Name:  Padilla Campuzano  MRN:  [id number]  :  1945    Referring Provider: No ref. provider found  Primary Care Provider: Naheed Clements MD  Care Team: Patient Care Team:  Naheed Clements MD as PCP - General (Internal Medicine)  Naheed Clements MD as PCP - [facility] Attributed Provider  DO Carroll Calabrese MD as Consulting Physician (Hematology and Oncology)  Kyunghee Burkitt, DO as Consulting Physician (Hematology and Oncology)  Eden Moss MD as Medical Oncologist (Hematology and Oncology)    Date of Service: 2024     Diagnosis:   Specialty Problems          Radiation Oncology Problems    Breast cancer (Multi)        Malignant neoplasm of colon (Multi)        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 Significant abdominal pain after first cycle of carbo/Taxol, planned to be changed to cetuximab. Otherwise denies any pain or discomfort.      OBJECTIVE:   Vital Signs:  /64 (BP Location: Left arm, Patient Position: Sitting, BP Cuff Size: Adult)   Pulse 87   Temp 35.6 °C (96.1 °F) (Temporal)   Resp 18   Wt 64 kg (140 lb 15.8 oz)   SpO2 98%   BMI 25.79 kg/m²    Pain Scale: The patient's current pain level was assessed.  They report currently having a pain of 0 out of 10.    Other Pertinent Findings:   Wt Readings from Last 10 Encounters:   24 64 kg (140 lb 15.8 oz)   24 65.1 kg (143 lb 6.6 oz)   24 64.4 kg (141 lb 13.9 oz)   24 65.1 kg (143 lb 6.9 oz)   24 65 kg (143 lb 4.8 oz)   24 65 kg (143 lb 4.8 oz)   24 64.4 kg (142 lb)   24  64.8 kg (142 lb 13.7 oz)   06/11/24 65.5 kg (144 lb 8 oz)   06/10/24 65.7 kg (144 lb 13.1 oz)       Toxicity Assessment          7/11/2024    13:38   Toxicity Assessment   Treatment  and neck   Anorexia Grade 0   Anxiety Grade 0   Dehydration Grade 0   Depression Grade 1   Dermatitis Radiation Grade 0   Diarrhea Grade 1       last night   Fatigue Grade 1   Fibrosis Deep Connective Tissue Grade 0   Fracture Grade 0   Nausea Grade 0   Pain Grade 0   Treatment Related Secondary Malignancy Grade 0   Tumor Pain Grade 0   Vomiting Grade 0   Dysphagia Grade 0   Esophagitis Grade 0   Mucositis Oral Grade 0       instructed patient to start salt and soda rinses and Blis losenges   Hearing Impaired Grade 0   Blurred Vision Grade 0   Dry Eye Grade 1   Eye Pain Grade 0   Retinopathy Grade 0   Central Nervous System Necrosis Grade 0   Edema Cerebral Grade 0   Dry Mouth Grade 0   Pneumonitis Grade 0   Febrile Neutropenia Grade 0   Ear Pain Grade 0   External Ear Pain Grade 0   Tinnitus Grade 0   Watering Eyes Grade 0   Oral Pain Grade 0   Salivary Duct Inflammation Grade 0   Edema Face Grade 0   Malaise Grade 0   Neck Edema Grade 0   Corneal Infection Grade 0   Mucosal Infection Grade 0   Otitis Media Grade 0   Sepsis Grade 0   Sinusitis Grade 0   Skin Infection Grade 0   Soft Tissue Infection Grade 0   Head Soft Tissue Necrosis Grade 0   Neck Soft Tissue Necrosis Grade 0   Osteonecrosis of Jaw Grade 0   Superficial Soft Tissue Fibrosis Grade 0   Trismus Grade 0   Dysarthria Grade 0   Dysesthesia Grade 0   Dysgeusia Grade 0   Facial Nerve Disorder Grade 0   Hypoglossal Nerve Disorder Grade 0   Oculomotor Nerve Disorder Grade 0   Paresthesia Grade 0   Stroke Grade 0   Transient Ischemic Attacks Grade 0   Trigeminal Nerve Disorder Grade 0   Aspiration Grade 0   Hoarseness Grade 1   Laryngeal Edema Grade 0   Stridor Grade 0   Tracheal Mucositis Grade 0   Voice Alteration Grade 1        Assessment / Plan:  The patient is  tolerating radiation therapy as anticipated.  Continue per current treatment plan.

## 2024-07-12 ENCOUNTER — HOSPITAL ENCOUNTER (OUTPATIENT)
Dept: RADIATION ONCOLOGY | Facility: CLINIC | Age: 79
Setting detail: RADIATION/ONCOLOGY SERIES
Discharge: HOME | End: 2024-07-12
Payer: MEDICARE

## 2024-07-12 ENCOUNTER — APPOINTMENT (OUTPATIENT)
Dept: HEMATOLOGY/ONCOLOGY | Facility: CLINIC | Age: 79
End: 2024-07-12
Payer: MEDICARE

## 2024-07-12 ENCOUNTER — INFUSION (OUTPATIENT)
Dept: HEMATOLOGY/ONCOLOGY | Facility: CLINIC | Age: 79
End: 2024-07-12
Payer: MEDICARE

## 2024-07-12 VITALS
TEMPERATURE: 96.8 F | BODY MASS INDEX: 25.81 KG/M2 | RESPIRATION RATE: 18 BRPM | DIASTOLIC BLOOD PRESSURE: 79 MMHG | SYSTOLIC BLOOD PRESSURE: 159 MMHG | WEIGHT: 141.09 LBS | OXYGEN SATURATION: 98 %

## 2024-07-12 DIAGNOSIS — K52.9 CHRONIC DIARRHEA: ICD-10-CM

## 2024-07-12 DIAGNOSIS — Z51.0 ENCOUNTER FOR ANTINEOPLASTIC RADIATION THERAPY: ICD-10-CM

## 2024-07-12 DIAGNOSIS — C32.0 MALIGNANT NEOPLASM OF GLOTTIS (MULTI): ICD-10-CM

## 2024-07-12 DIAGNOSIS — C32.0 SQUAMOUS CELL CARCINOMA OF LEFT VOCAL CORD (MULTI): ICD-10-CM

## 2024-07-12 LAB
RAD ONC MSQ ACTUAL FRACTIONS DELIVERED: 6
RAD ONC MSQ ACTUAL SESSION DELIVERED DOSE: 200 CGRAY
RAD ONC MSQ ACTUAL TOTAL DOSE: 1200 CGRAY
RAD ONC MSQ ELAPSED DAYS: 9
RAD ONC MSQ LAST DATE: NORMAL
RAD ONC MSQ PRESCRIBED FRACTIONAL DOSE: 200 CGRAY
RAD ONC MSQ PRESCRIBED NUMBER OF FRACTIONS: 35
RAD ONC MSQ PRESCRIBED TECHNIQUE: NORMAL
RAD ONC MSQ PRESCRIBED TOTAL DOSE: 7000 CGRAY
RAD ONC MSQ PRESCRIPTION PATTERN COMMENT: NORMAL
RAD ONC MSQ START DATE: NORMAL
RAD ONC MSQ TREATMENT COURSE NUMBER: 2
RAD ONC MSQ TREATMENT SITE: NORMAL

## 2024-07-12 PROCEDURE — 2500000004 HC RX 250 GENERAL PHARMACY W/ HCPCS (ALT 636 FOR OP/ED): Performed by: NURSE PRACTITIONER

## 2024-07-12 PROCEDURE — 96360 HYDRATION IV INFUSION INIT: CPT | Mod: INF

## 2024-07-12 PROCEDURE — 77386 HC INTENSITY-MODULATED RADIATION THERAPY (IMRT), COMPLEX: CPT | Performed by: STUDENT IN AN ORGANIZED HEALTH CARE EDUCATION/TRAINING PROGRAM

## 2024-07-12 ASSESSMENT — PAIN SCALES - GENERAL: PAINLEVEL: 0-NO PAIN

## 2024-07-14 RX ORDER — PANTOPRAZOLE SODIUM 40 MG/1
TABLET, DELAYED RELEASE ORAL
Qty: 180 TABLET | Refills: 0 | Status: SHIPPED | OUTPATIENT
Start: 2024-07-14

## 2024-07-15 ENCOUNTER — HOSPITAL ENCOUNTER (OUTPATIENT)
Dept: CARDIOLOGY | Facility: HOSPITAL | Age: 79
Discharge: HOME | End: 2024-07-15
Payer: MEDICARE

## 2024-07-15 ENCOUNTER — HOSPITAL ENCOUNTER (OUTPATIENT)
Dept: RADIATION ONCOLOGY | Facility: CLINIC | Age: 79
Setting detail: RADIATION/ONCOLOGY SERIES
Discharge: HOME | End: 2024-07-15
Payer: MEDICARE

## 2024-07-15 ENCOUNTER — TELEMEDICINE (OUTPATIENT)
Dept: HEMATOLOGY/ONCOLOGY | Facility: CLINIC | Age: 79
End: 2024-07-15
Payer: MEDICARE

## 2024-07-15 ENCOUNTER — LAB (OUTPATIENT)
Dept: LAB | Facility: CLINIC | Age: 79
End: 2024-07-15
Payer: MEDICARE

## 2024-07-15 VITALS
TEMPERATURE: 96.8 F | OXYGEN SATURATION: 96 % | HEART RATE: 78 BPM | DIASTOLIC BLOOD PRESSURE: 92 MMHG | RESPIRATION RATE: 12 BRPM | SYSTOLIC BLOOD PRESSURE: 124 MMHG

## 2024-07-15 DIAGNOSIS — E83.42 HYPOMAGNESEMIA: ICD-10-CM

## 2024-07-15 DIAGNOSIS — C32.0 MALIGNANT NEOPLASM OF GLOTTIS (MULTI): ICD-10-CM

## 2024-07-15 DIAGNOSIS — Z51.11 ENCOUNTER FOR ANTINEOPLASTIC CHEMOTHERAPY: ICD-10-CM

## 2024-07-15 DIAGNOSIS — C32.0 SQUAMOUS CELL CARCINOMA OF LEFT VOCAL CORD (MULTI): ICD-10-CM

## 2024-07-15 DIAGNOSIS — Z51.0 ENCOUNTER FOR ANTINEOPLASTIC RADIATION THERAPY: ICD-10-CM

## 2024-07-15 DIAGNOSIS — C32.0 SQUAMOUS CELL CARCINOMA OF LEFT VOCAL CORD (MULTI): Primary | ICD-10-CM

## 2024-07-15 LAB
ALBUMIN SERPL BCP-MCNC: 3.9 G/DL (ref 3.4–5)
ALP SERPL-CCNC: 52 U/L (ref 33–136)
ALT SERPL W P-5'-P-CCNC: 9 U/L (ref 7–45)
ANION GAP SERPL CALC-SCNC: 14 MMOL/L (ref 10–20)
AST SERPL W P-5'-P-CCNC: 19 U/L (ref 9–39)
BASOPHILS # BLD AUTO: 0.01 X10*3/UL (ref 0–0.1)
BASOPHILS NFR BLD AUTO: 0.2 %
BILIRUB SERPL-MCNC: 0.3 MG/DL (ref 0–1.2)
BUN SERPL-MCNC: 25 MG/DL (ref 6–23)
CALCIUM SERPL-MCNC: 8.4 MG/DL (ref 8.6–10.3)
CHLORIDE SERPL-SCNC: 109 MMOL/L (ref 98–107)
CO2 SERPL-SCNC: 22 MMOL/L (ref 21–32)
CREAT SERPL-MCNC: 1.43 MG/DL (ref 0.5–1.05)
EGFRCR SERPLBLD CKD-EPI 2021: 38 ML/MIN/1.73M*2
EOSINOPHIL # BLD AUTO: 0.04 X10*3/UL (ref 0–0.4)
EOSINOPHIL NFR BLD AUTO: 0.9 %
ERYTHROCYTE [DISTWIDTH] IN BLOOD BY AUTOMATED COUNT: 13.2 % (ref 11.5–14.5)
GLUCOSE SERPL-MCNC: 112 MG/DL (ref 74–99)
HCT VFR BLD AUTO: 32.8 % (ref 36–46)
HGB BLD-MCNC: 10.8 G/DL (ref 12–16)
IMM GRANULOCYTES # BLD AUTO: 0.03 X10*3/UL (ref 0–0.5)
IMM GRANULOCYTES NFR BLD AUTO: 0.6 % (ref 0–0.9)
LYMPHOCYTES # BLD AUTO: 1.21 X10*3/UL (ref 0.8–3)
LYMPHOCYTES NFR BLD AUTO: 26 %
MAGNESIUM SERPL-MCNC: 1.31 MG/DL (ref 1.6–2.4)
MCH RBC QN AUTO: 32 PG (ref 26–34)
MCHC RBC AUTO-ENTMCNC: 32.9 G/DL (ref 32–36)
MCV RBC AUTO: 97 FL (ref 80–100)
MONOCYTES # BLD AUTO: 0.29 X10*3/UL (ref 0.05–0.8)
MONOCYTES NFR BLD AUTO: 6.2 %
NEUTROPHILS # BLD AUTO: 3.08 X10*3/UL (ref 1.6–5.5)
NEUTROPHILS NFR BLD AUTO: 66.1 %
NRBC BLD-RTO: 0 /100 WBCS (ref 0–0)
PLATELET # BLD AUTO: 235 X10*3/UL (ref 150–450)
POTASSIUM SERPL-SCNC: 3.8 MMOL/L (ref 3.5–5.3)
PROT SERPL-MCNC: 6.2 G/DL (ref 6.4–8.2)
RAD ONC MSQ ACTUAL FRACTIONS DELIVERED: 7
RAD ONC MSQ ACTUAL SESSION DELIVERED DOSE: 200 CGRAY
RAD ONC MSQ ACTUAL TOTAL DOSE: 1400 CGRAY
RAD ONC MSQ ELAPSED DAYS: 12
RAD ONC MSQ LAST DATE: NORMAL
RAD ONC MSQ PRESCRIBED FRACTIONAL DOSE: 200 CGRAY
RAD ONC MSQ PRESCRIBED NUMBER OF FRACTIONS: 35
RAD ONC MSQ PRESCRIBED TECHNIQUE: NORMAL
RAD ONC MSQ PRESCRIBED TOTAL DOSE: 7000 CGRAY
RAD ONC MSQ PRESCRIPTION PATTERN COMMENT: NORMAL
RAD ONC MSQ START DATE: NORMAL
RAD ONC MSQ TREATMENT COURSE NUMBER: 2
RAD ONC MSQ TREATMENT SITE: NORMAL
RBC # BLD AUTO: 3.37 X10*6/UL (ref 4–5.2)
SODIUM SERPL-SCNC: 141 MMOL/L (ref 136–145)
WBC # BLD AUTO: 4.7 X10*3/UL (ref 4.4–11.3)

## 2024-07-15 PROCEDURE — 2720000007 HC OR 272 NO HCPCS

## 2024-07-15 PROCEDURE — 77386 HC INTENSITY-MODULATED RADIATION THERAPY (IMRT), COMPLEX: CPT | Performed by: STUDENT IN AN ORGANIZED HEALTH CARE EDUCATION/TRAINING PROGRAM

## 2024-07-15 PROCEDURE — 2500000005 HC RX 250 GENERAL PHARMACY W/O HCPCS: Performed by: RADIOLOGY

## 2024-07-15 PROCEDURE — 83735 ASSAY OF MAGNESIUM: CPT

## 2024-07-15 PROCEDURE — 77001 FLUOROGUIDE FOR VEIN DEVICE: CPT | Performed by: RADIOLOGY

## 2024-07-15 PROCEDURE — C1788 PORT, INDWELLING, IMP: HCPCS

## 2024-07-15 PROCEDURE — 99214 OFFICE O/P EST MOD 30 MIN: CPT | Performed by: NURSE PRACTITIONER

## 2024-07-15 PROCEDURE — C1894 INTRO/SHEATH, NON-LASER: HCPCS

## 2024-07-15 PROCEDURE — 77001 FLUOROGUIDE FOR VEIN DEVICE: CPT

## 2024-07-15 PROCEDURE — 99152 MOD SED SAME PHYS/QHP 5/>YRS: CPT

## 2024-07-15 PROCEDURE — 80053 COMPREHEN METABOLIC PANEL: CPT

## 2024-07-15 PROCEDURE — 76937 US GUIDE VASCULAR ACCESS: CPT

## 2024-07-15 PROCEDURE — 7100000009 HC PHASE TWO TIME - INITIAL BASE CHARGE

## 2024-07-15 PROCEDURE — 7100000010 HC PHASE TWO TIME - EACH INCREMENTAL 1 MINUTE

## 2024-07-15 PROCEDURE — 2780000003 HC OR 278 NO HCPCS

## 2024-07-15 PROCEDURE — 99152 MOD SED SAME PHYS/QHP 5/>YRS: CPT | Performed by: RADIOLOGY

## 2024-07-15 PROCEDURE — 36561 INSERT TUNNELED CV CATH: CPT

## 2024-07-15 PROCEDURE — 85025 COMPLETE CBC W/AUTO DIFF WBC: CPT

## 2024-07-15 PROCEDURE — 76937 US GUIDE VASCULAR ACCESS: CPT | Performed by: RADIOLOGY

## 2024-07-15 PROCEDURE — 36558 INSERT TUNNELED CV CATH: CPT | Performed by: RADIOLOGY

## 2024-07-15 PROCEDURE — 2500000004 HC RX 250 GENERAL PHARMACY W/ HCPCS (ALT 636 FOR OP/ED): Performed by: RADIOLOGY

## 2024-07-15 RX ORDER — MIDAZOLAM HYDROCHLORIDE 1 MG/ML
INJECTION, SOLUTION INTRAMUSCULAR; INTRAVENOUS AS NEEDED
Status: DISCONTINUED | OUTPATIENT
Start: 2024-07-15 | End: 2024-07-15 | Stop reason: HOSPADM

## 2024-07-15 RX ORDER — HEPARIN 100 UNIT/ML
SYRINGE INTRAVENOUS AS NEEDED
Status: DISCONTINUED | OUTPATIENT
Start: 2024-07-15 | End: 2024-07-15 | Stop reason: HOSPADM

## 2024-07-15 RX ORDER — CEFAZOLIN SODIUM 2 G/100ML
2 INJECTION, SOLUTION INTRAVENOUS ONCE
Status: DISCONTINUED | OUTPATIENT
Start: 2024-07-15 | End: 2024-07-16 | Stop reason: HOSPADM

## 2024-07-15 RX ORDER — FENTANYL CITRATE 50 UG/ML
INJECTION, SOLUTION INTRAMUSCULAR; INTRAVENOUS AS NEEDED
Status: DISCONTINUED | OUTPATIENT
Start: 2024-07-15 | End: 2024-07-15 | Stop reason: HOSPADM

## 2024-07-15 RX ORDER — LIDOCAINE HYDROCHLORIDE AND EPINEPHRINE 10; 10 MG/ML; UG/ML
INJECTION, SOLUTION INFILTRATION; PERINEURAL AS NEEDED
Status: DISCONTINUED | OUTPATIENT
Start: 2024-07-15 | End: 2024-07-15 | Stop reason: HOSPADM

## 2024-07-15 RX ORDER — HEPARIN SODIUM,PORCINE/PF 10 UNIT/ML
50 SYRINGE (ML) INTRAVENOUS AS NEEDED
Status: CANCELLED | OUTPATIENT
Start: 2024-07-15

## 2024-07-15 RX ORDER — PROCHLORPERAZINE MALEATE 10 MG
10 TABLET ORAL EVERY 6 HOURS PRN
Qty: 30 TABLET | Refills: 5 | Status: SHIPPED | OUTPATIENT
Start: 2024-07-15

## 2024-07-15 RX ORDER — HEPARIN 100 UNIT/ML
500 SYRINGE INTRAVENOUS AS NEEDED
Status: CANCELLED | OUTPATIENT
Start: 2024-07-15

## 2024-07-15 RX ORDER — LOPERAMIDE HYDROCHLORIDE 2 MG/1
CAPSULE ORAL
Qty: 100 CAPSULE | Refills: 2 | Status: SHIPPED | OUTPATIENT
Start: 2024-07-15

## 2024-07-15 RX ORDER — DOXYCYCLINE 100 MG/1
100 CAPSULE ORAL 2 TIMES DAILY
Qty: 56 CAPSULE | Refills: 3 | Status: SHIPPED | OUTPATIENT
Start: 2024-07-15

## 2024-07-15 RX ORDER — CLINDAMYCIN PHOSPHATE 10 UG/ML
LOTION TOPICAL
Qty: 60 ML | Refills: 3 | Status: SHIPPED | OUTPATIENT
Start: 2024-07-15

## 2024-07-15 RX ORDER — SODIUM CHLORIDE 9 MG/ML
100 INJECTION, SOLUTION INTRAVENOUS CONTINUOUS
Status: DISCONTINUED | OUTPATIENT
Start: 2024-07-15 | End: 2024-07-16 | Stop reason: HOSPADM

## 2024-07-15 RX ORDER — HYDROCORTISONE 1 %
CREAM (GRAM) TOPICAL
Qty: 453.6 G | Refills: 3 | Status: SHIPPED | OUTPATIENT
Start: 2024-07-15

## 2024-07-15 ASSESSMENT — PAIN - FUNCTIONAL ASSESSMENT: PAIN_FUNCTIONAL_ASSESSMENT: 0-10

## 2024-07-15 ASSESSMENT — ENCOUNTER SYMPTOMS
FATIGUE: 1
RESPIRATORY NEGATIVE: 1
NECK PAIN: 1
VOICE CHANGE: 0
GASTROINTESTINAL NEGATIVE: 1
NECK PAIN: 0
CARDIOVASCULAR NEGATIVE: 1
VOICE CHANGE: 1
NEUROLOGICAL NEGATIVE: 1
DIARRHEA: 0
BACK PAIN: 0
BACK PAIN: 1

## 2024-07-15 ASSESSMENT — COLUMBIA-SUICIDE SEVERITY RATING SCALE - C-SSRS
2. HAVE YOU ACTUALLY HAD ANY THOUGHTS OF KILLING YOURSELF?: NO
6. HAVE YOU EVER DONE ANYTHING, STARTED TO DO ANYTHING, OR PREPARED TO DO ANYTHING TO END YOUR LIFE?: NO
1. IN THE PAST MONTH, HAVE YOU WISHED YOU WERE DEAD OR WISHED YOU COULD GO TO SLEEP AND NOT WAKE UP?: NO

## 2024-07-15 ASSESSMENT — PAIN SCALES - GENERAL: PAINLEVEL_OUTOF10: 0 - NO PAIN

## 2024-07-15 NOTE — PROGRESS NOTES
Patient ID: Padilla Campuzano is a 78 y.o. female.  Diagnosis: Laryngeal cancer  Staging: T3N0M0  Date of Diagnosis: 5/22/24    Providers:  ENT Surgeon: Dr. Ferny Rhoades  Med[facility]:   Dr. Kyunghee Burkitt/Megan Arauz APRARNEL  [facility]: Dr. Sue Oliva    Ms. Campuzano is 77 y/o female with recent diagnosis of laryngeal cancer who is referred to  med onc  to discuss treatment plan.    -2/2024: pt presented with hoarse voice  -3/21/24: CT chest showed left pulmonary nodule  -4/3/24: PET/CT showed uptake in left vocal cord extending  to the right with limited subglottic extension  -4/23/24: biopsy of LLL nodule showed negative for malignancy  -5/7/24: seen by thoracic surgeon Dr. Linda, plan is to follow up image in 3 months  -5/20/24: s/p direct laryngoscopy. She was found to have transglottic lesion extending from primarily the left glottis into the subglottis and wrapping around anteriorly. 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)  No date: Breast cancer (Multi)  2012: Cerebral hemorrhage (Multi)  No date: CKD (chronic kidney disease)  No date: Colon cancer (Multi)  No date: Colorectal cancer (Multi)  No date: Diverticulosis of intestine, part unspecified, without   perforation or abscess without bleeding      Comment:  Diverticulosis  No date: GERD (gastroesophageal reflux disease)  No date: Hypertension  No date: Irritable bowel syndrome  No date: Kidney disease  No date: Malignant neoplasm of colon, unspecified (Multi)      Comment:  Colon cancer  No date: Personal history of colonic polyps      Comment:  History of colonic polyps  No date: Personal history of irradiation  No date: Personal history of malignant neoplasm of breast      Comment:  History of malignant neoplasm of female breast  No date: Personal history of other diseases of the respiratory system      Comment:  History of respiratory failure  No date: Personal history of other malignant  neoplasm of skin      Comment:  Personal history of malignant neoplasm of skin  2016: Right upper quadrant pain      Comment:  Abdominal pain, RUQ (right upper quadrant)  2012: Stroke (Multi)  2016: Unspecified symptoms and signs involving the   genitourinary system      Comment:  Urinary symptom or sign   Surgical History:    Past Surgical History:   Procedure Laterality Date    BI BREAST RIGHT CYST ASPIRATION Right 2019    BI BREAST CYST ASPIRATION RIGHT [facility]    BREAST LUMPECTOMY  2014    Breast Surgery Lumpectomy    CHOLECYSTECTOMY      CT GUIDED IMAGING FOR ABSCESS DRAIN  2015    CT GUIDED IMAGING FOR ABSCESS DRAIN [facility]    HYSTERECTOMY  1984    Hysterectomy    ILEOSTOMY  2015    Ileostomy    ILEOSTOMY CLOSURE  2015    Ileostomy Closure    OTHER SURGICAL HISTORY      Cerebral artery coiling      Family History:    Family History   Problem Relation Name Age of Onset    Colon cancer Mother      Liver cancer Mother      Kidney cancer Mother      Heart attack Father      Blood clot Father      Cancer Brother      Cancer Brother       Family Oncology History:    Cancer-related family history includes Cancer in her brother and brother; Colon cancer in her mother; Kidney cancer in her mother; Liver cancer in her mother.  Social History:    Social History     Tobacco Use    Smoking status: Former     Current packs/day: 0.00     Types: Cigarettes     Quit date:      Years since quittin.5     Passive exposure: Past    Smokeless tobacco: Never   Vaping Use    Vaping status: Never Used   Substance Use Topics    Alcohol use: Never    Drug use: Never        Subjective   Chief Complaint: Laryngeal cancer    HPI    Interval History              Patient is on the phone to discuss about treatment changes.  Pt started to have abdominal pain after C1 last chemo, and yesterday  she was having worsening abodominal pain from paclitaxel and refused to  receive further chemo.  She reported having similar side effects when she was treated for anal cancer. She is feeling little better today.  Denies fever, chills, nausea, vomiting,  diarrhea and constipation.    ROS  Review of Systems   Constitutional:  Positive for fatigue.   HENT:   Positive for voice change.    Respiratory: Negative.     Cardiovascular: Negative.    Gastrointestinal: Negative.  Negative for diarrhea.   Genitourinary: Negative.     Musculoskeletal:  Positive for back pain and neck pain.   Skin: Negative.    Neurological: Negative.        Allergies  Allergies   Allergen Reactions    Hydrocodone-Acetaminophen Dizziness    Oxycodone Hcl Unknown    Oxycodone-Acetaminophen Dizziness    Aspirin GI Upset and Unknown    Sulfa (Sulfonamide Antibiotics) Rash        Medications  Current Outpatient Medications   Medication Instructions    acetaminophen (TYLENOL) 1,000 mg, oral, Every 6 hours PRN    biotin 5 mg capsule Every 24 hours    cholecalciferol (Vitamin D-3) 50 mcg (2,000 unit) capsule 1 capsule, Every 24 hours    cholestyramine (Questran) 4 gram powder once daily.    lipase-protease-amylase (Creon) 24,000-76,000 -120,000 unit capsule TAKE 3 CAPSULES BY MOUTH 3 TIMES DAILY WITH MEALS AND 1 TO 2  CAPSULES BY MOUTH WITH SNACKS.  MAX 13 CAPSULE DAILY    losartan (COZAAR) 25 mg, oral, Daily    magnesium oxide (MAG-OX) 400 mg, oral, Daily    ondansetron (ZOFRAN) 8 mg, oral, Every 8 hours PRN    pantoprazole (ProtoNix) 40 mg EC tablet TAKE 1 TABLET BY MOUTH TWICE  DAILY    prochlorperazine (COMPAZINE) 10 mg, oral, Every 6 hours PRN          Objective   VS: There were no vitals taken for this visit.  Weight: Daily Weight  07/12/24 : 64 kg (141 lb 1.5 oz)  07/11/24 : 64 kg (140 lb 15.8 oz)  07/09/24 : 65.1 kg (143 lb 6.6 oz)  07/08/24 : 64.4 kg (141 lb 13.9 oz)  07/05/24 : 65.1 kg (143 lb 6.9 oz)  07/02/24 : 65 kg (143 lb 4.8 oz)  07/02/24 : 65 kg (143 lb 4.8 oz)      Physical Exam  Constitutional:        Appearance: Normal appearance.   HENT:      Head: Normocephalic and atraumatic.      Mouth/Throat:      Mouth: Mucous membranes are moist.   Eyes:      Extraocular Movements: Extraocular movements intact.      Pupils: Pupils are equal, round, and reactive to light.   Cardiovascular:      Rate and Rhythm: Normal rate and regular rhythm.      Pulses: Normal pulses.      Heart sounds: Normal heart sounds.   Pulmonary:      Effort: Pulmonary effort is normal.      Breath sounds: Normal breath sounds.   Abdominal:      General: Bowel sounds are normal.      Palpations: Abdomen is soft.   Musculoskeletal:         General: Normal range of motion.      Cervical back: Normal range of motion and neck supple.   Skin:     General: Skin is warm and dry.   Neurological:      General: No focal deficit present.      Mental Status: She is alert and oriented to person, place, and time.   Psychiatric:         Mood and Affect: Mood normal.         Behavior: Behavior normal.       Diagnostic Results   Labs    Results from last 7 days   Lab Units 07/15/24  1238   WBC AUTO x10*3/uL 4.7   HEMOGLOBIN g/dL 10.8*   HEMATOCRIT % 32.8*   PLATELETS AUTO x10*3/uL 235   NEUTROS ABS x10*3/uL 3.08   LYMPHS ABS AUTO x10*3/uL 1.21   MONOS ABS AUTO x10*3/uL 0.29   EOS ABS AUTO x10*3/uL 0.04   NEUTROS PCT AUTO % 66.1   LYMPHS PCT AUTO % 26.0   MONOS PCT AUTO % 6.2   EOS PCT AUTO % 0.9      Results from last 7 days   Lab Units 07/15/24  1238   GLUCOSE mg/dL 112*   SODIUM mmol/L 141   POTASSIUM mmol/L 3.8   CHLORIDE mmol/L 109*   CO2 mmol/L 22   BUN mg/dL 25*   CREATININE mg/dL 1.43*   EGFR mL/min/1.73m*2 38*   CALCIUM mg/dL 8.4*   MAGNESIUM mg/dL 1.31*   ALBUMIN g/dL 3.9   PROTEIN TOTAL g/dL 6.2*   BILIRUBIN TOTAL mg/dL 0.3   ALK PHOS U/L 52   ALT U/L 9   AST U/L 19                 Images  MRI Cervical and Thoracic Spine 7/8/24:    Cervical, Thoracic Spine: Diffuse soft tissue thickening and enhancement of the glottic larynx extending into the subglottic  larynx and involving the left cricoid, arytenoid, and thyroid cartilages, consistent with known tumor.  No evidence of metastatic disease within the cervical or thoracic  spine.    Pathology  Lab Results   Component Value Date    PATHREP  12/06/2021                                                     MRN: [id number]  Patient Name RONALD LENNON  Accession #: [id number]  Date of Procedure:  12/6/2021       Date Reported: 12/9/2021  Date Received:  12/7/2021  Date of Birth / Sex 1945 (Age: 76) / F  Race: WHITE  Submitting Physician: ROD VALLE MD    Other External #          FINAL CYTOLOGICAL INTERPRETATION    A.   FINE NEEDLE ASPIRATION BREAST - RIGHT, CYTOLOGY AND CELL BLOCK:  --NO MALIGNANT CELLS IDENTIFIED.  --SPECIMEN CONSISTS OF PROTEINACEOUS DEBRIS, OCCASIONAL FOAMY MACROPHAGES, FEW  INFLAMMATORY CELLS AND RARE EPITHELIAL GROUP.        Slide(s) initially screened by a Cytotechnologist at [place] [address]      Electronically Signed Out By GABINO GARCES MD    By the signature on this report, the individual or group listed as making the  Final Interpretation/Diagnosis certifies that they have reviewed this case.  Slide(s) initially screened by a Cytotechnologist at [facility]     Clinical History      Clinical Diagnosis History: Cyst of right breast - (N60.01)   Source of Specimen  A:  FINE NEEDLE ASPIRATION BREAST - RIGHT     Specimen Submitted as:  A:   FINE NEEDLE ASPIRATION BREAST - RIGHT        Pap non-gyn ThinPrep slide, CELL BLOCK, H&E, Initial    Gross Description  A.   FINE NEEDLE ASPIRATION BREAST - RIGHT:  35cc MILKY YELLOW NEEDLE RINSE IN  CYTOLYT                 [facility]  Department of Pathology  [address] [place] [address]        PATHREP  09/09/2019     Name RONALD LENNON.                                                                                       "             Accession #: [id number]            Pathologist:                   YUE FIGUEROA MD  Date of Procedure:    9/9/2019  Date Received:          9/9/2019  Date Reported           9/13/2019  Submitting Physician:   SALIMA WHEELER MD  [facility]  Other External #                                                                    FINAL DIAGNOSIS  A.  BIOPSY OF ANAL CANAL:  MINUTE FRAGMENTS OF SOFT TISSUE WITH NO SIGNIFICANT  PATHOLOGICAL FINDINGS, SEE NOTE.    Note: Deeper sections were obtained.                                                                                                                                                                                                                                                                                                                                                                                                                                                                                       Electronically Signed Out By YUE FIGUEROA MD/ANASTASIYA  By the signature on this report, the individual or group listed as making the  Final Interpretation/Diagnosis certifies that they have reviewed this case.           Clinical History:  Colorectal cancer, squamous cell, status post chemo radiation    Specimens Submitted As:  A: BIOPSY OF ANAL CANAL     Gross Description:  Received in formalin, labeled with the patient's name and hospital number and  \"anal canal\", are multiple fragments of tan, soft tissue aggregating to 1.7 x  1.0 x 0.1 cm. The specimen is submitted in toto in one cassette.  CJN    cjn/9/9/2019               [facility]  Department of Pathology   [address] [place] [id number]        COMDX  05/20/2024     Case reviewed at ENT consensus conference via [employer] technology on 5/22/2024.       Assessment/Plan   Ms. Campuzano is 77 y/o female with recent diagnosis of laryngeal " cancer who is referred to  med onc  to discuss treatment plan.    # Laryngeal cancer (T3N0M0)  -Based on PET/CT from 4/3/24, there was uptake in LLL nodule, however, biopsy of LLL nodule showed negative for malignancy  -seen by Thoracic surgeon, plan to have repeat CT chest without contrast in 3 month  -since her GFR is 40 (no hearing deficit), she is not a candidate for cisplatin, will treat her with weekly carboplatin and paclitaxel, reviewed chemo related side effects with patient in detail.  Consent obtained.  -pt developed abdominal pain post C1, during C2 infusion of paclitaxel,  she experienced worsening of abdominal pain, pt wanted to stop chemo on 7/9 and also didn't want further chemotherapy.  -7/10/24: discussed with aptient about changing treatment to cetuximab. Expalined to patient about main side effects (skin rash and hypomagnesemia), she is good with the plan.  I couldn't place consent due to epic issue, I will place it later.    # Hx of anal, breast and cutaneous malignancies  -s/p lumpectomy with radiation in 2012  -s/p radiation to anal cancer in 2018  -s/p resection of skin cancer on nose in 2010    # Chronic diarrhea   - from previous colonic surgery  -3-4 episodes per day, while on 3 imodium three times a day, 3-4 episodes of diarrhea daily.  - Concern for flares with chemotherapy tx's  - Tolerated C1  - Currently managed with Creon and Questran - stools forms, soft  - ongoing monitoring    # Back pain, area between her shoulder blades, more so on the left.  Has worsened over the last few weeks.  Pain with activity.  Resolves with rest.  No acute injury.  No falls.    - Will order MRI C & T (+/-) next available.  MRI's scheduled 7/8/24.    - 7/8:  Cervical, Thoracic Spine: Diffuse soft tissue thickening and enhancement of the glottic larynx extending into the subglottic larynx and involving the left cricoid, arytenoid, and thyroid cartilages, consistent with known tumor.  No evidence of  metastatic disease within the cervical or thoracic  spine.   - Currently managed with routine Tylenol 1,000mg/daily  - Supportive onc referral placed.  Pt to return sup onc call.    # Hypomagnesia: 7/2/24 Mg 1.41  - Will start oral Mag Oxide - 1 tab/day, Rx sent (not covered by pt's insurance)  - Will replete with IV hydration visit 7/5/24.  - If consistently low, will replete weekly via IV   - 7/8/24 Mg 1.71.      # Mediport placement, per pt request  - IR consult order in place  - INR lab completed.  - 7/8:  Encouraged pt to return [facility] call to schedule placement.      # Elevated Cr/BUN 1/24/24  - at/near baseline  - 1.17 7/8/24  - ongoing monitoring      PLAN  - Will change treatment to Cetuximab starting 7/16  - IV hydration - each Friday

## 2024-07-15 NOTE — DISCHARGE INSTRUCTIONS
Patient and Family Education  If you have questions or need to change the time or date of your Central Venous Access Port  Appointment, call  Radiology Scheduling at [phone].  What is a Central Venous Access Port?  A central venous access port is a small device made up of 2 parts:  ? The port, which is a hollow metal or plastic disk with a rubber center and  ? The tube (also called a catheter) that connects to the port. The catheter is  threaded through a vein that leads to your heart.  A doctor places the port under the skin in the chest near the collarbone, or in the arm. The port  feels like a small bump. Once your port site heals it should not hurt. A port provides easy  access to a vein. A port is useful if you need frequent intravenous (IV) treatments, medicines,  blood tests or blood products. A port can stay in for months or years if it is cared for correctly  and working as it should. A port may also be called a Mediport, Power Port or Port-a-cath.   Images source: [employer] [place], 2014     Page 2 of 4  Before your Port is Placed:  ? If you take any medicine that thins your blood or helps prevent clots, talk with  your doctor. Before your port is placed, ask your doctor if your dose of these  medicines needs to be changed to help prevent bleeding problems. If your doctor tells  you to stop taking these medicines, ask him or her when you should restart them. If  your port placement is cancelled, call your doctor and ask if you need to restart your  medicine or change your dose. These medicines include, but are not limited to:  Warfarin (Coumadin), Lovenox (enoxaparin), Eliquis (apixaban), Plavix (clopidogrel),  Pradaxa (dabigatran) and   Xarelto (rivaroxaban).  ? Do not eat or drink anything 8 hours before your port placement. If you have  been told to take certain medicines, take them with a sip of water.  ? Take your daily medicines, especially any  cardiac (heart), high blood pressure, seizure,  and antibiotic medicines. If you take insulin for diabetes, ask your doctor how to adjust  your insulin dose the morning of your port placement. Be sure to tell your doctor that  you have to fast for 8 hours before the port is placed.  ? Talk with your doctor, nurse or dietitian before taking probiotics. Ask if it’s safe for  you to take them when you have a central line. Some patients should avoid taking  certain probiotics because they may increase their chance of getting an infection.  The Day your Port is Placed:  ? A doctor in Radiology will place your port. The port placement takes about 60 to 90  minutes but plan on being here for 3 to 4 hours. This allows time for your check-in and  recovery.  ? A staff member will talk with you about the procedure, ask you questions and help  answer your questions. For women: Tell your doctor or technologist if there is any  chance you are pregnant.  ? You will be asked to change into a hospital gown for the procedure. You must remove  necklaces and earrings because they can get in the way during your port placement. An  IV will be placed in your arm and you will be asked to lay on a cart. Once we are  ready, we will take you to the procedure room. A family member may stay in our  waiting room while your port is placed.  In the Procedure Room:  You will get medicine through your IV to help you relax. While the port is placed, some  people fall asleep and some are awake but feel very relaxed. We will wash the area where the  port is being placed with a germ killing solution. This solution helps lower your chances of  getting an infection. Next, the doctor injects a numbing medicine into your skin, around the  port site. Once your skin is numb, the doctor makes 2 small incisions (cuts) to place the port  under your skin. The incisions are closed with skin glue or sutures and paper Band-Aids called  steri-strips. A gauze bandage  is then placed over the port site.     Page 3 of 4  After the Port is Placed:  ? You will be taken to the recovery area. We will check your pulse and blood pressure  often and check your port site for bleeding and swelling. Some bruising is normal. If  you see bleeding, apply pressure with your fingertips and call your nurse right away. If  you feel swelling or more pain at the site, call your nurse.  ? You may have some soreness where your port is placed but it should improve each  day as the site heals. The incisions used to place the port are small and should heal in  10 to 14 days.  ? Once healed, you do not need to have a dressing over the site unless the port has a  needle in it.  If You go Home After Your Port is Placed:  ? You must have someone drive you home. We cannot let you drive or travel home alone in  a cab or on a bus. Do not drive for 24 hours after your port is placed.  ? Someone should stay with you through the night.  ? Resume your routine normal diet. You may resume your normal medicines but if you  take blood-thinning medicine, ask your doctor when you should start taking it again.  ? Rest until morning. Avoid strenuous activities like jogging or lifting objects that weigh  more than 10 pounds for the next 2 days. If you want to do any contact sports, such as  football, ask your doctor if it is ok.  If you are not able to contact your doctor, go to the nearest Emergency Room.  Caring for Your Incisions:  ? Remove the gauze dressing after 48 hours. You do not need to replace it. Don’t try to peel  off the surgical glue or steri-strips. Let them fall off on their own, which often happens  after 2 weeks.  ? Do not let your incisions get wet until they are fully healed, which often takes 10 to 14  days. When you shower, cover your incisions with a dressing made from plastic wrap  (like Saran wrap or Glad Press n’ Seal) or a plastic bag and tape to keep the area dry.  After you shower, remove the  plastic wrap and pat the incisions dry. Don’t swim or soak  in a tub or Jacuzzi until your incisions are fully healed.  ? Do not get your port site wet if it has a needle in it. Follow the steps above to make  sure your port site is covered with plastic wrap or a plastic bag when you shower.  ? Look at your incisions each day. Call your doctor right away if you have any of the signs  of infection that are listed on the next page.     Page 4 of 4  Caring for Your Port:  A trained nurse must use a special non-coring needle, called a Minaya needle, each time they  access your port. The needle is placed through your skin and into the rubber center of the port.  You will likely feel slight pricking when your nurse inserts the needle. The needle stays in  place for your treatments and can be removed afterwards.  Your port needs to be flushed every 4 to 6 weeks to help prevent blood clots  from forming in the catheter. If blood clots form and cause a blockage, your  port may need to be removed. Your nurse will flush your port with saline. If  needed, they may also flush it with a blood thinning medicine called heparin.  Port flushes are done right before the needle is taken out. Some patients are  taught how to do these flushes at home. If you need to flush your port at home,  your nurse will show you how. If your port is not being used at least once  every 4 to 6 weeks, talk with your doctor or nurse about scheduling port  flushes.  When to Call Your Doctor:  ? Redness, swelling or drainage near the port or over the port site.  ? Drainage from the port site.  ? Shake or chills.  ? Temperature of 100.4°F (38°C) or higher.  ? Pain, warm or soreness at the port site.  ? Swelling of your arm, check at the port site.  ? Also call if the port has been moved  ? If you have any questions about your port.   How to Reach your Doctor:    Call  ____________________________at __________________ with problems or questions.

## 2024-07-15 NOTE — Clinical Note
Patient Clipped and Prepped: right chest and right neck. Prepped with ChloraPrep, a minimum of 3 minute dry time, longer if needed, no pooling noted, patient draped in sterile fashion.

## 2024-07-15 NOTE — PROGRESS NOTES
Patient ID: Padilla Campuzano is a 78 y.o. female.  Diagnosis: Laryngeal cancer  Staging: T3N0M0  Date of Diagnosis: 5/22/24    Providers:  ENT Surgeon: Dr. Ferny Rhoades  [facility]:   Dr. Kyunghee Burkitt/Megan Arauz APRARNEL  [facility]: Dr. Sue Oliva    Ms. Campuzano is 79 y/o female with recent diagnosis of laryngeal cancer who is referred to  med onc  to discuss treatment plan.    -2/2024: pt presented with hoarse voice  -3/21/24: CT chest showed left pulmonary nodule  -4/3/24: PET/CT showed uptake in left vocal cord extending  to the right with limited subglottic extension  -4/23/24: biopsy of LLL nodule showed negative for malignancy  -5/7/24: seen by thoracic surgeon Dr. Linda, plan is to follow up image in 3 months  -5/20/24: s/p direct laryngoscopy. She was found to have transglottic lesion extending from primarily the left glottis into the subglottis and wrapping around anteriorly. 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)  No date: Breast cancer (Multi)  2012: Cerebral hemorrhage (Multi)  No date: CKD (chronic kidney disease)  No date: Colon cancer (Multi)  No date: Colorectal cancer (Multi)  No date: Diverticulosis of intestine, part unspecified, without   perforation or abscess without bleeding      Comment:  Diverticulosis  No date: GERD (gastroesophageal reflux disease)  No date: Hypertension  No date: Irritable bowel syndrome  No date: Kidney disease  No date: Malignant neoplasm of colon, unspecified (Multi)      Comment:  Colon cancer  No date: Personal history of colonic polyps      Comment:  History of colonic polyps  No date: Personal history of irradiation  No date: Personal history of malignant neoplasm of breast      Comment:  History of malignant neoplasm of female breast  No date: Personal history of other diseases of the respiratory system      Comment:  History of respiratory failure  No date: Personal history of other malignant  neoplasm of skin      Comment:  Personal history of malignant neoplasm of skin  2016: Right upper quadrant pain      Comment:  Abdominal pain, RUQ (right upper quadrant)  2012: Stroke (Multi)  2016: Unspecified symptoms and signs involving the   genitourinary system      Comment:  Urinary symptom or sign   Surgical History:    Past Surgical History:   Procedure Laterality Date    BI BREAST RIGHT CYST ASPIRATION Right 2019    BI BREAST CYST ASPIRATION RIGHT [facility]    BREAST LUMPECTOMY  2014    Breast Surgery Lumpectomy    CHOLECYSTECTOMY      CT GUIDED IMAGING FOR ABSCESS DRAIN  2015    CT GUIDED IMAGING FOR ABSCESS DRAIN [facility]PATIENT LEGAC[facility]    HYSTERECTOMY  1984    Hysterectomy    ILEOSTOMY  2015    Ileostomy    ILEOSTOMY CLOSURE  2015    Ileostomy Closure    OTHER SURGICAL HISTORY      Cerebral artery coiling      Family History:    Family History   Problem Relation Name Age of Onset    Colon cancer Mother      Liver cancer Mother      Kidney cancer Mother      Heart attack Father      Blood clot Father      Cancer Brother      Cancer Brother       Family Oncology History:    Cancer-related family history includes Cancer in her brother and brother; Colon cancer in her mother; Kidney cancer in her mother; Liver cancer in her mother.  Social History:    Social History     Tobacco Use    Smoking status: Former     Current packs/day: 0.00     Types: Cigarettes     Quit date:      Years since quittin.5     Passive exposure: Past    Smokeless tobacco: Never   Vaping Use    Vaping status: Never Used   Substance Use Topics    Alcohol use: Never    Drug use: Never        Subjective   Chief Complaint: Laryngeal cancer    HPI    Interval History  Telephonic visit.  Reports feeling tired today d/t early appt.    Port placed this morning.  Tolerated procedure.  No change in appetite.  Denies n/v/c.  No fevers, chills, or CP.  Labs WNL.    Ready for treatment  today.      ROS  Review of Systems   Constitutional:  Positive for fatigue.   HENT:   Negative for voice change.    Respiratory: Negative.     Cardiovascular: Negative.    Gastrointestinal: Negative.  Negative for diarrhea.   Genitourinary: Negative.     Musculoskeletal:  Negative for back pain and neck pain.   Skin: Negative.    Neurological: Negative.        Allergies  Allergies   Allergen Reactions    Hydrocodone-Acetaminophen Dizziness    Oxycodone Hcl Unknown    Oxycodone-Acetaminophen Dizziness    Aspirin GI Upset and Unknown    Sulfa (Sulfonamide Antibiotics) Rash        Medications  Current Outpatient Medications   Medication Instructions    acetaminophen (TYLENOL) 1,000 mg, oral, Every 6 hours PRN    biotin 5 mg capsule Every 24 hours    cholecalciferol (Vitamin D-3) 50 mcg (2,000 unit) capsule 1 capsule, Every 24 hours    cholestyramine (Questran) 4 gram powder once daily.    clindamycin (Cleocin T) 1 % lotion Apply topically to affected area twice daily.    doxycycline (VIBRAMYCIN) 100 mg, oral, 2 times daily, For rash prevention. Take with at least 8 ounces (large glass) of water, do not lie down for 30 minutes after    hydrocortisone 1 % cream Apply topically to face, hands, feet, neck, back, and chest once daily at bedtime for rash prevention.    lipase-protease-amylase (Creon) 24,000-76,000 -120,000 unit capsule TAKE 3 CAPSULES BY MOUTH 3 TIMES DAILY WITH MEALS AND 1 TO 2  CAPSULES BY MOUTH WITH SNACKS.  MAX 13 CAPSULE DAILY    loperamide (Imodium) 2 mg capsule Take 2 capsules (4 mg) by mouth with the first episode of diarrhea and 1 capsule (2 mg) by mouth with any additional episodes. Maximum 8 capsules (16 mg) per day.    losartan (COZAAR) 25 mg, oral, Daily    magnesium oxide (MAG-OX) 400 mg, oral, Daily    ondansetron (ZOFRAN) 8 mg, oral, Every 8 hours PRN    pantoprazole (ProtoNix) 40 mg EC tablet TAKE 1 TABLET BY MOUTH TWICE  DAILY    prochlorperazine (COMPAZINE) 10 mg, oral, Every 6 hours PRN           Objective   VS: There were no vitals taken for this visit.  Weight: Daily Weight  07/12/24 : 64 kg (141 lb 1.5 oz)  07/11/24 : 64 kg (140 lb 15.8 oz)  07/09/24 : 65.1 kg (143 lb 6.6 oz)  07/08/24 : 64.4 kg (141 lb 13.9 oz)  07/05/24 : 65.1 kg (143 lb 6.9 oz)  07/02/24 : 65 kg (143 lb 4.8 oz)  07/02/24 : 65 kg (143 lb 4.8 oz)      Physical Exam  Neurological:      Mental Status: She is alert and oriented to person, place, and time.       Diagnostic Results   Labs    Results from last 7 days   Lab Units 07/15/24  1238   WBC AUTO x10*3/uL 4.7   HEMOGLOBIN g/dL 10.8*   HEMATOCRIT % 32.8*   PLATELETS AUTO x10*3/uL 235   NEUTROS ABS x10*3/uL 3.08   LYMPHS ABS AUTO x10*3/uL 1.21   MONOS ABS AUTO x10*3/uL 0.29   EOS ABS AUTO x10*3/uL 0.04   NEUTROS PCT AUTO % 66.1   LYMPHS PCT AUTO % 26.0   MONOS PCT AUTO % 6.2   EOS PCT AUTO % 0.9      Results from last 7 days   Lab Units 07/15/24  1238   GLUCOSE mg/dL 112*   SODIUM mmol/L 141   POTASSIUM mmol/L 3.8   CHLORIDE mmol/L 109*   CO2 mmol/L 22   BUN mg/dL 25*   CREATININE mg/dL 1.43*   EGFR mL/min/1.73m*2 38*   CALCIUM mg/dL 8.4*   MAGNESIUM mg/dL 1.31*   ALBUMIN g/dL 3.9   PROTEIN TOTAL g/dL 6.2*   BILIRUBIN TOTAL mg/dL 0.3   ALK PHOS U/L 52   ALT U/L 9   AST U/L 19                 Images  MRI Cervical and Thoracic Spine 7/8/24:    Cervical, Thoracic Spine: Diffuse soft tissue thickening and enhancement of the glottic larynx extending into the subglottic larynx and involving the left cricoid, arytenoid, and thyroid cartilages, consistent with known tumor.  No evidence of metastatic disease within the cervical or thoracic  spine.    Pathology  Lab Results   Component Value Date    PATHREP  12/06/2021                                                     MRN: [id number]  Patient Name RONALD LENNON  Accession #: [id number]  Date of Procedure:  12/6/2021       Date Reported: 12/9/2021  Date Received:  12/7/2021  Date of Birth / Sex 1945 (Age: 76) / F  Race:  WHITE  Submitting Physician: ROD VALLE MD    Other External #          FINAL CYTOLOGICAL INTERPRETATION    A.   FINE NEEDLE ASPIRATION BREAST - RIGHT, CYTOLOGY AND CELL BLOCK:  --NO MALIGNANT CELLS IDENTIFIED.  --SPECIMEN CONSISTS OF PROTEINACEOUS DEBRIS, OCCASIONAL FOAMY MACROPHAGES, FEW  INFLAMMATORY CELLS AND RARE EPITHELIAL GROUP.        Slide(s) initially screened by a Cytotechnologist at [place] [id number]      Electronically Signed Out By GABINO GARCES MD    By the signature on this report, the individual or group listed as making the  Final Interpretation/Diagnosis certifies that they have reviewed this case.  Slide(s) initially screened by a Cytotechnologist at [facility]     Clinical History      Clinical Diagnosis History: Cyst of right breast - (N60.01)   Source of Specimen  A:  FINE NEEDLE ASPIRATION BREAST - RIGHT     Specimen Submitted as:  A:   FINE NEEDLE ASPIRATION BREAST - RIGHT        Pap non-gyn ThinPrep slide, CELL BLOCK, H&E, Initial    Gross Description  A.   FINE NEEDLE ASPIRATION BREAST - RIGHT:  35cc MILKY YELLOW NEEDLE RINSE IN  CYTOLYT                 [facility]  Department of Pathology  [address] [place] [address]        PATHREP  09/09/2019     Name RONALD LENNON                                                                                                   Accession #: [id number]            Pathologist:                   YUE FIGUEROA MD  Date of Procedure:    9/9/2019  Date Received:          9/9/2019  Date Reported           9/13/2019  Submitting Physician:   SALIMA WHEELER MD  [facility]  Other External #                                                                    FINAL DIAGNOSIS  A.  BIOPSY OF ANAL CANAL:  MINUTE FRAGMENTS OF SOFT TISSUE WITH NO SIGNIFICANT  PATHOLOGICAL FINDINGS, SEE NOTE.    Note: Deeper  "sections were obtained.                                                                                                                                                                                                                                                                                                                                                                                                                                                                                       Electronically Signed Out By YUE FIGUEROA MD/ANASTASIYA  By the signature on this report, the individual or group listed as making the  Final Interpretation/Diagnosis certifies that they have reviewed this case.           Clinical History:  Colorectal cancer, squamous cell, status post chemo radiation    Specimens Submitted As:  A: BIOPSY OF ANAL CANAL     Gross Description:  Received in formalin, labeled with the patient's name and hospital number and  \"anal canal\", are multiple fragments of tan, soft tissue aggregating to 1.7 x  1.0 x 0.1 cm. The specimen is submitted in toto in one cassette.  CJN    cjn/9/9/2019               [facility]  Department of Pathology   [address] [place] [address]        COMDX  05/20/2024     Case reviewed at ENT consensus conference via [employer] technology on 5/22/2024.       Assessment/Plan   Ms. Campuzano is 79 y/o female with recent diagnosis of laryngeal cancer who is referred to  med onc  to discuss treatment plan.    # Laryngeal cancer (T3N0M0)  -Based on PET/CT from 4/3/24, there was uptake in LLL nodule, however, biopsy of LLL nodule showed negative for malignancy  -seen by Thoracic surgeon, plan to have repeat CT chest without contrast in 3 month  -since her GFR is 40 (no hearing deficit), she is not a candidate for cisplatin, will treat her with weekly carboplatin and paclitaxel, reviewed chemo related side effects with patient in detail.  Consent " obtained.  -pt developed abdominal pain post C1, during C2 infusion of paclitaxel,  she experienced worsening of abdominal pain, pt wanted to stop chemo on 7/9 and also didn't want further chemotherapy.  -7/10/24: discussed with patient about changing treatment to cetuximab. Explained to patient about main side effects (skin rash and hypomagnesemia), she is good with the plan.  I couldn't place consent due to epic issue, I will place it later.    7/2/24 - C1 Carboplatin/Paciltaxel  7/9/24 - pt declined C2 carboplatin/paciltaxel  7/15/24 - PowerPort mediport placed   7/16/24 Scheduled C1 Cetuximab    # Hx of anal, breast and cutaneous malignancies  -s/p lumpectomy with radiation in 2012  -s/p radiation to anal cancer in 2018  -s/p resection of skin cancer on nose in 2010    # Chronic diarrhea   - from previous colonic surgery  -3-4 episodes per day, while on 3 imodium three times a day, 3-4 episodes of diarrhea daily.  - Concern for flares with chemotherapy tx's  - Tolerated C1  - Currently managed with Creon and Questran - stools forms, soft  - ongoing monitoring    # Back pain, area between her shoulder blades, more so on the left.  Has worsened over the last few weeks.  Pain with activity.  Resolves with rest.  No acute injury.  No falls.    - Will order MRI C & T (+/-) next available.  MRI's scheduled 7/8/24.    - 7/8:  Cervical, Thoracic Spine: Diffuse soft tissue thickening and enhancement of the glottic larynx extending into the subglottic larynx and involving the left cricoid, arytenoid, and thyroid cartilages, consistent with known tumor.  No evidence of metastatic disease within the cervical or thoracic  spine.   - Currently managed with routine Tylenol 1,000mg/daily  - Supportive onc referral placed.  Pt to return sup onc call.    # Hypomagnesia: 7/2/24 Mg 1.41  - Will start oral Mag Oxide - 1 tab/day, Rx sent (not covered by pt's insurance)  - Will replete with IV hydration visit 7/5/24.  - If  consistently low, will replete weekly via IV   - 7/8/24 Mg 1.71.    - 7/15/24 Mg 1.31 - 2g Mg prior to 7/16 infusion     # Mediport placement, per pt request  - IR consult order in place  - INR lab completed.  - 7/8:  Encouraged pt to return [facility] call to schedule placement.    - 7/15:  PowerPort mediport placed    # Elevated Cr/BUN 1/24/24  - at/near baseline  - 1.17 7/8/24  - 1.43/25 7/15/24   - ongoing monitoring    # Rx's  - Rx's sent to pt's pharmacy 7/15  - Spouse reports not all meds available today, expect to have all meds on 7/16.  Meds to be picked-up on 7/16/24.  Spouse waiting for all meds to be available, prior to pick-up.      PLAN  - Will change treatment to Cetuximab starting 7/16  - IV hydration - each Friday    Time spent on call 12:60

## 2024-07-15 NOTE — PROGRESS NOTES
Pt with scheduled visit today at 1400.  Pt no-show for visit.  TC with VMM left requesting return call.

## 2024-07-16 ENCOUNTER — INFUSION (OUTPATIENT)
Dept: HEMATOLOGY/ONCOLOGY | Facility: CLINIC | Age: 79
End: 2024-07-16
Payer: MEDICARE

## 2024-07-16 ENCOUNTER — NUTRITION (OUTPATIENT)
Dept: HEMATOLOGY/ONCOLOGY | Facility: CLINIC | Age: 79
End: 2024-07-16
Payer: MEDICARE

## 2024-07-16 ENCOUNTER — HOSPITAL ENCOUNTER (OUTPATIENT)
Dept: RADIATION ONCOLOGY | Facility: CLINIC | Age: 79
Setting detail: RADIATION/ONCOLOGY SERIES
Discharge: HOME | End: 2024-07-16
Payer: MEDICARE

## 2024-07-16 VITALS
DIASTOLIC BLOOD PRESSURE: 80 MMHG | WEIGHT: 142.53 LBS | BODY MASS INDEX: 26.07 KG/M2 | SYSTOLIC BLOOD PRESSURE: 149 MMHG | TEMPERATURE: 97.3 F | RESPIRATION RATE: 18 BRPM | HEART RATE: 71 BPM | OXYGEN SATURATION: 98 %

## 2024-07-16 DIAGNOSIS — Z51.0 ENCOUNTER FOR ANTINEOPLASTIC RADIATION THERAPY: ICD-10-CM

## 2024-07-16 DIAGNOSIS — C32.0 SQUAMOUS CELL CARCINOMA OF LEFT VOCAL CORD (MULTI): Primary | ICD-10-CM

## 2024-07-16 DIAGNOSIS — C32.0 SQUAMOUS CELL CARCINOMA OF LEFT VOCAL CORD (MULTI): ICD-10-CM

## 2024-07-16 DIAGNOSIS — C32.0 MALIGNANT NEOPLASM OF GLOTTIS (MULTI): ICD-10-CM

## 2024-07-16 LAB
RAD ONC MSQ ACTUAL FRACTIONS DELIVERED: 8
RAD ONC MSQ ACTUAL SESSION DELIVERED DOSE: 200 CGRAY
RAD ONC MSQ ACTUAL TOTAL DOSE: 1600 CGRAY
RAD ONC MSQ ELAPSED DAYS: 13
RAD ONC MSQ LAST DATE: NORMAL
RAD ONC MSQ PRESCRIBED FRACTIONAL DOSE: 200 CGRAY
RAD ONC MSQ PRESCRIBED NUMBER OF FRACTIONS: 35
RAD ONC MSQ PRESCRIBED TECHNIQUE: NORMAL
RAD ONC MSQ PRESCRIBED TOTAL DOSE: 7000 CGRAY
RAD ONC MSQ PRESCRIPTION PATTERN COMMENT: NORMAL
RAD ONC MSQ START DATE: NORMAL
RAD ONC MSQ TREATMENT COURSE NUMBER: 2
RAD ONC MSQ TREATMENT SITE: NORMAL

## 2024-07-16 PROCEDURE — 2500000004 HC RX 250 GENERAL PHARMACY W/ HCPCS (ALT 636 FOR OP/ED): Performed by: NURSE PRACTITIONER

## 2024-07-16 PROCEDURE — 77336 RADIATION PHYSICS CONSULT: CPT | Performed by: STUDENT IN AN ORGANIZED HEALTH CARE EDUCATION/TRAINING PROGRAM

## 2024-07-16 PROCEDURE — 96375 TX/PRO/DX INJ NEW DRUG ADDON: CPT | Mod: INF

## 2024-07-16 PROCEDURE — 96365 THER/PROPH/DIAG IV INF INIT: CPT | Mod: INF

## 2024-07-16 PROCEDURE — 96415 CHEMO IV INFUSION ADDL HR: CPT

## 2024-07-16 PROCEDURE — 96413 CHEMO IV INFUSION 1 HR: CPT

## 2024-07-16 PROCEDURE — 77386 HC INTENSITY-MODULATED RADIATION THERAPY (IMRT), COMPLEX: CPT | Performed by: STUDENT IN AN ORGANIZED HEALTH CARE EDUCATION/TRAINING PROGRAM

## 2024-07-16 PROCEDURE — 2500000004 HC RX 250 GENERAL PHARMACY W/ HCPCS (ALT 636 FOR OP/ED): Mod: JZ,JG | Performed by: INTERNAL MEDICINE

## 2024-07-16 RX ORDER — MAGNESIUM SULFATE HEPTAHYDRATE 40 MG/ML
2 INJECTION, SOLUTION INTRAVENOUS ONCE AS NEEDED
OUTPATIENT
Start: 2024-07-30

## 2024-07-16 RX ORDER — PROCHLORPERAZINE EDISYLATE 5 MG/ML
10 INJECTION INTRAMUSCULAR; INTRAVENOUS EVERY 6 HOURS PRN
OUTPATIENT
Start: 2024-07-30

## 2024-07-16 RX ORDER — FAMOTIDINE 10 MG/ML
20 INJECTION INTRAVENOUS ONCE AS NEEDED
OUTPATIENT
Start: 2024-07-30

## 2024-07-16 RX ORDER — MAGNESIUM SULFATE HEPTAHYDRATE 40 MG/ML
2 INJECTION, SOLUTION INTRAVENOUS ONCE
OUTPATIENT
Start: 2024-07-30 | End: 2024-07-30

## 2024-07-16 RX ORDER — HEPARIN 100 UNIT/ML
500 SYRINGE INTRAVENOUS AS NEEDED
Status: DISCONTINUED | OUTPATIENT
Start: 2024-07-16 | End: 2024-07-16 | Stop reason: HOSPADM

## 2024-07-16 RX ORDER — ALBUTEROL SULFATE 0.83 MG/ML
3 SOLUTION RESPIRATORY (INHALATION) AS NEEDED
OUTPATIENT
Start: 2024-08-06

## 2024-07-16 RX ORDER — HEPARIN 100 UNIT/ML
500 SYRINGE INTRAVENOUS AS NEEDED
Status: CANCELLED | OUTPATIENT
Start: 2024-07-16

## 2024-07-16 RX ORDER — ALBUTEROL SULFATE 0.83 MG/ML
3 SOLUTION RESPIRATORY (INHALATION) AS NEEDED
Status: DISCONTINUED | OUTPATIENT
Start: 2024-07-16 | End: 2024-07-16 | Stop reason: HOSPADM

## 2024-07-16 RX ORDER — EPINEPHRINE 0.3 MG/.3ML
0.3 INJECTION SUBCUTANEOUS EVERY 5 MIN PRN
OUTPATIENT
Start: 2024-07-23

## 2024-07-16 RX ORDER — PROCHLORPERAZINE MALEATE 10 MG
10 TABLET ORAL EVERY 6 HOURS PRN
OUTPATIENT
Start: 2024-07-23

## 2024-07-16 RX ORDER — ALBUTEROL SULFATE 0.83 MG/ML
3 SOLUTION RESPIRATORY (INHALATION) AS NEEDED
OUTPATIENT
Start: 2024-07-30

## 2024-07-16 RX ORDER — DIPHENHYDRAMINE HYDROCHLORIDE 50 MG/ML
50 INJECTION INTRAMUSCULAR; INTRAVENOUS AS NEEDED
OUTPATIENT
Start: 2024-08-06

## 2024-07-16 RX ORDER — PROCHLORPERAZINE MALEATE 10 MG
10 TABLET ORAL EVERY 6 HOURS PRN
OUTPATIENT
Start: 2024-08-06

## 2024-07-16 RX ORDER — ALBUTEROL SULFATE 0.83 MG/ML
3 SOLUTION RESPIRATORY (INHALATION) AS NEEDED
OUTPATIENT
Start: 2024-07-23

## 2024-07-16 RX ORDER — DIPHENHYDRAMINE HYDROCHLORIDE 50 MG/ML
50 INJECTION INTRAMUSCULAR; INTRAVENOUS AS NEEDED
OUTPATIENT
Start: 2024-08-20

## 2024-07-16 RX ORDER — PROCHLORPERAZINE MALEATE 10 MG
10 TABLET ORAL EVERY 6 HOURS PRN
OUTPATIENT
Start: 2024-08-13

## 2024-07-16 RX ORDER — ALBUTEROL SULFATE 0.83 MG/ML
3 SOLUTION RESPIRATORY (INHALATION) AS NEEDED
OUTPATIENT
Start: 2024-08-20

## 2024-07-16 RX ORDER — EPINEPHRINE 0.3 MG/.3ML
0.3 INJECTION SUBCUTANEOUS EVERY 5 MIN PRN
OUTPATIENT
Start: 2024-08-20

## 2024-07-16 RX ORDER — FAMOTIDINE 10 MG/ML
20 INJECTION INTRAVENOUS ONCE AS NEEDED
OUTPATIENT
Start: 2024-07-23

## 2024-07-16 RX ORDER — PROCHLORPERAZINE MALEATE 10 MG
10 TABLET ORAL EVERY 6 HOURS PRN
Status: DISCONTINUED | OUTPATIENT
Start: 2024-07-16 | End: 2024-07-16 | Stop reason: HOSPADM

## 2024-07-16 RX ORDER — DIPHENHYDRAMINE HYDROCHLORIDE 50 MG/ML
50 INJECTION INTRAMUSCULAR; INTRAVENOUS AS NEEDED
OUTPATIENT
Start: 2024-07-30

## 2024-07-16 RX ORDER — MAGNESIUM SULFATE HEPTAHYDRATE 40 MG/ML
4 INJECTION, SOLUTION INTRAVENOUS ONCE AS NEEDED
Status: DISCONTINUED | OUTPATIENT
Start: 2024-07-16 | End: 2024-07-16 | Stop reason: HOSPADM

## 2024-07-16 RX ORDER — EPINEPHRINE 0.3 MG/.3ML
0.3 INJECTION SUBCUTANEOUS EVERY 5 MIN PRN
OUTPATIENT
Start: 2024-08-06

## 2024-07-16 RX ORDER — PROCHLORPERAZINE MALEATE 10 MG
10 TABLET ORAL EVERY 6 HOURS PRN
OUTPATIENT
Start: 2024-08-20

## 2024-07-16 RX ORDER — FAMOTIDINE 10 MG/ML
20 INJECTION INTRAVENOUS ONCE AS NEEDED
Status: DISCONTINUED | OUTPATIENT
Start: 2024-07-16 | End: 2024-07-16 | Stop reason: HOSPADM

## 2024-07-16 RX ORDER — EPINEPHRINE 0.3 MG/.3ML
0.3 INJECTION SUBCUTANEOUS EVERY 5 MIN PRN
OUTPATIENT
Start: 2024-07-30

## 2024-07-16 RX ORDER — FAMOTIDINE 10 MG/ML
20 INJECTION INTRAVENOUS ONCE AS NEEDED
OUTPATIENT
Start: 2024-08-20

## 2024-07-16 RX ORDER — PROCHLORPERAZINE EDISYLATE 5 MG/ML
10 INJECTION INTRAMUSCULAR; INTRAVENOUS EVERY 6 HOURS PRN
OUTPATIENT
Start: 2024-07-23

## 2024-07-16 RX ORDER — HEPARIN SODIUM,PORCINE/PF 10 UNIT/ML
50 SYRINGE (ML) INTRAVENOUS AS NEEDED
Status: CANCELLED | OUTPATIENT
Start: 2024-07-16

## 2024-07-16 RX ORDER — ALBUTEROL SULFATE 0.83 MG/ML
3 SOLUTION RESPIRATORY (INHALATION) AS NEEDED
OUTPATIENT
Start: 2024-08-13

## 2024-07-16 RX ORDER — DIPHENHYDRAMINE HYDROCHLORIDE 50 MG/ML
50 INJECTION INTRAMUSCULAR; INTRAVENOUS AS NEEDED
Status: DISCONTINUED | OUTPATIENT
Start: 2024-07-16 | End: 2024-07-16 | Stop reason: HOSPADM

## 2024-07-16 RX ORDER — PROCHLORPERAZINE MALEATE 10 MG
10 TABLET ORAL EVERY 6 HOURS PRN
OUTPATIENT
Start: 2024-07-30

## 2024-07-16 RX ORDER — FAMOTIDINE 10 MG/ML
20 INJECTION INTRAVENOUS ONCE AS NEEDED
OUTPATIENT
Start: 2024-08-06

## 2024-07-16 RX ORDER — MAGNESIUM SULFATE HEPTAHYDRATE 40 MG/ML
2 INJECTION, SOLUTION INTRAVENOUS ONCE AS NEEDED
Status: DISCONTINUED | OUTPATIENT
Start: 2024-07-16 | End: 2024-07-16 | Stop reason: HOSPADM

## 2024-07-16 RX ORDER — PROCHLORPERAZINE EDISYLATE 5 MG/ML
10 INJECTION INTRAMUSCULAR; INTRAVENOUS EVERY 6 HOURS PRN
Status: DISCONTINUED | OUTPATIENT
Start: 2024-07-16 | End: 2024-07-16 | Stop reason: HOSPADM

## 2024-07-16 RX ORDER — PROCHLORPERAZINE EDISYLATE 5 MG/ML
10 INJECTION INTRAMUSCULAR; INTRAVENOUS EVERY 6 HOURS PRN
OUTPATIENT
Start: 2024-08-13

## 2024-07-16 RX ORDER — PROCHLORPERAZINE EDISYLATE 5 MG/ML
10 INJECTION INTRAMUSCULAR; INTRAVENOUS EVERY 6 HOURS PRN
OUTPATIENT
Start: 2024-08-06

## 2024-07-16 RX ORDER — DIPHENHYDRAMINE HYDROCHLORIDE 50 MG/ML
50 INJECTION INTRAMUSCULAR; INTRAVENOUS AS NEEDED
OUTPATIENT
Start: 2024-07-23

## 2024-07-16 RX ORDER — MAGNESIUM SULFATE HEPTAHYDRATE 40 MG/ML
2 INJECTION, SOLUTION INTRAVENOUS ONCE AS NEEDED
OUTPATIENT
Start: 2024-07-23

## 2024-07-16 RX ORDER — DIPHENHYDRAMINE HYDROCHLORIDE 50 MG/ML
50 INJECTION INTRAMUSCULAR; INTRAVENOUS AS NEEDED
OUTPATIENT
Start: 2024-08-13

## 2024-07-16 RX ORDER — EPINEPHRINE 0.3 MG/.3ML
0.3 INJECTION SUBCUTANEOUS EVERY 5 MIN PRN
OUTPATIENT
Start: 2024-08-13

## 2024-07-16 RX ORDER — FAMOTIDINE 10 MG/ML
20 INJECTION INTRAVENOUS ONCE AS NEEDED
OUTPATIENT
Start: 2024-08-13

## 2024-07-16 RX ORDER — PROCHLORPERAZINE EDISYLATE 5 MG/ML
10 INJECTION INTRAMUSCULAR; INTRAVENOUS EVERY 6 HOURS PRN
OUTPATIENT
Start: 2024-08-20

## 2024-07-16 RX ORDER — EPINEPHRINE 0.3 MG/.3ML
0.3 INJECTION SUBCUTANEOUS EVERY 5 MIN PRN
Status: DISCONTINUED | OUTPATIENT
Start: 2024-07-16 | End: 2024-07-16 | Stop reason: HOSPADM

## 2024-07-16 ASSESSMENT — PAIN SCALES - GENERAL
PAINLEVEL: 0-NO PAIN
PAINLEVEL_OUTOF10: 0 - NO PAIN

## 2024-07-16 NOTE — PROGRESS NOTES
"NUTRITION Follow Up NOTE    Nutrition Assessment     Reason for Visit:  Padilla Campuzano is a 78 y.o. female who presents for nutrition consult 2/2 dx.  DX scc L vocal cord  TX concurrent chemoradiation, chemotherapy changed to Cetuximab 7/16/24. XRT 7/2-8/20  Hydration on Fridays  Hx anal and breast cancer. Chronic diarrhea not related to anal cancer per   7/16- Week 2. CNP notes reviewed. Pt seen in infusion with . No new concerns    Lab Results   Component Value Date/Time    GLUCOSE 112 (H) 07/15/2024 1238     07/15/2024 1238    K 3.8 07/15/2024 1238     (H) 07/15/2024 1238    CO2 22 07/15/2024 1238    ANIONGAP 14 07/15/2024 1238    BUN 25 (H) 07/15/2024 1238    CREATININE 1.43 (H) 07/15/2024 1238    EGFR 38 (L) 07/15/2024 1238    CALCIUM 8.4 (L) 07/15/2024 1238    ALBUMIN 3.9 07/15/2024 1238    ALKPHOS 52 07/15/2024 1238    PROT 6.2 (L) 07/15/2024 1238    AST 19 07/15/2024 1238    BILITOT 0.3 07/15/2024 1238    ALT 9 07/15/2024 1238     Lab Results   Component Value Date/Time    VITD25 53 05/01/2024 0904       Anthropometrics:  Anthropometrics  IBW/kg (Dietitian Calculated): 50.4 kg  Percent of IBW: 122 %  Weight Change  Weight History / % Weight Change: weight stable        Wt Readings from Last 10 Encounters:   07/16/24 64.7 kg (142 lb 8.4 oz)   07/12/24 64 kg (141 lb 1.5 oz)   07/11/24 64 kg (140 lb 15.8 oz)   07/09/24 65.1 kg (143 lb 6.6 oz)   07/08/24 64.4 kg (141 lb 13.9 oz)   07/05/24 65.1 kg (143 lb 6.9 oz)   07/02/24 65 kg (143 lb 4.8 oz)   07/02/24 65 kg (143 lb 4.8 oz)   07/08/24 64.4 kg (142 lb)   07/01/24 64.8 kg (142 lb 13.7 oz)   7/2- first chemotherapy 65 kg  7/9- 65.1 kg  7/16 - 64.7 kg  Food And Nutrient Intake:  Food and Nutrient History  Food and Nutrient History: tolerating regular diet \"eating good food and meals\"  Energy Intake: Good > 75 %  Fluid Intake: water, occasional tea  GI Symptoms: diarrhea (+Questran and Creon. Diarrhea is intermittent,can come without " "warning. Has not tried Banatrol Plus again for diarrhea)  GI Symptoms greater than 2 weeks: yes  Oral Problems: odynophagia (hoarse voice. Has [place] lozenges, despite recommending her to begin taking them she is not noting \"saving them for when needed\" Suggested beginning now as well as s/s rinses. Throat has been sore since January, but has not worsened or changed during tx)  Dentition: lower denture/partial, upper denture/partial     Food Intake  Amount of Food: eating consistent meals. Consumes protein sources like eggs, cottage cheese, yogurt, PNB  Meal 3: cabbage rolls the other day    Food Preparation  Cooking: Spouse/Significant Other, Patient  Grocery Shopping: Patient, Spouse/Significant Other              Enteral Nutrition Intake  Enteral Nutrition Formula/Solution: had a feeding tube when she had a stroke                             Medication and Complementary/Alternative Medicine Use  OTC Medication Use: Biotin, Vitamin D      Nutrition Focused Physical Exam Findings: 7/2/24                          Energy Needs  Estimated Energy Needs  Total Energy Estimated Needs (kCal): 1825 kCal  Total Estimated Energy Need per Day (kCal/kg): 28 kCal/kg  Estimated Fluid Needs  Total Fluid Estimated Needs (mL): 1825 mL  Method for Estimating Needs: 1 ml/kcal  Estimated Protein Needs  Total Protein Estimated Needs (g): 78 g  Total Protein Estimated Needs (g/kg): 1.2 g/kg        Nutrition Diagnosis   Malnutrition Diagnosis  Patient has Malnutrition Diagnosis: No    Nutrition Diagnosis  Patient has Nutrition Diagnosis: Yes  Diagnosis Status (1): Ongoing  Nutrition Diagnosis 1: Predicted inadequate energy intake  Related to (1): pathophysiology of disease  As Evidenced by (1): high risk for nutrition impact symptoms impairing ability to meet estimated energy needs, affect oral intake and weight status    Nutrition Interventions/Recommendations   Nutrition Prescription  Individualized Nutrition Prescription Provided for : " diet during cancer treatment    Food and Nutrition Delivery  Food and Nutrition Delivery  Meals & Snacks: Energy-modified diet, Modify Composition of Meals/Snacks, Protein-modified diet, Fluid-modified diet  Goals: incorporate soft high calorie high protein foods modified texture as needed, moist foods as needed. Include consistent meals and snacks. Avoid dry harsh  foods and acidic foods/beverages. Include adequate fluids  Medical Food Supplement: Commercial beverage (purchased boost [employer]. Not taking ONS. Suggested VHC 1/2 carton daily to start, increase with change in oral intake)    Nutrition Education - provided 7/22       Coordination of Care     7/9- Explained Banatrol Plus is banana flakes with prebiotics, so food based. She will try again to see if helps with diarrhea  7/2- Explained calorie and protein needs are increased with diagnosis and treatment. The importance of maintaining weight, adequate oral intake and fluids. Explained the role of protein, examples of protein sources provided and how to work into diet.    Circled recipe for s/s rinses in Eating Hints book, encouraged to start  Provided information on Medtrition Banatrol Plus which may be helpful to add to regime for diarrhea      Nutrition Monitoring and Evaluation   Food/Nutrient Related History Monitoring  Monitoring and Evaluation Plan: Energy intake, Meal/snack pattern, Protein intake, Fluid intake  Energy Intake: Estimated energy intake  Criteria: Meet >75% estimated energy needs  Fluid Intake: Estimated fluid intake  Criteria: maintain hydration  Meal/Snack Pattern: Estimated meal and snack pattern  Criteria: 4-6 meals/snacks daily  Estimated protein intake: Estimated protein intake  Criteria: include high protein foods with meals and/or snacks 75% of meals  Body Composition/Growth/Weight History  Monitoring and Evaluation Plan: Weight  Weight: Measured weight  Criteria: maintain weight    Following through treatment  Contact information  provided     Time Spent  Prep time on day of patient encounter: 5 minutes  Time spent directly with patient, family or caregiver: 15 minutes  Additional Time Spent on Patient Care Activities: 0 minutes  Documentation Time: 15 minutes  Other Time Spent: 0 minutes  Total: 35 minutes            Readiness to Change : Good  Level of Understanding : Good  Anticipated Compliant : Good

## 2024-07-17 ENCOUNTER — APPOINTMENT (OUTPATIENT)
Dept: RADIATION ONCOLOGY | Facility: CLINIC | Age: 79
End: 2024-07-17
Payer: MEDICARE

## 2024-07-18 ENCOUNTER — RADIATION ONCOLOGY OTV (OUTPATIENT)
Dept: RADIATION ONCOLOGY | Facility: CLINIC | Age: 79
End: 2024-07-18
Payer: MEDICARE

## 2024-07-18 ENCOUNTER — HOSPITAL ENCOUNTER (OUTPATIENT)
Dept: RADIATION ONCOLOGY | Facility: CLINIC | Age: 79
Setting detail: RADIATION/ONCOLOGY SERIES
Discharge: HOME | End: 2024-07-18
Payer: MEDICARE

## 2024-07-18 VITALS
OXYGEN SATURATION: 97 % | BODY MASS INDEX: 25.91 KG/M2 | TEMPERATURE: 97.9 F | DIASTOLIC BLOOD PRESSURE: 63 MMHG | WEIGHT: 141.65 LBS | SYSTOLIC BLOOD PRESSURE: 162 MMHG | HEART RATE: 89 BPM | RESPIRATION RATE: 16 BRPM

## 2024-07-18 DIAGNOSIS — Z51.0 ENCOUNTER FOR ANTINEOPLASTIC RADIATION THERAPY: ICD-10-CM

## 2024-07-18 DIAGNOSIS — C32.0 MALIGNANT NEOPLASM OF GLOTTIS (MULTI): ICD-10-CM

## 2024-07-18 DIAGNOSIS — M54.9 UPPER BACK PAIN ON LEFT SIDE: ICD-10-CM

## 2024-07-18 LAB
RAD ONC MSQ ACTUAL FRACTIONS DELIVERED: 9
RAD ONC MSQ ACTUAL SESSION DELIVERED DOSE: 200 CGRAY
RAD ONC MSQ ACTUAL TOTAL DOSE: 1800 CGRAY
RAD ONC MSQ ELAPSED DAYS: 15
RAD ONC MSQ LAST DATE: NORMAL
RAD ONC MSQ PRESCRIBED FRACTIONAL DOSE: 200 CGRAY
RAD ONC MSQ PRESCRIBED NUMBER OF FRACTIONS: 35
RAD ONC MSQ PRESCRIBED TECHNIQUE: NORMAL
RAD ONC MSQ PRESCRIBED TOTAL DOSE: 7000 CGRAY
RAD ONC MSQ PRESCRIPTION PATTERN COMMENT: NORMAL
RAD ONC MSQ START DATE: NORMAL
RAD ONC MSQ TREATMENT COURSE NUMBER: 2
RAD ONC MSQ TREATMENT SITE: NORMAL

## 2024-07-18 PROCEDURE — 77386 HC INTENSITY-MODULATED RADIATION THERAPY (IMRT), COMPLEX: CPT | Performed by: STUDENT IN AN ORGANIZED HEALTH CARE EDUCATION/TRAINING PROGRAM

## 2024-07-18 ASSESSMENT — ENCOUNTER SYMPTOMS
LOSS OF SENSATION IN FEET: 0
OCCASIONAL FEELINGS OF UNSTEADINESS: 0
DEPRESSION: 0

## 2024-07-18 ASSESSMENT — PAIN SCALES - GENERAL: PAINLEVEL: 7

## 2024-07-18 NOTE — PROGRESS NOTES
Radiation Oncology On Treatment Visit    Patient Name:  Padilla Campuzano  MRN:  [id number]  :  1945    Referring Provider: No ref. provider found  Primary Care Provider: Naheed Clements MD  Care Team: Patient Care Team:  Naheed Clements MD as PCP - General (Internal Medicine)  Naheed Clements MD as PCP [facility]  DO Carroll Calabrese MD as Consulting Physician (Hematology and Oncology)  Kyunghee Burkitt, DO as Consulting Physician (Hematology and Oncology)  Eden Moss MD as Medical Oncologist (Hematology and Oncology)    Date of Service: 2024     Diagnosis:   Specialty Problems          Radiation Oncology Problems    Breast cancer (Multi)        Malignant neoplasm of colon (Multi)        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 Will send referral PM&R cancer rehab.      OBJECTIVE:   Vital Signs:  /63 (BP Location: Left arm, Patient Position: Sitting, BP Cuff Size: Adult)   Pulse 89   Temp 36.6 °C (97.9 °F) (Temporal)   Resp 16   Wt 64.3 kg (141 lb 10.3 oz)   SpO2 97%   BMI 25.91 kg/m²    Pain Scale: The patient's current pain level was assessed.  They report currently having a pain of 7 out of 10.    Other Pertinent Findings:   Wt Readings from Last 10 Encounters:   24 64.3 kg (141 lb 10.3 oz)   24 64.7 kg (142 lb 8.4 oz)   24 64 kg (141 lb 1.5 oz)   24 64 kg (140 lb 15.8 oz)   24 65.1 kg (143 lb 6.6 oz)   24 64.4 kg (141 lb 13.9 oz)   24 65.1 kg (143 lb 6.9 oz)   24 65 kg (143 lb 4.8 oz)   24  65 kg (143 lb 4.8 oz)   07/08/24 64.4 kg (142 lb)         Toxicity Assessment          7/11/2024    13:38 7/18/2024    13:45   Toxicity Assessment   Treatment  and neck Head and neck   Anorexia Grade 0 Grade 0       eating pretty much what she wants and 1 Boost/day   Anxiety Grade 0 Grade 1   Dehydration Grade 0 Grade 0   Depression Grade 1 Grade 1   Dermatitis Radiation Grade 0 Grade 0   Diarrhea Grade 1       last night Grade 1       baseline   Fatigue Grade 1 Grade 1       naps during the day and goes to bed early   Fibrosis Deep Connective Tissue Grade 0 Grade 0   Fracture Grade 0 Grade 0   Nausea Grade 0 Grade 0   Pain Grade 0 Grade 2       pain level 7 to back--takes Tylenol with some relief   Treatment Related Secondary Malignancy Grade 0 Grade 0   Tumor Pain Grade 0 Grade 0   Vomiting Grade 0 Grade 0   Dysphagia Grade 0 Grade 0   Esophagitis Grade 0 Grade 1   Mucositis Oral Grade 0       instructed patient to start salt and soda rinses and Blis losenges Grade 0       using Blis lozenges and salt and soda rinses   Hearing Impaired Grade 0 Grade 0   Blurred Vision Grade 0 Grade 0   Dry Eye Grade 1 Grade 0   Eye Pain Grade 0 Grade 0   Retinopathy Grade 0 Grade 0   Central Nervous System Necrosis Grade 0 Grade 0   Edema Cerebral Grade 0 Grade 0   Dry Mouth Grade 0 Grade 0   Pneumonitis Grade 0 Grade 0   Febrile Neutropenia Grade 0 Grade 0   Ear Pain Grade 0 Grade 0   External Ear Pain Grade 0 Grade 0   Tinnitus Grade 0 Grade 0   Watering Eyes Grade 0 Grade 0   Oral Pain Grade 0 Grade 0   Salivary Duct Inflammation Grade 0 Grade 0   Edema Face Grade 0 Grade 0   Malaise Grade 0 Grade 0   Neck Edema Grade 0 Grade 0   Corneal Infection Grade 0 Grade 0   Mucosal Infection Grade 0 Grade 0   Otitis Media Grade 0 Grade 0   Sepsis Grade 0 Grade 0   Sinusitis Grade 0 Grade 0   Skin Infection Grade 0 Grade 0   Soft Tissue Infection Grade 0 Grade 0   Head Soft Tissue Necrosis Grade 0 Grade 0   Neck Soft Tissue  Necrosis Grade 0 Grade 0   Osteonecrosis of Jaw Grade 0 Grade 0   Superficial Soft Tissue Fibrosis Grade 0 Grade 0   Trismus Grade 0 Grade 0   Dysarthria Grade 0 Grade 0   Dysesthesia Grade 0 Grade 0   Dysgeusia Grade 0 Grade 0   Facial Nerve Disorder Grade 0 Grade 0   Hypoglossal Nerve Disorder Grade 0 Grade 0   Oculomotor Nerve Disorder Grade 0 Grade 0   Paresthesia Grade 0 Grade 0   Stroke Grade 0 Grade 0   Transient Ischemic Attacks Grade 0 Grade 0   Trigeminal Nerve Disorder Grade 0 Grade 0   Aspiration Grade 0 Grade 0   Hoarseness Grade 1 Grade 2   Laryngeal Edema Grade 0 Grade 0   Stridor Grade 0 Grade 0   Tracheal Mucositis Grade 0 Grade 0   Voice Alteration Grade 1 Grade 2        Assessment / Plan:  The patient is tolerating radiation therapy as anticipated.  Continue per current treatment plan.

## 2024-07-19 ENCOUNTER — HOSPITAL ENCOUNTER (OUTPATIENT)
Dept: RADIATION ONCOLOGY | Facility: CLINIC | Age: 79
Setting detail: RADIATION/ONCOLOGY SERIES
Discharge: HOME | End: 2024-07-19
Payer: MEDICARE

## 2024-07-19 ENCOUNTER — APPOINTMENT (OUTPATIENT)
Dept: HEMATOLOGY/ONCOLOGY | Facility: CLINIC | Age: 79
End: 2024-07-19
Payer: MEDICARE

## 2024-07-19 ENCOUNTER — INFUSION (OUTPATIENT)
Dept: HEMATOLOGY/ONCOLOGY | Facility: CLINIC | Age: 79
End: 2024-07-19
Payer: MEDICARE

## 2024-07-19 VITALS
RESPIRATION RATE: 18 BRPM | BODY MASS INDEX: 25.77 KG/M2 | DIASTOLIC BLOOD PRESSURE: 74 MMHG | OXYGEN SATURATION: 96 % | SYSTOLIC BLOOD PRESSURE: 138 MMHG | TEMPERATURE: 95.7 F | WEIGHT: 140.87 LBS | HEART RATE: 78 BPM

## 2024-07-19 DIAGNOSIS — C32.0 MALIGNANT NEOPLASM OF GLOTTIS (MULTI): ICD-10-CM

## 2024-07-19 DIAGNOSIS — Z51.0 ENCOUNTER FOR ANTINEOPLASTIC RADIATION THERAPY: ICD-10-CM

## 2024-07-19 DIAGNOSIS — C32.0 SQUAMOUS CELL CARCINOMA OF LEFT VOCAL CORD (MULTI): ICD-10-CM

## 2024-07-19 LAB
RAD ONC MSQ ACTUAL FRACTIONS DELIVERED: 10
RAD ONC MSQ ACTUAL SESSION DELIVERED DOSE: 200 CGRAY
RAD ONC MSQ ACTUAL TOTAL DOSE: 2000 CGRAY
RAD ONC MSQ ELAPSED DAYS: 16
RAD ONC MSQ LAST DATE: NORMAL
RAD ONC MSQ PRESCRIBED FRACTIONAL DOSE: 200 CGRAY
RAD ONC MSQ PRESCRIBED NUMBER OF FRACTIONS: 35
RAD ONC MSQ PRESCRIBED TECHNIQUE: NORMAL
RAD ONC MSQ PRESCRIBED TOTAL DOSE: 7000 CGRAY
RAD ONC MSQ PRESCRIPTION PATTERN COMMENT: NORMAL
RAD ONC MSQ START DATE: NORMAL
RAD ONC MSQ TREATMENT COURSE NUMBER: 2
RAD ONC MSQ TREATMENT SITE: NORMAL

## 2024-07-19 PROCEDURE — 2500000004 HC RX 250 GENERAL PHARMACY W/ HCPCS (ALT 636 FOR OP/ED): Performed by: NURSE PRACTITIONER

## 2024-07-19 PROCEDURE — 96360 HYDRATION IV INFUSION INIT: CPT | Mod: INF

## 2024-07-19 PROCEDURE — 77386 HC INTENSITY-MODULATED RADIATION THERAPY (IMRT), COMPLEX: CPT | Performed by: STUDENT IN AN ORGANIZED HEALTH CARE EDUCATION/TRAINING PROGRAM

## 2024-07-19 RX ORDER — HEPARIN 100 UNIT/ML
500 SYRINGE INTRAVENOUS AS NEEDED
OUTPATIENT
Start: 2024-07-19

## 2024-07-19 RX ORDER — HEPARIN 100 UNIT/ML
500 SYRINGE INTRAVENOUS AS NEEDED
Status: DISCONTINUED | OUTPATIENT
Start: 2024-07-19 | End: 2024-07-19 | Stop reason: HOSPADM

## 2024-07-19 RX ORDER — HEPARIN SODIUM,PORCINE/PF 10 UNIT/ML
50 SYRINGE (ML) INTRAVENOUS AS NEEDED
Status: DISCONTINUED | OUTPATIENT
Start: 2024-07-19 | End: 2024-07-19 | Stop reason: HOSPADM

## 2024-07-19 RX ORDER — HEPARIN SODIUM,PORCINE/PF 10 UNIT/ML
50 SYRINGE (ML) INTRAVENOUS AS NEEDED
OUTPATIENT
Start: 2024-07-19

## 2024-07-19 ASSESSMENT — PAIN SCALES - GENERAL: PAINLEVEL: 0-NO PAIN

## 2024-07-19 NOTE — PROGRESS NOTES
Pt got 1st dose cetuximab on Tuesday - states her only side effects were fatigue & she had body soreness x1 day. She took tylenol & rested for the day & felt much better the next day. Pt states she feels well today with no complaints.

## 2024-07-22 ENCOUNTER — HOSPITAL ENCOUNTER (OUTPATIENT)
Dept: RADIATION ONCOLOGY | Facility: CLINIC | Age: 79
Setting detail: RADIATION/ONCOLOGY SERIES
Discharge: HOME | End: 2024-07-22
Payer: MEDICARE

## 2024-07-22 ENCOUNTER — OFFICE VISIT (OUTPATIENT)
Dept: HEMATOLOGY/ONCOLOGY | Facility: CLINIC | Age: 79
End: 2024-07-22
Payer: MEDICARE

## 2024-07-22 ENCOUNTER — INFUSION (OUTPATIENT)
Dept: HEMATOLOGY/ONCOLOGY | Facility: CLINIC | Age: 79
End: 2024-07-22
Payer: MEDICARE

## 2024-07-22 VITALS
WEIGHT: 138.78 LBS | SYSTOLIC BLOOD PRESSURE: 136 MMHG | RESPIRATION RATE: 18 BRPM | HEART RATE: 72 BPM | OXYGEN SATURATION: 95 % | DIASTOLIC BLOOD PRESSURE: 78 MMHG | TEMPERATURE: 96.6 F | BODY MASS INDEX: 25.38 KG/M2

## 2024-07-22 DIAGNOSIS — Z51.11 ENCOUNTER FOR ANTINEOPLASTIC CHEMOTHERAPY: Primary | ICD-10-CM

## 2024-07-22 DIAGNOSIS — C32.0 SQUAMOUS CELL CARCINOMA OF LEFT VOCAL CORD (MULTI): ICD-10-CM

## 2024-07-22 DIAGNOSIS — Z51.0 ENCOUNTER FOR ANTINEOPLASTIC RADIATION THERAPY: ICD-10-CM

## 2024-07-22 DIAGNOSIS — R79.9 ELEVATED BUN: ICD-10-CM

## 2024-07-22 DIAGNOSIS — C32.0 MALIGNANT NEOPLASM OF GLOTTIS (MULTI): ICD-10-CM

## 2024-07-22 DIAGNOSIS — R91.1 SOLITARY PULMONARY NODULE ON LUNG CT: ICD-10-CM

## 2024-07-22 DIAGNOSIS — E83.42 HYPOMAGNESEMIA: ICD-10-CM

## 2024-07-22 LAB
ALBUMIN SERPL BCP-MCNC: 3.7 G/DL (ref 3.4–5)
ALP SERPL-CCNC: 64 U/L (ref 33–136)
ALT SERPL W P-5'-P-CCNC: 10 U/L (ref 7–45)
ANION GAP SERPL CALC-SCNC: 12 MMOL/L (ref 10–20)
AST SERPL W P-5'-P-CCNC: 21 U/L (ref 9–39)
BILIRUB SERPL-MCNC: 0.4 MG/DL (ref 0–1.2)
BUN SERPL-MCNC: 30 MG/DL (ref 6–23)
CALCIUM SERPL-MCNC: 8.6 MG/DL (ref 8.6–10.3)
CHLORIDE SERPL-SCNC: 110 MMOL/L (ref 98–107)
CO2 SERPL-SCNC: 23 MMOL/L (ref 21–32)
CREAT SERPL-MCNC: 1.05 MG/DL (ref 0.5–1.05)
EGFRCR SERPLBLD CKD-EPI 2021: 54 ML/MIN/1.73M*2
GLUCOSE SERPL-MCNC: 93 MG/DL (ref 74–99)
MAGNESIUM SERPL-MCNC: 1.42 MG/DL (ref 1.6–2.4)
POTASSIUM SERPL-SCNC: 3.8 MMOL/L (ref 3.5–5.3)
PROT SERPL-MCNC: 6.2 G/DL (ref 6.4–8.2)
RAD ONC MSQ ACTUAL FRACTIONS DELIVERED: 11
RAD ONC MSQ ACTUAL SESSION DELIVERED DOSE: 200 CGRAY
RAD ONC MSQ ACTUAL TOTAL DOSE: 2200 CGRAY
RAD ONC MSQ ELAPSED DAYS: 19
RAD ONC MSQ LAST DATE: NORMAL
RAD ONC MSQ PRESCRIBED FRACTIONAL DOSE: 200 CGRAY
RAD ONC MSQ PRESCRIBED NUMBER OF FRACTIONS: 35
RAD ONC MSQ PRESCRIBED TECHNIQUE: NORMAL
RAD ONC MSQ PRESCRIBED TOTAL DOSE: 7000 CGRAY
RAD ONC MSQ PRESCRIPTION PATTERN COMMENT: NORMAL
RAD ONC MSQ START DATE: NORMAL
RAD ONC MSQ TREATMENT COURSE NUMBER: 2
RAD ONC MSQ TREATMENT SITE: NORMAL
SODIUM SERPL-SCNC: 141 MMOL/L (ref 136–145)

## 2024-07-22 PROCEDURE — 1126F AMNT PAIN NOTED NONE PRSNT: CPT | Performed by: NURSE PRACTITIONER

## 2024-07-22 PROCEDURE — 99214 OFFICE O/P EST MOD 30 MIN: CPT | Performed by: NURSE PRACTITIONER

## 2024-07-22 PROCEDURE — 2500000004 HC RX 250 GENERAL PHARMACY W/ HCPCS (ALT 636 FOR OP/ED): Performed by: NURSE PRACTITIONER

## 2024-07-22 PROCEDURE — 77386 HC INTENSITY-MODULATED RADIATION THERAPY (IMRT), COMPLEX: CPT | Performed by: STUDENT IN AN ORGANIZED HEALTH CARE EDUCATION/TRAINING PROGRAM

## 2024-07-22 PROCEDURE — 80053 COMPREHEN METABOLIC PANEL: CPT | Performed by: NURSE PRACTITIONER

## 2024-07-22 PROCEDURE — 96523 IRRIG DRUG DELIVERY DEVICE: CPT

## 2024-07-22 PROCEDURE — 83735 ASSAY OF MAGNESIUM: CPT | Performed by: NURSE PRACTITIONER

## 2024-07-22 RX ORDER — LIDOCAINE AND PRILOCAINE 25; 25 MG/G; MG/G
CREAM TOPICAL ONCE
Qty: 5 G | Refills: 2 | Status: SHIPPED | OUTPATIENT
Start: 2024-07-22 | End: 2024-07-22

## 2024-07-22 RX ORDER — HEPARIN SODIUM,PORCINE/PF 10 UNIT/ML
50 SYRINGE (ML) INTRAVENOUS AS NEEDED
Status: CANCELLED | OUTPATIENT
Start: 2024-07-22

## 2024-07-22 RX ORDER — HEPARIN SODIUM,PORCINE/PF 10 UNIT/ML
50 SYRINGE (ML) INTRAVENOUS AS NEEDED
Status: DISCONTINUED | OUTPATIENT
Start: 2024-07-22 | End: 2024-07-22 | Stop reason: HOSPADM

## 2024-07-22 RX ORDER — HEPARIN 100 UNIT/ML
500 SYRINGE INTRAVENOUS AS NEEDED
Status: DISCONTINUED | OUTPATIENT
Start: 2024-07-22 | End: 2024-07-22 | Stop reason: HOSPADM

## 2024-07-22 RX ORDER — HEPARIN 100 UNIT/ML
500 SYRINGE INTRAVENOUS AS NEEDED
Status: CANCELLED | OUTPATIENT
Start: 2024-07-22

## 2024-07-22 ASSESSMENT — ENCOUNTER SYMPTOMS
BACK PAIN: 0
NECK PAIN: 0
FATIGUE: 1
CARDIOVASCULAR NEGATIVE: 1
NEUROLOGICAL NEGATIVE: 1
VOICE CHANGE: 0
RESPIRATORY NEGATIVE: 1

## 2024-07-22 ASSESSMENT — PAIN SCALES - GENERAL: PAINLEVEL: 0-NO PAIN

## 2024-07-22 NOTE — PROGRESS NOTES
Patient ID: Padilla Campuzano is a 78 y.o. female.  Diagnosis: Laryngeal cancer  Staging: T3N0M0  Date of Diagnosis: 5/22/24    Providers:  ENT Surgeon: Dr. Ferny Rhoades  [facility]:   Dr. Kyunghee Burkitt/Megan Arauz APRARNEL  [facility]: Dr. Sue Oliva    Ms. Campuzano is 79 y/o female with recent diagnosis of laryngeal cancer who is referred to  med onc  to discuss treatment plan.    -2/2024: pt presented with hoarse voice  -3/21/24: CT chest showed left pulmonary nodule  -4/3/24: PET/CT showed uptake in left vocal cord extending  to the right with limited subglottic extension  -4/23/24: biopsy of LLL nodule showed negative for malignancy  -5/7/24: seen by thoracic surgeon Dr. Linda, plan is to follow up image in 3 months  -5/20/24: s/p direct laryngoscopy. She was found to have transglottic lesion extending from primarily the left glottis into the subglottis and wrapping around anteriorly. 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)  No date: Breast cancer (Multi)  2012: Cerebral hemorrhage (Multi)  No date: CKD (chronic kidney disease)  No date: Colon cancer (Multi)  No date: Colorectal cancer (Multi)  No date: Diverticulosis of intestine, part unspecified, without   perforation or abscess without bleeding      Comment:  Diverticulosis  No date: GERD (gastroesophageal reflux disease)  No date: Hypertension  No date: Irritable bowel syndrome  No date: Kidney disease  No date: Malignant neoplasm of colon, unspecified (Multi)      Comment:  Colon cancer  No date: Personal history of colonic polyps      Comment:  History of colonic polyps  No date: Personal history of irradiation  No date: Personal history of malignant neoplasm of breast      Comment:  History of malignant neoplasm of female breast  No date: Personal history of other diseases of the respiratory system      Comment:  History of respiratory failure  No date: Personal history of other malignant  neoplasm of skin      Comment:  Personal history of malignant neoplasm of skin  2016: Right upper quadrant pain      Comment:  Abdominal pain, RUQ (right upper quadrant)  2012: Stroke (Multi)  2016: Unspecified symptoms and signs involving the   genitourinary system      Comment:  Urinary symptom or sign   Surgical History:    Past Surgical History:   Procedure Laterality Date    BI BREAST RIGHT CYST ASPIRATION Right 2019    BI BREAST CYST ASPIRATION RIGHT [facility]    BREAST LUMPECTOMY  2014    Breast Surgery Lumpectomy    CHOLECYSTECTOMY      CT GUIDED IMAGING FOR ABSCESS DRAIN  2015    CT GUIDED IMAGING FOR ABSCESS DRAIN [facility]PATIENT LEGAC[facility]    HYSTERECTOMY  1984    Hysterectomy    ILEOSTOMY  2015    Ileostomy    ILEOSTOMY CLOSURE  2015    Ileostomy Closure    OTHER SURGICAL HISTORY      Cerebral artery coiling      Family History:    Family History   Problem Relation Name Age of Onset    Colon cancer Mother      Liver cancer Mother      Kidney cancer Mother      Heart attack Father      Blood clot Father      Cancer Brother      Cancer Brother       Family Oncology History:    Cancer-related family history includes Cancer in her brother and brother; Colon cancer in her mother; Kidney cancer in her mother; Liver cancer in her mother.  Social History:    Social History     Tobacco Use    Smoking status: Former     Current packs/day: 0.00     Types: Cigarettes     Quit date:      Years since quittin.5     Passive exposure: Past    Smokeless tobacco: Never   Vaping Use    Vaping status: Never Used   Substance Use Topics    Alcohol use: Never    Drug use: Never        Subjective   Chief Complaint: Laryngeal cancer    HPI    Interval History  Present in clinic for follow-up and readiness to treat visit.  Feeling good today.  Tolerated her 1st cetuximab infusion.      Yesterday - n/v after eating breakfast.  Came on suddenly.  Relief with emesis.  Nausea  did not linger.  Imodium taken a few times, effective.  No pattern.   BM today.  Denies constipated.  Able to go shopping and did not have to worry about her bowels.  Denies fevers, chills, or CP.   No rashes or skin concerns.  Taking doxycyline. Not using the oitments.  Port in place.  No c/o pain.  No other concerns today.  Labs WNL.  Ready for treatment tomorrow.      ROS  Review of Systems   Constitutional:  Positive for fatigue.   HENT:  Negative.  Negative for voice change.    Respiratory: Negative.     Cardiovascular: Negative.    Gastrointestinal:  Positive for diarrhea, nausea and vomiting.   Genitourinary: Negative.     Musculoskeletal: Negative.  Negative for back pain and neck pain.   Skin: Negative.    Neurological: Negative.        Allergies  Allergies   Allergen Reactions    Paclitaxel GI Upset and Nausea/vomiting     See adverse drug reaction note dated 7/9/24    Hydrocodone-Acetaminophen Dizziness    Oxycodone Hcl Unknown    Oxycodone-Acetaminophen Dizziness    Aspirin GI Upset and Unknown    Sulfa (Sulfonamide Antibiotics) Rash        Medications  Current Outpatient Medications   Medication Instructions    acetaminophen (TYLENOL) 1,000 mg, oral, Every 6 hours PRN    biotin 5 mg capsule Every 24 hours    cholecalciferol (Vitamin D-3) 50 mcg (2,000 unit) capsule 1 capsule, Every 24 hours    cholestyramine (Questran) 4 gram powder once daily.    clindamycin (Cleocin T) 1 % lotion Apply topically to affected area twice daily.    doxycycline (VIBRAMYCIN) 100 mg, oral, 2 times daily, For rash prevention. Take with at least 8 ounces (large glass) of water, do not lie down for 30 minutes after    hydrocortisone 1 % cream Apply topically to face, hands, feet, neck, back, and chest once daily at bedtime for rash prevention.    lipase-protease-amylase (Creon) 24,000-76,000 -120,000 unit capsule TAKE 3 CAPSULES BY MOUTH 3 TIMES DAILY WITH MEALS AND 1 TO 2  CAPSULES BY MOUTH WITH SNACKS.  MAX 13 CAPSULE DAILY     loperamide (Imodium) 2 mg capsule Take 2 capsules (4 mg) by mouth with the first episode of diarrhea and 1 capsule (2 mg) by mouth with any additional episodes. Maximum 8 capsules (16 mg) per day.    losartan (COZAAR) 25 mg, oral, Daily    magnesium oxide (MAG-OX) 400 mg, oral, Daily    ondansetron (ZOFRAN) 8 mg, oral, Every 8 hours PRN    pantoprazole (ProtoNix) 40 mg EC tablet TAKE 1 TABLET BY MOUTH TWICE  DAILY    prochlorperazine (COMPAZINE) 10 mg, oral, Every 6 hours PRN          Objective   VS: /78 (BP Location: Right arm, Patient Position: Sitting, BP Cuff Size: Adult)   Pulse 72   Temp 35.9 °C (96.6 °F) (Temporal)   Resp 18   Wt 62.9 kg (138 lb 12.5 oz)   SpO2 95%   BMI 25.38 kg/m²   Weight: Daily Weight  07/22/24 : 62.9 kg (138 lb 12.5 oz)  07/19/24 : 63.9 kg (140 lb 14 oz)  07/18/24 : 64.3 kg (141 lb 10.3 oz)  07/16/24 : 64.7 kg (142 lb 8.4 oz)  07/12/24 : 64 kg (141 lb 1.5 oz)  07/11/24 : 64 kg (140 lb 15.8 oz)  07/09/24 : 65.1 kg (143 lb 6.6 oz)      Physical Exam  Vitals reviewed.   Constitutional:       Appearance: Normal appearance.   HENT:      Head: Normocephalic.      Nose: Nose normal.      Mouth/Throat:      Mouth: Mucous membranes are moist.      Pharynx: Oropharynx is clear.   Eyes:      Extraocular Movements: Extraocular movements intact.      Conjunctiva/sclera: Conjunctivae normal.      Pupils: Pupils are equal, round, and reactive to light.   Cardiovascular:      Rate and Rhythm: Normal rate and regular rhythm.      Pulses: Normal pulses.      Heart sounds: Normal heart sounds.   Pulmonary:      Breath sounds: Normal breath sounds.   Abdominal:      General: Bowel sounds are normal.      Palpations: Abdomen is soft.   Musculoskeletal:         General: Normal range of motion.      Cervical back: Normal range of motion and neck supple.   Skin:     General: Skin is warm and dry.   Neurological:      General: No focal deficit present.      Mental Status: She is alert and oriented  to person, place, and time.   Psychiatric:         Mood and Affect: Mood normal.         Behavior: Behavior normal.         Thought Content: Thought content normal.         Judgment: Judgment normal.       Diagnostic Results   Labs           Results from last 7 days   Lab Units 07/22/24  1357   GLUCOSE mg/dL 93   SODIUM mmol/L 141   POTASSIUM mmol/L 3.8   CHLORIDE mmol/L 110*   CO2 mmol/L 23   BUN mg/dL 30*   CREATININE mg/dL 1.05   EGFR mL/min/1.73m*2 54*   CALCIUM mg/dL 8.6   MAGNESIUM mg/dL 1.42*   ALBUMIN g/dL 3.7   PROTEIN TOTAL g/dL 6.2*   BILIRUBIN TOTAL mg/dL 0.4   ALK PHOS U/L 64   ALT U/L 10   AST U/L 21                 Images  MRI Cervical and Thoracic Spine 7/8/24:    Cervical, Thoracic Spine: Diffuse soft tissue thickening and enhancement of the glottic larynx extending into the subglottic larynx and involving the left cricoid, arytenoid, and thyroid cartilages, consistent with known tumor.  No evidence of metastatic disease within the cervical or thoracic  spine.    Pathology  Lab Results   Component Value Date    PATHREP  12/06/2021                                                     MRN: [id number]  Patient Name RONALD LENNON  Accession #: [id number]  Date of Procedure:  12/6/2021       Date Reported: 12/9/2021  Date Received:  12/7/2021  Date of Birth / Sex 1945 (Age: 76) / F  Race: WHITE  Submitting Physician: ROD VALLE MD    Other External #          FINAL CYTOLOGICAL INTERPRETATION    A.   FINE NEEDLE ASPIRATION BREAST - RIGHT, CYTOLOGY AND CELL BLOCK:  --NO MALIGNANT CELLS IDENTIFIED.  --SPECIMEN CONSISTS OF PROTEINACEOUS DEBRIS, OCCASIONAL FOAMY MACROPHAGES, FEW  INFLAMMATORY CELLS AND RARE EPITHELIAL GROUP.        Slide(s) initially screened by a Cytotechnologist at [place] [address]      Electronically Signed Out By GABINO GARCES MD    By the signature on this report, the individual or group listed as  making the  Final Interpretation/Diagnosis certifies that they have reviewed this case.  Slide(s) initially screened by a Cytotechnologist at [facility]     Clinical History      Clinical Diagnosis History: Cyst of right breast - (N60.01)   Source of Specimen  A:  FINE NEEDLE ASPIRATION BREAST - RIGHT     Specimen Submitted as:  A:   FINE NEEDLE ASPIRATION BREAST - RIGHT        Pap non-gyn ThinPrep slide, CELL BLOCK, H&E, Initial    Gross Description  A.   FINE NEEDLE ASPIRATION BREAST - RIGHT:  35cc MILKY YELLOW NEEDLE RINSE IN  CYTOLYT                 [facility]  Department of Pathology  [address] [place] [address]        PATHREP  09/09/2019     Name RONALD LENNON                                                                                                   Accession #: [id number]            Pathologist:                   YUE FIGUEROA MD  Date of Procedure:    9/9/2019  Date Received:          9/9/2019  Date Reported           9/13/2019  Submitting Physician:   SALIMA WHEELER MD  Location:                    [facility]  Other External #                                                                    FINAL DIAGNOSIS  A.  BIOPSY OF ANAL CANAL:  MINUTE FRAGMENTS OF SOFT TISSUE WITH NO SIGNIFICANT  PATHOLOGICAL FINDINGS, SEE NOTE.    Note: Deeper sections were obtained.                                                                                                                                                                                                                                                                                                                                                                                                                                                                                       Electronically Signed Out By YUE FIGUEROA MD/ANASTASIYA  By the signature on this report, the individual or group listed as  "making the  Final Interpretation/Diagnosis certifies that they have reviewed this case.           Clinical History:  Colorectal cancer, squamous cell, status post chemo radiation    Specimens Submitted As:  A: BIOPSY OF ANAL CANAL     Gross Description:  Received in formalin, labeled with the patient's name and hospital number and  \"anal canal\", are multiple fragments of tan, soft tissue aggregating to 1.7 x  1.0 x 0.1 cm. The specimen is submitted in toto in one cassette.  CJN    cjn/9/9/2019               [facility]  Department of Pathology   [id number] [place] [id number]        COMDX  05/20/2024     Case reviewed at ENT consensus conference via [employer] on 5/22/2024.       Assessment/Plan   Ms. Campuzano is 79 y/o female with recent diagnosis of laryngeal cancer who is referred to  med onc  to discuss treatment plan.    # Laryngeal cancer (T3N0M0)  -Based on PET/CT from 4/3/24, there was uptake in LLL nodule, however, biopsy of LLL nodule showed negative for malignancy  -seen by Thoracic surgeon, plan to have repeat CT chest without contrast in 3 month  -since her GFR is 40 (no hearing deficit), she is not a candidate for cisplatin, will treat her with weekly carboplatin and paclitaxel, reviewed chemo related side effects with patient in detail.  Consent obtained.  -pt developed abdominal pain post C1, during C2 infusion of paclitaxel,  she experienced worsening of abdominal pain, pt wanted to stop chemo on 7/9 and also didn't want further chemotherapy.  -7/10/24: discussed with patient about changing treatment to cetuximab. Explained to patient about main side effects (skin rash and hypomagnesemia), she is good with the plan.  I couldn't place consent due to epic issue, I will place it later.  - Pt consented by Dr. Burkitt 7/11/24  - Pt tolerated her C1 Cetuximab tx well.  Intermittent diarrhea managed with PRN Imodium       7/2/24 - C1 " Carboplatin/Paciltaxel  7/9/24 - pt declined C2 carboplatin/paciltaxel  7/15/24 - [employer] mediport placed   7/16/24 - C1 Cetuximab  7/23/24 - scheduled C2 Cetuximab    # Hx of anal, breast and cutaneous malignancies  -s/p lumpectomy with radiation in 2012  -s/p radiation to anal cancer in 2018  -s/p resection of skin cancer on nose in 2010    # Chronic diarrhea   - from previous colonic surgery  -3-4 episodes per day, while on 3 imodium three times a day, 3-4 episodes of diarrhea daily.  - Concern for flares with chemotherapy tx's  - Tolerated C1 Carboplatin/Paciltaxel  - Currently managed with Creon and Questran - stools forms, soft  - 7/22:  Following C1 Cetuximab, intermittent diarrhea - managed with PRN Imodium   - ongoing monitoring    # Back pain, area between her shoulder blades, more so on the left.  Has worsened over the last few weeks.  Pain with activity.  Resolves with rest.  No acute injury.  No falls.    - Will order MRI C & T (+/-) next available.  MRI's scheduled 7/8/24.    - 7/8:  Cervical, Thoracic Spine: Diffuse soft tissue thickening and enhancement of the glottic larynx extending into the subglottic larynx and involving the left cricoid, arytenoid, and thyroid cartilages, consistent with known tumor.  No evidence of metastatic disease within the cervical or thoracic  spine.   - Currently managed with routine Tylenol 1,000mg/daily  - Supportive onc referral placed.  Pt to return sup onc call.  - 7/22:  No current c/o back pain      # Hypomagnesia: 7/2/24 Mg 1.41  - Will start oral Mag Oxide - 1 tab/day, Rx sent (not covered by pt's insurance)  - Will replete with IV hydration visit 7/5/24.  - If consistently low, will replete weekly via IV   - 7/8/24 - Mg 1.71.    - 7/15/24 - Mg 1.31 - 2g Mg prior to 7/16 infusion   - 7/22/24 - Mg 1.42 - 2g Mg prior to 7/23 infusion     # Mediport placement, per pt request  - IR consult order in place  - INR lab completed.  - 7/8:  Encouraged pt to return [facility]  [place] call to schedule placement.    - 7/15:  [employer] mediport placed  - EMLA Rx sent 7/22/24     # Elevated Cr/BUN 1/24/24  - at/near baseline  - 1.17 7/8/24  - 1.43/25 7/15/24   - 1.05/30 7/22/24 - encouraged fluids   - ongoing monitoring    # Rx's  - Rx's sent to pt's pharmacy 7/15  - Spouse reports not all meds available today, expect to have all meds on 7/16.  Meds to be picked-up on 7/16/24.  Spouse waiting for all meds to be available, prior to pick-up.    - 7/22:  encouraged pt to apply Hydrocortisone ointment per tx plan instructions.  No current skin concerns.      PLAN  - Will change treatment to Cetuximab starting 7/16  - IV hydration - each Friday  - Weekly NP visits during CRT tx.

## 2024-07-23 ENCOUNTER — INFUSION (OUTPATIENT)
Dept: HEMATOLOGY/ONCOLOGY | Facility: CLINIC | Age: 79
End: 2024-07-23
Payer: MEDICARE

## 2024-07-23 ENCOUNTER — NUTRITION (OUTPATIENT)
Dept: HEMATOLOGY/ONCOLOGY | Facility: CLINIC | Age: 79
End: 2024-07-23
Payer: MEDICARE

## 2024-07-23 ENCOUNTER — HOSPITAL ENCOUNTER (OUTPATIENT)
Dept: RADIATION ONCOLOGY | Facility: CLINIC | Age: 79
Setting detail: RADIATION/ONCOLOGY SERIES
Discharge: HOME | End: 2024-07-23
Payer: MEDICARE

## 2024-07-23 VITALS
OXYGEN SATURATION: 99 % | DIASTOLIC BLOOD PRESSURE: 71 MMHG | WEIGHT: 138.67 LBS | RESPIRATION RATE: 16 BRPM | SYSTOLIC BLOOD PRESSURE: 135 MMHG | HEIGHT: 62 IN | TEMPERATURE: 95 F | HEART RATE: 68 BPM | BODY MASS INDEX: 25.52 KG/M2

## 2024-07-23 DIAGNOSIS — Z51.0 ENCOUNTER FOR ANTINEOPLASTIC RADIATION THERAPY: ICD-10-CM

## 2024-07-23 DIAGNOSIS — C32.0 MALIGNANT NEOPLASM OF GLOTTIS (MULTI): ICD-10-CM

## 2024-07-23 DIAGNOSIS — C32.0 SQUAMOUS CELL CARCINOMA OF LEFT VOCAL CORD (MULTI): ICD-10-CM

## 2024-07-23 PROBLEM — R79.9 ELEVATED BUN: Status: ACTIVE | Noted: 2024-07-23

## 2024-07-23 LAB
RAD ONC MSQ ACTUAL FRACTIONS DELIVERED: 12
RAD ONC MSQ ACTUAL SESSION DELIVERED DOSE: 200 CGRAY
RAD ONC MSQ ACTUAL TOTAL DOSE: 2400 CGRAY
RAD ONC MSQ ELAPSED DAYS: 20
RAD ONC MSQ LAST DATE: NORMAL
RAD ONC MSQ PRESCRIBED FRACTIONAL DOSE: 200 CGRAY
RAD ONC MSQ PRESCRIBED NUMBER OF FRACTIONS: 35
RAD ONC MSQ PRESCRIBED TECHNIQUE: NORMAL
RAD ONC MSQ PRESCRIBED TOTAL DOSE: 7000 CGRAY
RAD ONC MSQ PRESCRIPTION PATTERN COMMENT: NORMAL
RAD ONC MSQ START DATE: NORMAL
RAD ONC MSQ TREATMENT COURSE NUMBER: 2
RAD ONC MSQ TREATMENT SITE: NORMAL

## 2024-07-23 PROCEDURE — 77386 HC INTENSITY-MODULATED RADIATION THERAPY (IMRT), COMPLEX: CPT | Performed by: STUDENT IN AN ORGANIZED HEALTH CARE EDUCATION/TRAINING PROGRAM

## 2024-07-23 PROCEDURE — 2500000004 HC RX 250 GENERAL PHARMACY W/ HCPCS (ALT 636 FOR OP/ED): Performed by: NURSE PRACTITIONER

## 2024-07-23 PROCEDURE — 77336 RADIATION PHYSICS CONSULT: CPT | Performed by: STUDENT IN AN ORGANIZED HEALTH CARE EDUCATION/TRAINING PROGRAM

## 2024-07-23 PROCEDURE — 2500000004 HC RX 250 GENERAL PHARMACY W/ HCPCS (ALT 636 FOR OP/ED): Performed by: INTERNAL MEDICINE

## 2024-07-23 PROCEDURE — 96375 TX/PRO/DX INJ NEW DRUG ADDON: CPT | Mod: INF

## 2024-07-23 PROCEDURE — 96413 CHEMO IV INFUSION 1 HR: CPT

## 2024-07-23 PROCEDURE — 96367 TX/PROPH/DG ADDL SEQ IV INF: CPT

## 2024-07-23 RX ORDER — MAGNESIUM SULFATE HEPTAHYDRATE 40 MG/ML
4 INJECTION, SOLUTION INTRAVENOUS ONCE AS NEEDED
OUTPATIENT
Start: 2024-08-13

## 2024-07-23 RX ORDER — PROCHLORPERAZINE MALEATE 10 MG
10 TABLET ORAL EVERY 6 HOURS PRN
Status: DISCONTINUED | OUTPATIENT
Start: 2024-07-23 | End: 2024-07-23 | Stop reason: HOSPADM

## 2024-07-23 RX ORDER — PROCHLORPERAZINE EDISYLATE 5 MG/ML
10 INJECTION INTRAMUSCULAR; INTRAVENOUS EVERY 6 HOURS PRN
Status: DISCONTINUED | OUTPATIENT
Start: 2024-07-23 | End: 2024-07-23 | Stop reason: HOSPADM

## 2024-07-23 RX ORDER — HEPARIN 100 UNIT/ML
500 SYRINGE INTRAVENOUS AS NEEDED
Status: DISCONTINUED | OUTPATIENT
Start: 2024-07-23 | End: 2024-07-23 | Stop reason: HOSPADM

## 2024-07-23 RX ORDER — FAMOTIDINE 10 MG/ML
20 INJECTION INTRAVENOUS ONCE AS NEEDED
Status: DISCONTINUED | OUTPATIENT
Start: 2024-07-23 | End: 2024-07-23 | Stop reason: HOSPADM

## 2024-07-23 RX ORDER — MAGNESIUM SULFATE HEPTAHYDRATE 40 MG/ML
2 INJECTION, SOLUTION INTRAVENOUS ONCE AS NEEDED
OUTPATIENT
Start: 2024-08-06

## 2024-07-23 RX ORDER — HEPARIN SODIUM,PORCINE/PF 10 UNIT/ML
50 SYRINGE (ML) INTRAVENOUS AS NEEDED
OUTPATIENT
Start: 2024-07-23

## 2024-07-23 RX ORDER — ALBUTEROL SULFATE 0.83 MG/ML
3 SOLUTION RESPIRATORY (INHALATION) AS NEEDED
Status: DISCONTINUED | OUTPATIENT
Start: 2024-07-23 | End: 2024-07-23 | Stop reason: HOSPADM

## 2024-07-23 RX ORDER — MAGNESIUM SULFATE HEPTAHYDRATE 40 MG/ML
2 INJECTION, SOLUTION INTRAVENOUS ONCE AS NEEDED
OUTPATIENT
Start: 2024-08-13

## 2024-07-23 RX ORDER — HEPARIN SODIUM,PORCINE/PF 10 UNIT/ML
50 SYRINGE (ML) INTRAVENOUS AS NEEDED
Status: DISCONTINUED | OUTPATIENT
Start: 2024-07-23 | End: 2024-07-23 | Stop reason: HOSPADM

## 2024-07-23 RX ORDER — HEPARIN 100 UNIT/ML
500 SYRINGE INTRAVENOUS AS NEEDED
OUTPATIENT
Start: 2024-07-23

## 2024-07-23 RX ORDER — EPINEPHRINE 0.3 MG/.3ML
0.3 INJECTION SUBCUTANEOUS EVERY 5 MIN PRN
Status: DISCONTINUED | OUTPATIENT
Start: 2024-07-23 | End: 2024-07-23 | Stop reason: HOSPADM

## 2024-07-23 RX ORDER — MAGNESIUM SULFATE HEPTAHYDRATE 40 MG/ML
4 INJECTION, SOLUTION INTRAVENOUS ONCE AS NEEDED
OUTPATIENT
Start: 2024-08-06

## 2024-07-23 RX ORDER — DIPHENHYDRAMINE HYDROCHLORIDE 50 MG/ML
50 INJECTION INTRAMUSCULAR; INTRAVENOUS AS NEEDED
Status: DISCONTINUED | OUTPATIENT
Start: 2024-07-23 | End: 2024-07-23 | Stop reason: HOSPADM

## 2024-07-23 RX ORDER — MAGNESIUM SULFATE HEPTAHYDRATE 40 MG/ML
2 INJECTION, SOLUTION INTRAVENOUS ONCE AS NEEDED
Status: DISCONTINUED | OUTPATIENT
Start: 2024-07-23 | End: 2024-07-23 | Stop reason: HOSPADM

## 2024-07-23 ASSESSMENT — ENCOUNTER SYMPTOMS
NAUSEA: 1
DIARRHEA: 1
VOMITING: 1
MUSCULOSKELETAL NEGATIVE: 1

## 2024-07-23 ASSESSMENT — PAIN SCALES - GENERAL: PAINLEVEL: 0-NO PAIN

## 2024-07-23 NOTE — PROGRESS NOTES
NUTRITION Follow Up NOTE    Nutrition Assessment     Reason for Visit:  Padilla Campuzano is a 78 y.o. female who presents for nutrition consult 2/2 dx.  DX scc L vocal cord  TX concurrent chemoradiation, chemotherapy changed to Cetuximab 7/16/24. XRT 7/2-8/20  Hydration on Fridays  Hx anal and breast cancer. Chronic diarrhea not related to anal cancer per   7/23- C2 Cetuximab - CNP notes reviewed. Pt seen in infusion with . No new concerns    Lab Results   Component Value Date/Time    GLUCOSE 93 07/22/2024 1357     07/22/2024 1357    K 3.8 07/22/2024 1357     (H) 07/22/2024 1357    CO2 23 07/22/2024 1357    ANIONGAP 12 07/22/2024 1357    BUN 30 (H) 07/22/2024 1357    CREATININE 1.05 07/22/2024 1357    EGFR 54 (L) 07/22/2024 1357    CALCIUM 8.6 07/22/2024 1357    ALBUMIN 3.7 07/22/2024 1357    ALKPHOS 64 07/22/2024 1357    PROT 6.2 (L) 07/22/2024 1357    AST 21 07/22/2024 1357    BILITOT 0.4 07/22/2024 1357    ALT 10 07/22/2024 1357     Lab Results   Component Value Date/Time    VITD25 53 05/01/2024 0904       Anthropometrics:  Weight Change  Weight History / % Weight Change: weight has been relatively stable, down 2# past week        Wt Readings from Last 10 Encounters:   07/23/24 62.9 kg (138 lb 10.7 oz)   07/22/24 62.9 kg (138 lb 12.5 oz)   07/19/24 63.9 kg (140 lb 14 oz)   07/18/24 64.3 kg (141 lb 10.3 oz)   07/16/24 64.7 kg (142 lb 8.4 oz)   07/12/24 64 kg (141 lb 1.5 oz)   07/11/24 64 kg (140 lb 15.8 oz)   07/09/24 65.1 kg (143 lb 6.6 oz)   07/08/24 64.4 kg (141 lb 13.9 oz)   07/05/24 65.1 kg (143 lb 6.9 oz)   7/2- first chemotherapy 65 kg  7/9- 65.1 kg  7/16 - 64.7 kg  7/23- 62.9 kg  Food And Nutrient Intake:  Food and Nutrient History  Food and Nutrient History: feels she is eating well, no change. Feels hungry  Energy Intake: Good > 75 %  Fluid Intake: water, occasional tea, bought cranberry juice - dilutes it with water  GI Symptoms: diarrhea (Isolate episode N/V on Sunday.                                 +Questran and Creon. Diarrhea is intermittent,can come without warning. Has not tried Banatrol Plus again for diarrhea)  GI Symptoms greater than 2 weeks: yes  Oral Problems: odynophagia (hoarse voice. Started using Bliss lozenges. Suggested beginning s/s rinses, states she plans to today. Throat has been sore since January, but has not worsened or changed during tx)  Dentition: lower denture/partial, upper denture/partial     Food Intake  Amount of Food: eating consistent meals. Consumes protein sources like eggs, cottage cheese, yogurt, PNB    Food Preparation  Cooking: Spouse/Significant Other, Patient  Grocery Shopping: Patient, Spouse/Significant Other              Enteral Nutrition Intake  Enteral Nutrition Formula/Solution: had a feeding tube when she had a stroke                        Food Supplement Intake  Oral Nutrition Supplements:  (taking Boost VHC 1/day. Doesn't care for it, but consuming)    Medication and Complementary/Alternative Medicine Use  OTC Medication Use: Biotin, Vitamin D      Nutrition Focused Physical Exam Findings: 7/2/24                          Energy Needs  Estimated Energy Needs  Total Energy Estimated Needs (kCal): 1825 kCal (9440-5584)  Total Estimated Energy Need per Day (kCal/kg): 28 kCal/kg (28-30)  Estimated Fluid Needs  Total Fluid Estimated Needs (mL): 1825 mL  Method for Estimating Needs: 1 ml/kcal  Estimated Protein Needs  Total Protein Estimated Needs (g): 78 g (78-85)  Total Protein Estimated Needs (g/kg): 1.2 g/kg (1.2-1.3)        Nutrition Diagnosis   Malnutrition Diagnosis  Patient has Malnutrition Diagnosis: No    Nutrition Diagnosis  Patient has Nutrition Diagnosis: Yes  Diagnosis Status (1): Ongoing  Nutrition Diagnosis 1: Predicted inadequate energy intake  Related to (1): pathophysiology of disease  As Evidenced by (1): high risk for nutrition impact symptoms impairing ability to meet estimated energy needs, affect oral intake and weight  status    Nutrition Interventions/Recommendations   Nutrition Prescription  Individualized Nutrition Prescription Provided for : diet during cancer treatment    Food and Nutrition Delivery  Food and Nutrition Delivery  Meals & Snacks: Energy-modified diet, Modify Composition of Meals/Snacks, Protein-modified diet, Fluid-modified diet  Goals: incorporate soft high calorie high protein foods modified texture as needed, moist foods as needed. Include consistent meals and snacks. Avoid dry harsh  foods and acidic foods/beverages. Include adequate fluids  Medical Food Supplement: Commercial beverage (continue Boost VHC recommend 2/day. Suggested adding ice cream, chocolate syrup or fruit. Explained though she is eating food, she need the additional calories and protein provided from [facility]. Suggested Ensure Clear as something to add in, she likes juice)    Nutrition Education - provided 7/22       Coordination of Care     7/9- Explained Banatrol Plus is banana flakes with prebiotics, so food based. She will try again to see if helps with diarrhea  7/2- Explained calorie and protein needs are increased with diagnosis and treatment. The importance of maintaining weight, adequate oral intake and fluids. Explained the role of protein, examples of protein sources provided and how to work into diet.    Circled recipe for s/s rinses in Eating Hints book, encouraged to start  Provided information on Medtrition Banatrol Plus which may be helpful to add to regime for diarrhea      Nutrition Monitoring and Evaluation   Food/Nutrient Related History Monitoring  Monitoring and Evaluation Plan: Energy intake, Meal/snack pattern, Protein intake, Fluid intake  Energy Intake: Estimated energy intake  Criteria: Meet >75% estimated energy needs  Fluid Intake: Estimated fluid intake  Criteria: maintain hydration  Meal/Snack Pattern: Estimated meal and snack pattern  Criteria: 4-6 meals/snacks daily  Estimated protein intake: Estimated protein  intake  Criteria: include high protein foods with meals and/or snacks 75% of meals  Body Composition/Growth/Weight History  Monitoring and Evaluation Plan: Weight  Weight: Measured weight  Criteria: maintain weight    Following through treatment  Contact information provided     Time Spent  Prep time on day of patient encounter: 5 minutes  Time spent directly with patient, family or caregiver: 15 minutes  Additional Time Spent on Patient Care Activities: 0 minutes  Documentation Time: 15 minutes  Other Time Spent: 0 minutes  Total: 35 minutes              Readiness to Change : Good  Level of Understanding : Good  Anticipated Compliant : Good

## 2024-07-24 ENCOUNTER — HOSPITAL ENCOUNTER (OUTPATIENT)
Dept: RADIATION ONCOLOGY | Facility: CLINIC | Age: 79
Setting detail: RADIATION/ONCOLOGY SERIES
Discharge: HOME | End: 2024-07-24
Payer: MEDICARE

## 2024-07-24 DIAGNOSIS — Z51.0 ENCOUNTER FOR ANTINEOPLASTIC RADIATION THERAPY: ICD-10-CM

## 2024-07-24 DIAGNOSIS — C32.0 MALIGNANT NEOPLASM OF GLOTTIS (MULTI): ICD-10-CM

## 2024-07-24 LAB
RAD ONC MSQ ACTUAL FRACTIONS DELIVERED: 13
RAD ONC MSQ ACTUAL SESSION DELIVERED DOSE: 200 CGRAY
RAD ONC MSQ ACTUAL TOTAL DOSE: 2600 CGRAY
RAD ONC MSQ ELAPSED DAYS: 21
RAD ONC MSQ LAST DATE: NORMAL
RAD ONC MSQ PRESCRIBED FRACTIONAL DOSE: 200 CGRAY
RAD ONC MSQ PRESCRIBED NUMBER OF FRACTIONS: 35
RAD ONC MSQ PRESCRIBED TECHNIQUE: NORMAL
RAD ONC MSQ PRESCRIBED TOTAL DOSE: 7000 CGRAY
RAD ONC MSQ PRESCRIPTION PATTERN COMMENT: NORMAL
RAD ONC MSQ START DATE: NORMAL
RAD ONC MSQ TREATMENT COURSE NUMBER: 2
RAD ONC MSQ TREATMENT SITE: NORMAL

## 2024-07-24 PROCEDURE — 77386 HC INTENSITY-MODULATED RADIATION THERAPY (IMRT), COMPLEX: CPT | Performed by: STUDENT IN AN ORGANIZED HEALTH CARE EDUCATION/TRAINING PROGRAM

## 2024-07-25 ENCOUNTER — RADIATION ONCOLOGY OTV (OUTPATIENT)
Dept: RADIATION ONCOLOGY | Facility: CLINIC | Age: 79
End: 2024-07-25
Payer: MEDICARE

## 2024-07-25 ENCOUNTER — RADIATION ONCOLOGY OTV (OUTPATIENT)
Dept: RADIATION ONCOLOGY | Facility: CLINIC | Age: 79
End: 2024-07-25

## 2024-07-25 ENCOUNTER — HOSPITAL ENCOUNTER (OUTPATIENT)
Dept: RADIATION ONCOLOGY | Facility: CLINIC | Age: 79
Setting detail: RADIATION/ONCOLOGY SERIES
Discharge: HOME | End: 2024-07-25
Payer: MEDICARE

## 2024-07-25 VITALS
BODY MASS INDEX: 25.12 KG/M2 | WEIGHT: 138.45 LBS | TEMPERATURE: 97.9 F | SYSTOLIC BLOOD PRESSURE: 167 MMHG | OXYGEN SATURATION: 96 % | HEART RATE: 85 BPM | DIASTOLIC BLOOD PRESSURE: 73 MMHG | RESPIRATION RATE: 16 BRPM

## 2024-07-25 DIAGNOSIS — Z51.0 ENCOUNTER FOR ANTINEOPLASTIC RADIATION THERAPY: ICD-10-CM

## 2024-07-25 DIAGNOSIS — C32.0 MALIGNANT NEOPLASM OF GLOTTIS (MULTI): ICD-10-CM

## 2024-07-25 LAB
RAD ONC MSQ ACTUAL FRACTIONS DELIVERED: 14
RAD ONC MSQ ACTUAL SESSION DELIVERED DOSE: 200 CGRAY
RAD ONC MSQ ACTUAL TOTAL DOSE: 2800 CGRAY
RAD ONC MSQ ELAPSED DAYS: 22
RAD ONC MSQ LAST DATE: NORMAL
RAD ONC MSQ PRESCRIBED FRACTIONAL DOSE: 200 CGRAY
RAD ONC MSQ PRESCRIBED NUMBER OF FRACTIONS: 35
RAD ONC MSQ PRESCRIBED TECHNIQUE: NORMAL
RAD ONC MSQ PRESCRIBED TOTAL DOSE: 7000 CGRAY
RAD ONC MSQ PRESCRIPTION PATTERN COMMENT: NORMAL
RAD ONC MSQ START DATE: NORMAL
RAD ONC MSQ TREATMENT COURSE NUMBER: 2
RAD ONC MSQ TREATMENT SITE: NORMAL

## 2024-07-25 PROCEDURE — 77386 HC INTENSITY-MODULATED RADIATION THERAPY (IMRT), COMPLEX: CPT | Performed by: STUDENT IN AN ORGANIZED HEALTH CARE EDUCATION/TRAINING PROGRAM

## 2024-07-25 ASSESSMENT — PAIN SCALES - GENERAL: PAINLEVEL: 3

## 2024-07-25 ASSESSMENT — ENCOUNTER SYMPTOMS
LOSS OF SENSATION IN FEET: 0
DEPRESSION: 0
OCCASIONAL FEELINGS OF UNSTEADINESS: 0

## 2024-07-25 NOTE — PROGRESS NOTES
Radiation Oncology On Treatment Visit    Patient Name:  Padilla Campuzano  MRN:  [id number]  :  1945    Referring Provider: No ref. provider found  Primary Care Provider: Naheed Clements MD  Care Team: Patient Care Team:  Naheed Clements MD as PCP - General (Internal Medicine)  Naheed Clements MD as PCP - [facility] Attributed Provider  DO Carroll Calabrese MD as Consulting Physician (Hematology and Oncology)  Kyunghee Burkitt, DO as Consulting Physician (Hematology and Oncology)  Eden Moss MD as Medical Oncologist (Hematology and Oncology)    Date of Service: 2024     Diagnosis:   Specialty Problems          Radiation Oncology Problems    Breast cancer (Multi)        Malignant neoplasm of colon (Multi)        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 Has some mild odynophagia but does not want any additional medications at this time. Continues to tolerate mostly soft p.o. diet and boost. No significant mucositis on exam.      OBJECTIVE:   Vital Signs:  /73 (BP Location: Left arm, Patient Position: Sitting, BP Cuff Size: Large adult)   Pulse 85   Temp 36.6 °C (97.9 °F) (Temporal)   Resp 16   Wt 62.8 kg (138 lb 7.2 oz)   SpO2 96%   BMI 25.12 kg/m²    Pain Scale: The patient's current pain level was assessed.  They report currently having a pain of 3 out of 10.    Other Pertinent Findings:   Wt Readings from Last 10 Encounters:   24 62.8 kg (138 lb 7.2 oz)   24 62.9 kg (138 lb 10.7 oz)   24 62.9 kg (138 lb 12.5 oz)   24 63.9 kg (140 lb 14 oz)   24 64.3 kg (141 lb 10.3 oz)   24 64.7 kg (142 lb 8.4 oz)   24 64 kg (141 lb 1.5 oz)   24  64 kg (140 lb 15.8 oz)   07/09/24 65.1 kg (143 lb 6.6 oz)   07/08/24 64.4 kg (141 lb 13.9 oz)         Toxicity Assessment          7/11/2024    13:38 7/18/2024    13:45 7/25/2024    13:50   Toxicity Assessment   Treatment  and neck Head and neck Head and neck   Anorexia Grade 0 Grade 0       eating pretty much what she wants and 1 Boost/day Grade 1       eating soft foods and 1 Boost/day   Anxiety Grade 0 Grade 1 Grade 0   Dehydration Grade 0 Grade 0 Grade 0   Depression Grade 1 Grade 1 Grade 0   Dermatitis Radiation Grade 0 Grade 0 Grade 0       Using Udderly smooth once a day   Diarrhea Grade 1       last night Grade 1       baseline Grade 1       baseline   Fatigue Grade 1 Grade 1       naps during the day and goes to bed early Grade 1       naps during the day   Fibrosis Deep Connective Tissue Grade 0 Grade 0 Grade 0   Fracture Grade 0 Grade 0 Grade 0   Nausea Grade 0 Grade 0 Grade 0   Pain Grade 0 Grade 2       pain level 7 to back--takes Tylenol with some relief Grade 1       pain level 3 to abdomen   Treatment Related Secondary Malignancy Grade 0 Grade 0 Grade 0   Tumor Pain Grade 0 Grade 0 Grade 0   Vomiting Grade 0 Grade 0 Grade 0   Dysphagia Grade 0 Grade 0 Grade 1   Esophagitis Grade 0 Grade 1 Grade 1   Mucositis Oral Grade 0       instructed patient to start salt and soda rinses and Blis losenges Grade 0       using Blis lozenges and salt and soda rinses Grade 0       using Blis lozenges and salt and soda rinses   Hearing Impaired Grade 0 Grade 0 Grade 0   Blurred Vision Grade 0 Grade 0 Grade 0   Dry Eye Grade 1 Grade 0 Grade 0   Eye Pain Grade 0 Grade 0 Grade 0   Retinopathy Grade 0 Grade 0 Grade 0   Central Nervous System Necrosis Grade 0 Grade 0 Grade 0   Edema Cerebral Grade 0 Grade 0 Grade 0   Dry Mouth Grade 0 Grade 0 Grade 1   Pneumonitis Grade 0 Grade 0 Grade 0   Febrile Neutropenia Grade 0 Grade 0 Grade 0   Ear Pain Grade 0 Grade 0 Grade 0   External Ear Pain Grade 0 Grade 0 Grade 0    Tinnitus Grade 0 Grade 0 Grade 0   Watering Eyes Grade 0 Grade 0 Grade 0   Oral Pain Grade 0 Grade 0 Grade 0   Salivary Duct Inflammation Grade 0 Grade 0 Grade 1   Edema Face Grade 0 Grade 0 Grade 0   Malaise Grade 0 Grade 0 Grade 0   Neck Edema Grade 0 Grade 0 Grade 0   Corneal Infection Grade 0 Grade 0 Grade 0   Mucosal Infection Grade 0 Grade 0 Grade 0   Otitis Media Grade 0 Grade 0 Grade 0   Sepsis Grade 0 Grade 0 Grade 0   Sinusitis Grade 0 Grade 0 Grade 0   Skin Infection Grade 0 Grade 0 Grade 0   Soft Tissue Infection Grade 0 Grade 0 Grade 0   Head Soft Tissue Necrosis Grade 0 Grade 0 Grade 0   Neck Soft Tissue Necrosis Grade 0 Grade 0 Grade 0   Osteonecrosis of Jaw Grade 0 Grade 0 Grade 0   Superficial Soft Tissue Fibrosis Grade 0 Grade 0 Grade 0   Trismus Grade 0 Grade 0 Grade 0   Dysarthria Grade 0 Grade 0 Grade 0   Dysesthesia Grade 0 Grade 0 Grade 0   Dysgeusia Grade 0 Grade 0 Grade 1   Facial Nerve Disorder Grade 0 Grade 0 Grade 0   Hypoglossal Nerve Disorder Grade 0 Grade 0 Grade 0   Oculomotor Nerve Disorder Grade 0 Grade 0 Grade 0   Paresthesia Grade 0 Grade 0 Grade 0   Stroke Grade 0 Grade 0 Grade 0   Transient Ischemic Attacks Grade 0 Grade 0 Grade 0   Trigeminal Nerve Disorder Grade 0 Grade 0 Grade 0   Aspiration Grade 0 Grade 0 Grade 0   Hoarseness Grade 1 Grade 2 Grade 2   Laryngeal Edema Grade 0 Grade 0 Grade 0   Stridor Grade 0 Grade 0 Grade 0   Tracheal Mucositis Grade 0 Grade 0 Grade 0   Voice Alteration Grade 1 Grade 2 Grade 2        Assessment / Plan:  The patient is tolerating radiation therapy as anticipated.  Continue per current treatment plan.

## 2024-07-26 ENCOUNTER — APPOINTMENT (OUTPATIENT)
Dept: HEMATOLOGY/ONCOLOGY | Facility: CLINIC | Age: 79
End: 2024-07-26
Payer: MEDICARE

## 2024-07-26 ENCOUNTER — HOSPITAL ENCOUNTER (OUTPATIENT)
Dept: RADIATION ONCOLOGY | Facility: CLINIC | Age: 79
Setting detail: RADIATION/ONCOLOGY SERIES
Discharge: HOME | End: 2024-07-26
Payer: MEDICARE

## 2024-07-26 DIAGNOSIS — Z51.0 ENCOUNTER FOR ANTINEOPLASTIC RADIATION THERAPY: ICD-10-CM

## 2024-07-26 DIAGNOSIS — C32.0 MALIGNANT NEOPLASM OF GLOTTIS (MULTI): ICD-10-CM

## 2024-07-26 LAB
RAD ONC MSQ ACTUAL FRACTIONS DELIVERED: 15
RAD ONC MSQ ACTUAL SESSION DELIVERED DOSE: 200 CGRAY
RAD ONC MSQ ACTUAL TOTAL DOSE: 3000 CGRAY
RAD ONC MSQ ELAPSED DAYS: 23
RAD ONC MSQ LAST DATE: NORMAL
RAD ONC MSQ PRESCRIBED FRACTIONAL DOSE: 200 CGRAY
RAD ONC MSQ PRESCRIBED NUMBER OF FRACTIONS: 35
RAD ONC MSQ PRESCRIBED TECHNIQUE: NORMAL
RAD ONC MSQ PRESCRIBED TOTAL DOSE: 7000 CGRAY
RAD ONC MSQ PRESCRIPTION PATTERN COMMENT: NORMAL
RAD ONC MSQ START DATE: NORMAL
RAD ONC MSQ TREATMENT COURSE NUMBER: 2
RAD ONC MSQ TREATMENT SITE: NORMAL

## 2024-07-26 PROCEDURE — 77386 HC INTENSITY-MODULATED RADIATION THERAPY (IMRT), COMPLEX: CPT | Performed by: STUDENT IN AN ORGANIZED HEALTH CARE EDUCATION/TRAINING PROGRAM

## 2024-07-29 ENCOUNTER — INFUSION (OUTPATIENT)
Dept: HEMATOLOGY/ONCOLOGY | Facility: CLINIC | Age: 79
End: 2024-07-29
Payer: MEDICARE

## 2024-07-29 ENCOUNTER — HOSPITAL ENCOUNTER (OUTPATIENT)
Dept: RADIATION ONCOLOGY | Facility: CLINIC | Age: 79
Setting detail: RADIATION/ONCOLOGY SERIES
Discharge: HOME | End: 2024-07-29
Payer: MEDICARE

## 2024-07-29 ENCOUNTER — OFFICE VISIT (OUTPATIENT)
Dept: HEMATOLOGY/ONCOLOGY | Facility: CLINIC | Age: 79
End: 2024-07-29
Payer: MEDICARE

## 2024-07-29 VITALS
WEIGHT: 134.59 LBS | DIASTOLIC BLOOD PRESSURE: 64 MMHG | SYSTOLIC BLOOD PRESSURE: 120 MMHG | HEART RATE: 82 BPM | RESPIRATION RATE: 18 BRPM | BODY MASS INDEX: 24.42 KG/M2 | TEMPERATURE: 96.7 F | OXYGEN SATURATION: 97 %

## 2024-07-29 DIAGNOSIS — C32.0 SQUAMOUS CELL CARCINOMA OF LEFT VOCAL CORD (MULTI): ICD-10-CM

## 2024-07-29 DIAGNOSIS — Z51.0 ENCOUNTER FOR ANTINEOPLASTIC RADIATION THERAPY: ICD-10-CM

## 2024-07-29 DIAGNOSIS — C32.0 MALIGNANT NEOPLASM OF GLOTTIS (MULTI): ICD-10-CM

## 2024-07-29 DIAGNOSIS — R79.9 ELEVATED BUN: Primary | ICD-10-CM

## 2024-07-29 DIAGNOSIS — I10 HYPERTENSION, UNSPECIFIED TYPE: ICD-10-CM

## 2024-07-29 DIAGNOSIS — E83.42 HYPOMAGNESEMIA: ICD-10-CM

## 2024-07-29 DIAGNOSIS — Z51.11 ENCOUNTER FOR ANTINEOPLASTIC CHEMOTHERAPY: ICD-10-CM

## 2024-07-29 LAB
ALBUMIN SERPL BCP-MCNC: 3.7 G/DL (ref 3.4–5)
ALP SERPL-CCNC: 78 U/L (ref 33–136)
ALT SERPL W P-5'-P-CCNC: 10 U/L (ref 7–45)
ANION GAP SERPL CALC-SCNC: 14 MMOL/L (ref 10–20)
AST SERPL W P-5'-P-CCNC: 22 U/L (ref 9–39)
BILIRUB SERPL-MCNC: 0.5 MG/DL (ref 0–1.2)
BUN SERPL-MCNC: 34 MG/DL (ref 6–23)
CALCIUM SERPL-MCNC: 9 MG/DL (ref 8.6–10.3)
CHLORIDE SERPL-SCNC: 110 MMOL/L (ref 98–107)
CO2 SERPL-SCNC: 23 MMOL/L (ref 21–32)
CREAT SERPL-MCNC: 1.17 MG/DL (ref 0.5–1.05)
EGFRCR SERPLBLD CKD-EPI 2021: 48 ML/MIN/1.73M*2
GLUCOSE SERPL-MCNC: 105 MG/DL (ref 74–99)
MAGNESIUM SERPL-MCNC: 1.52 MG/DL (ref 1.6–2.4)
POTASSIUM SERPL-SCNC: 3.8 MMOL/L (ref 3.5–5.3)
PROT SERPL-MCNC: 6.3 G/DL (ref 6.4–8.2)
RAD ONC MSQ ACTUAL FRACTIONS DELIVERED: 16
RAD ONC MSQ ACTUAL SESSION DELIVERED DOSE: 200 CGRAY
RAD ONC MSQ ACTUAL TOTAL DOSE: 3200 CGRAY
RAD ONC MSQ ELAPSED DAYS: 26
RAD ONC MSQ LAST DATE: NORMAL
RAD ONC MSQ PRESCRIBED FRACTIONAL DOSE: 200 CGRAY
RAD ONC MSQ PRESCRIBED NUMBER OF FRACTIONS: 35
RAD ONC MSQ PRESCRIBED TECHNIQUE: NORMAL
RAD ONC MSQ PRESCRIBED TOTAL DOSE: 7000 CGRAY
RAD ONC MSQ PRESCRIPTION PATTERN COMMENT: NORMAL
RAD ONC MSQ START DATE: NORMAL
RAD ONC MSQ TREATMENT COURSE NUMBER: 2
RAD ONC MSQ TREATMENT SITE: NORMAL
SODIUM SERPL-SCNC: 143 MMOL/L (ref 136–145)

## 2024-07-29 PROCEDURE — 80053 COMPREHEN METABOLIC PANEL: CPT | Performed by: NURSE PRACTITIONER

## 2024-07-29 PROCEDURE — 83735 ASSAY OF MAGNESIUM: CPT | Performed by: NURSE PRACTITIONER

## 2024-07-29 PROCEDURE — 1126F AMNT PAIN NOTED NONE PRSNT: CPT | Performed by: NURSE PRACTITIONER

## 2024-07-29 PROCEDURE — 36591 DRAW BLOOD OFF VENOUS DEVICE: CPT

## 2024-07-29 PROCEDURE — 2500000004 HC RX 250 GENERAL PHARMACY W/ HCPCS (ALT 636 FOR OP/ED): Performed by: NURSE PRACTITIONER

## 2024-07-29 PROCEDURE — 77386 HC INTENSITY-MODULATED RADIATION THERAPY (IMRT), COMPLEX: CPT | Performed by: STUDENT IN AN ORGANIZED HEALTH CARE EDUCATION/TRAINING PROGRAM

## 2024-07-29 PROCEDURE — 99214 OFFICE O/P EST MOD 30 MIN: CPT | Performed by: NURSE PRACTITIONER

## 2024-07-29 RX ORDER — HEPARIN 100 UNIT/ML
500 SYRINGE INTRAVENOUS AS NEEDED
Status: CANCELLED | OUTPATIENT
Start: 2024-07-29

## 2024-07-29 RX ORDER — LOSARTAN POTASSIUM 25 MG/1
25 TABLET ORAL DAILY
Qty: 90 TABLET | Refills: 0 | OUTPATIENT
Start: 2024-07-29

## 2024-07-29 RX ORDER — HEPARIN 100 UNIT/ML
500 SYRINGE INTRAVENOUS AS NEEDED
Status: DISCONTINUED | OUTPATIENT
Start: 2024-07-29 | End: 2024-07-29 | Stop reason: HOSPADM

## 2024-07-29 RX ORDER — HEPARIN SODIUM,PORCINE/PF 10 UNIT/ML
50 SYRINGE (ML) INTRAVENOUS AS NEEDED
Status: CANCELLED | OUTPATIENT
Start: 2024-07-29

## 2024-07-29 ASSESSMENT — ENCOUNTER SYMPTOMS
NECK PAIN: 0
NAUSEA: 1
VOICE CHANGE: 0
MUSCULOSKELETAL NEGATIVE: 1
VOMITING: 1
FATIGUE: 1
BACK PAIN: 0
DIARRHEA: 1
NEUROLOGICAL NEGATIVE: 1
CARDIOVASCULAR NEGATIVE: 1

## 2024-07-29 ASSESSMENT — PAIN SCALES - GENERAL: PAINLEVEL: 0-NO PAIN

## 2024-07-29 NOTE — PROGRESS NOTES
Patient ID: Padilla Campuzano is a 78 y.o. female.  Diagnosis: Laryngeal cancer  Staging: T3N0M0  Date of Diagnosis: 5/22/24    Providers:  ENT Surgeon: Dr. Ferny Rhoades  [facility]:   Dr. Kyunghee Burkitt/Megan Arauz APRARNEL  [facility]: Dr. Sue Oliva    Ms. Campuzano is 79 y/o female with recent diagnosis of laryngeal cancer who is referred to  med onc  to discuss treatment plan.    -2/2024: pt presented with hoarse voice  -3/21/24: CT chest showed left pulmonary nodule  -4/3/24: PET/CT showed uptake in left vocal cord extending  to the right with limited subglottic extension  -4/23/24: biopsy of LLL nodule showed negative for malignancy  -5/7/24: seen by thoracic surgeon Dr. Linda, plan is to follow up image in 3 months  -5/20/24: s/p direct laryngoscopy. She was found to have transglottic lesion extending from primarily the left glottis into the subglottis and wrapping around anteriorly. 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)  No date: Breast cancer (Multi)  2012: Cerebral hemorrhage (Multi)  No date: CKD (chronic kidney disease)  No date: Colon cancer (Multi)  No date: Colorectal cancer (Multi)  No date: Diverticulosis of intestine, part unspecified, without   perforation or abscess without bleeding      Comment:  Diverticulosis  No date: GERD (gastroesophageal reflux disease)  No date: Hypertension  No date: Irritable bowel syndrome  No date: Kidney disease  No date: Malignant neoplasm of colon, unspecified (Multi)      Comment:  Colon cancer  No date: Personal history of colonic polyps      Comment:  History of colonic polyps  No date: Personal history of irradiation  No date: Personal history of malignant neoplasm of breast      Comment:  History of malignant neoplasm of female breast  No date: Personal history of other diseases of the respiratory system      Comment:  History of respiratory failure  No date: Personal history of other malignant  neoplasm of skin      Comment:  Personal history of malignant neoplasm of skin  2016: Right upper quadrant pain      Comment:  Abdominal pain, RUQ (right upper quadrant)  2012: Stroke (Multi)  2016: Unspecified symptoms and signs involving the   genitourinary system      Comment:  Urinary symptom or sign   Surgical History:    Past Surgical History:   Procedure Laterality Date    BI BREAST RIGHT CYST ASPIRATION Right 2019    BI BREAST CYST ASPIRATION RIGHT [facility]    BREAST LUMPECTOMY  2014    Breast Surgery Lumpectomy    CHOLECYSTECTOMY      CT GUIDED IMAGING FOR ABSCESS DRAIN  2015    CT GUIDED IMAGING FOR ABSCESS DRAIN [facility] INPATIENT LEGACY    HYSTERECTOMY  1984    Hysterectomy    ILEOSTOMY  2015    Ileostomy    ILEOSTOMY CLOSURE  2015    Ileostomy Closure    OTHER SURGICAL HISTORY      Cerebral artery coiling      Family History:    Family History   Problem Relation Name Age of Onset    Colon cancer Mother      Liver cancer Mother      Kidney cancer Mother      Heart attack Father      Blood clot Father      Cancer Brother      Cancer Brother       Family Oncology History:    Cancer-related family history includes Cancer in her brother and brother; Colon cancer in her mother; Kidney cancer in her mother; Liver cancer in her mother.  Social History:    Social History     Tobacco Use    Smoking status: Former     Current packs/day: 0.00     Types: Cigarettes     Quit date:      Years since quittin.5     Passive exposure: Past    Smokeless tobacco: Never   Vaping Use    Vaping status: Never Used   Substance Use Topics    Alcohol use: Never    Drug use: Never        Subjective   Chief Complaint: Laryngeal cancer    HPI    Interval History  Present in clinic for follow-up and readiness to treat visit.    Her  is present for visit.  Feeling good today.  Fatigued - a little tired.  Taking naps.  No change in functional status.  Breathing feels  good.  Denies SOB.  Cough d/t increased phlegm.  Able to clear.  Appetite - poor.    Emesis this week.  Single episode.  D/t phelgm.  Denies n/v/c.  Denies mucositis.  Taking 1 high-calorie supplement/day.  Taking pantoprazole BID.  Imodium taken 1x last week.   No fevers, chills, or CP.  Using hydrocortisone cream.  Rash to left side of mouth.  Labs today via port.  No other concerns today.  Ready for treatment tomorrow      ROS  Review of Systems   Constitutional:  Positive for fatigue.   HENT:  Negative.  Negative for voice change.    Respiratory:  Positive for cough.    Cardiovascular: Negative.    Gastrointestinal:  Positive for diarrhea, nausea and vomiting.   Genitourinary: Negative.     Musculoskeletal: Negative.  Negative for back pain and neck pain.   Skin:  Positive for rash.   Neurological: Negative.        Allergies  Allergies   Allergen Reactions    Paclitaxel GI Upset and Nausea/vomiting     See adverse drug reaction note dated 7/9/24    Hydrocodone-Acetaminophen Dizziness    Oxycodone Hcl Unknown    Oxycodone-Acetaminophen Dizziness    Aspirin GI Upset and Unknown    Sulfa (Sulfonamide Antibiotics) Rash        Medications  Current Outpatient Medications   Medication Instructions    acetaminophen (TYLENOL) 1,000 mg, oral, Every 6 hours PRN    biotin 5 mg capsule Every 24 hours    cholecalciferol (Vitamin D-3) 50 mcg (2,000 unit) capsule 1 capsule, Every 24 hours    cholestyramine (Questran) 4 gram powder once daily.    clindamycin (Cleocin T) 1 % lotion Apply topically to affected area twice daily.    doxycycline (VIBRAMYCIN) 100 mg, oral, 2 times daily, For rash prevention. Take with at least 8 ounces (large glass) of water, do not lie down for 30 minutes after    hydrocortisone 1 % cream Apply topically to face, hands, feet, neck, back, and chest once daily at bedtime for rash prevention.    lipase-protease-amylase (Creon) 24,000-76,000 -120,000 unit capsule TAKE 3 CAPSULES BY MOUTH 3 TIMES DAILY  WITH MEALS AND 1 TO 2  CAPSULES BY MOUTH WITH SNACKS.  MAX 13 CAPSULE DAILY    loperamide (Imodium) 2 mg capsule Take 2 capsules (4 mg) by mouth with the first episode of diarrhea and 1 capsule (2 mg) by mouth with any additional episodes. Maximum 8 capsules (16 mg) per day.    losartan (COZAAR) 25 mg, oral, Daily    magnesium oxide (MAG-OX) 400 mg, oral, Daily    ondansetron (ZOFRAN) 8 mg, oral, Every 8 hours PRN    pantoprazole (ProtoNix) 40 mg EC tablet TAKE 1 TABLET BY MOUTH TWICE  DAILY    prochlorperazine (COMPAZINE) 10 mg, oral, Every 6 hours PRN        Objective   VS: /64 (BP Location: Right arm, Patient Position: Sitting, BP Cuff Size: Adult)   Pulse 82   Temp 35.9 °C (96.7 °F) (Temporal)   Resp 18   Wt 61 kg (134 lb 9.5 oz)   SpO2 97%   BMI 24.42 kg/m²   Weight: Daily Weight  07/29/24 : 61 kg (134 lb 9.5 oz)  07/25/24 : 62.8 kg (138 lb 7.2 oz)  07/23/24 : 62.9 kg (138 lb 10.7 oz)  07/22/24 : 62.9 kg (138 lb 12.5 oz)  07/19/24 : 63.9 kg (140 lb 14 oz)  07/18/24 : 64.3 kg (141 lb 10.3 oz)  07/16/24 : 64.7 kg (142 lb 8.4 oz)      Physical Exam  Vitals reviewed.   Constitutional:       Appearance: Normal appearance.   HENT:      Head: Normocephalic.      Nose: Nose normal.      Mouth/Throat:      Mouth: Mucous membranes are moist.      Pharynx: Oropharynx is clear.   Eyes:      Extraocular Movements: Extraocular movements intact.      Conjunctiva/sclera: Conjunctivae normal.      Pupils: Pupils are equal, round, and reactive to light.   Cardiovascular:      Rate and Rhythm: Normal rate and regular rhythm.      Pulses: Normal pulses.      Heart sounds: Normal heart sounds.   Pulmonary:      Breath sounds: Normal breath sounds.   Abdominal:      General: Bowel sounds are normal.      Palpations: Abdomen is soft.   Musculoskeletal:         General: Normal range of motion.      Cervical back: Normal range of motion and neck supple.   Skin:     General: Skin is warm and dry.      Findings: Rash (dry  patch to left corner of mouth - covered with makeup) present.   Neurological:      General: No focal deficit present.      Mental Status: She is alert and oriented to person, place, and time.   Psychiatric:         Mood and Affect: Mood normal.         Behavior: Behavior normal.         Thought Content: Thought content normal.         Judgment: Judgment normal.       Diagnostic Results   Labs           Results from last 7 days   Lab Units 07/29/24  1431   GLUCOSE mg/dL 105*   SODIUM mmol/L 143   POTASSIUM mmol/L 3.8   CHLORIDE mmol/L 110*   CO2 mmol/L 23   BUN mg/dL 34*   CREATININE mg/dL 1.17*   EGFR mL/min/1.73m*2 48*   CALCIUM mg/dL 9.0   MAGNESIUM mg/dL 1.52*   ALBUMIN g/dL 3.7   PROTEIN TOTAL g/dL 6.3*   BILIRUBIN TOTAL mg/dL 0.5   ALK PHOS U/L 78   ALT U/L 10   AST U/L 22                 Images  MRI Cervical and Thoracic Spine 7/8/24:    Cervical, Thoracic Spine: Diffuse soft tissue thickening and enhancement of the glottic larynx extending into the subglottic larynx and involving the left cricoid, arytenoid, and thyroid cartilages, consistent with known tumor.  No evidence of metastatic disease within the cervical or thoracic  spine.    Pathology  Lab Results   Component Value Date    PATHREP  12/06/2021                                                     MRN: [id number]  Patient Name RONALD LENNON  Accession #: [id number]  Date of Procedure:  12/6/2021       Date Reported: 12/9/2021  Date Received:  12/7/2021  Date of Birth / Sex 1945 (Age: 76) / F  Race: WHITE  Submitting Physician: ROD VALLE MD    Other External #          FINAL CYTOLOGICAL INTERPRETATION    A.   FINE NEEDLE ASPIRATION BREAST - RIGHT, CYTOLOGY AND CELL BLOCK:  --NO MALIGNANT CELLS IDENTIFIED.  --SPECIMEN CONSISTS OF PROTEINACEOUS DEBRIS, OCCASIONAL FOAMY MACROPHAGES, FEW  INFLAMMATORY CELLS AND RARE EPITHELIAL GROUP.        Slide(s) initially screened by a Cytotechnologist at [facility] [address]  [place] [address]      Electronically Signed Out By GABINO GARCES MD    By the signature on this report, the individual or group listed as making the  Final Interpretation/Diagnosis certifies that they have reviewed this case.  Slide(s) initially screened by a Cytotechnologist at [facility]     Clinical History      Clinical Diagnosis History: Cyst of right breast - (N60.01)   Source of Specimen  A:  FINE NEEDLE ASPIRATION BREAST - RIGHT     Specimen Submitted as:  A:   FINE NEEDLE ASPIRATION BREAST - RIGHT        Pap non-gyn ThinPrep slide, CELL BLOCK, H&E, Initial    Gross Description  A.   FINE NEEDLE ASPIRATION BREAST - RIGHT:  35cc MILKY YELLOW NEEDLE RINSE IN  CYTOLYT                 [facility]  Department of Pathology  [address] [place] [address]        PATHREP  09/09/2019     Name RONALD LENNONChristo                                                                                                   Accession #: [id number]            Pathologist:                   YUE FIGUEROA MD  Date of Procedure:    9/9/2019  Date Received:          9/9/2019  Date Reported           9/13/2019  Submitting Physician:   SALIMA WHEELER MD  [facility]  Other External #                                                                    FINAL DIAGNOSIS  A.  BIOPSY OF ANAL CANAL:  MINUTE FRAGMENTS OF SOFT TISSUE WITH NO SIGNIFICANT  PATHOLOGICAL FINDINGS, SEE NOTE.    Note: Deeper sections were obtained.                                                                                                                                                                                                                                                                                                                                                                                                                                                    "                                    Electronically Signed Out By YUE FIGUEROA MD/ANASTASIYA  By the signature on this report, the individual or group listed as making the  Final Interpretation/Diagnosis certifies that they have reviewed this case.           Clinical History:  Colorectal cancer, squamous cell, status post chemo radiation    Specimens Submitted As:  A: BIOPSY OF ANAL CANAL     Gross Description:  Received in formalin, labeled with the patient's name and hospital number and  \"anal canal\", are multiple fragments of tan, soft tissue aggregating to 1.7 x  1.0 x 0.1 cm. The specimen is submitted in toto in one cassette.  CJN    cjn/9/9/2019               [facility]  Department of Pathology   [id number] [place] [id number]        COMDX  05/20/2024     Case reviewed at ENT consensus conference via WebPT technology on 5/22/2024.       Assessment/Plan   Ms. Campuzano is 77 y/o female with recent diagnosis of laryngeal cancer who is referred to  med onc  to discuss treatment plan.    # Laryngeal cancer (T3N0M0)  -Based on PET/CT from 4/3/24, there was uptake in LLL nodule, however, biopsy of LLL nodule showed negative for malignancy  -seen by Thoracic surgeon, plan to have repeat CT chest without contrast in 3 month  -since her GFR is 40 (no hearing deficit), she is not a candidate for cisplatin, will treat her with weekly carboplatin and paclitaxel, reviewed chemo related side effects with patient in detail.  Consent obtained.  -pt developed abdominal pain post C1, during C2 infusion of paclitaxel,  she experienced worsening of abdominal pain, pt wanted to stop chemo on 7/9 and also didn't want further chemotherapy.  -7/10/24: discussed with patient about changing treatment to cetuximab. Explained to patient about main side effects (skin rash and hypomagnesemia), she is good with the plan.  I couldn't place consent due to epic issue, I will place it later.  - Pt consented " by Dr. Burkitt 7/11/24  - Pt tolerated her C1 Cetuximab tx well.  Intermittent diarrhea managed with PRN Imodium       7/2/24 - C1 Carboplatin/Paciltaxel  7/9/24 - pt declined C2 carboplatin/paciltaxel  7/15/24 - PowerPort mediport placed   7/16/24 - C1 Cetuximab  7/23/24 - C2 Cetuximab  7/30/24 - scheduled C3 Cetuximab    # Hx of anal, breast and cutaneous malignancies  -s/p lumpectomy with radiation in 2012  -s/p radiation to anal cancer in 2018  -s/p resection of skin cancer on nose in 2010    # Chronic diarrhea   - from previous colonic surgery  -3-4 episodes per day, while on 3 imodium three times a day, 3-4 episodes of diarrhea daily.  - Concern for flares with chemotherapy tx's  - Tolerated C1 Carboplatin/Paciltaxel  - Currently managed with Creon and Questran - stools forms, soft  - 7/22:  Following C1 Cetuximab, intermittent diarrhea - managed with PRN Imodium   - 7/29:  Imodium admin x1 following C2.    - ongoing monitoring    # Back pain, area between her shoulder blades, more so on the left.  Has worsened over the last few weeks.  Pain with activity.  Resolves with rest.  No acute injury.  No falls.    - Will order MRI C & T (+/-) next available.  MRI's scheduled 7/8/24.    - 7/8:  Cervical, Thoracic Spine: Diffuse soft tissue thickening and enhancement of the glottic larynx extending into the subglottic larynx and involving the left cricoid, arytenoid, and thyroid cartilages, consistent with known tumor.  No evidence of metastatic disease within the cervical or thoracic  spine.   - Currently managed with routine Tylenol 1,000mg/daily  - Supportive onc referral placed.  Pt to return sup onc call.  - 7/22:  No current c/o back pain    - 7/29:  Recommended she continue Tylenol and heating pad for pain control    # Hypomagnesia: 7/2/24 Mg 1.41  - Will start oral Mag Oxide - 1 tab/day, Rx sent (not covered by pt's insurance)  - Will replete with IV hydration visit 7/5/24.  - If consistently low, will replete  weekly via IV   - 7/8/24 - Mg 1.71.    - 7/15/24 - Mg 1.31 - 2g Mg prior to 7/16 infusion   - 7/22/24 - Mg 1.42 - 2g Mg prior to 7/23 infusion   - 7/29/24 - Mg 1.52 - 2g Mg prior to 7/30 infusion    # Mediport placement, per pt request  - IR consult order in place  - INR lab completed.  - 7/8:  Encouraged pt to return [facility] call to schedule placement.    - 7/15:  PowerPort mediport placed  - EMLA Rx sent 7/22/24     # Elevated Cr/BUN 1/24/24  - at/near baseline  - 1.17 7/8/24  - 1.43/25 7/15/24   - 1.05/30 7/22/24 - encouraged fluids   - 1.17/34 7/29/24 - encouraged fluids, IV hydration 8/2/24  - ongoing monitoring    # Rx's  - Rx's sent to pt's pharmacy 7/15  - Spouse reports not all meds available today, expect to have all meds on 7/16.  Meds to be picked-up on 7/16/24.  Spouse waiting for all meds to be available, prior to pick-up.    - 7/22:  encouraged pt to apply Hydrocortisone ointment per tx plan instructions.  No current skin concerns.      PLAN  - Treatment changed to Cetuximab starting 7/16  - IV hydration - each Friday  - Weekly NP visits during CRT tx. Port draws with Monday visits.

## 2024-07-30 ENCOUNTER — APPOINTMENT (OUTPATIENT)
Dept: RADIATION ONCOLOGY | Facility: CLINIC | Age: 79
End: 2024-07-30
Payer: MEDICARE

## 2024-07-30 ENCOUNTER — APPOINTMENT (OUTPATIENT)
Dept: HEMATOLOGY/ONCOLOGY | Facility: CLINIC | Age: 79
End: 2024-07-30
Payer: MEDICARE

## 2024-07-30 ASSESSMENT — ENCOUNTER SYMPTOMS: COUGH: 1

## 2024-07-31 ENCOUNTER — APPOINTMENT (OUTPATIENT)
Dept: RADIATION ONCOLOGY | Facility: CLINIC | Age: 79
End: 2024-07-31
Payer: MEDICARE

## 2024-07-31 ENCOUNTER — APPOINTMENT (OUTPATIENT)
Dept: RADIOLOGY | Facility: HOSPITAL | Age: 79
End: 2024-07-31
Payer: MEDICARE

## 2024-07-31 ENCOUNTER — HOSPITAL ENCOUNTER (EMERGENCY)
Facility: HOSPITAL | Age: 79
Discharge: LEFT BEFORE DISCHARGE | End: 2024-07-31
Attending: FAMILY MEDICINE
Payer: MEDICARE

## 2024-07-31 ENCOUNTER — TELEPHONE (OUTPATIENT)
Dept: HEMATOLOGY/ONCOLOGY | Facility: CLINIC | Age: 79
End: 2024-07-31
Payer: MEDICARE

## 2024-07-31 VITALS
HEART RATE: 65 BPM | BODY MASS INDEX: 22.66 KG/M2 | RESPIRATION RATE: 16 BRPM | OXYGEN SATURATION: 94 % | TEMPERATURE: 97.6 F | DIASTOLIC BLOOD PRESSURE: 70 MMHG | HEIGHT: 64 IN | WEIGHT: 132.72 LBS | SYSTOLIC BLOOD PRESSURE: 132 MMHG

## 2024-07-31 DIAGNOSIS — Z53.29 LEFT AGAINST MEDICAL ADVICE: Primary | ICD-10-CM

## 2024-07-31 DIAGNOSIS — E87.6 HYPOKALEMIA: ICD-10-CM

## 2024-07-31 DIAGNOSIS — T45.1X5S CHEMOTHERAPY ADVERSE REACTION, SEQUELA: ICD-10-CM

## 2024-07-31 DIAGNOSIS — R79.89 ELEVATED TROPONIN: ICD-10-CM

## 2024-07-31 DIAGNOSIS — C32.9 LARYNGEAL CANCER (MULTI): ICD-10-CM

## 2024-07-31 DIAGNOSIS — Z85.038 HISTORY OF COLON CANCER: ICD-10-CM

## 2024-07-31 DIAGNOSIS — Z85.048 HISTORY OF ANAL CANCER: ICD-10-CM

## 2024-07-31 DIAGNOSIS — R19.7 DIARRHEA, UNSPECIFIED TYPE: ICD-10-CM

## 2024-07-31 DIAGNOSIS — Z85.3 HISTORY OF BREAST CANCER: ICD-10-CM

## 2024-07-31 PROBLEM — Z85.46 HISTORY OF PROSTATE CANCER: Status: ACTIVE | Noted: 2024-07-31

## 2024-07-31 PROBLEM — K43.9 VENTRAL HERNIA WITHOUT OBSTRUCTION OR GANGRENE: Status: ACTIVE | Noted: 2023-11-29

## 2024-07-31 LAB
ALBUMIN SERPL BCP-MCNC: 3.4 G/DL (ref 3.4–5)
ALP SERPL-CCNC: 91 U/L (ref 33–136)
ALT SERPL W P-5'-P-CCNC: 10 U/L (ref 7–45)
ANION GAP SERPL CALC-SCNC: 13 MMOL/L (ref 10–20)
APPEARANCE UR: CLEAR
AST SERPL W P-5'-P-CCNC: 23 U/L (ref 9–39)
BASOPHILS # BLD AUTO: 0.02 X10*3/UL (ref 0–0.1)
BASOPHILS NFR BLD AUTO: 0.4 %
BILIRUB SERPL-MCNC: 0.5 MG/DL (ref 0–1.2)
BILIRUB UR STRIP.AUTO-MCNC: NEGATIVE MG/DL
BUN SERPL-MCNC: 36 MG/DL (ref 6–23)
CALCIUM SERPL-MCNC: 9.1 MG/DL (ref 8.6–10.3)
CARDIAC TROPONIN I PNL SERPL HS: 47 NG/L (ref 0–13)
CARDIAC TROPONIN I PNL SERPL HS: 60 NG/L (ref 0–13)
CHLORIDE SERPL-SCNC: 109 MMOL/L (ref 98–107)
CO2 SERPL-SCNC: 23 MMOL/L (ref 21–32)
COLOR UR: YELLOW
CREAT SERPL-MCNC: 1.3 MG/DL (ref 0.5–1.05)
EGFRCR SERPLBLD CKD-EPI 2021: 42 ML/MIN/1.73M*2
EOSINOPHIL # BLD AUTO: 0.01 X10*3/UL (ref 0–0.4)
EOSINOPHIL NFR BLD AUTO: 0.2 %
ERYTHROCYTE [DISTWIDTH] IN BLOOD BY AUTOMATED COUNT: 14 % (ref 11.5–14.5)
GLUCOSE SERPL-MCNC: 127 MG/DL (ref 74–99)
GLUCOSE UR STRIP.AUTO-MCNC: NORMAL MG/DL
HCT VFR BLD AUTO: 38.2 % (ref 36–46)
HGB BLD-MCNC: 12.3 G/DL (ref 12–16)
IMM GRANULOCYTES # BLD AUTO: 0.02 X10*3/UL (ref 0–0.5)
IMM GRANULOCYTES NFR BLD AUTO: 0.4 % (ref 0–0.9)
INR PPP: 1.1 (ref 0.9–1.1)
KETONES UR STRIP.AUTO-MCNC: ABNORMAL MG/DL
LACTATE SERPL-SCNC: 1.6 MMOL/L (ref 0.4–2)
LEUKOCYTE ESTERASE UR QL STRIP.AUTO: NEGATIVE
LIPASE SERPL-CCNC: 14 U/L (ref 9–82)
LYMPHOCYTES # BLD AUTO: 0.87 X10*3/UL (ref 0.8–3)
LYMPHOCYTES NFR BLD AUTO: 15.5 %
MCH RBC QN AUTO: 31.9 PG (ref 26–34)
MCHC RBC AUTO-ENTMCNC: 32.2 G/DL (ref 32–36)
MCV RBC AUTO: 99 FL (ref 80–100)
MONOCYTES # BLD AUTO: 0.42 X10*3/UL (ref 0.05–0.8)
MONOCYTES NFR BLD AUTO: 7.5 %
MUCOUS THREADS #/AREA URNS AUTO: NORMAL /LPF
NEUTROPHILS # BLD AUTO: 4.28 X10*3/UL (ref 1.6–5.5)
NEUTROPHILS NFR BLD AUTO: 76 %
NITRITE UR QL STRIP.AUTO: NEGATIVE
NRBC BLD-RTO: 0 /100 WBCS (ref 0–0)
PH UR STRIP.AUTO: 6 [PH]
PLATELET # BLD AUTO: 217 X10*3/UL (ref 150–450)
POTASSIUM SERPL-SCNC: 3.2 MMOL/L (ref 3.5–5.3)
PROT SERPL-MCNC: 5.9 G/DL (ref 6.4–8.2)
PROT UR STRIP.AUTO-MCNC: ABNORMAL MG/DL
PROTHROMBIN TIME: 12.8 SECONDS (ref 9.8–12.8)
RBC # BLD AUTO: 3.86 X10*6/UL (ref 4–5.2)
RBC # UR STRIP.AUTO: ABNORMAL /UL
RBC #/AREA URNS AUTO: NORMAL /HPF
SODIUM SERPL-SCNC: 142 MMOL/L (ref 136–145)
SP GR UR STRIP.AUTO: 1.03
SQUAMOUS #/AREA URNS AUTO: NORMAL /HPF
UROBILINOGEN UR STRIP.AUTO-MCNC: NORMAL MG/DL
WBC # BLD AUTO: 5.6 X10*3/UL (ref 4.4–11.3)
WBC #/AREA URNS AUTO: NORMAL /HPF

## 2024-07-31 PROCEDURE — 74022 RADEX COMPL AQT ABD SERIES: CPT | Performed by: RADIOLOGY

## 2024-07-31 PROCEDURE — 84075 ASSAY ALKALINE PHOSPHATASE: CPT | Performed by: FAMILY MEDICINE

## 2024-07-31 PROCEDURE — 83690 ASSAY OF LIPASE: CPT | Performed by: FAMILY MEDICINE

## 2024-07-31 PROCEDURE — 81001 URINALYSIS AUTO W/SCOPE: CPT | Performed by: FAMILY MEDICINE

## 2024-07-31 PROCEDURE — 84484 ASSAY OF TROPONIN QUANT: CPT | Performed by: FAMILY MEDICINE

## 2024-07-31 PROCEDURE — 99283 EMERGENCY DEPT VISIT LOW MDM: CPT | Mod: 25

## 2024-07-31 PROCEDURE — 85025 COMPLETE CBC W/AUTO DIFF WBC: CPT | Performed by: FAMILY MEDICINE

## 2024-07-31 PROCEDURE — 96361 HYDRATE IV INFUSION ADD-ON: CPT

## 2024-07-31 PROCEDURE — 85610 PROTHROMBIN TIME: CPT | Performed by: FAMILY MEDICINE

## 2024-07-31 PROCEDURE — 83605 ASSAY OF LACTIC ACID: CPT | Performed by: FAMILY MEDICINE

## 2024-07-31 PROCEDURE — 2500000004 HC RX 250 GENERAL PHARMACY W/ HCPCS (ALT 636 FOR OP/ED): Performed by: FAMILY MEDICINE

## 2024-07-31 PROCEDURE — 74022 RADEX COMPL AQT ABD SERIES: CPT

## 2024-07-31 PROCEDURE — 96360 HYDRATION IV INFUSION INIT: CPT

## 2024-07-31 PROCEDURE — 82374 ASSAY BLOOD CARBON DIOXIDE: CPT | Performed by: FAMILY MEDICINE

## 2024-07-31 RX ORDER — HEPARIN SODIUM,PORCINE/PF 10 UNIT/ML
SYRINGE (ML) INTRAVENOUS
Status: DISCONTINUED
Start: 2024-07-31 | End: 2024-07-31 | Stop reason: HOSPADM

## 2024-07-31 RX ORDER — HEPARIN 100 UNIT/ML
5 SYRINGE INTRAVENOUS ONCE
Status: COMPLETED | OUTPATIENT
Start: 2024-07-31 | End: 2024-07-31

## 2024-07-31 RX ORDER — POTASSIUM CHLORIDE 20 MEQ/1
20 TABLET, EXTENDED RELEASE ORAL DAILY
Status: DISCONTINUED | OUTPATIENT
Start: 2024-07-31 | End: 2024-07-31 | Stop reason: HOSPADM

## 2024-07-31 RX ORDER — SODIUM CHLORIDE 9 MG/ML
125 INJECTION, SOLUTION INTRAVENOUS CONTINUOUS
Status: DISCONTINUED | OUTPATIENT
Start: 2024-07-31 | End: 2024-07-31 | Stop reason: HOSPADM

## 2024-07-31 ASSESSMENT — PAIN - FUNCTIONAL ASSESSMENT: PAIN_FUNCTIONAL_ASSESSMENT: 0-10

## 2024-07-31 ASSESSMENT — PAIN SCALES - GENERAL
PAINLEVEL_OUTOF10: 8
PAINLEVEL_OUTOF10: 0 - NO PAIN

## 2024-07-31 ASSESSMENT — PAIN DESCRIPTION - PAIN TYPE: TYPE: ACUTE PAIN

## 2024-07-31 ASSESSMENT — PAIN DESCRIPTION - LOCATION: LOCATION: ABDOMEN

## 2024-07-31 ASSESSMENT — PAIN DESCRIPTION - DESCRIPTORS
DESCRIPTORS: ACHING;CRAMPING
DESCRIPTORS: ACHING;CRAMPING

## 2024-07-31 ASSESSMENT — PAIN DESCRIPTION - FREQUENCY: FREQUENCY: INTERMITTENT

## 2024-07-31 NOTE — TELEPHONE ENCOUNTER
Spoke to Padilla.  She c/o diarrhea starting yesterday am along with emesis which she cannot control with the imodium and ondansetron.  She cannot keep anything down.  She is lightheaded and dizzy and feels dehydrated.  Per Megan Arauz NP patient to go to there ER for evaluation  and hydration.  Padilla is in agreement with the plan and will go to the [place] ER which is closest to her.

## 2024-07-31 NOTE — ED TRIAGE NOTES
Pt states that she is a cancer pt and thinks she is dehydrated secondary to diarrhea and vomiting.

## 2024-07-31 NOTE — ED PROVIDER NOTES
HPI   Chief Complaint   Patient presents with    Abdominal Pain     Pt states that she is a cancer pt and thinks she is dehydrated secondary to diarrhea and vomiting.        HPI  This 78-year-old really nice female patient who has history of laryngeal cancer has undergone chemotherapy currently getting chemotherapy came to the ER because she is having nausea vomiting or diarrhea she feels dehydrated she admitted some lower abdominal cramp but denies any significant abdominal pain.  Denies any chest pain or short of breath.  Denies vomiting blood blood in stool or black stool.  Denies any fever or chills.  She quit smoking years ago and diagnosed with laryngeal cancer 6 months ago.  She also has history of anal cancer breast cancer history of stroke cerebral hemorrhage chronic kidney disease diverticulosis of intestine hypertension and multiple other comorbidities, she had prior radiation therapy and history of abdominal pain.    Family history: Reviewed  Social history: Reviewed, she quit smoking 20 years ago.  Review of system: 10 review of system obtained review of system as In HPI otherwise negative      Patient History   Past Medical History:   Diagnosis Date    Anal cancer (Multi)     Breast cancer (Multi)     Cerebral hemorrhage (Multi) 2012    CKD (chronic kidney disease)     Colon cancer (Multi)     Colorectal cancer (Multi)     Diverticulosis of intestine, part unspecified, without perforation or abscess without bleeding     Diverticulosis    GERD (gastroesophageal reflux disease)     Hypertension     Irritable bowel syndrome     Kidney disease     Malignant neoplasm of colon, unspecified (Multi)     Colon cancer    Personal history of colonic polyps     History of colonic polyps    Personal history of irradiation     Personal history of malignant neoplasm of breast     History of malignant neoplasm of female breast    Personal history of other diseases of the respiratory system     History of respiratory  failure    Personal history of other malignant neoplasm of skin     Personal history of malignant neoplasm of skin    Right upper quadrant pain 2016    Abdominal pain, RUQ (right upper quadrant)    Stroke (Multi)     Unspecified symptoms and signs involving the genitourinary system 2016    Urinary symptom or sign     Past Surgical History:   Procedure Laterality Date    BI BREAST RIGHT CYST ASPIRATION Right 2019    BI BREAST CYST ASPIRATION RIGHT [facility]    BREAST LUMPECTOMY  2014    Breast Surgery Lumpectomy    CHOLECYSTECTOMY      CT GUIDED IMAGING FOR ABSCESS DRAIN  2015    CT GUIDED IMAGING FOR ABSCESS DRAIN [facility]    HYSTERECTOMY  1984    Hysterectomy    ILEOSTOMY  2015    Ileostomy    ILEOSTOMY CLOSURE  2015    Ileostomy Closure    OTHER SURGICAL HISTORY      Cerebral artery coiling     Family History   Problem Relation Name Age of Onset    Colon cancer Mother      Liver cancer Mother      Kidney cancer Mother      Heart attack Father      Blood clot Father      Cancer Brother      Cancer Brother       Social History     Tobacco Use    Smoking status: Former     Current packs/day: 0.00     Types: Cigarettes     Quit date:      Years since quittin.5     Passive exposure: Past    Smokeless tobacco: Never   Vaping Use    Vaping status: Never Used   Substance Use Topics    Alcohol use: Never    Drug use: Never       Physical Exam   ED Triage Vitals [24 1055]   Temperature Heart Rate Respirations BP   36.4 °C (97.6 °F) 98 20 121/78      Pulse Ox Temp Source Heart Rate Source Patient Position   95 % Temporal Monitor --      BP Location FiO2 (%)     Left arm --       Physical Exam  Constitutional:       Appearance: Normal appearance.      Comments: Elderly female patient who was awake and alert she whisper when she targeted laryngeal cancer but communicate well no drooping or drooling or stridor noted no respite distress noted  she give us a urine but she has only tiny amount of urine output and heparin she dehydrated and has been in decreased urine output.  Awake alert oriented x 3.  Talking breathing without acute distress except for spring due to laryngeal cancer related changes.  No facial edema erythema.  Alert oriented x 3.   on bedside.   HENT:      Head: Normocephalic and atraumatic.      Comments: Throat without edema exudate discharge intact neck is supple.  No drooling or stridor noted     Nose: Nose normal.      Mouth/Throat:      Mouth: Mucous membranes are moist.   Eyes:      Extraocular Movements: Extraocular movements intact.      Conjunctiva/sclera: Conjunctivae normal.      Pupils: Pupils are equal, round, and reactive to light.   Neck:      Vascular: No carotid bruit.   Cardiovascular:      Rate and Rhythm: Normal rate and regular rhythm.      Pulses: Normal pulses.      Heart sounds: Normal heart sounds. No murmur heard.     No friction rub. No gallop.      Comments: Heart regular rate and rhythm.  No friction rub no murmur no JVD good peripheral pulses no peripheral edema.  Calf is nontender Homans' sign negative.  Pulmonary:      Effort: Pulmonary effort is normal. No respiratory distress.      Breath sounds: Normal breath sounds. No stridor. No rales.   Abdominal:      General: Abdomen is flat. Bowel sounds are normal.      Palpations: Abdomen is soft.      Comments: Abdomen soft positive bowel sounds noted he has some lower abdominal discomfort but no guarding, rebound or rigidity no CVA tenderness noted.   Genitourinary:     Comments: No CVA tenderness noted  Musculoskeletal:         General: No swelling, tenderness, deformity or signs of injury. Normal range of motion.      Cervical back: Normal range of motion and neck supple. No rigidity or tenderness.      Right lower leg: No edema.      Left lower leg: No edema.   Lymphadenopathy:      Cervical: No cervical adenopathy.   Neurological:      General: No  focal deficit present.      Mental Status: She is alert and oriented to person, place, and time.      Cranial Nerves: No cranial nerve deficit.      Sensory: No sensory deficit.      Motor: No weakness.      Coordination: Coordination normal.      Gait: Gait normal.      Deep Tendon Reflexes: Reflexes normal.      Comments: Awake alert pleasant cooperative no facial drooping or drooling no facial asymmetry symmetrical range of motion arms and legs symmetrical strength.  Neck is supple no isolated motor or sensory deficit cranial nerves II to XII grossly intact.   Psychiatric:         Mood and Affect: Mood normal.         Behavior: Behavior normal.           ED Course & MDM   Diagnoses as of 08/22/24 0359   Left against medical advice   Hypokalemia   Diarrhea, unspecified type   Elevated troponin   History of colon cancer   History of anal cancer   History of breast cancer   Laryngeal cancer (Multi)   Chemotherapy adverse reaction, sequela   While making a plan for diagnostic workup and treatment patient has been stable good do not want CAT scan abdomen pelvis, understood risks and benefits well specially when also found out that patient had prior history of colorectal cancer but still they do not want CAT scan abdomen pelvis did not want patient to be treated symptomatic for nausea vomiting agree with report blood work EKG and I also ordered plain x-ray.  Once again they understood risk and benefit well and did not do not want CAT scan of the abdomen pelvis.  Patient is being treated symptomatically.  Initial workup has been started.  Troponin 47 and 60.  Lipase 14 serum glucose 127 sodium 142 potassium 3.2 chloride 109.  BUN is 33 creatinine 1.30.  CBC is unremarkable.   Patient refused CAT scans abdominal CT showed limited information nonobstructive bowel pattern perfusion and atelectasis.    Patient recommended CAT scan hospitalization further evaluation but patient feels          [place] Coma Scale Score: 15                         Medical Decision Making      Procedure  Procedures     Lakesha Chase MD  07/31/24 1144       Lakesha Chase MD  07/31/24 1147       Lakesha Chase MD  07/31/24 1514       Lakesha Chase MD  08/22/24 5534

## 2024-07-31 NOTE — DISCHARGE INSTRUCTIONS
You are leaving AGAINST MEDICAL ADVICE.  Your blood test for the heart is abnormal could be due to chemotherapy however you are getting chemotherapy you need to have your heart evaluated.  In addition we did not do CAT scan as you have refused CAT scan of the abdomen pelvis due to significant and stated you had recent CAT scan done which required no intervention.  However since she had diarrhea you need to be reevaluated and hydrated preferably a CAT scan done if she agreed to that also you need to be admitted with a evaluation by cardiologist at the facility where cardiologist oncologist can see you.  You have refused transfer if decided to be transferred return to ER.  You are leaving AGAINST MEDICAL ADVICE.  Please drink Gatorade type fluids which contain potassium as your potassium is low too.

## 2024-08-01 ENCOUNTER — HOSPITAL ENCOUNTER (OUTPATIENT)
Dept: RADIATION ONCOLOGY | Facility: CLINIC | Age: 79
Setting detail: RADIATION/ONCOLOGY SERIES
Discharge: HOME | End: 2024-08-01
Payer: MEDICARE

## 2024-08-01 DIAGNOSIS — Z51.0 ENCOUNTER FOR ANTINEOPLASTIC RADIATION THERAPY: ICD-10-CM

## 2024-08-01 DIAGNOSIS — C32.0 MALIGNANT NEOPLASM OF GLOTTIS (MULTI): ICD-10-CM

## 2024-08-01 LAB
RAD ONC MSQ ACTUAL FRACTIONS DELIVERED: 17
RAD ONC MSQ ACTUAL SESSION DELIVERED DOSE: 200 CGRAY
RAD ONC MSQ ACTUAL TOTAL DOSE: 3400 CGRAY
RAD ONC MSQ ELAPSED DAYS: 29
RAD ONC MSQ LAST DATE: NORMAL
RAD ONC MSQ PRESCRIBED FRACTIONAL DOSE: 200 CGRAY
RAD ONC MSQ PRESCRIBED NUMBER OF FRACTIONS: 35
RAD ONC MSQ PRESCRIBED TECHNIQUE: NORMAL
RAD ONC MSQ PRESCRIBED TOTAL DOSE: 7000 CGRAY
RAD ONC MSQ PRESCRIPTION PATTERN COMMENT: NORMAL
RAD ONC MSQ START DATE: NORMAL
RAD ONC MSQ TREATMENT COURSE NUMBER: 2
RAD ONC MSQ TREATMENT SITE: NORMAL

## 2024-08-01 PROCEDURE — 77386 HC INTENSITY-MODULATED RADIATION THERAPY (IMRT), COMPLEX: CPT | Performed by: STUDENT IN AN ORGANIZED HEALTH CARE EDUCATION/TRAINING PROGRAM

## 2024-08-01 PROCEDURE — 77336 RADIATION PHYSICS CONSULT: CPT | Performed by: STUDENT IN AN ORGANIZED HEALTH CARE EDUCATION/TRAINING PROGRAM

## 2024-08-02 ENCOUNTER — NUTRITION (OUTPATIENT)
Dept: HEMATOLOGY/ONCOLOGY | Facility: CLINIC | Age: 79
End: 2024-08-02

## 2024-08-02 ENCOUNTER — INFUSION (OUTPATIENT)
Dept: HEMATOLOGY/ONCOLOGY | Facility: CLINIC | Age: 79
End: 2024-08-02
Payer: MEDICARE

## 2024-08-02 ENCOUNTER — HOSPITAL ENCOUNTER (OUTPATIENT)
Dept: RADIATION ONCOLOGY | Facility: CLINIC | Age: 79
Setting detail: RADIATION/ONCOLOGY SERIES
Discharge: HOME | End: 2024-08-02
Payer: MEDICARE

## 2024-08-02 ENCOUNTER — RADIATION ONCOLOGY OTV (OUTPATIENT)
Dept: RADIATION ONCOLOGY | Facility: CLINIC | Age: 79
End: 2024-08-02
Payer: MEDICARE

## 2024-08-02 VITALS
SYSTOLIC BLOOD PRESSURE: 101 MMHG | RESPIRATION RATE: 16 BRPM | DIASTOLIC BLOOD PRESSURE: 68 MMHG | OXYGEN SATURATION: 91 % | HEART RATE: 91 BPM | TEMPERATURE: 97.9 F | WEIGHT: 133.16 LBS | BODY MASS INDEX: 22.86 KG/M2

## 2024-08-02 DIAGNOSIS — C32.9 LARYNGEAL CANCER (MULTI): ICD-10-CM

## 2024-08-02 DIAGNOSIS — E87.6 HYPOKALEMIA: Primary | ICD-10-CM

## 2024-08-02 DIAGNOSIS — Z51.0 ENCOUNTER FOR ANTINEOPLASTIC RADIATION THERAPY: ICD-10-CM

## 2024-08-02 DIAGNOSIS — R19.7 DIARRHEA, UNSPECIFIED TYPE: Primary | ICD-10-CM

## 2024-08-02 DIAGNOSIS — C32.0 MALIGNANT NEOPLASM OF GLOTTIS (MULTI): ICD-10-CM

## 2024-08-02 LAB
ALBUMIN SERPL BCP-MCNC: 3.5 G/DL (ref 3.4–5)
ALP SERPL-CCNC: 79 U/L (ref 33–136)
ALT SERPL W P-5'-P-CCNC: 11 U/L (ref 7–45)
ANION GAP SERPL CALC-SCNC: 13 MMOL/L (ref 10–20)
AST SERPL W P-5'-P-CCNC: 25 U/L (ref 9–39)
BILIRUB SERPL-MCNC: 0.5 MG/DL (ref 0–1.2)
BUN SERPL-MCNC: 28 MG/DL (ref 6–23)
CALCIUM SERPL-MCNC: 8.8 MG/DL (ref 8.6–10.3)
CHLORIDE SERPL-SCNC: 110 MMOL/L (ref 98–107)
CO2 SERPL-SCNC: 23 MMOL/L (ref 21–32)
CREAT SERPL-MCNC: 1.06 MG/DL (ref 0.5–1.05)
EGFRCR SERPLBLD CKD-EPI 2021: 54 ML/MIN/1.73M*2
GLUCOSE SERPL-MCNC: 99 MG/DL (ref 74–99)
MAGNESIUM SERPL-MCNC: 1.6 MG/DL (ref 1.6–2.4)
POTASSIUM SERPL-SCNC: 3.2 MMOL/L (ref 3.5–5.3)
PROT SERPL-MCNC: 5.9 G/DL (ref 6.4–8.2)
RAD ONC MSQ ACTUAL FRACTIONS DELIVERED: 18
RAD ONC MSQ ACTUAL SESSION DELIVERED DOSE: 200 CGRAY
RAD ONC MSQ ACTUAL TOTAL DOSE: 3600 CGRAY
RAD ONC MSQ ELAPSED DAYS: 30
RAD ONC MSQ LAST DATE: NORMAL
RAD ONC MSQ PRESCRIBED FRACTIONAL DOSE: 200 CGRAY
RAD ONC MSQ PRESCRIBED NUMBER OF FRACTIONS: 35
RAD ONC MSQ PRESCRIBED TECHNIQUE: NORMAL
RAD ONC MSQ PRESCRIBED TOTAL DOSE: 7000 CGRAY
RAD ONC MSQ PRESCRIPTION PATTERN COMMENT: NORMAL
RAD ONC MSQ START DATE: NORMAL
RAD ONC MSQ TREATMENT COURSE NUMBER: 2
RAD ONC MSQ TREATMENT SITE: NORMAL
SODIUM SERPL-SCNC: 143 MMOL/L (ref 136–145)

## 2024-08-02 PROCEDURE — 83735 ASSAY OF MAGNESIUM: CPT | Performed by: NURSE PRACTITIONER

## 2024-08-02 PROCEDURE — 80053 COMPREHEN METABOLIC PANEL: CPT | Performed by: NURSE PRACTITIONER

## 2024-08-02 PROCEDURE — 77386 HC INTENSITY-MODULATED RADIATION THERAPY (IMRT), COMPLEX: CPT | Performed by: STUDENT IN AN ORGANIZED HEALTH CARE EDUCATION/TRAINING PROGRAM

## 2024-08-02 PROCEDURE — 2500000004 HC RX 250 GENERAL PHARMACY W/ HCPCS (ALT 636 FOR OP/ED): Performed by: NURSE PRACTITIONER

## 2024-08-02 PROCEDURE — 96360 HYDRATION IV INFUSION INIT: CPT | Mod: INF

## 2024-08-02 RX ORDER — HEPARIN SODIUM,PORCINE/PF 10 UNIT/ML
50 SYRINGE (ML) INTRAVENOUS AS NEEDED
Status: DISCONTINUED | OUTPATIENT
Start: 2024-08-02 | End: 2024-08-02 | Stop reason: HOSPADM

## 2024-08-02 RX ORDER — HEPARIN 100 UNIT/ML
500 SYRINGE INTRAVENOUS AS NEEDED
Status: DISCONTINUED | OUTPATIENT
Start: 2024-08-02 | End: 2024-08-02 | Stop reason: HOSPADM

## 2024-08-02 RX ORDER — HEPARIN 100 UNIT/ML
500 SYRINGE INTRAVENOUS AS NEEDED
OUTPATIENT
Start: 2024-08-02

## 2024-08-02 RX ORDER — POTASSIUM CHLORIDE 14.9 MG/ML
20 INJECTION INTRAVENOUS ONCE
OUTPATIENT
Start: 2024-08-02 | End: 2024-08-02

## 2024-08-02 RX ORDER — DIPHENOXYLATE HYDROCHLORIDE AND ATROPINE SULFATE 2.5; .025 MG/1; MG/1
1 TABLET ORAL 4 TIMES DAILY PRN
Qty: 40 TABLET | Refills: 0 | Status: SHIPPED | OUTPATIENT
Start: 2024-08-02 | End: 2024-08-12

## 2024-08-02 RX ORDER — POTASSIUM CHLORIDE 1.5 G/1.58G
20 POWDER, FOR SOLUTION ORAL DAILY
Qty: 30 PACKET | Refills: 0 | Status: SHIPPED | OUTPATIENT
Start: 2024-08-02 | End: 2024-09-01

## 2024-08-02 RX ORDER — HEPARIN SODIUM,PORCINE/PF 10 UNIT/ML
50 SYRINGE (ML) INTRAVENOUS AS NEEDED
OUTPATIENT
Start: 2024-08-02

## 2024-08-02 ASSESSMENT — ENCOUNTER SYMPTOMS
DEPRESSION: 0
OCCASIONAL FEELINGS OF UNSTEADINESS: 0
LOSS OF SENSATION IN FEET: 0

## 2024-08-02 ASSESSMENT — PAIN DESCRIPTION - DESCRIPTORS: DESCRIPTORS: SORE

## 2024-08-02 ASSESSMENT — PAIN - FUNCTIONAL ASSESSMENT: PAIN_FUNCTIONAL_ASSESSMENT: 0-10

## 2024-08-02 ASSESSMENT — PAIN SCALES - GENERAL
PAINLEVEL: 5
PAINLEVEL_OUTOF10: 5 - MODERATE PAIN

## 2024-08-02 NOTE — PROGRESS NOTES
Radiation Oncology On Treatment Visit    Patient Name:  Padilla Campuzano  MRN:  [id number]  :  1945    Referring Provider: No ref. provider found  Primary Care Provider: Naheed Clements MD  Care Team: Patient Care Team:  Naheed Clements MD as PCP - General (Internal Medicine)  Naheed Clements MD as PCP - [facility]  DO Carroll Calabrese MD as Consulting Physician (Hematology and Oncology)  Kyunghee Burkitt, DO as Consulting Physician (Hematology and Oncology)  Eden Moss MD as Medical Oncologist (Hematology and Oncology)    Date of Service: 2024     Diagnosis:   Specialty Problems          Radiation Oncology Problems    Breast cancer (Multi)        Malignant neoplasm of colon (Multi)        Laryngeal cancer (Multi)         Treatment Summary:  IMRT: Bilateral Head and neck    Treatment Period Technique Fraction Dose Fractions Total Dose   Course 2 7/3/2024-2024  (days elapsed: 30)         H&N 7/3/2024-2024 VMAT 200 / 200 cGy  3600 / 7,000 cGy     SUBJECTIVE:   Chief concern today is diarrhea, which has been ongoing for many years, since her diagnosis of colon cancer and her cholecystectomy. She also has some nausea, and this is controlled with Zofran. She is hoarse.    Wt Readings from Last 10 Encounters:   24 60.4 kg (133 lb 2.5 oz)   24 60.2 kg (132 lb 11.5 oz)   24 61 kg (134 lb 9.5 oz)   24 62.8 kg (138 lb 7.2 oz)   24 62.9 kg (138 lb 10.7 oz)   24 62.9 kg (138 lb 12.5 oz)   24 63.9 kg (140 lb 14 oz)   24 64.3 kg (141 lb 10.3 oz)   24 64.7 kg (142 lb 8.4 oz)   24 64 kg (141 lb 1.5 oz)           OBJECTIVE:   Vital Signs:  /68 (BP Location: Right arm, Patient Position: Sitting, BP Cuff Size: Large adult)   Pulse 91   Temp 36.6 °C (97.9 °F) (Temporal)   Resp 16   Wt 60.4 kg (133 lb 2.5 oz)   SpO2 91%   BMI 22.86 kg/m²    Pain Scale: The patient's current pain level was  assessed.  They report currently having a pain of 5 out of 10.    Other Pertinent Findings:     Toxicity Assessment          7/11/2024    13:38 7/18/2024    13:45 7/25/2024    13:50   Toxicity Assessment   Treatment  and neck Head and neck Head and neck   Anorexia Grade 0 Grade 0       eating pretty much what she wants and 1 Boost/day Grade 1       eating soft foods and 1 Boost/day   Anxiety Grade 0 Grade 1 Grade 0   Dehydration Grade 0 Grade 0 Grade 0   Depression Grade 1 Grade 1 Grade 0   Dermatitis Radiation Grade 0 Grade 0 Grade 0       Using Udderly smooth once a day   Diarrhea Grade 1       last night Grade 1       baseline Grade 1       baseline   Fatigue Grade 1 Grade 1       naps during the day and goes to bed early Grade 1       naps during the day   Fibrosis Deep Connective Tissue Grade 0 Grade 0 Grade 0   Fracture Grade 0 Grade 0 Grade 0   Nausea Grade 0 Grade 0 Grade 0   Pain Grade 0 Grade 2       pain level 7 to back--takes Tylenol with some relief Grade 1       pain level 3 to abdomen   Treatment Related Secondary Malignancy Grade 0 Grade 0 Grade 0   Tumor Pain Grade 0 Grade 0 Grade 0   Vomiting Grade 0 Grade 0 Grade 0   Dysphagia Grade 0 Grade 0 Grade 1   Esophagitis Grade 0 Grade 1 Grade 1   Mucositis Oral Grade 0       instructed patient to start salt and soda rinses and Blis losenges Grade 0       using Blis lozenges and salt and soda rinses Grade 0       using Blis lozenges and salt and soda rinses   Hearing Impaired Grade 0 Grade 0 Grade 0   Blurred Vision Grade 0 Grade 0 Grade 0   Dry Eye Grade 1 Grade 0 Grade 0   Eye Pain Grade 0 Grade 0 Grade 0   Retinopathy Grade 0 Grade 0 Grade 0   Central Nervous System Necrosis Grade 0 Grade 0 Grade 0   Edema Cerebral Grade 0 Grade 0 Grade 0   Dry Mouth Grade 0 Grade 0 Grade 1   Pneumonitis Grade 0 Grade 0 Grade 0   Febrile Neutropenia Grade 0 Grade 0 Grade 0   Ear Pain Grade 0 Grade 0 Grade 0   External Ear Pain Grade 0 Grade 0 Grade 0    Tinnitus Grade 0 Grade 0 Grade 0   Watering Eyes Grade 0 Grade 0 Grade 0   Oral Pain Grade 0 Grade 0 Grade 0   Salivary Duct Inflammation Grade 0 Grade 0 Grade 1   Edema Face Grade 0 Grade 0 Grade 0   Malaise Grade 0 Grade 0 Grade 0   Neck Edema Grade 0 Grade 0 Grade 0   Corneal Infection Grade 0 Grade 0 Grade 0   Mucosal Infection Grade 0 Grade 0 Grade 0   Otitis Media Grade 0 Grade 0 Grade 0   Sepsis Grade 0 Grade 0 Grade 0   Sinusitis Grade 0 Grade 0 Grade 0   Skin Infection Grade 0 Grade 0 Grade 0   Soft Tissue Infection Grade 0 Grade 0 Grade 0   Head Soft Tissue Necrosis Grade 0 Grade 0 Grade 0   Neck Soft Tissue Necrosis Grade 0 Grade 0 Grade 0   Osteonecrosis of Jaw Grade 0 Grade 0 Grade 0   Superficial Soft Tissue Fibrosis Grade 0 Grade 0 Grade 0   Trismus Grade 0 Grade 0 Grade 0   Dysarthria Grade 0 Grade 0 Grade 0   Dysesthesia Grade 0 Grade 0 Grade 0   Dysgeusia Grade 0 Grade 0 Grade 1   Facial Nerve Disorder Grade 0 Grade 0 Grade 0   Hypoglossal Nerve Disorder Grade 0 Grade 0 Grade 0   Oculomotor Nerve Disorder Grade 0 Grade 0 Grade 0   Paresthesia Grade 0 Grade 0 Grade 0   Stroke Grade 0 Grade 0 Grade 0   Transient Ischemic Attacks Grade 0 Grade 0 Grade 0   Trigeminal Nerve Disorder Grade 0 Grade 0 Grade 0   Aspiration Grade 0 Grade 0 Grade 0   Hoarseness Grade 1 Grade 2 Grade 2   Laryngeal Edema Grade 0 Grade 0 Grade 0   Stridor Grade 0 Grade 0 Grade 0   Tracheal Mucositis Grade 0 Grade 0 Grade 0   Voice Alteration Grade 1 Grade 2 Grade 2        Assessment / Plan:  The patient is tolerating radiation therapy as anticipated.  Continue per current treatment plan.       For the diarrhea, I prescribed lomotil since she already takes immodium. She is known to the GI/surg team already, and I suggested she follow up with them for more recs. She was seen by our dietician today for other recommendations.

## 2024-08-02 NOTE — PROGRESS NOTES
NUTRITION Follow Up NOTE    Nutrition Assessment     Reason for Visit:  Padilla Campuzano is a 78 y.o. female who presents for nutrition consult 2/2 dx.  DX scc L vocal cord  TX concurrent chemoradiation, chemotherapy changed to Cetuximab 7/16/24. XRT 7/2-8/20  Hydration on Fridays  Hx anal and breast cancer. Chronic diarrhea not related to anal cancer per     8/2/24- Asked to see pt in [facility], was in ED for dehydration past week. Getting fluids today. Does not like Boost VHC. Increased diarrhea from baseline, Banatrol Plus ineffective     Lab Results   Component Value Date/Time    GLUCOSE 99 08/02/2024 1520     08/02/2024 1520    K 3.2 (L) 08/02/2024 1520     (H) 08/02/2024 1520    CO2 23 08/02/2024 1520    ANIONGAP 13 08/02/2024 1520    BUN 28 (H) 08/02/2024 1520    CREATININE 1.06 (H) 08/02/2024 1520    EGFR 54 (L) 08/02/2024 1520    CALCIUM 8.8 08/02/2024 1520    ALBUMIN 3.5 08/02/2024 1520    ALKPHOS 79 08/02/2024 1520    PROT 5.9 (L) 08/02/2024 1520    AST 25 08/02/2024 1520    BILITOT 0.5 08/02/2024 1520    ALT 11 08/02/2024 1520     Lab Results   Component Value Date/Time    VITD25 53 05/01/2024 0904       Anthropometrics:  Anthropometrics  IBW/kg (Dietitian Calculated): 50.4 kg        Wt Readings from Last 10 Encounters:   08/02/24 60.4 kg (133 lb 2.5 oz)   07/31/24 60.2 kg (132 lb 11.5 oz)   07/29/24 61 kg (134 lb 9.5 oz)   07/25/24 62.8 kg (138 lb 7.2 oz)   07/23/24 62.9 kg (138 lb 10.7 oz)   07/22/24 62.9 kg (138 lb 12.5 oz)   07/19/24 63.9 kg (140 lb 14 oz)   07/18/24 64.3 kg (141 lb 10.3 oz)   07/16/24 64.7 kg (142 lb 8.4 oz)   07/12/24 64 kg (141 lb 1.5 oz)   7/2- first chemotherapy 65 kg  7/9- 65.1 kg  7/16 - 64.7 kg  7/23- 62.9 kg  Food And Nutrient Intake:  Food and Nutrient History  Food and Nutrient History: intake decreased  Energy Intake: Fair 50-75 %  Fluid Intake: water, occasional tea, bought cranberry juice - dilutes it with water  GI Symptoms: diarrhea (increased gag reflex  causing her to spit up/vomit.                            +Questran and Creon for diarrhea (which predates tx) Dr Moura prescribed Lomotil recommded FUV with GI, she recently saw her GI)  GI Symptoms greater than 2 weeks: yes  Oral Problems: odynophagia, dysgeusia (hoarse voice. Started using Bliss lozenges. +s/s rinses. increase thick saliva/mucus.)  Dentition: lower denture/partial, upper denture/partial     Food Intake  Amount of Food: Consumes protein sources like eggs, cottage cheese, yogurt, PNB    Food Preparation  Cooking: Spouse/Significant Other, Patient  Grocery Shopping: Patient, Spouse/Significant Other              Enteral Nutrition Intake  Enteral Nutrition Formula/Solution: had a feeding tube when she had a stroke                        Food Supplement Intake  Oral Nutrition Supplements:  (no consistent ONS intake)    Medication and Complementary/Alternative Medicine Use  OTC Medication Use: Biotin, Vitamin D      Nutrition Focused Physical Exam Findings: 7/2/24                          Energy Needs  Estimated Energy Needs  Total Energy Estimated Needs (kCal): 1825 kCal  Total Estimated Energy Need per Day (kCal/kg): 28 kCal/kg  Estimated Fluid Needs  Total Fluid Estimated Needs (mL): 1825 mL  Method for Estimating Needs: 1 ml/kcal  Estimated Protein Needs  Total Protein Estimated Needs (g): 78 g  Total Protein Estimated Needs (g/kg): 1.2 g/kg        Nutrition Diagnosis   Malnutrition Diagnosis  Patient has Malnutrition Diagnosis: No    Nutrition Diagnosis  Patient has Nutrition Diagnosis: Yes  Diagnosis Status (1): Ongoing  Nutrition Diagnosis 1: Predicted inadequate energy intake  Related to (1): pathophysiology of disease  As Evidenced by (1): high risk for nutrition impact symptoms impairing ability to meet estimated energy needs, affect oral intake and weight status    Nutrition Interventions/Recommendations   Nutrition Prescription  Individualized Nutrition Prescription Provided for : diet  during cancer treatment    Food and Nutrition Delivery  Food and Nutrition Delivery  Meals & Snacks: Energy-modified diet, Modify Composition of Meals/Snacks, Protein-modified diet, Fluid-modified diet  Goals: incorporate soft high calorie high protein foods modified texture as needed, moist foods as needed. Include consistent meals and snacks. Avoid dry harsh  foods and acidic foods/beverages. Include adequate fluids  Medical Food Supplement: Commercial beverage (suggeted diluting VHC with water or adding ice cream or mixing with Ensure Plus/Complete and trialing. Provided samples of Ensure Complete and Ensure Clear. Recommended Ensure Complete TID, increase as needed if unable to tolerate VHC)    Nutrition Education - provided 7/22       Coordination of Care     8/2- encouraged increased fluids, Pedialyte for diarrhea  7/9- Explained Banatrol Plus is banana flakes with prebiotics, so food based. She will try again to see if helps with diarrhea  7/2- Explained calorie and protein needs are increased with diagnosis and treatment. The importance of maintaining weight, adequate oral intake and fluids. Explained the role of protein, examples of protein sources provided and how to work into diet.    Circled recipe for s/s rinses in Eating Hints book, encouraged to start  Provided information on Medtrition Banatrol Plus which may be helpful to add to regime for diarrhea      Nutrition Monitoring and Evaluation   Food/Nutrient Related History Monitoring  Monitoring and Evaluation Plan: Energy intake, Meal/snack pattern, Protein intake, Fluid intake  Energy Intake: Estimated energy intake  Criteria: Meet >75% estimated energy needs  Fluid Intake: Estimated fluid intake  Criteria: maintain hydration  Meal/Snack Pattern: Estimated meal and snack pattern  Criteria: 4-6 meals/snacks daily  Estimated protein intake: Estimated protein intake  Criteria: include high protein foods with meals and/or snacks 75% of meals  Body  Composition/Growth/Weight History  Monitoring and Evaluation Plan: Weight  Weight: Measured weight  Criteria: maintain weight    Following through treatment  Contact information provided     Time Spent  Prep time on day of patient encounter: 5 minutes  Time spent directly with patient, family or caregiver: 20 minutes  Additional Time Spent on Patient Care Activities: 5 minutes  Documentation Time: 15 minutes  Other Time Spent: 0 minutes  Total: 45 minutes                Readiness to Change : Good  Level of Understanding : Good  Anticipated Compliant : Good

## 2024-08-02 NOTE — PROGRESS NOTES
Patient given IV hydration today. CMP notable for potassium level of 3.2. Patient reports she has been having diarrhea about 4x a day. She has been taking imodium with no relief, prescription for lomotil sent to patients pharmacy by MD Tabor. SHAWANDA Arauz and MD Burkitt notified. NP ordered 20 meq of potassium IV to be given, however patient refused to stay additional hour to receive medication. NP Notified, RN attempted to get patient on schedule Monday 8/5 for IV potassium but there was no available chair space. Oral prescription for potassium sent to patients pharmacy by NP, patient educated to increase oral intake of potassium rich foods. Plan for follow up visit on 8/5, reviewed AVS and schedule.

## 2024-08-05 ENCOUNTER — OFFICE VISIT (OUTPATIENT)
Dept: HEMATOLOGY/ONCOLOGY | Facility: CLINIC | Age: 79
End: 2024-08-05
Payer: MEDICARE

## 2024-08-05 ENCOUNTER — INFUSION (OUTPATIENT)
Dept: HEMATOLOGY/ONCOLOGY | Facility: CLINIC | Age: 79
End: 2024-08-05
Payer: MEDICARE

## 2024-08-05 ENCOUNTER — HOSPITAL ENCOUNTER (OUTPATIENT)
Dept: RADIATION ONCOLOGY | Facility: CLINIC | Age: 79
Setting detail: RADIATION/ONCOLOGY SERIES
Discharge: HOME | End: 2024-08-05
Payer: MEDICARE

## 2024-08-05 VITALS
HEART RATE: 74 BPM | WEIGHT: 135.8 LBS | BODY MASS INDEX: 23.31 KG/M2 | TEMPERATURE: 96.6 F | SYSTOLIC BLOOD PRESSURE: 111 MMHG | RESPIRATION RATE: 18 BRPM | OXYGEN SATURATION: 95 % | DIASTOLIC BLOOD PRESSURE: 52 MMHG

## 2024-08-05 DIAGNOSIS — E87.6 HYPOKALEMIA: Primary | ICD-10-CM

## 2024-08-05 DIAGNOSIS — Z51.11 ENCOUNTER FOR ANTINEOPLASTIC CHEMOTHERAPY: ICD-10-CM

## 2024-08-05 DIAGNOSIS — C32.9 LARYNGEAL CANCER (MULTI): ICD-10-CM

## 2024-08-05 DIAGNOSIS — Z51.0 ENCOUNTER FOR ANTINEOPLASTIC RADIATION THERAPY: ICD-10-CM

## 2024-08-05 DIAGNOSIS — E83.42 HYPOMAGNESEMIA: ICD-10-CM

## 2024-08-05 DIAGNOSIS — C32.0 MALIGNANT NEOPLASM OF GLOTTIS (MULTI): ICD-10-CM

## 2024-08-05 DIAGNOSIS — C32.0 SQUAMOUS CELL CARCINOMA OF LEFT VOCAL CORD (MULTI): ICD-10-CM

## 2024-08-05 LAB
BASOPHILS # BLD AUTO: 0.02 X10*3/UL (ref 0–0.1)
BASOPHILS NFR BLD AUTO: 0.3 %
EOSINOPHIL # BLD AUTO: 0.1 X10*3/UL (ref 0–0.4)
EOSINOPHIL NFR BLD AUTO: 1.7 %
ERYTHROCYTE [DISTWIDTH] IN BLOOD BY AUTOMATED COUNT: 14.3 % (ref 11.5–14.5)
HCT VFR BLD AUTO: 35.1 % (ref 36–46)
HGB BLD-MCNC: 11.6 G/DL (ref 12–16)
IMM GRANULOCYTES # BLD AUTO: 0.02 X10*3/UL (ref 0–0.5)
IMM GRANULOCYTES NFR BLD AUTO: 0.3 % (ref 0–0.9)
LYMPHOCYTES # BLD AUTO: 1 X10*3/UL (ref 0.8–3)
LYMPHOCYTES NFR BLD AUTO: 17.3 %
MCH RBC QN AUTO: 32.2 PG (ref 26–34)
MCHC RBC AUTO-ENTMCNC: 33 G/DL (ref 32–36)
MCV RBC AUTO: 98 FL (ref 80–100)
MONOCYTES # BLD AUTO: 0.59 X10*3/UL (ref 0.05–0.8)
MONOCYTES NFR BLD AUTO: 10.2 %
NEUTROPHILS # BLD AUTO: 4.06 X10*3/UL (ref 1.6–5.5)
NEUTROPHILS NFR BLD AUTO: 70.2 %
NRBC BLD-RTO: 0 /100 WBCS (ref 0–0)
PLATELET # BLD AUTO: 177 X10*3/UL (ref 150–450)
RAD ONC MSQ ACTUAL FRACTIONS DELIVERED: 19
RAD ONC MSQ ACTUAL SESSION DELIVERED DOSE: 200 CGRAY
RAD ONC MSQ ACTUAL TOTAL DOSE: 3800 CGRAY
RAD ONC MSQ ELAPSED DAYS: 33
RAD ONC MSQ LAST DATE: NORMAL
RAD ONC MSQ PRESCRIBED FRACTIONAL DOSE: 200 CGRAY
RAD ONC MSQ PRESCRIBED NUMBER OF FRACTIONS: 35
RAD ONC MSQ PRESCRIBED TECHNIQUE: NORMAL
RAD ONC MSQ PRESCRIBED TOTAL DOSE: 7000 CGRAY
RAD ONC MSQ PRESCRIPTION PATTERN COMMENT: NORMAL
RAD ONC MSQ START DATE: NORMAL
RAD ONC MSQ TREATMENT COURSE NUMBER: 2
RAD ONC MSQ TREATMENT SITE: NORMAL
RBC # BLD AUTO: 3.6 X10*6/UL (ref 4–5.2)
WBC # BLD AUTO: 5.8 X10*3/UL (ref 4.4–11.3)

## 2024-08-05 PROCEDURE — 2500000004 HC RX 250 GENERAL PHARMACY W/ HCPCS (ALT 636 FOR OP/ED): Performed by: NURSE PRACTITIONER

## 2024-08-05 PROCEDURE — 77386 HC INTENSITY-MODULATED RADIATION THERAPY (IMRT), COMPLEX: CPT | Performed by: STUDENT IN AN ORGANIZED HEALTH CARE EDUCATION/TRAINING PROGRAM

## 2024-08-05 PROCEDURE — 36591 DRAW BLOOD OFF VENOUS DEVICE: CPT

## 2024-08-05 PROCEDURE — 99214 OFFICE O/P EST MOD 30 MIN: CPT | Performed by: NURSE PRACTITIONER

## 2024-08-05 RX ORDER — HEPARIN SODIUM,PORCINE/PF 10 UNIT/ML
50 SYRINGE (ML) INTRAVENOUS AS NEEDED
Status: CANCELLED | OUTPATIENT
Start: 2024-08-05

## 2024-08-05 RX ORDER — HEPARIN 100 UNIT/ML
500 SYRINGE INTRAVENOUS AS NEEDED
Status: CANCELLED | OUTPATIENT
Start: 2024-08-05

## 2024-08-05 RX ORDER — HEPARIN 100 UNIT/ML
500 SYRINGE INTRAVENOUS AS NEEDED
Status: DISCONTINUED | OUTPATIENT
Start: 2024-08-05 | End: 2024-08-05 | Stop reason: HOSPADM

## 2024-08-05 ASSESSMENT — ENCOUNTER SYMPTOMS
NEUROLOGICAL NEGATIVE: 1
FATIGUE: 1
DIARRHEA: 1
NAUSEA: 1
BACK PAIN: 0
VOMITING: 1
MUSCULOSKELETAL NEGATIVE: 1
COUGH: 1
CARDIOVASCULAR NEGATIVE: 1
NECK PAIN: 0
VOICE CHANGE: 0

## 2024-08-05 ASSESSMENT — PAIN SCALES - GENERAL: PAINLEVEL: 0-NO PAIN

## 2024-08-05 NOTE — PROGRESS NOTES
Patient ID: Padilla Campuzano is a 78 y.o. female.  Diagnosis: Laryngeal cancer  Staging: T3N0M0  Date of Diagnosis: 5/22/24    Providers:  ENT Surgeon: Dr. Ferny Rhoades  [facility]:   Dr. Kyunghee Burkitt/Megan Arauz APRARNEL  [facility]: Dr. Sue Oliva    Ms. Campuzano is 77 y/o female with recent diagnosis of laryngeal cancer who is referred to  med onc  to discuss treatment plan.    -2/2024: pt presented with hoarse voice  -3/21/24: CT chest showed left pulmonary nodule  -4/3/24: PET/CT showed uptake in left vocal cord extending  to the right with limited subglottic extension  -4/23/24: biopsy of LLL nodule showed negative for malignancy  -5/7/24: seen by thoracic surgeon Dr. Linda, plan is to follow up image in 3 months  -5/20/24: s/p direct laryngoscopy. She was found to have transglottic lesion extending from primarily the left glottis into the subglottis and wrapping around anteriorly. 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)  No date: Breast cancer (Multi)  2012: Cerebral hemorrhage (Multi)  No date: CKD (chronic kidney disease)  No date: Colon cancer (Multi)  No date: Colorectal cancer (Multi)  No date: Diverticulosis of intestine, part unspecified, without   perforation or abscess without bleeding      Comment:  Diverticulosis  No date: GERD (gastroesophageal reflux disease)  No date: Hypertension  No date: Irritable bowel syndrome  No date: Kidney disease  No date: Malignant neoplasm of colon, unspecified (Multi)      Comment:  Colon cancer  No date: Personal history of colonic polyps      Comment:  History of colonic polyps  No date: Personal history of irradiation  No date: Personal history of malignant neoplasm of breast      Comment:  History of malignant neoplasm of female breast  No date: Personal history of other diseases of the respiratory system      Comment:  History of respiratory failure  No date: Personal history of other malignant  neoplasm of skin      Comment:  Personal history of malignant neoplasm of skin  2016: Right upper quadrant pain      Comment:  Abdominal pain, RUQ (right upper quadrant)  2012: Stroke (Multi)  2016: Unspecified symptoms and signs involving the   genitourinary system      Comment:  Urinary symptom or sign   Surgical History:    Past Surgical History:   Procedure Laterality Date    BI US GUIDED BREAST RIGHT CYST ASPIRATION Right 2019    BI BREAST CYST ASPIRATION RIGHT [facility]    BREAST LUMPECTOMY  2014    Breast Surgery Lumpectomy    CHOLECYSTECTOMY      CT GUIDED IMAGING FOR ABSCESS DRAIN  2015    CT GUIDED IMAGING FOR ABSCESS DRAIN [facility]    HYSTERECTOMY  1984    Hysterectomy    ILEOSTOMY  2015    Ileostomy    ILEOSTOMY CLOSURE  2015    Ileostomy Closure    OTHER SURGICAL HISTORY      Cerebral artery coiling      Family History:    Family History   Problem Relation Name Age of Onset    Colon cancer Mother      Liver cancer Mother      Kidney cancer Mother      Heart attack Father      Blood clot Father      Cancer Brother      Cancer Brother       Family Oncology History:    Cancer-related family history includes Cancer in her brother and brother; Colon cancer in her mother; Kidney cancer in her mother; Liver cancer in her mother.  Social History:    Social History     Tobacco Use    Smoking status: Former     Current packs/day: 0.00     Types: Cigarettes     Quit date:      Years since quittin.6     Passive exposure: Past    Smokeless tobacco: Never   Vaping Use    Vaping status: Never Used   Substance Use Topics    Alcohol use: Never    Drug use: Never        Subjective   Chief Complaint: Laryngeal cancer    HPI    Interval History  Pt present in clinic for follow-up and readiness to treat visit.  Her  is present for visit.  Feeling better today.  +Fatigued, taking naps.  Taking routine creon, but had stopped the questran.    Stopped d/t medication consistency - too thick.   Diarrhea managed with lomotil.  Taking 2x/day.   BM today - 3 prior to visit, loose, able to make it the bathroom.   BM following intake.  Appetite - fair - poor, started with tx. Eating 3 meals/day, small meals.  Drinking supplement.  Wt is stable.  Denies n/v/c.  Started potassium supplement.  No fevers, chills, or CP.  No current c/o back pain. Pain managed with PRN tylenol.  Odynophagia, rates 4/10.  Sore throat since January.  Declining oxycodone Rx.  Labs WNL.  Ready for treatment tomorrow.      ROS  Review of Systems   Constitutional:  Positive for fatigue.   HENT:  Negative.  Negative for voice change.    Respiratory:  Positive for cough.    Cardiovascular: Negative.    Gastrointestinal:  Positive for diarrhea, nausea and vomiting.   Genitourinary: Negative.     Musculoskeletal: Negative.  Negative for back pain and neck pain.   Skin:  Positive for rash.   Neurological: Negative.        Allergies  Allergies   Allergen Reactions    Paclitaxel GI Upset and Nausea/vomiting     See adverse drug reaction note dated 7/9/24    Hydrocodone-Acetaminophen Dizziness    Oxycodone Hcl Unknown    Oxycodone-Acetaminophen Dizziness    Aspirin GI Upset and Unknown    Sulfa (Sulfonamide Antibiotics) Rash        Medications  Current Outpatient Medications   Medication Instructions    acetaminophen (TYLENOL) 1,000 mg, oral, Every 6 hours PRN    biotin 5 mg capsule Every 24 hours    cholecalciferol (Vitamin D-3) 50 mcg (2,000 unit) capsule 1 capsule, Every 24 hours    cholestyramine (Questran) 4 gram powder once daily.    clindamycin (Cleocin T) 1 % lotion Apply topically to affected area twice daily.    diphenoxylate-atropine (LomotiL) 2.5-0.025 mg tablet 1 tablet, oral, 4 times daily PRN    doxycycline (VIBRAMYCIN) 100 mg, oral, 2 times daily, For rash prevention. Take with at least 8 ounces (large glass) of water, do not lie down for 30 minutes after    hydrocortisone 1 %  cream Apply topically to face, hands, feet, neck, back, and chest once daily at bedtime for rash prevention.    lipase-protease-amylase (Creon) 24,000-76,000 -120,000 unit capsule TAKE 3 CAPSULES BY MOUTH 3 TIMES DAILY WITH MEALS AND 1 TO 2  CAPSULES BY MOUTH WITH SNACKS.  MAX 13 CAPSULE DAILY    loperamide (Imodium) 2 mg capsule Take 2 capsules (4 mg) by mouth with the first episode of diarrhea and 1 capsule (2 mg) by mouth with any additional episodes. Maximum 8 capsules (16 mg) per day.    losartan (COZAAR) 25 mg, oral, Daily    magnesium oxide (MAG-OX) 400 mg, oral, Daily    ondansetron (ZOFRAN) 8 mg, oral, Every 8 hours PRN    pantoprazole (ProtoNix) 40 mg EC tablet TAKE 1 TABLET BY MOUTH TWICE  DAILY    potassium chloride (Klor-Con) 20 mEq packet 20 mEq, oral, Daily    prochlorperazine (COMPAZINE) 10 mg, oral, Every 6 hours PRN        Objective   VS: There were no vitals taken for this visit.  Weight: Daily Weight  08/06/24 : 61.7 kg (136 lb 0.4 oz)  08/05/24 : 61.6 kg (135 lb 12.9 oz)  08/02/24 : 60.4 kg (133 lb 2.5 oz)  07/31/24 : 60.2 kg (132 lb 11.5 oz)  07/29/24 : 61 kg (134 lb 9.5 oz)  07/25/24 : 62.8 kg (138 lb 7.2 oz)  07/23/24 : 62.9 kg (138 lb 10.7 oz)      Physical Exam  Vitals reviewed.   Constitutional:       Appearance: Normal appearance.   HENT:      Head: Normocephalic.      Nose: Nose normal.      Mouth/Throat:      Mouth: Mucous membranes are moist.      Pharynx: Oropharynx is clear.   Eyes:      Extraocular Movements: Extraocular movements intact.      Conjunctiva/sclera: Conjunctivae normal.      Pupils: Pupils are equal, round, and reactive to light.   Cardiovascular:      Rate and Rhythm: Normal rate and regular rhythm.      Pulses: Normal pulses.      Heart sounds: Normal heart sounds.   Pulmonary:      Breath sounds: Normal breath sounds.   Abdominal:      General: Bowel sounds are normal.      Palpations: Abdomen is soft.   Musculoskeletal:         General: Normal range of motion.       Cervical back: Normal range of motion and neck supple.   Skin:     General: Skin is warm and dry.      Findings: Rash (dry patch to left corner of mouth - covered with makeup) present.   Neurological:      General: No focal deficit present.      Mental Status: She is alert and oriented to person, place, and time.   Psychiatric:         Mood and Affect: Mood normal.         Behavior: Behavior normal.         Thought Content: Thought content normal.         Judgment: Judgment normal.     Diagnostic Results   Labs    Results from last 7 days   Lab Units 08/05/24  1345   WBC AUTO x10*3/uL 5.8   HEMOGLOBIN g/dL 11.6*   HEMATOCRIT % 35.1*   PLATELETS AUTO x10*3/uL 177   NEUTROS ABS x10*3/uL 4.06   LYMPHS ABS AUTO x10*3/uL 1.00   MONOS ABS AUTO x10*3/uL 0.59   EOS ABS AUTO x10*3/uL 0.10   NEUTROS PCT AUTO % 70.2   LYMPHS PCT AUTO % 17.3   MONOS PCT AUTO % 10.2   EOS PCT AUTO % 1.7        Results from last 7 days   Lab Units 08/02/24  1520   GLUCOSE mg/dL 99   SODIUM mmol/L 143   POTASSIUM mmol/L 3.2*   CHLORIDE mmol/L 110*   CO2 mmol/L 23   BUN mg/dL 28*   CREATININE mg/dL 1.06*   EGFR mL/min/1.73m*2 54*   CALCIUM mg/dL 8.8   MAGNESIUM mg/dL 1.60   ALBUMIN g/dL 3.5   PROTEIN TOTAL g/dL 5.9*   BILIRUBIN TOTAL mg/dL 0.5   ALK PHOS U/L 79   ALT U/L 11   AST U/L 25                   Images  MRI Cervical and Thoracic Spine 7/8/24:    Cervical, Thoracic Spine: Diffuse soft tissue thickening and enhancement of the glottic larynx extending into the subglottic larynx and involving the left cricoid, arytenoid, and thyroid cartilages, consistent with known tumor.  No evidence of metastatic disease within the cervical or thoracic  spine.    Pathology  Lab Results   Component Value Date    PATHREP  12/06/2021                                                     MRN: [id number]  Patient Name RONALD LENNON  Accession #: [id number]  Date of Procedure:  12/6/2021       Date Reported: 12/9/2021  Date Received:  12/7/2021  Date of Birth / Sex  1945 (Age: 76) / F  Race: WHITE  Submitting Physician: ROD VALLE MD    Other External #          FINAL CYTOLOGICAL INTERPRETATION    A.   FINE NEEDLE ASPIRATION BREAST - RIGHT, CYTOLOGY AND CELL BLOCK:  --NO MALIGNANT CELLS IDENTIFIED.  --SPECIMEN CONSISTS OF PROTEINACEOUS DEBRIS, OCCASIONAL FOAMY MACROPHAGES, FEW  INFLAMMATORY CELLS AND RARE EPITHELIAL GROUP.        Slide(s) initially screened by a Cytotechnologist at [place] [id number]      Electronically Signed Out By GABINO GARCES MD    By the signature on this report, the individual or group listed as making the  Final Interpretation/Diagnosis certifies that they have reviewed this case.  Slide(s) initially screened by a Cytotechnologist at [facility]     Clinical History      Clinical Diagnosis History: Cyst of right breast - (N60.01)   Source of Specimen  A:  FINE NEEDLE ASPIRATION BREAST - RIGHT     Specimen Submitted as:  A:   FINE NEEDLE ASPIRATION BREAST - RIGHT        Pap non-gyn ThinPrep slide, CELL BLOCK, H&E, Initial    Gross Description  A.   FINE NEEDLE ASPIRATION BREAST - RIGHT:  35cc MILKY YELLOW NEEDLE RINSE IN  CYTOLYT                 [facility]  Department of Pathology  [address] [place] [address]        PATHREP  09/09/2019     Name RONALD LENNON                                                                                                   Accession #: [id number]            Pathologist:                   YUE FIGUEROA MD  Date of Procedure:    9/9/2019  Date Received:          9/9/2019  Date Reported           9/13/2019  Submitting Physician:   SALIMA WHEELER MD  Location:                    [facility]  Other External #                                                                    FINAL DIAGNOSIS  A.  BIOPSY OF ANAL CANAL:  MINUTE FRAGMENTS OF SOFT TISSUE WITH NO SIGNIFICANT  PATHOLOGICAL  "FINDINGS, SEE NOTE.    Note: Deeper sections were obtained.                                                                                                                                                                                                                                                                                                                                                                                                                                                                                       Electronically Signed Out By YUE FIGUEROA MD/ANASTASIYA  By the signature on this report, the individual or group listed as making the  Final Interpretation/Diagnosis certifies that they have reviewed this case.           Clinical History:  Colorectal cancer, squamous cell, status post chemo radiation    Specimens Submitted As:  A: BIOPSY OF ANAL CANAL     Gross Description:  Received in formalin, labeled with the patient's name and hospital number and  \"anal canal\", are multiple fragments of tan, soft tissue aggregating to 1.7 x  1.0 x 0.1 cm. The specimen is submitted in toto in one cassette.  CJN    cjn/9/9/2019               [facility]  Department of Pathology   [address] [place] [address]        COMDX  05/20/2024     Case reviewed at ENT consensus conference via [employer] technology on 5/22/2024.       Assessment/Plan   Ms. Campuzano is 79 y/o female with recent diagnosis of laryngeal cancer who is referred to  med onc  to discuss treatment plan.    # Laryngeal cancer (T3N0M0)  -Based on PET/CT from 4/3/24, there was uptake in LLL nodule, however, biopsy of LLL nodule showed negative for malignancy  -seen by Thoracic surgeon, plan to have repeat CT chest without contrast in 3 month  -since her GFR is 40 (no hearing deficit), she is not a candidate for cisplatin, will treat her with weekly carboplatin and paclitaxel, reviewed chemo related side effects " with patient in detail.  Consent obtained.  -pt developed abdominal pain post C1, during C2 infusion of paclitaxel,  she experienced worsening of abdominal pain, pt wanted to stop chemo on 7/9 and also didn't want further chemotherapy.  -7/10/24: discussed with patient about changing treatment to cetuximab. Explained to patient about main side effects (skin rash and hypomagnesemia), she is good with the plan.  I couldn't place consent due to epic issue, I will place it later.  - Pt consented by Dr. Burkitt 7/11/24  - Pt tolerated her C1 Cetuximab tx well.  Intermittent diarrhea managed with PRN Imodium       7/2/24 - C1 Carboplatin/Paciltaxel  7/9/24 - pt declined C2 carboplatin/paciltaxel  7/15/24 - PowerPort mediport placed   7/16/24 - C1 Cetuximab  7/23/24 - C2 Cetuximab  7/30/24 - C3 Cetuximab  8/6/24 - scheduled C4 Cetuximab    # Hx of anal, breast and cutaneous malignancies  -s/p lumpectomy with radiation in 2012  -s/p radiation to anal cancer in 2018  -s/p resection of skin cancer on nose in 2010    # Chronic diarrhea   - from previous colonic surgery  -3-4 episodes per day, while on 3 imodium three times a day, 3-4 episodes of diarrhea daily.  - Concern for flares with chemotherapy tx's  - Tolerated C1 Carboplatin/Paciltaxel  - Currently managed with Creon and Questran - stools forms, soft  - 7/22:  Following C1 Cetuximab, intermittent diarrhea - managed with PRN Imodium   - 7/29:  Imodium admin x1 following C2.    - ongoing monitoring  - ED 7/31 - d/t dehydration 2/2 diarrhea, pt left AMA  - 8/5:  Dr. Moura provided Lomotil Rx.  Diarrhea managed with Lomotil - x2/day     # Back pain, area between her shoulder blades, more so on the left.  Has worsened over the last few weeks.  Pain with activity.  Resolves with rest.  No acute injury.  No falls.    - Will order MRI C & T (+/-) next available.  MRI's scheduled 7/8/24.    - 7/8:  Cervical, Thoracic Spine: Diffuse soft tissue thickening and enhancement of  the glottic larynx extending into the subglottic larynx and involving the left cricoid, arytenoid, and thyroid cartilages, consistent with known tumor.  No evidence of metastatic disease within the cervical or thoracic  spine.   - Currently managed with routine Tylenol 1,000mg/daily  - Supportive onc referral placed.  Pt to return sup onc call.  - 7/22:  No current c/o back pain    - 7/29:  Recommended she continue Tylenol and heating pad for pain control  - 8/5:  Managed with PRN Tylenol    # Hypomagnesia: 7/2/24 Mg 1.41  - Will start oral Mag Oxide - 1 tab/day, Rx sent (not covered by pt's insurance)  - Will replete with IV hydration visit 7/5/24.  - If consistently low, will replete weekly via IV   - 7/8/24 - Mg 1.71.    - 7/15/24 - Mg 1.31 - 2g Mg prior to 7/16 infusion   - 7/22/24 - Mg 1.42 - 2g Mg prior to 7/23 infusion   - 7/29/24 - Mg 1.52 - 2g Mg prior to 7/30 infusion  - 8/6/24 - Mg 1.60 - 2g Mg     # Hypokalemia 8/2/24 K 3.2  - Rx sent - potassium packets 20mEq/day    # Mediport placement, per pt request  - IR consult order in place  - INR lab completed.  - 7/8:  Encouraged pt to return [facility] call to schedule placement.    - 7/15:  PowerPort mediport placed  - EMLA Rx sent 7/22/24     # Elevated Cr/BUN 1/24/24  - at/near baseline  - 1.17 7/8/24  - 1.43/25 7/15/24   - 1.05/30 7/22/24 - encouraged fluids   - 1.17/34 7/29/24 - encouraged fluids, IV hydration 8/2/24  - ongoing monitoring  - 1.06/28 8/2/24 - improved    # Rx's  - Rx's sent to pt's pharmacy 7/15  - Spouse reports not all meds available today, expect to have all meds on 7/16.  Meds to be picked-up on 7/16/24.  Spouse waiting for all meds to be available, prior to pick-up.    - 7/22:  encouraged pt to apply Hydrocortisone ointment per tx plan instructions.  No current skin concerns.      PLAN  - Treatment changed to Cetuximab starting 7/16  - IV hydration - each Friday  - Weekly NP visits during CRT tx. Port draws with Monday visits.

## 2024-08-05 NOTE — PROGRESS NOTES
pt tolerated today's port draw without difficulty. Pt aware of upcoming appt schedule. pt to call with any questions and/or concerns.;

## 2024-08-06 ENCOUNTER — HOSPITAL ENCOUNTER (OUTPATIENT)
Dept: RADIATION ONCOLOGY | Facility: CLINIC | Age: 79
Setting detail: RADIATION/ONCOLOGY SERIES
Discharge: HOME | End: 2024-08-06
Payer: MEDICARE

## 2024-08-06 ENCOUNTER — INFUSION (OUTPATIENT)
Dept: HEMATOLOGY/ONCOLOGY | Facility: CLINIC | Age: 79
End: 2024-08-06
Payer: MEDICARE

## 2024-08-06 ENCOUNTER — NUTRITION (OUTPATIENT)
Dept: HEMATOLOGY/ONCOLOGY | Facility: CLINIC | Age: 79
End: 2024-08-06
Payer: MEDICARE

## 2024-08-06 VITALS
WEIGHT: 136.02 LBS | OXYGEN SATURATION: 96 % | DIASTOLIC BLOOD PRESSURE: 65 MMHG | BODY MASS INDEX: 23.35 KG/M2 | RESPIRATION RATE: 18 BRPM | SYSTOLIC BLOOD PRESSURE: 135 MMHG | TEMPERATURE: 97.7 F

## 2024-08-06 DIAGNOSIS — Z51.0 ENCOUNTER FOR ANTINEOPLASTIC RADIATION THERAPY: ICD-10-CM

## 2024-08-06 DIAGNOSIS — C32.0 MALIGNANT NEOPLASM OF GLOTTIS (MULTI): ICD-10-CM

## 2024-08-06 DIAGNOSIS — C32.9 LARYNGEAL CANCER (MULTI): ICD-10-CM

## 2024-08-06 LAB
RAD ONC MSQ ACTUAL FRACTIONS DELIVERED: 20
RAD ONC MSQ ACTUAL SESSION DELIVERED DOSE: 200 CGRAY
RAD ONC MSQ ACTUAL TOTAL DOSE: 4000 CGRAY
RAD ONC MSQ ELAPSED DAYS: 34
RAD ONC MSQ LAST DATE: NORMAL
RAD ONC MSQ PRESCRIBED FRACTIONAL DOSE: 200 CGRAY
RAD ONC MSQ PRESCRIBED NUMBER OF FRACTIONS: 35
RAD ONC MSQ PRESCRIBED TECHNIQUE: NORMAL
RAD ONC MSQ PRESCRIBED TOTAL DOSE: 7000 CGRAY
RAD ONC MSQ PRESCRIPTION PATTERN COMMENT: NORMAL
RAD ONC MSQ START DATE: NORMAL
RAD ONC MSQ TREATMENT COURSE NUMBER: 2
RAD ONC MSQ TREATMENT SITE: NORMAL

## 2024-08-06 PROCEDURE — 96375 TX/PRO/DX INJ NEW DRUG ADDON: CPT | Mod: INF

## 2024-08-06 PROCEDURE — 2500000004 HC RX 250 GENERAL PHARMACY W/ HCPCS (ALT 636 FOR OP/ED): Performed by: NURSE PRACTITIONER

## 2024-08-06 PROCEDURE — 77386 HC INTENSITY-MODULATED RADIATION THERAPY (IMRT), COMPLEX: CPT | Performed by: STUDENT IN AN ORGANIZED HEALTH CARE EDUCATION/TRAINING PROGRAM

## 2024-08-06 PROCEDURE — 96367 TX/PROPH/DG ADDL SEQ IV INF: CPT

## 2024-08-06 PROCEDURE — 96413 CHEMO IV INFUSION 1 HR: CPT

## 2024-08-06 RX ORDER — HEPARIN SODIUM,PORCINE/PF 10 UNIT/ML
50 SYRINGE (ML) INTRAVENOUS AS NEEDED
Status: CANCELLED | OUTPATIENT
Start: 2024-08-06

## 2024-08-06 RX ORDER — MAGNESIUM SULFATE HEPTAHYDRATE 40 MG/ML
2 INJECTION, SOLUTION INTRAVENOUS ONCE AS NEEDED
Status: DISCONTINUED | OUTPATIENT
Start: 2024-08-06 | End: 2024-08-06 | Stop reason: HOSPADM

## 2024-08-06 RX ORDER — HEPARIN 100 UNIT/ML
500 SYRINGE INTRAVENOUS AS NEEDED
Status: CANCELLED | OUTPATIENT
Start: 2024-08-06

## 2024-08-06 RX ORDER — EPINEPHRINE 0.3 MG/.3ML
0.3 INJECTION SUBCUTANEOUS EVERY 5 MIN PRN
Status: DISCONTINUED | OUTPATIENT
Start: 2024-08-06 | End: 2024-08-06 | Stop reason: HOSPADM

## 2024-08-06 RX ORDER — FAMOTIDINE 10 MG/ML
20 INJECTION INTRAVENOUS ONCE AS NEEDED
Status: DISCONTINUED | OUTPATIENT
Start: 2024-08-06 | End: 2024-08-06 | Stop reason: HOSPADM

## 2024-08-06 RX ORDER — HEPARIN 100 UNIT/ML
500 SYRINGE INTRAVENOUS AS NEEDED
Status: DISCONTINUED | OUTPATIENT
Start: 2024-08-06 | End: 2024-08-06 | Stop reason: HOSPADM

## 2024-08-06 RX ORDER — ALBUTEROL SULFATE 0.83 MG/ML
3 SOLUTION RESPIRATORY (INHALATION) AS NEEDED
Status: DISCONTINUED | OUTPATIENT
Start: 2024-08-06 | End: 2024-08-06 | Stop reason: HOSPADM

## 2024-08-06 RX ORDER — PROCHLORPERAZINE EDISYLATE 5 MG/ML
10 INJECTION INTRAMUSCULAR; INTRAVENOUS EVERY 6 HOURS PRN
Status: DISCONTINUED | OUTPATIENT
Start: 2024-08-06 | End: 2024-08-06 | Stop reason: HOSPADM

## 2024-08-06 RX ORDER — PROCHLORPERAZINE MALEATE 10 MG
10 TABLET ORAL EVERY 6 HOURS PRN
Status: DISCONTINUED | OUTPATIENT
Start: 2024-08-06 | End: 2024-08-06 | Stop reason: HOSPADM

## 2024-08-06 RX ORDER — DIPHENHYDRAMINE HYDROCHLORIDE 50 MG/ML
50 INJECTION INTRAMUSCULAR; INTRAVENOUS AS NEEDED
Status: DISCONTINUED | OUTPATIENT
Start: 2024-08-06 | End: 2024-08-06 | Stop reason: HOSPADM

## 2024-08-06 RX ORDER — HEPARIN SODIUM,PORCINE/PF 10 UNIT/ML
50 SYRINGE (ML) INTRAVENOUS AS NEEDED
Status: DISCONTINUED | OUTPATIENT
Start: 2024-08-06 | End: 2024-08-06 | Stop reason: HOSPADM

## 2024-08-06 ASSESSMENT — PAIN SCALES - GENERAL: PAINLEVEL: 0-NO PAIN

## 2024-08-06 NOTE — PROGRESS NOTES
Pt arrived ambulatory to infusion clinic. Pts  is preset. Port site c/d/I. Port accessed. Brisk blood return. Per orders, 2gm of magnesium will also be given today.   Pt verbalized understanding via teachback.   Infusions initiated. Will continue to monitor.   Patient refused observation period.

## 2024-08-06 NOTE — PROGRESS NOTES
NUTRITION Follow Up NOTE    Nutrition Assessment     Reason for Visit:  Padilla Campuzano is a 78 y.o. female who presents for nutrition consult 2/2 dx.  DX scc L vocal cord  TX concurrent chemoradiation, chemotherapy changed to Cetuximab 7/16/24. XRT 7/2-8/20  Hydration on Fridays  Hx anal and breast cancer. Chronic diarrhea not related to anal cancer per     8/6- Week 5 for chemo, was held last week. FUV in infusion. States she feels better, diarrhea better controlled  8/2/24- Asked to see pt in [facility], was in ED for dehydration past week. Getting fluids today. Does not like Boost VHC. Increased diarrhea from baseline, Banatrol Plus ineffective     Lab Results   Component Value Date/Time    GLUCOSE 99 08/02/2024 1520     08/02/2024 1520    K 3.2 (L) 08/02/2024 1520     (H) 08/02/2024 1520    CO2 23 08/02/2024 1520    ANIONGAP 13 08/02/2024 1520    BUN 28 (H) 08/02/2024 1520    CREATININE 1.06 (H) 08/02/2024 1520    EGFR 54 (L) 08/02/2024 1520    CALCIUM 8.8 08/02/2024 1520    ALBUMIN 3.5 08/02/2024 1520    ALKPHOS 79 08/02/2024 1520    PROT 5.9 (L) 08/02/2024 1520    AST 25 08/02/2024 1520    BILITOT 0.5 08/02/2024 1520    ALT 11 08/02/2024 1520     Lab Results   Component Value Date/Time    VITD25 53 05/01/2024 0904       Anthropometrics:           Wt Readings from Last 10 Encounters:   08/06/24 61.7 kg (136 lb 0.4 oz)   08/05/24 61.6 kg (135 lb 12.9 oz)   08/02/24 60.4 kg (133 lb 2.5 oz)   07/31/24 60.2 kg (132 lb 11.5 oz)   07/29/24 61 kg (134 lb 9.5 oz)   07/25/24 62.8 kg (138 lb 7.2 oz)   07/23/24 62.9 kg (138 lb 10.7 oz)   07/22/24 62.9 kg (138 lb 12.5 oz)   07/19/24 63.9 kg (140 lb 14 oz)   07/18/24 64.3 kg (141 lb 10.3 oz)   7/2- first chemotherapy 65 kg  7/9- 65.1 kg  7/16 - 64.7 kg  7/23- 62.9 kg  8/6- 61.7 kg  Food And Nutrient Intake:  Food and Nutrient History  Food and Nutrient History: intake decreased overall, but able to eat more over past few days  Energy Intake: Fair 50-75 %  Fluid  Intake: water. States she always struggled with drinking fluids, knows she needs to keep working on it  GI Symptoms: diarrhea (gag reflex/spitting up resoloved at this time.                           +Questran and Creon for diarrhea (which predates tx) Dr Moura prescribed Lomotil recommded FUV with GI, she recently saw her GI doesn't plan on a FUV. Diarrhea improved)  GI Symptoms greater than 2 weeks: yes  Oral Problems: odynophagia, dysgeusia (hoarse voice. Started using Bliss lozenges. +s/s rinses. increase thick saliva/mucus.)  Dentition: lower denture/partial, upper denture/partial     Food Intake  Amount of Food: Consumes protein sources like eggs, cottage cheese, yogurt, PNB. Has had homemade chicken soup and homemade stuffed peppers recently    Food Preparation  Cooking: Spouse/Significant Other, Patient  Grocery Shopping: Patient, Spouse/Significant Other              Enteral Nutrition Intake  Enteral Nutrition Formula/Solution: had a feeding tube when she had a stroke                        Food Supplement Intake  Oral Nutrition Supplements:  (no consistent ONS intake)    Medication and Complementary/Alternative Medicine Use  OTC Medication Use: Biotin, Vitamin D      Nutrition Focused Physical Exam Findings: 7/2/24                          Energy Needs  Estimated Energy Needs  Total Energy Estimated Needs (kCal): 1825 kCal  Total Estimated Energy Need per Day (kCal/kg): 28 kCal/kg  Estimated Fluid Needs  Total Fluid Estimated Needs (mL): 1825 mL  Method for Estimating Needs: 1 ml/kcal  Estimated Protein Needs  Total Protein Estimated Needs (g): 78 g  Total Protein Estimated Needs (g/kg): 1.2 g/kg        Nutrition Diagnosis   Malnutrition Diagnosis  Patient has Malnutrition Diagnosis: No    Nutrition Diagnosis  Patient has Nutrition Diagnosis: Yes  Diagnosis Status (1): Ongoing  Nutrition Diagnosis 1: Predicted inadequate energy intake  Related to (1): pathophysiology of disease  As Evidenced by (1): high  "risk for nutrition impact symptoms impairing ability to meet estimated energy needs, affect oral intake and weight status    Nutrition Interventions/Recommendations   Nutrition Prescription  Individualized Nutrition Prescription Provided for : diet during cancer treatment    Food and Nutrition Delivery  Food and Nutrition Delivery  Meals & Snacks: Energy-modified diet, Modify Composition of Meals/Snacks, Protein-modified diet, Fluid-modified diet  Goals: incorporate soft high calorie high protein foods modified texture as needed, moist foods as needed. Include consistent meals and snacks. Avoid dry harsh  foods and acidic foods/beverages. Include adequate fluids  Medical Food Supplement: Commercial beverage (suggest diluting VHC with water/adding ice cream/mixing with Ensure Plus/Complete and trialing. Provided samples of Ensure Complete and Ensure Clear. Pt liked Ensure Clear, suggested purchasing, using as \"juice\" w morning meal in addition to ONS)    Nutrition Education - provided 7/22       Coordination of Care     8/2- encouraged increased fluids, Pedialyte for diarrhea      Nutrition Monitoring and Evaluation   Food/Nutrient Related History Monitoring  Monitoring and Evaluation Plan: Energy intake, Meal/snack pattern, Protein intake, Fluid intake  Energy Intake: Estimated energy intake  Criteria: Meet >75% estimated energy needs  Fluid Intake: Estimated fluid intake  Criteria: maintain hydration  Meal/Snack Pattern: Estimated meal and snack pattern  Criteria: 4-6 meals/snacks daily  Estimated protein intake: Estimated protein intake  Criteria: include high protein foods with meals and/or snacks 75% of meals  Body Composition/Growth/Weight History  Monitoring and Evaluation Plan: Weight  Weight: Measured weight  Criteria: maintain weight    Following through treatment  Contact information provided     Time Spent  Prep time on day of patient encounter: 5 minutes  Time spent directly with patient, family or " caregiver: 10 minutes  Additional Time Spent on Patient Care Activities: 0 minutes  Documentation Time: 15 minutes  Other Time Spent: 0 minutes  Total: 30 minutes                  Readiness to Change : Good  Level of Understanding : Good  Anticipated Compliant : Good

## 2024-08-07 ENCOUNTER — APPOINTMENT (OUTPATIENT)
Dept: RADIOLOGY | Facility: HOSPITAL | Age: 79
End: 2024-08-07
Payer: MEDICARE

## 2024-08-07 ENCOUNTER — HOSPITAL ENCOUNTER (OUTPATIENT)
Dept: RADIATION ONCOLOGY | Facility: CLINIC | Age: 79
Setting detail: RADIATION/ONCOLOGY SERIES
Discharge: HOME | End: 2024-08-07
Payer: MEDICARE

## 2024-08-07 DIAGNOSIS — Z51.0 ENCOUNTER FOR ANTINEOPLASTIC RADIATION THERAPY: ICD-10-CM

## 2024-08-07 DIAGNOSIS — C32.0 MALIGNANT NEOPLASM OF GLOTTIS (MULTI): ICD-10-CM

## 2024-08-07 PROBLEM — Z51.11 ENCOUNTER FOR ANTINEOPLASTIC CHEMOTHERAPY: Status: ACTIVE | Noted: 2024-08-07

## 2024-08-07 LAB
RAD ONC MSQ ACTUAL FRACTIONS DELIVERED: 21
RAD ONC MSQ ACTUAL SESSION DELIVERED DOSE: 200 CGRAY
RAD ONC MSQ ACTUAL TOTAL DOSE: 4200 CGRAY
RAD ONC MSQ ELAPSED DAYS: 35
RAD ONC MSQ LAST DATE: NORMAL
RAD ONC MSQ PRESCRIBED FRACTIONAL DOSE: 200 CGRAY
RAD ONC MSQ PRESCRIBED NUMBER OF FRACTIONS: 35
RAD ONC MSQ PRESCRIBED TECHNIQUE: NORMAL
RAD ONC MSQ PRESCRIBED TOTAL DOSE: 7000 CGRAY
RAD ONC MSQ PRESCRIPTION PATTERN COMMENT: NORMAL
RAD ONC MSQ START DATE: NORMAL
RAD ONC MSQ TREATMENT COURSE NUMBER: 2
RAD ONC MSQ TREATMENT SITE: NORMAL

## 2024-08-07 PROCEDURE — 77386 HC INTENSITY-MODULATED RADIATION THERAPY (IMRT), COMPLEX: CPT | Performed by: STUDENT IN AN ORGANIZED HEALTH CARE EDUCATION/TRAINING PROGRAM

## 2024-08-08 ENCOUNTER — RADIATION ONCOLOGY OTV (OUTPATIENT)
Dept: RADIATION ONCOLOGY | Facility: CLINIC | Age: 79
End: 2024-08-08
Payer: MEDICARE

## 2024-08-08 ENCOUNTER — HOSPITAL ENCOUNTER (OUTPATIENT)
Dept: RADIATION ONCOLOGY | Facility: CLINIC | Age: 79
Setting detail: RADIATION/ONCOLOGY SERIES
Discharge: HOME | End: 2024-08-08
Payer: MEDICARE

## 2024-08-08 VITALS
TEMPERATURE: 96.4 F | OXYGEN SATURATION: 96 % | DIASTOLIC BLOOD PRESSURE: 69 MMHG | BODY MASS INDEX: 23.23 KG/M2 | WEIGHT: 135.36 LBS | HEART RATE: 81 BPM | SYSTOLIC BLOOD PRESSURE: 144 MMHG | RESPIRATION RATE: 18 BRPM

## 2024-08-08 DIAGNOSIS — C32.0 MALIGNANT NEOPLASM OF GLOTTIS (MULTI): ICD-10-CM

## 2024-08-08 DIAGNOSIS — Z51.0 ENCOUNTER FOR ANTINEOPLASTIC RADIATION THERAPY: ICD-10-CM

## 2024-08-08 DIAGNOSIS — K52.9 CHRONIC DIARRHEA: ICD-10-CM

## 2024-08-08 LAB
RAD ONC MSQ ACTUAL FRACTIONS DELIVERED: 22
RAD ONC MSQ ACTUAL SESSION DELIVERED DOSE: 200 CGRAY
RAD ONC MSQ ACTUAL TOTAL DOSE: 4400 CGRAY
RAD ONC MSQ ELAPSED DAYS: 36
RAD ONC MSQ LAST DATE: NORMAL
RAD ONC MSQ PRESCRIBED FRACTIONAL DOSE: 200 CGRAY
RAD ONC MSQ PRESCRIBED NUMBER OF FRACTIONS: 35
RAD ONC MSQ PRESCRIBED TECHNIQUE: NORMAL
RAD ONC MSQ PRESCRIBED TOTAL DOSE: 7000 CGRAY
RAD ONC MSQ PRESCRIPTION PATTERN COMMENT: NORMAL
RAD ONC MSQ START DATE: NORMAL
RAD ONC MSQ TREATMENT COURSE NUMBER: 2
RAD ONC MSQ TREATMENT SITE: NORMAL

## 2024-08-08 PROCEDURE — 77386 HC INTENSITY-MODULATED RADIATION THERAPY (IMRT), COMPLEX: CPT | Performed by: STUDENT IN AN ORGANIZED HEALTH CARE EDUCATION/TRAINING PROGRAM

## 2024-08-08 RX ORDER — PANTOPRAZOLE SODIUM 40 MG/1
TABLET, DELAYED RELEASE ORAL
Qty: 180 TABLET | Refills: 0 | Status: SHIPPED | OUTPATIENT
Start: 2024-08-08

## 2024-08-08 ASSESSMENT — PAIN SCALES - GENERAL: PAINLEVEL: 0-NO PAIN

## 2024-08-08 NOTE — PROGRESS NOTES
Radiation Oncology On Treatment Visit    Patient Name:  Padilla Campuzano  MRN:  [id number]  :  1945    Referring Provider: No ref. provider found  Primary Care Provider: Naheed Clements MD  Care Team: Patient Care Team:  Naheed Clements MD as PCP - General (Internal Medicine)  Naheed Clements MD [facility] PCP [facility]  DO Carroll Calabrese MD as Consulting Physician (Hematology and Oncology)  Kyunghee Burkitt, DO as Consulting Physician (Hematology and Oncology)  Eden Moss MD as Medical Oncologist (Hematology and Oncology)    Date of Service: 2024     Diagnosis:   Specialty Problems          Radiation Oncology Problems    Breast cancer (Multi)        Malignant neoplasm of colon (Multi)        Laryngeal cancer (Multi)         Treatment Summary:  IMRT: Bilateral Head and neck    Treatment Period Technique Fraction Dose Fractions Total Dose   Course 2 7/3/2024-2024  (days elapsed: 36)         H&N 7/3/2024-2024 VMAT 200 / 200 cGy  4400 / 7,000 cGy     SUBJECTIVE: Tolerating relatively well. Stable mild odynophagia, not significantly changed since prior to treatment. Using probiotic lozenges. Tolerating soft p.o. diet. Using boost to supplement, though having some difficulties with diarrhea. Will meet with RD to discuss alternatives.  Wt Readings from Last 10 Encounters:   24 61.4 kg (135 lb 5.8 oz)   24 61.7 kg (136 lb 0.4 oz)   24 61.6 kg (135 lb 12.9 oz)   24 60.4 kg (133 lb 2.5 oz)   24 60.2 kg (132 lb 11.5 oz)   24 61 kg (134 lb 9.5 oz)   24 62.8 kg (138 lb 7.2 oz)   24 62.9 kg (138 lb 10.7 oz)   24 62.9 kg (138 lb 12.5 oz)   24 63.9 kg (140 lb 14 oz)           OBJECTIVE:   Vital Signs:  /69 (BP Location: Left arm, Patient Position: Sitting, BP Cuff Size: Adult long)   Pulse 81   Temp 35.8 °C (96.4 °F) (Temporal)   Resp 18   Wt 61.4 kg (135 lb 5.8 oz)   SpO2 96%   BMI  23.23 kg/m²    Pain Scale: The patient's current pain level was assessed.  They report currently having a pain of 0 out of 10.    Other Pertinent Findings:     Toxicity Assessment          7/11/2024    13:38 7/18/2024    13:45 7/25/2024    13:50 8/2/2024    14:37 8/8/2024    13:26   Toxicity Assessment   Treatment  and neck Head and neck Head and neck Head and neck Head and neck   Anorexia Grade 0 Grade 0       eating pretty much what she wants and 1 Boost/day Grade 1       eating soft foods and 1 Boost/day Grade 1       ensure is hurting her stomach Grade 1       ensure 1x daily, mostly soft foods   Anxiety Grade 0 Grade 1 Grade 0 Grade 0 Grade 0   Dehydration Grade 0 Grade 0 Grade 0 Grade 2       getting IV fluid infusions Grade 2       IV fluids on Fridays   Depression Grade 1 Grade 1 Grade 0 Grade 0 Grade 0   Dermatitis Radiation Grade 0 Grade 0 Grade 0       Using Udderly smooth once a day Grade 0       using udderly smooth Grade 1       red splotches on chest, udderly smooth   Diarrhea Grade 1       last night Grade 1       baseline Grade 1       baseline Grade 2 Grade 2       some days 3-4x, some days none   Fatigue Grade 1 Grade 1       naps during the day and goes to bed early Grade 1       naps during the day Grade 2       baseline Grade 1       napping, going to bed early   Fibrosis Deep Connective Tissue Grade 0 Grade 0 Grade 0 Grade 0 Grade 0   Fracture Grade 0 Grade 0 Grade 0 Grade 0 Grade 0   Nausea Grade 0 Grade 0 Grade 0 Grade 2       taking anti nausea medicine Grade 0   Pain Grade 0 Grade 2       pain level 7 to back--takes Tylenol with some relief Grade 1       pain level 3 to abdomen Grade 0 Grade 0   Treatment Related Secondary Malignancy Grade 0 Grade 0 Grade 0 Grade 0 Grade 0   Tumor Pain Grade 0 Grade 0 Grade 0 Grade 0 Grade 0   Vomiting Grade 0 Grade 0 Grade 0 Grade 2 Grade 0   Dysphagia Grade 0 Grade 0 Grade 1  Grade 2   Esophagitis Grade 0 Grade 1 Grade 1     Mucositis Oral Grade  0       instructed patient to start salt and soda rinses and Blis losenges Grade 0       using Blis lozenges and salt and soda rinses Grade 0       using Blis lozenges and salt and soda rinses  Grade 1   Hearing Impaired Grade 0 Grade 0 Grade 0  Grade 0   Blurred Vision Grade 0 Grade 0 Grade 0  Grade 0   Dry Eye Grade 1 Grade 0 Grade 0  Grade 0   Eye Pain Grade 0 Grade 0 Grade 0  Grade 0   Retinopathy Grade 0 Grade 0 Grade 0  Grade 0   Central Nervous System Necrosis Grade 0 Grade 0 Grade 0  Grade 0   Edema Cerebral Grade 0 Grade 0 Grade 0  Grade 0   Dry Mouth Grade 0 Grade 0 Grade 1  Grade 1   Pneumonitis Grade 0 Grade 0 Grade 0     Febrile Neutropenia Grade 0 Grade 0 Grade 0 Grade 0 Grade 0   Ear Pain Grade 0 Grade 0 Grade 0 Grade 0 Grade 0   External Ear Pain Grade 0 Grade 0 Grade 0 Grade 0 Grade 0   Tinnitus Grade 0 Grade 0 Grade 0 Grade 0 Grade 0   Watering Eyes Grade 0 Grade 0 Grade 0 Grade 0 Grade 0   Oral Pain Grade 0 Grade 0 Grade 0 Grade 0 Grade 1       2/10 pain in tongue, constant   Salivary Duct Inflammation Grade 0 Grade 0 Grade 1 Grade 2       using baking soda/salt rinse Grade 2       using baking soda/salt rinse, blis lozenges   Edema Face Grade 0 Grade 0 Grade 0 Grade 0 Grade 0   Malaise Grade 0 Grade 0 Grade 0 Grade 0 Grade 0   Neck Edema Grade 0 Grade 0 Grade 0 Grade 0 Grade 0   Corneal Infection Grade 0 Grade 0 Grade 0 Grade 0 Grade 0   Mucosal Infection Grade 0 Grade 0 Grade 0 Grade 0 Grade 0   Otitis Media Grade 0 Grade 0 Grade 0 Grade 0 Grade 0   Sepsis Grade 0 Grade 0 Grade 0 Grade 0 Grade 0   Sinusitis Grade 0 Grade 0 Grade 0 Grade 0 Grade 0   Skin Infection Grade 0 Grade 0 Grade 0 Grade 0 Grade 0   Soft Tissue Infection Grade 0 Grade 0 Grade 0 Grade 0 Grade 0   Head Soft Tissue Necrosis Grade 0 Grade 0 Grade 0 Grade 0 Grade 0   Neck Soft Tissue Necrosis Grade 0 Grade 0 Grade 0 Grade 0 Grade 0   Osteonecrosis of Jaw Grade 0 Grade 0 Grade 0 Grade 0 Grade 0   Superficial Soft Tissue Fibrosis  Grade 0 Grade 0 Grade 0 Grade 0 Grade 0   Trismus Grade 0 Grade 0 Grade 0 Grade 0 Grade 0   Dysarthria Grade 0 Grade 0 Grade 0 Grade 0 Grade 0   Dysesthesia Grade 0 Grade 0 Grade 0 Grade 0 Grade 0   Dysgeusia Grade 0 Grade 0 Grade 1 Grade 2       loss of sense of taste Grade 2   Facial Nerve Disorder Grade 0 Grade 0 Grade 0 Grade 0 Grade 0   Hypoglossal Nerve Disorder Grade 0 Grade 0 Grade 0 Grade 0 Grade 0   Oculomotor Nerve Disorder Grade 0 Grade 0 Grade 0 Grade 0 Grade 0   Paresthesia Grade 0 Grade 0 Grade 0 Grade 0 Grade 0   Stroke Grade 0 Grade 0 Grade 0 Grade 0 Grade 0   Transient Ischemic Attacks Grade 0 Grade 0 Grade 0 Grade 0 Grade 0   Trigeminal Nerve Disorder Grade 0 Grade 0 Grade 0 Grade 0 Grade 0   Aspiration Grade 0 Grade 0 Grade 0 Grade 0 Grade 0   Hoarseness Grade 1 Grade 2 Grade 2 Grade 2 Grade 2   Laryngeal Edema Grade 0 Grade 0 Grade 0 Grade 0 Grade 0   Stridor Grade 0 Grade 0 Grade 0 Grade 0 Grade 0   Tracheal Mucositis Grade 0 Grade 0 Grade 0 Grade 0 Grade 0   Voice Alteration Grade 1 Grade 2 Grade 2 Grade 2 Grade 2        Assessment / Plan:  The patient is tolerating radiation therapy as anticipated.  Continue per current treatment plan.

## 2024-08-09 ENCOUNTER — HOSPITAL ENCOUNTER (OUTPATIENT)
Dept: RADIATION ONCOLOGY | Facility: CLINIC | Age: 79
Setting detail: RADIATION/ONCOLOGY SERIES
Discharge: HOME | End: 2024-08-09
Payer: MEDICARE

## 2024-08-09 ENCOUNTER — INFUSION (OUTPATIENT)
Dept: HEMATOLOGY/ONCOLOGY | Facility: CLINIC | Age: 79
End: 2024-08-09
Payer: MEDICARE

## 2024-08-09 VITALS
DIASTOLIC BLOOD PRESSURE: 63 MMHG | HEART RATE: 77 BPM | WEIGHT: 136.24 LBS | TEMPERATURE: 95.9 F | RESPIRATION RATE: 16 BRPM | SYSTOLIC BLOOD PRESSURE: 150 MMHG | OXYGEN SATURATION: 94 % | BODY MASS INDEX: 23.39 KG/M2

## 2024-08-09 DIAGNOSIS — C32.0 MALIGNANT NEOPLASM OF GLOTTIS (MULTI): ICD-10-CM

## 2024-08-09 DIAGNOSIS — C32.9 LARYNGEAL CANCER (MULTI): ICD-10-CM

## 2024-08-09 DIAGNOSIS — Z51.0 ENCOUNTER FOR ANTINEOPLASTIC RADIATION THERAPY: ICD-10-CM

## 2024-08-09 LAB
RAD ONC MSQ ACTUAL FRACTIONS DELIVERED: 23
RAD ONC MSQ ACTUAL SESSION DELIVERED DOSE: 200 CGRAY
RAD ONC MSQ ACTUAL TOTAL DOSE: 4600 CGRAY
RAD ONC MSQ ELAPSED DAYS: 37
RAD ONC MSQ LAST DATE: NORMAL
RAD ONC MSQ PRESCRIBED FRACTIONAL DOSE: 200 CGRAY
RAD ONC MSQ PRESCRIBED NUMBER OF FRACTIONS: 35
RAD ONC MSQ PRESCRIBED TECHNIQUE: NORMAL
RAD ONC MSQ PRESCRIBED TOTAL DOSE: 7000 CGRAY
RAD ONC MSQ PRESCRIPTION PATTERN COMMENT: NORMAL
RAD ONC MSQ START DATE: NORMAL
RAD ONC MSQ TREATMENT COURSE NUMBER: 2
RAD ONC MSQ TREATMENT SITE: NORMAL

## 2024-08-09 PROCEDURE — 2500000004 HC RX 250 GENERAL PHARMACY W/ HCPCS (ALT 636 FOR OP/ED): Performed by: NURSE PRACTITIONER

## 2024-08-09 PROCEDURE — 96360 HYDRATION IV INFUSION INIT: CPT | Mod: INF

## 2024-08-09 PROCEDURE — 77386 HC INTENSITY-MODULATED RADIATION THERAPY (IMRT), COMPLEX: CPT | Performed by: STUDENT IN AN ORGANIZED HEALTH CARE EDUCATION/TRAINING PROGRAM

## 2024-08-09 RX ORDER — HEPARIN 100 UNIT/ML
500 SYRINGE INTRAVENOUS AS NEEDED
OUTPATIENT
Start: 2024-08-09

## 2024-08-09 RX ORDER — HEPARIN SODIUM,PORCINE/PF 10 UNIT/ML
50 SYRINGE (ML) INTRAVENOUS AS NEEDED
Status: DISCONTINUED | OUTPATIENT
Start: 2024-08-09 | End: 2024-08-09 | Stop reason: HOSPADM

## 2024-08-09 RX ORDER — HEPARIN SODIUM,PORCINE/PF 10 UNIT/ML
50 SYRINGE (ML) INTRAVENOUS AS NEEDED
OUTPATIENT
Start: 2024-08-09

## 2024-08-09 RX ORDER — HEPARIN 100 UNIT/ML
500 SYRINGE INTRAVENOUS AS NEEDED
Status: DISCONTINUED | OUTPATIENT
Start: 2024-08-09 | End: 2024-08-09 | Stop reason: HOSPADM

## 2024-08-09 ASSESSMENT — PAIN SCALES - GENERAL: PAINLEVEL: 0-NO PAIN

## 2024-08-12 ENCOUNTER — OFFICE VISIT (OUTPATIENT)
Dept: HEMATOLOGY/ONCOLOGY | Facility: CLINIC | Age: 79
End: 2024-08-12
Payer: MEDICARE

## 2024-08-12 ENCOUNTER — LAB (OUTPATIENT)
Dept: HEMATOLOGY/ONCOLOGY | Facility: CLINIC | Age: 79
End: 2024-08-12
Payer: MEDICARE

## 2024-08-12 ENCOUNTER — HOSPITAL ENCOUNTER (OUTPATIENT)
Dept: RADIATION ONCOLOGY | Facility: CLINIC | Age: 79
Setting detail: RADIATION/ONCOLOGY SERIES
Discharge: HOME | End: 2024-08-12
Payer: MEDICARE

## 2024-08-12 VITALS
SYSTOLIC BLOOD PRESSURE: 107 MMHG | RESPIRATION RATE: 16 BRPM | DIASTOLIC BLOOD PRESSURE: 55 MMHG | WEIGHT: 136.69 LBS | BODY MASS INDEX: 23.46 KG/M2

## 2024-08-12 VITALS
WEIGHT: 136.8 LBS | TEMPERATURE: 96.8 F | SYSTOLIC BLOOD PRESSURE: 116 MMHG | RESPIRATION RATE: 18 BRPM | DIASTOLIC BLOOD PRESSURE: 67 MMHG | HEART RATE: 76 BPM | OXYGEN SATURATION: 96 % | BODY MASS INDEX: 23.48 KG/M2

## 2024-08-12 DIAGNOSIS — Z51.0 ENCOUNTER FOR ANTINEOPLASTIC RADIATION THERAPY: ICD-10-CM

## 2024-08-12 DIAGNOSIS — C32.9 LARYNGEAL CANCER (MULTI): ICD-10-CM

## 2024-08-12 DIAGNOSIS — C32.0 MALIGNANT NEOPLASM OF GLOTTIS (MULTI): ICD-10-CM

## 2024-08-12 DIAGNOSIS — M62.830 SPASM OF THORACIC BACK MUSCLE: Primary | ICD-10-CM

## 2024-08-12 DIAGNOSIS — C32.0 SQUAMOUS CELL CARCINOMA OF LEFT VOCAL CORD (MULTI): ICD-10-CM

## 2024-08-12 LAB
ALBUMIN SERPL BCP-MCNC: 3.4 G/DL (ref 3.4–5)
ALP SERPL-CCNC: 57 U/L (ref 33–136)
ALT SERPL W P-5'-P-CCNC: 10 U/L (ref 7–45)
ANION GAP SERPL CALC-SCNC: 13 MMOL/L (ref 10–20)
AST SERPL W P-5'-P-CCNC: 21 U/L (ref 9–39)
BILIRUB SERPL-MCNC: 0.3 MG/DL (ref 0–1.2)
BUN SERPL-MCNC: 16 MG/DL (ref 6–23)
CALCIUM SERPL-MCNC: 8.2 MG/DL (ref 8.6–10.3)
CHLORIDE SERPL-SCNC: 108 MMOL/L (ref 98–107)
CO2 SERPL-SCNC: 24 MMOL/L (ref 21–32)
CREAT SERPL-MCNC: 1.09 MG/DL (ref 0.5–1.05)
EGFRCR SERPLBLD CKD-EPI 2021: 52 ML/MIN/1.73M*2
GLUCOSE SERPL-MCNC: 125 MG/DL (ref 74–99)
MAGNESIUM SERPL-MCNC: 1.26 MG/DL (ref 1.6–2.4)
POTASSIUM SERPL-SCNC: 3.6 MMOL/L (ref 3.5–5.3)
PROT SERPL-MCNC: 5.7 G/DL (ref 6.4–8.2)
RAD ONC MSQ ACTUAL FRACTIONS DELIVERED: 24
RAD ONC MSQ ACTUAL SESSION DELIVERED DOSE: 200 CGRAY
RAD ONC MSQ ACTUAL TOTAL DOSE: 4800 CGRAY
RAD ONC MSQ ELAPSED DAYS: 40
RAD ONC MSQ LAST DATE: NORMAL
RAD ONC MSQ PRESCRIBED FRACTIONAL DOSE: 200 CGRAY
RAD ONC MSQ PRESCRIBED NUMBER OF FRACTIONS: 35
RAD ONC MSQ PRESCRIBED TECHNIQUE: NORMAL
RAD ONC MSQ PRESCRIBED TOTAL DOSE: 7000 CGRAY
RAD ONC MSQ PRESCRIPTION PATTERN COMMENT: NORMAL
RAD ONC MSQ START DATE: NORMAL
RAD ONC MSQ TREATMENT COURSE NUMBER: 2
RAD ONC MSQ TREATMENT SITE: NORMAL
SODIUM SERPL-SCNC: 141 MMOL/L (ref 136–145)

## 2024-08-12 PROCEDURE — 1036F TOBACCO NON-USER: CPT | Performed by: NURSE PRACTITIONER

## 2024-08-12 PROCEDURE — 36591 DRAW BLOOD OFF VENOUS DEVICE: CPT

## 2024-08-12 PROCEDURE — 83735 ASSAY OF MAGNESIUM: CPT | Performed by: NURSE PRACTITIONER

## 2024-08-12 PROCEDURE — 2500000004 HC RX 250 GENERAL PHARMACY W/ HCPCS (ALT 636 FOR OP/ED): Performed by: NURSE PRACTITIONER

## 2024-08-12 PROCEDURE — 1125F AMNT PAIN NOTED PAIN PRSNT: CPT | Performed by: NURSE PRACTITIONER

## 2024-08-12 PROCEDURE — 77386 HC INTENSITY-MODULATED RADIATION THERAPY (IMRT), COMPLEX: CPT | Performed by: STUDENT IN AN ORGANIZED HEALTH CARE EDUCATION/TRAINING PROGRAM

## 2024-08-12 PROCEDURE — 99214 OFFICE O/P EST MOD 30 MIN: CPT | Performed by: NURSE PRACTITIONER

## 2024-08-12 PROCEDURE — 80053 COMPREHEN METABOLIC PANEL: CPT | Performed by: NURSE PRACTITIONER

## 2024-08-12 RX ORDER — HEPARIN SODIUM,PORCINE/PF 10 UNIT/ML
50 SYRINGE (ML) INTRAVENOUS AS NEEDED
Status: DISCONTINUED | OUTPATIENT
Start: 2024-08-12 | End: 2024-08-12 | Stop reason: HOSPADM

## 2024-08-12 RX ORDER — HEPARIN SODIUM,PORCINE/PF 10 UNIT/ML
50 SYRINGE (ML) INTRAVENOUS AS NEEDED
Status: CANCELLED | OUTPATIENT
Start: 2024-08-12

## 2024-08-12 RX ORDER — HEPARIN 100 UNIT/ML
500 SYRINGE INTRAVENOUS AS NEEDED
Status: CANCELLED | OUTPATIENT
Start: 2024-08-12

## 2024-08-12 RX ORDER — BACLOFEN 5 MG/1
5 TABLET ORAL 3 TIMES DAILY
Qty: 21 TABLET | Refills: 0 | Status: SHIPPED | OUTPATIENT
Start: 2024-08-12 | End: 2024-08-19

## 2024-08-12 RX ORDER — HEPARIN 100 UNIT/ML
500 SYRINGE INTRAVENOUS AS NEEDED
Status: DISCONTINUED | OUTPATIENT
Start: 2024-08-12 | End: 2024-08-12 | Stop reason: HOSPADM

## 2024-08-12 ASSESSMENT — ENCOUNTER SYMPTOMS
DIARRHEA: 1
NEUROLOGICAL NEGATIVE: 1
CARDIOVASCULAR NEGATIVE: 1
FATIGUE: 1
VOICE CHANGE: 0
BACK PAIN: 0
MUSCULOSKELETAL NEGATIVE: 1
NECK PAIN: 0

## 2024-08-12 ASSESSMENT — PAIN SCALES - GENERAL
PAINLEVEL: 10-WORST PAIN EVER
PAINLEVEL: 8

## 2024-08-12 NOTE — PROGRESS NOTES
Patient ID: Padilla Campuzano is a 78 y.o. female.  Diagnosis: Laryngeal cancer  Staging: T3N0M0  Date of Diagnosis: 5/22/24    Providers:  ENT Surgeon: Dr. Ferny Rhoades  [facility]:   Dr. Kyunghee Burkitt/Megan Arauz APRARNEL  [facility]: Dr. Sue Oliva    Ms. Campuzano is 77 y/o female with recent diagnosis of laryngeal cancer who is referred to  med onc  to discuss treatment plan.    -2/2024: pt presented with hoarse voice  -3/21/24: CT chest showed left pulmonary nodule  -4/3/24: PET/CT showed uptake in left vocal cord extending  to the right with limited subglottic extension  -4/23/24: biopsy of LLL nodule showed negative for malignancy  -5/7/24: seen by thoracic surgeon Dr. Linda, plan is to follow up image in 3 months  -5/20/24: s/p direct laryngoscopy. She was found to have transglottic lesion extending from primarily the left glottis into the subglottis and wrapping around anteriorly. 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)  No date: Breast cancer (Multi)  2012: Cerebral hemorrhage (Multi)  No date: CKD (chronic kidney disease)  No date: Colon cancer (Multi)  No date: Colorectal cancer (Multi)  No date: Diverticulosis of intestine, part unspecified, without   perforation or abscess without bleeding      Comment:  Diverticulosis  No date: GERD (gastroesophageal reflux disease)  No date: Hypertension  No date: Irritable bowel syndrome  No date: Kidney disease  No date: Malignant neoplasm of colon, unspecified (Multi)      Comment:  Colon cancer  No date: Personal history of colonic polyps      Comment:  History of colonic polyps  No date: Personal history of irradiation  No date: Personal history of malignant neoplasm of breast      Comment:  History of malignant neoplasm of female breast  No date: Personal history of other diseases of the respiratory system      Comment:  History of respiratory failure  No date: Personal history of other malignant  neoplasm of skin      Comment:  Personal history of malignant neoplasm of skin  2016: Right upper quadrant pain      Comment:  Abdominal pain, RUQ (right upper quadrant)  2012: Stroke (Multi)  2016: Unspecified symptoms and signs involving the   genitourinary system      Comment:  Urinary symptom or sign   Surgical History:    Past Surgical History:   Procedure Laterality Date    BI US GUIDED BREAST RIGHT CYST ASPIRATION Right 2019    BI BREAST CYST ASPIRATION RIGHT [facility]    BREAST LUMPECTOMY  2014    Breast Surgery Lumpectomy    CHOLECYSTECTOMY      CT GUIDED IMAGING FOR ABSCESS DRAIN  2015    CT GUIDED IMAGING FOR ABSCESS DRAIN [facility]PATIENT LEGACY    HYSTERECTOMY  1984    Hysterectomy    ILEOSTOMY  2015    Ileostomy    ILEOSTOMY CLOSURE  2015    Ileostomy Closure    OTHER SURGICAL HISTORY      Cerebral artery coiling      Family History:    Family History   Problem Relation Name Age of Onset    Colon cancer Mother      Liver cancer Mother      Kidney cancer Mother      Heart attack Father      Blood clot Father      Cancer Brother      Cancer Brother       Family Oncology History:    Cancer-related family history includes Cancer in her brother and brother; Colon cancer in her mother; Kidney cancer in her mother; Liver cancer in her mother.  Social History:    Social History     Tobacco Use    Smoking status: Former     Current packs/day: 0.00     Types: Cigarettes     Quit date:      Years since quittin.6     Passive exposure: Past    Smokeless tobacco: Never   Vaping Use    Vaping status: Never Used   Substance Use Topics    Alcohol use: Never    Drug use: Never        Subjective   Chief Complaint: Laryngeal cancer    HPI    Interval History  Pt present in clinic for follow-up and readiness to treat visit.  Her  is present for visit.  Fatigued (g1).  Reports diarrhea yesterday.  Four Lomotil tabs taken with relieve in the evening.  No  significant diarrhea during the week.  Not sure if it was something she ate on Saturday.  Denies lightheadedness or dizziness.  Intake fine today.  Denies n/v/c.  Throat pain - since January.  Tolerable. Using blis lozenges.  Reports mouth pain d/t xerostomia.  Declining BMX.  Out of mag tabs - will  refill today.  Continues to experience back pain.  Previously seen by a chiropractor.  Once to focus on one thing at a time.  Receiving CRT prior to seeing a chiropractor.  Will provide Rx for Baclofen.  No other concerns.  Will tx Mg 1.26.  Ready for treatment tomorrow.      ROS  Review of Systems   Constitutional:  Positive for fatigue.   HENT:  Negative.  Negative for voice change.    Respiratory:  Positive for cough.    Cardiovascular: Negative.    Gastrointestinal:  Positive for diarrhea. Negative for nausea and vomiting.   Genitourinary: Negative.     Musculoskeletal: Negative.  Negative for back pain and neck pain.   Skin:  Positive for rash.   Neurological: Negative.        Allergies  Allergies   Allergen Reactions    Paclitaxel GI Upset and Nausea/vomiting     See adverse drug reaction note dated 7/9/24    Hydrocodone-Acetaminophen Dizziness    Oxycodone Hcl Unknown    Oxycodone-Acetaminophen Dizziness    Aspirin GI Upset and Unknown    Sulfa (Sulfonamide Antibiotics) Rash        Medications  Current Outpatient Medications   Medication Instructions    acetaminophen (TYLENOL) 1,000 mg, oral, Every 6 hours PRN    baclofen (LIORESAL) 5 mg, oral, 3 times daily    biotin 5 mg capsule Every 24 hours    cholecalciferol (Vitamin D-3) 50 mcg (2,000 unit) capsule 1 capsule, Every 24 hours    cholestyramine (Questran) 4 gram powder once daily.    clindamycin (Cleocin T) 1 % lotion Apply topically to affected area twice daily.    doxycycline (VIBRAMYCIN) 100 mg, oral, 2 times daily, For rash prevention. Take with at least 8 ounces (large glass) of water, do not lie down for 30 minutes after    hydrocortisone 1 %  cream Apply topically to face, hands, feet, neck, back, and chest once daily at bedtime for rash prevention.    lipase-protease-amylase (Creon) 24,000-76,000 -120,000 unit capsule TAKE 3 CAPSULES BY MOUTH 3 TIMES DAILY WITH MEALS AND 1 TO 2  CAPSULES BY MOUTH WITH SNACKS.  MAX 13 CAPSULE DAILY    loperamide (Imodium) 2 mg capsule Take 2 capsules (4 mg) by mouth with the first episode of diarrhea and 1 capsule (2 mg) by mouth with any additional episodes. Maximum 8 capsules (16 mg) per day.    losartan (COZAAR) 25 mg, oral, Daily    magnesium oxide (MAG-OX) 400 mg, oral, Daily    ondansetron (ZOFRAN) 8 mg, oral, Every 8 hours PRN    pantoprazole (ProtoNix) 40 mg EC tablet TAKE 1 TABLET BY MOUTH TWICE  DAILY    potassium chloride (Klor-Con) 20 mEq packet 20 mEq, oral, Daily    prochlorperazine (COMPAZINE) 10 mg, oral, Every 6 hours PRN        Objective   VS: /55 (BP Location: Left arm, Patient Position: Sitting, BP Cuff Size: Adult long)   Resp 16   Wt 62 kg (136 lb 11 oz)   BMI 23.46 kg/m²   Weight: Daily Weight  08/13/24 : 62.1 kg (137 lb 0.3 oz)  08/12/24 : 62 kg (136 lb 11 oz)  08/12/24 : 62.1 kg (136 lb 12.7 oz)  08/09/24 : 61.8 kg (136 lb 3.9 oz)  08/08/24 : 61.4 kg (135 lb 5.8 oz)  08/06/24 : 61.7 kg (136 lb 0.4 oz)  08/05/24 : 61.6 kg (135 lb 12.9 oz)      Physical Exam  Vitals reviewed.   Constitutional:       Appearance: Normal appearance.   HENT:      Head: Normocephalic.      Nose: Nose normal.      Mouth/Throat:      Mouth: Mucous membranes are moist.      Pharynx: Oropharynx is clear.   Eyes:      Extraocular Movements: Extraocular movements intact.      Conjunctiva/sclera: Conjunctivae normal.      Pupils: Pupils are equal, round, and reactive to light.   Cardiovascular:      Rate and Rhythm: Normal rate and regular rhythm.      Pulses: Normal pulses.      Heart sounds: Normal heart sounds.   Pulmonary:      Breath sounds: Normal breath sounds.   Abdominal:      General: Bowel sounds are  normal.      Palpations: Abdomen is soft.   Musculoskeletal:         General: Normal range of motion.      Cervical back: Normal range of motion and neck supple.   Skin:     General: Skin is warm and dry.      Findings: Rash: dry patch to left corner of mouth.   Neurological:      General: No focal deficit present.      Mental Status: She is alert and oriented to person, place, and time.   Psychiatric:         Mood and Affect: Mood normal.         Behavior: Behavior normal.         Thought Content: Thought content normal.         Judgment: Judgment normal.       Diagnostic Results            Results from last 7 days   Lab Units 08/12/24  1236   GLUCOSE mg/dL 125*   SODIUM mmol/L 141   POTASSIUM mmol/L 3.6   CHLORIDE mmol/L 108*   CO2 mmol/L 24   BUN mg/dL 16   CREATININE mg/dL 1.09*   EGFR mL/min/1.73m*2 52*   CALCIUM mg/dL 8.2*   MAGNESIUM mg/dL 1.26*   ALBUMIN g/dL 3.4   PROTEIN TOTAL g/dL 5.7*   BILIRUBIN TOTAL mg/dL 0.3   ALK PHOS U/L 57   ALT U/L 10   AST U/L 21                   Images  MRI Cervical and Thoracic Spine 7/8/24:    Cervical, Thoracic Spine: Diffuse soft tissue thickening and enhancement of the glottic larynx extending into the subglottic larynx and involving the left cricoid, arytenoid, and thyroid cartilages, consistent with known tumor.  No evidence of metastatic disease within the cervical or thoracic  spine.    Pathology  Lab Results   Component Value Date    PATHREP  12/06/2021                                                     MRN: [id number]  Patient Name RONALD LENNON  Accession #: [id number]  Date of Procedure:  12/6/2021       Date Reported: 12/9/2021  Date Received:  12/7/2021  Date of Birth / Sex 1945 (Age: 76) / F  Race: WHITE  Submitting Physician: ROD VALLE MD    Other External #          FINAL CYTOLOGICAL INTERPRETATION    A.   FINE NEEDLE ASPIRATION BREAST - RIGHT, CYTOLOGY AND CELL BLOCK:  --NO MALIGNANT CELLS IDENTIFIED.  --SPECIMEN CONSISTS OF PROTEINACEOUS DEBRIS,  OCCASIONAL FOAMY MACROPHAGES, FEW  INFLAMMATORY CELLS AND RARE EPITHELIAL GROUP.        Slide(s) initially screened by a Cytotechnologist at [place] [id number]      Electronically Signed Out By GABINO GARCES MD    By the signature on this report, the individual or group listed as making the  Final Interpretation/Diagnosis certifies that they have reviewed this case.  Slide(s) initially screened by a Cytotechnologist at [facility]     Clinical History      Clinical Diagnosis History: Cyst of right breast - (N60.01)   Source of Specimen  A:  FINE NEEDLE ASPIRATION BREAST - RIGHT     Specimen Submitted as:  A:   FINE NEEDLE ASPIRATION BREAST - RIGHT        Pap non-gyn ThinPrep slide, CELL BLOCK, H&E, Initial    Gross Description  A.   FINE NEEDLE ASPIRATION BREAST - RIGHT:  35cc MILKY YELLOW NEEDLE RINSE IN  CYTOLYT                 [facility]  Department of Pathology  [address] [place] [address]        PATHREP  09/09/2019     Name RONALD LENNON                                                                                                   Accession #: [id number]            Pathologist:                   YUE FIGUEROA MD  Date of Procedure:    9/9/2019  Date Received:          9/9/2019  Date Reported           9/13/2019  Submitting Physician:   SALIMA WHEELER MD  Location:                    [facility]  Other External #                                                                    FINAL DIAGNOSIS  A.  BIOPSY OF ANAL CANAL:  MINUTE FRAGMENTS OF SOFT TISSUE WITH NO SIGNIFICANT  PATHOLOGICAL FINDINGS, SEE NOTE.    Note: Deeper sections were obtained.                                                                                                                                                                                                                                                  "                                                                                                                                                                                                                                      Electronically Signed Out By YUE FIGUEROA MD/ANASTASIYA  By the signature on this report, the individual or group listed as making the  Final Interpretation/Diagnosis certifies that they have reviewed this case.           Clinical History:  Colorectal cancer, squamous cell, status post chemo radiation    Specimens Submitted As:  A: BIOPSY OF ANAL CANAL     Gross Description:  Received in formalin, labeled with the patient's name and hospital number and  \"anal canal\", are multiple fragments of tan, soft tissue aggregating to 1.7 x  1.0 x 0.1 cm. The specimen is submitted in toto in one cassette.  CJN    cjn/9/9/2019               [facility]  Department of Pathology   [id number] [place] [id number]        COMDX  05/20/2024     Case reviewed at ENT consensus conference via [employer] technology on 5/22/2024.       Assessment/Plan   Ms. Campuzano is 77 y/o female with recent diagnosis of laryngeal cancer who is referred to  med onc  to discuss treatment plan.    # Laryngeal cancer (T3N0M0)  -Based on PET/CT from 4/3/24, there was uptake in LLL nodule, however, biopsy of LLL nodule showed negative for malignancy  -seen by Thoracic surgeon, plan to have repeat CT chest without contrast in 3 month  -since her GFR is 40 (no hearing deficit), she is not a candidate for cisplatin, will treat her with weekly carboplatin and paclitaxel, reviewed chemo related side effects with patient in detail.  Consent obtained.  -pt developed abdominal pain post C1, during C2 infusion of paclitaxel,  she experienced worsening of abdominal pain, pt wanted to stop chemo on 7/9 and also didn't want further chemotherapy.  -7/10/24: discussed with patient about changing treatment to " cetuximab. Explained to patient about main side effects (skin rash and hypomagnesemia), she is good with the plan.  I couldn't place consent due to epic issue, I will place it later.  - Pt consented by Dr. Burkitt 7/11/24  - Pt tolerated her C1 Cetuximab tx well.  Intermittent diarrhea managed with PRN Imodium     - Diarrhea 8/11/24 with Lomotil taken x4.  Today back to her norm.      7/2/24 - C1 Carboplatin/Paciltaxel  7/9/24 - pt declined C2 carboplatin/paciltaxel  7/15/24 - PowerPort mediport placed   7/16/24 - C1 Cetuximab  7/23/24 - C2 Cetuximab  7/30/24 - C3 Cetuximab  8/6/24 - C4 Cetuximab  8/13/24 - scheduled C5 Cetuximab    # Hx of anal, breast and cutaneous malignancies  -s/p lumpectomy with radiation in 2012  -s/p radiation to anal cancer in 2018  -s/p resection of skin cancer on nose in 2010    # Chronic diarrhea   - from previous colonic surgery  -3-4 episodes per day, while on 3 imodium three times a day, 3-4 episodes of diarrhea daily.  - Concern for flares with chemotherapy tx's  - Tolerated C1 Carboplatin/Paciltaxel  - Currently managed with Creon and Questran - stools forms, soft  - 7/22:  Following C1 Cetuximab, intermittent diarrhea - managed with PRN Imodium   - 7/29:  Imodium admin x1 following C2.    - ongoing monitoring  - ED 7/31 - d/t dehydration 2/2 diarrhea, pt left AMA  - 8/5:  Dr. Moura provided Lomotil Rx.  Diarrhea managed with Lomotil - x2/day   - 8/12:  Managed with PRN Lomotil.      # Back pain, area between her shoulder blades, more so on the left.  Has worsened over the last few weeks.  Pain with activity.  Resolves with rest.  No acute injury.  No falls.    - Will order MRI C & T (+/-) next available.  MRI's scheduled 7/8/24.    - 7/8:  Cervical, Thoracic Spine: Diffuse soft tissue thickening and enhancement of the glottic larynx extending into the subglottic larynx and involving the left cricoid, arytenoid, and thyroid cartilages, consistent with known tumor.  No evidence of  metastatic disease within the cervical or thoracic  spine.   - Currently managed with routine Tylenol 1,000mg/daily  - Supportive onc referral placed.  Pt to return sup onc call.  - 7/22:  No current c/o back pain    - 7/29:  Recommended she continue Tylenol and heating pad for pain control  - 8/5:  Managed with PRN Tylenol  - 8/12:  Will trial Baclofen 5mg TID PRN.  Instructed no concurrent admin with Lomotil.  Pt verbalized understanding.      # Hypomagnesia: 7/2/24 Mg 1.41  - Will start oral Mag Oxide - 1 tab/day, Rx sent (not covered by pt's insurance)  - Will replete with IV hydration visit 7/5/24.  - If consistently low, will replete weekly via IV   - 7/8/24 - Mg 1.71.    - 7/15/24 - Mg 1.31 - 2g Mg prior to 7/16 infusion   - 7/22/24 - Mg 1.42 - 2g Mg prior to 7/23 infusion   - 7/29/24 - Mg 1.52 - 2g Mg prior to 7/30 infusion  - 8/6/24 - Mg 1.60 - 2g Mg   - 8/12/24 - Mg 1.26 - 2g Mg with infusion 8/13/24.  Pt ran out of her magnesium supplement, will pick-up refill today.      # Hypokalemia 8/2/24 K 3.2  - Rx sent - potassium packets 20mEq/day    # Mediport placement, per pt request  - IR consult order in place  - INR lab completed.  - 7/8:  Encouraged pt to return [facility] call to schedule placement.    - 7/15:  PowerPort mediport placed  - EMLA Rx sent 7/22/24     # Elevated Cr/BUN 1/24/24  - at/near baseline  - 1.17 7/8/24  - 1.43/25 7/15/24   - 1.05/30 7/22/24 - encouraged fluids   - 1.17/34 7/29/24 - encouraged fluids, IV hydration 8/2/24  - ongoing monitoring  - 1.06/28 8/2/24 - improved  - 1.09 8/12/24 - stable    # Rx's  - Rx's sent to pt's pharmacy 7/15  - Spouse reports not all meds available today, expect to have all meds on 7/16.  Meds to be picked-up on 7/16/24.  Spouse waiting for all meds to be available, prior to pick-up.    - 7/22:  encouraged pt to apply Hydrocortisone ointment per tx plan instructions.  No current skin concerns.      PLAN  - Treatment changed to Cetuximab starting 7/16  -  IV hydration - each Friday  - Weekly NP visits during CRT tx. Port draws with Monday visits.

## 2024-08-13 ENCOUNTER — INFUSION (OUTPATIENT)
Dept: HEMATOLOGY/ONCOLOGY | Facility: CLINIC | Age: 79
End: 2024-08-13
Payer: MEDICARE

## 2024-08-13 ENCOUNTER — APPOINTMENT (OUTPATIENT)
Dept: CARDIAC SURGERY | Facility: CLINIC | Age: 79
End: 2024-08-13
Payer: MEDICARE

## 2024-08-13 ENCOUNTER — APPOINTMENT (OUTPATIENT)
Dept: HEMATOLOGY/ONCOLOGY | Facility: CLINIC | Age: 79
End: 2024-08-13
Payer: MEDICARE

## 2024-08-13 ENCOUNTER — HOSPITAL ENCOUNTER (OUTPATIENT)
Dept: RADIATION ONCOLOGY | Facility: CLINIC | Age: 79
Setting detail: RADIATION/ONCOLOGY SERIES
Discharge: HOME | End: 2024-08-13
Payer: MEDICARE

## 2024-08-13 ENCOUNTER — NUTRITION (OUTPATIENT)
Dept: HEMATOLOGY/ONCOLOGY | Facility: CLINIC | Age: 79
End: 2024-08-13
Payer: MEDICARE

## 2024-08-13 VITALS
BODY MASS INDEX: 23.52 KG/M2 | DIASTOLIC BLOOD PRESSURE: 90 MMHG | TEMPERATURE: 96.8 F | OXYGEN SATURATION: 96 % | SYSTOLIC BLOOD PRESSURE: 168 MMHG | WEIGHT: 137.02 LBS | RESPIRATION RATE: 18 BRPM | HEART RATE: 72 BPM

## 2024-08-13 VITALS — WEIGHT: 137.02 LBS | BODY MASS INDEX: 23.52 KG/M2

## 2024-08-13 DIAGNOSIS — C32.9 LARYNGEAL CANCER (MULTI): ICD-10-CM

## 2024-08-13 DIAGNOSIS — Z51.0 ENCOUNTER FOR ANTINEOPLASTIC RADIATION THERAPY: ICD-10-CM

## 2024-08-13 DIAGNOSIS — C32.0 MALIGNANT NEOPLASM OF GLOTTIS (MULTI): ICD-10-CM

## 2024-08-13 PROBLEM — M62.830 SPASM OF THORACIC BACK MUSCLE: Status: ACTIVE | Noted: 2024-08-13

## 2024-08-13 LAB
RAD ONC MSQ ACTUAL FRACTIONS DELIVERED: 25
RAD ONC MSQ ACTUAL SESSION DELIVERED DOSE: 200 CGRAY
RAD ONC MSQ ACTUAL TOTAL DOSE: 5000 CGRAY
RAD ONC MSQ ELAPSED DAYS: 41
RAD ONC MSQ LAST DATE: NORMAL
RAD ONC MSQ PRESCRIBED FRACTIONAL DOSE: 200 CGRAY
RAD ONC MSQ PRESCRIBED NUMBER OF FRACTIONS: 35
RAD ONC MSQ PRESCRIBED TECHNIQUE: NORMAL
RAD ONC MSQ PRESCRIBED TOTAL DOSE: 7000 CGRAY
RAD ONC MSQ PRESCRIPTION PATTERN COMMENT: NORMAL
RAD ONC MSQ START DATE: NORMAL
RAD ONC MSQ TREATMENT COURSE NUMBER: 2
RAD ONC MSQ TREATMENT SITE: NORMAL

## 2024-08-13 PROCEDURE — 77336 RADIATION PHYSICS CONSULT: CPT | Performed by: STUDENT IN AN ORGANIZED HEALTH CARE EDUCATION/TRAINING PROGRAM

## 2024-08-13 PROCEDURE — 77386 HC INTENSITY-MODULATED RADIATION THERAPY (IMRT), COMPLEX: CPT | Performed by: STUDENT IN AN ORGANIZED HEALTH CARE EDUCATION/TRAINING PROGRAM

## 2024-08-13 PROCEDURE — 96413 CHEMO IV INFUSION 1 HR: CPT

## 2024-08-13 PROCEDURE — 2500000004 HC RX 250 GENERAL PHARMACY W/ HCPCS (ALT 636 FOR OP/ED): Performed by: NURSE PRACTITIONER

## 2024-08-13 PROCEDURE — 96375 TX/PRO/DX INJ NEW DRUG ADDON: CPT | Mod: INF

## 2024-08-13 PROCEDURE — 96367 TX/PROPH/DG ADDL SEQ IV INF: CPT

## 2024-08-13 RX ORDER — HEPARIN 100 UNIT/ML
500 SYRINGE INTRAVENOUS AS NEEDED
OUTPATIENT
Start: 2024-08-13

## 2024-08-13 RX ORDER — DIPHENHYDRAMINE HYDROCHLORIDE 50 MG/ML
50 INJECTION INTRAMUSCULAR; INTRAVENOUS AS NEEDED
Status: DISCONTINUED | OUTPATIENT
Start: 2024-08-13 | End: 2024-08-13 | Stop reason: HOSPADM

## 2024-08-13 RX ORDER — MAGNESIUM SULFATE HEPTAHYDRATE 40 MG/ML
4 INJECTION, SOLUTION INTRAVENOUS ONCE AS NEEDED
Status: DISCONTINUED | OUTPATIENT
Start: 2024-08-13 | End: 2024-08-13 | Stop reason: HOSPADM

## 2024-08-13 RX ORDER — MAGNESIUM SULFATE HEPTAHYDRATE 40 MG/ML
2 INJECTION, SOLUTION INTRAVENOUS ONCE AS NEEDED
Status: DISCONTINUED | OUTPATIENT
Start: 2024-08-13 | End: 2024-08-13 | Stop reason: HOSPADM

## 2024-08-13 RX ORDER — ALBUTEROL SULFATE 0.83 MG/ML
3 SOLUTION RESPIRATORY (INHALATION) AS NEEDED
Status: DISCONTINUED | OUTPATIENT
Start: 2024-08-13 | End: 2024-08-13 | Stop reason: HOSPADM

## 2024-08-13 RX ORDER — PROCHLORPERAZINE MALEATE 10 MG
10 TABLET ORAL EVERY 6 HOURS PRN
Status: DISCONTINUED | OUTPATIENT
Start: 2024-08-13 | End: 2024-08-13 | Stop reason: HOSPADM

## 2024-08-13 RX ORDER — PROCHLORPERAZINE EDISYLATE 5 MG/ML
10 INJECTION INTRAMUSCULAR; INTRAVENOUS EVERY 6 HOURS PRN
Status: DISCONTINUED | OUTPATIENT
Start: 2024-08-13 | End: 2024-08-13 | Stop reason: HOSPADM

## 2024-08-13 RX ORDER — HEPARIN SODIUM,PORCINE/PF 10 UNIT/ML
50 SYRINGE (ML) INTRAVENOUS AS NEEDED
Status: DISCONTINUED | OUTPATIENT
Start: 2024-08-13 | End: 2024-08-13 | Stop reason: HOSPADM

## 2024-08-13 RX ORDER — HEPARIN 100 UNIT/ML
500 SYRINGE INTRAVENOUS AS NEEDED
Status: DISCONTINUED | OUTPATIENT
Start: 2024-08-13 | End: 2024-08-13 | Stop reason: HOSPADM

## 2024-08-13 RX ORDER — HEPARIN SODIUM,PORCINE/PF 10 UNIT/ML
50 SYRINGE (ML) INTRAVENOUS AS NEEDED
OUTPATIENT
Start: 2024-08-13

## 2024-08-13 RX ORDER — FAMOTIDINE 10 MG/ML
20 INJECTION INTRAVENOUS ONCE AS NEEDED
Status: DISCONTINUED | OUTPATIENT
Start: 2024-08-13 | End: 2024-08-13 | Stop reason: HOSPADM

## 2024-08-13 RX ORDER — EPINEPHRINE 0.3 MG/.3ML
0.3 INJECTION SUBCUTANEOUS EVERY 5 MIN PRN
Status: DISCONTINUED | OUTPATIENT
Start: 2024-08-13 | End: 2024-08-13 | Stop reason: HOSPADM

## 2024-08-13 ASSESSMENT — PAIN SCALES - GENERAL: PAINLEVEL: 0-NO PAIN

## 2024-08-13 ASSESSMENT — ENCOUNTER SYMPTOMS
NAUSEA: 0
VOMITING: 0
COUGH: 1

## 2024-08-13 NOTE — PROGRESS NOTES
"NUTRITION Follow Up NOTE    Nutrition Assessment     Reason for Visit:  Padilla Campuzano is a 78 y.o. female who presents for nutrition consult 2/2 dx.  DX scc L vocal cord  TX concurrent chemoradiation, chemotherapy changed to Cetuximab 7/16/24. XRT 7/2-8/27  Hx anal and breast cancer. Chronic diarrhea not related to anal cancer per   Hydration on Fridays 8/13- C5 Cetuximab. FUV pt and  in infusion.       Lab Results   Component Value Date/Time    GLUCOSE 125 (H) 08/12/2024 1236     08/12/2024 1236    K 3.6 08/12/2024 1236     (H) 08/12/2024 1236    CO2 24 08/12/2024 1236    ANIONGAP 13 08/12/2024 1236    BUN 16 08/12/2024 1236    CREATININE 1.09 (H) 08/12/2024 1236    EGFR 52 (L) 08/12/2024 1236    CALCIUM 8.2 (L) 08/12/2024 1236    ALBUMIN 3.4 08/12/2024 1236    ALKPHOS 57 08/12/2024 1236    PROT 5.7 (L) 08/12/2024 1236    AST 21 08/12/2024 1236    BILITOT 0.3 08/12/2024 1236    ALT 10 08/12/2024 1236     Lab Results   Component Value Date/Time    VITD25 53 05/01/2024 0904   +oral Mg    Anthropometrics:  Anthropometrics  Weight: 62.1 kg (137 lb 0.3 oz)  BMI (Calculated): 24.86  IBW/kg (Dietitian Calculated): 50.4 kg  Weight Change  Weight History / % Weight Change: weight stable        Wt Readings from Last 10 Encounters:   08/13/24 62.1 kg (137 lb 0.3 oz)   08/13/24 62.1 kg (137 lb 0.3 oz)   08/12/24 62 kg (136 lb 11 oz)   08/12/24 62.1 kg (136 lb 12.7 oz)   08/09/24 61.8 kg (136 lb 3.9 oz)   08/08/24 61.4 kg (135 lb 5.8 oz)   08/06/24 61.7 kg (136 lb 0.4 oz)   08/05/24 61.6 kg (135 lb 12.9 oz)   08/02/24 60.4 kg (133 lb 2.5 oz)   07/31/24 60.2 kg (132 lb 11.5 oz)   7/2- first chemotherapy 65 kg  7/9- 65.1 kg  7/16 - 64.7 kg  7/23- 62.9 kg  8/6- 61.7 kg  8/13- 62.1 kg  Food And Nutrient Intake:  Food and Nutrient History  Energy Intake: Fair 50-75 %  Fluid Intake: \"doing bad\" drinking water has always been challenging for her. Has water bottle on bedside table. She continues to work on " it.Cranberry juice diluted with water  GI Symptoms: diarrhea (+Questran and Creon for diarrhea (which predates tx) Active with GI. Lomotil PRN for diarrhea and is effective. Is not having diarrhea every day now. Had on Sunday, thinks because she ate 1/2 a large tomato)  GI Symptoms greater than 2 weeks: intermittent  Oral Problems: odynophagia, dysgeusia, xerostomia (hoarse voice.+ Bliss lozenges. +s/s rinses. increase thick saliva/mucus.)  Dentition: lower denture/partial, upper denture/partial     Food Intake  Amount of Food: Consumes protein sources like eggs, cottage cheese, yogurt, PNB. Has had homemade chicken soup. Marinated fried chicken over past week with mashed potatoes and green beans  Meal 1: scrambled eggs or Cheerios  Snacks: Ritz crackers, Rupert PNB crackers    Food Preparation  Cooking: Spouse/Significant Other, Patient  Grocery Shopping: Patient, Spouse/Significant Other              Enteral Nutrition Intake  Enteral Nutrition Formula/Solution: had a feeding tube when she had a stroke                        Food Supplement Intake  Oral Nutrition Supplements:  (not taking any products currently. Dislikes [facility], upsets stomach)    Medication and Complementary/Alternative Medicine Use  OTC Medication Use: Biotin, Vitamin D      Nutrition Focused Physical Exam Findings: 7/2/24                          Energy Needs  Estimated Energy Needs  Total Energy Estimated Needs (kCal): 1825 kCal  Total Estimated Energy Need per Day (kCal/kg): 28 kCal/kg  Estimated Fluid Needs  Total Fluid Estimated Needs (mL): 1825 mL  Method for Estimating Needs: 1 ml/kcal  Estimated Protein Needs  Total Protein Estimated Needs (g): 78 g  Total Protein Estimated Needs (g/kg): 1.2 g/kg        Nutrition Diagnosis   Malnutrition Diagnosis  Patient has Malnutrition Diagnosis: No    Nutrition Diagnosis  Patient has Nutrition Diagnosis: Yes  Diagnosis Status (1): Ongoing  Nutrition Diagnosis 1: Predicted inadequate energy  "intake  Related to (1): pathophysiology of disease  As Evidenced by (1): high risk for nutrition impact symptoms impairing ability to meet estimated energy needs, affect oral intake and weight status    Nutrition Interventions/Recommendations   Nutrition Prescription  Individualized Nutrition Prescription Provided for : diet during cancer treatment    Food and Nutrition Delivery  Food and Nutrition Delivery  Meals & Snacks: Energy-modified diet, Modify Composition of Meals/Snacks, Protein-modified diet, Fluid-modified diet  Goals: incorporate soft high calorie high protein foods modified texture as needed, moist foods as needed. Include consistent meals and snacks. Avoid dry harsh  foods and acidic foods/beverages. Include adequate fluids  Medical Food Supplement: Commercial beverage (Has not purchased Ensure Clear yet. Provided sample of Ens Clear and Ens Complete. States she can drink \"regular\" Ensure not VHC, but she hasn't been taking anything despite weekly discussions about taking some ONS. Suggested min 1 each Clear and Plus)    Nutrition Education - provided 7/22       Coordination of Care     8/2- encouraged increased fluids, Pedialyte for diarrhea      Nutrition Monitoring and Evaluation   Food/Nutrient Related History Monitoring  Monitoring and Evaluation Plan: Energy intake, Meal/snack pattern, Protein intake, Fluid intake  Energy Intake: Estimated energy intake  Criteria: Meet >75% estimated energy needs  Fluid Intake: Estimated fluid intake  Criteria: maintain hydration  Meal/Snack Pattern: Estimated meal and snack pattern  Criteria: 4-6 meals/snacks daily  Estimated protein intake: Estimated protein intake  Criteria: meet > 75% estimated protein needs  Body Composition/Growth/Weight History  Monitoring and Evaluation Plan: Weight  Weight: Measured weight  Criteria: maintain weight    Following through treatment  Contact information provided     Time Spent  Prep time on day of patient encounter: 5 " minutes  Time spent directly with patient, family or caregiver: 15 minutes  Additional Time Spent on Patient Care Activities: 5 minutes  Documentation Time: 15 minutes  Other Time Spent: 0 minutes  Total: 40 minutes                    Readiness to Change : Good  Level of Understanding : Good  Anticipated Compliant : Good

## 2024-08-14 ENCOUNTER — HOSPITAL ENCOUNTER (OUTPATIENT)
Dept: RADIATION ONCOLOGY | Facility: CLINIC | Age: 79
Setting detail: RADIATION/ONCOLOGY SERIES
Discharge: HOME | End: 2024-08-14
Payer: MEDICARE

## 2024-08-14 DIAGNOSIS — Z51.0 ENCOUNTER FOR ANTINEOPLASTIC RADIATION THERAPY: ICD-10-CM

## 2024-08-14 DIAGNOSIS — C32.0 MALIGNANT NEOPLASM OF GLOTTIS (MULTI): ICD-10-CM

## 2024-08-14 LAB
RAD ONC MSQ ACTUAL FRACTIONS DELIVERED: 26
RAD ONC MSQ ACTUAL SESSION DELIVERED DOSE: 200 CGRAY
RAD ONC MSQ ACTUAL TOTAL DOSE: 5200 CGRAY
RAD ONC MSQ ELAPSED DAYS: 42
RAD ONC MSQ LAST DATE: NORMAL
RAD ONC MSQ PRESCRIBED FRACTIONAL DOSE: 200 CGRAY
RAD ONC MSQ PRESCRIBED NUMBER OF FRACTIONS: 35
RAD ONC MSQ PRESCRIBED TECHNIQUE: NORMAL
RAD ONC MSQ PRESCRIBED TOTAL DOSE: 7000 CGRAY
RAD ONC MSQ PRESCRIPTION PATTERN COMMENT: NORMAL
RAD ONC MSQ START DATE: NORMAL
RAD ONC MSQ TREATMENT COURSE NUMBER: 2
RAD ONC MSQ TREATMENT SITE: NORMAL

## 2024-08-14 PROCEDURE — 77386 HC INTENSITY-MODULATED RADIATION THERAPY (IMRT), COMPLEX: CPT | Performed by: STUDENT IN AN ORGANIZED HEALTH CARE EDUCATION/TRAINING PROGRAM

## 2024-08-15 ENCOUNTER — RADIATION ONCOLOGY OTV (OUTPATIENT)
Dept: RADIATION ONCOLOGY | Facility: CLINIC | Age: 79
End: 2024-08-15
Payer: MEDICARE

## 2024-08-15 ENCOUNTER — HOSPITAL ENCOUNTER (OUTPATIENT)
Dept: RADIATION ONCOLOGY | Facility: CLINIC | Age: 79
Setting detail: RADIATION/ONCOLOGY SERIES
Discharge: HOME | End: 2024-08-15
Payer: MEDICARE

## 2024-08-15 VITALS
DIASTOLIC BLOOD PRESSURE: 86 MMHG | HEART RATE: 83 BPM | TEMPERATURE: 96.1 F | RESPIRATION RATE: 16 BRPM | SYSTOLIC BLOOD PRESSURE: 155 MMHG | BODY MASS INDEX: 23.33 KG/M2 | OXYGEN SATURATION: 96 % | WEIGHT: 135.91 LBS

## 2024-08-15 DIAGNOSIS — Z51.0 ENCOUNTER FOR ANTINEOPLASTIC RADIATION THERAPY: ICD-10-CM

## 2024-08-15 DIAGNOSIS — C32.0 MALIGNANT NEOPLASM OF GLOTTIS (MULTI): ICD-10-CM

## 2024-08-15 LAB
RAD ONC MSQ ACTUAL FRACTIONS DELIVERED: 27
RAD ONC MSQ ACTUAL SESSION DELIVERED DOSE: 200 CGRAY
RAD ONC MSQ ACTUAL TOTAL DOSE: 5400 CGRAY
RAD ONC MSQ ELAPSED DAYS: 43
RAD ONC MSQ LAST DATE: NORMAL
RAD ONC MSQ PRESCRIBED FRACTIONAL DOSE: 200 CGRAY
RAD ONC MSQ PRESCRIBED NUMBER OF FRACTIONS: 35
RAD ONC MSQ PRESCRIBED TECHNIQUE: NORMAL
RAD ONC MSQ PRESCRIBED TOTAL DOSE: 7000 CGRAY
RAD ONC MSQ PRESCRIPTION PATTERN COMMENT: NORMAL
RAD ONC MSQ START DATE: NORMAL
RAD ONC MSQ TREATMENT COURSE NUMBER: 2
RAD ONC MSQ TREATMENT SITE: NORMAL

## 2024-08-15 PROCEDURE — 77386 HC INTENSITY-MODULATED RADIATION THERAPY (IMRT), COMPLEX: CPT | Performed by: STUDENT IN AN ORGANIZED HEALTH CARE EDUCATION/TRAINING PROGRAM

## 2024-08-15 ASSESSMENT — ENCOUNTER SYMPTOMS
OCCASIONAL FEELINGS OF UNSTEADINESS: 0
LOSS OF SENSATION IN FEET: 0
DEPRESSION: 0

## 2024-08-15 ASSESSMENT — PAIN SCALES - GENERAL: PAINLEVEL: 0-NO PAIN

## 2024-08-15 NOTE — PROGRESS NOTES
Radiation Oncology On Treatment Visit    Patient Name:  Padilla Campuzano  MRN:  [id number]  :  1945    Referring Provider: No ref. provider found  Primary Care Provider: Naheed Clements MD  Care Team: Patient Care Team:  Naheed Clements MD as PCP - General (Internal Medicine)  Na[facility] Clements [facility]  DO Carroll Calabrese MD as Consulting Physician (Hematology and Oncology)  Kyunghee Burkitt, DO as Consulting Physician (Hematology and Oncology)  Eden Moss MD as Medical Oncologist (Hematology and Oncology)    Date of Service: 8/15/2024     Diagnosis:   Specialty Problems          Radiation Oncology Problems    Breast cancer (Multi)        Malignant neoplasm of colon (Multi)        Laryngeal cancer (Multi)         Treatment Summary:  IMRT: Bilateral Head and neck    Treatment Period Technique Fraction Dose Fractions Total Dose   Course 2 7/3/2024-8/15/2024  (days elapsed: 43)         H&N 7/3/2024-8/15/2024 VMAT 200 / 200 cGy  5400 / 7,000 cGy     SUBJECTIVE: Continues to tolerate very well. Mild odynophagia, unchanged, no significant mucositis on exam. Increased dysgeusia, but tolerating p.o. diet. Weight has been stable overall. Very faint erythema along the neck.      OBJECTIVE:   Vital Signs:  /86 (BP Location: Left arm, Patient Position: Sitting, BP Cuff Size: Adult long)   Pulse 83   Temp 35.6 °C (96.1 °F) (Temporal)   Resp 16   Wt 61.7 kg (135 lb 14.6 oz)   SpO2 96%   BMI 23.33 kg/m²    Pain Scale: The patient's current pain level was assessed.  They report currently having a pain of 0 out of 10.    Other Pertinent Findings:   Wt Readings from Last 10 Encounters:   08/15/24 61.7 kg (135 lb 14.6 oz)   24 62.1 kg (137 lb 0.3 oz)   24 62.1 kg (137 lb 0.3 oz)   24 62 kg (136 lb 11 oz)   24 62.1 kg (136 lb 12.7 oz)   24 61.8 kg (136 lb 3.9 oz)   24 61.4 kg (135 lb 5.8 oz)   24 61.7  kg (136 lb 0.4 oz)   08/05/24 61.6 kg (135 lb 12.9 oz)   08/02/24 60.4 kg (133 lb 2.5 oz)         Toxicity Assessment          7/11/2024    13:38 7/18/2024    13:45 7/25/2024    13:50 8/2/2024    14:37 8/8/2024    13:26 8/15/2024    13:35   Toxicity Assessment   Treatment  and neck Head and neck Head and neck Head and neck Head and neck Head and neck   Anorexia Grade 0 Grade 0       eating pretty much what she wants and 1 Boost/day Grade 1       eating soft foods and 1 Boost/day Grade 1       ensure is hurting her stomach Grade 1       ensure 1x daily, mostly soft foods Grade 0       eating softer foods and 1 boost/day   Anxiety Grade 0 Grade 1 Grade 0 Grade 0 Grade 0 Grade 0   Dehydration Grade 0 Grade 0 Grade 0 Grade 2       getting IV fluid infusions Grade 2       IV fluids on Fridays Grade 0   Depression Grade 1 Grade 1 Grade 0 Grade 0 Grade 0 Grade 0   Dermatitis Radiation Grade 0 Grade 0 Grade 0       Using Udderly smooth once a day Grade 0       using udderly smooth Grade 1       red splotches on chest, udderly smooth Grade 1   Diarrhea Grade 1       last night Grade 1       baseline Grade 1       baseline Grade 2 Grade 2       some days 3-4x, some days none Grade 1       baseline--has gotten better   Fatigue Grade 1 Grade 1       naps during the day and goes to bed early Grade 1       naps during the day Grade 2       baseline Grade 1       napping, going to bed early Grade 1       naps during the day   Fibrosis Deep Connective Tissue Grade 0 Grade 0 Grade 0 Grade 0 Grade 0 Grade 0   Fracture Grade 0 Grade 0 Grade 0 Grade 0 Grade 0 Grade 0   Nausea Grade 0 Grade 0 Grade 0 Grade 2       taking anti nausea medicine Grade 0 Grade 0   Pain Grade 0 Grade 2       pain level 7 to back--takes Tylenol with some relief Grade 1       pain level 3 to abdomen Grade 0 Grade 0 Grade 0   Treatment Related Secondary Malignancy Grade 0 Grade 0 Grade 0 Grade 0 Grade 0 Grade 0   Tumor Pain Grade 0 Grade 0 Grade 0 Grade  0 Grade 0 Grade 0   Vomiting Grade 0 Grade 0 Grade 0 Grade 2 Grade 0 Grade 0   Dysphagia Grade 0 Grade 0 Grade 1  Grade 2 Grade 0   Esophagitis Grade 0 Grade 1 Grade 1   Grade 1   Mucositis Oral Grade 0       instructed patient to start salt and soda rinses and Blis losenges Grade 0       using Blis lozenges and salt and soda rinses Grade 0       using Blis lozenges and salt and soda rinses  Grade 1 Grade 0       using Blis lozenges   Hearing Impaired Grade 0 Grade 0 Grade 0  Grade 0 Grade 0   Blurred Vision Grade 0 Grade 0 Grade 0  Grade 0 Grade 0   Dry Eye Grade 1 Grade 0 Grade 0  Grade 0 Grade 0   Eye Pain Grade 0 Grade 0 Grade 0  Grade 0 Grade 0   Retinopathy Grade 0 Grade 0 Grade 0  Grade 0 Grade 0   Central Nervous System Necrosis Grade 0 Grade 0 Grade 0  Grade 0 Grade 0   Edema Cerebral Grade 0 Grade 0 Grade 0  Grade 0 Grade 0   Dry Mouth Grade 0 Grade 0 Grade 1  Grade 1 Grade 1   Pneumonitis Grade 0 Grade 0 Grade 0   Grade 0   Febrile Neutropenia Grade 0 Grade 0 Grade 0 Grade 0 Grade 0 Grade 0   Ear Pain Grade 0 Grade 0 Grade 0 Grade 0 Grade 0 Grade 0   External Ear Pain Grade 0 Grade 0 Grade 0 Grade 0 Grade 0 Grade 0   Tinnitus Grade 0 Grade 0 Grade 0 Grade 0 Grade 0 Grade 0   Watering Eyes Grade 0 Grade 0 Grade 0 Grade 0 Grade 0 Grade 0   Oral Pain Grade 0 Grade 0 Grade 0 Grade 0 Grade 1       2/10 pain in tongue, constant Grade 0   Salivary Duct Inflammation Grade 0 Grade 0 Grade 1 Grade 2       using baking soda/salt rinse Grade 2       using baking soda/salt rinse, blis lozenges Grade 1   Edema Face Grade 0 Grade 0 Grade 0 Grade 0 Grade 0 Grade 0   Malaise Grade 0 Grade 0 Grade 0 Grade 0 Grade 0 Grade 0   Neck Edema Grade 0 Grade 0 Grade 0 Grade 0 Grade 0 Grade 0   Corneal Infection Grade 0 Grade 0 Grade 0 Grade 0 Grade 0 Grade 0   Mucosal Infection Grade 0 Grade 0 Grade 0 Grade 0 Grade 0 Grade 0   Otitis Media Grade 0 Grade 0 Grade 0 Grade 0 Grade 0 Grade 0   Sepsis Grade 0 Grade 0 Grade 0 Grade 0  Grade 0 Grade 0   Sinusitis Grade 0 Grade 0 Grade 0 Grade 0 Grade 0 Grade 0   Skin Infection Grade 0 Grade 0 Grade 0 Grade 0 Grade 0 Grade 0   Soft Tissue Infection Grade 0 Grade 0 Grade 0 Grade 0 Grade 0 Grade 0   Head Soft Tissue Necrosis Grade 0 Grade 0 Grade 0 Grade 0 Grade 0 Grade 0   Neck Soft Tissue Necrosis Grade 0 Grade 0 Grade 0 Grade 0 Grade 0 Grade 0   Osteonecrosis of Jaw Grade 0 Grade 0 Grade 0 Grade 0 Grade 0 Grade 0   Superficial Soft Tissue Fibrosis Grade 0 Grade 0 Grade 0 Grade 0 Grade 0 Grade 0   Trismus Grade 0 Grade 0 Grade 0 Grade 0 Grade 0 Grade 0   Dysarthria Grade 0 Grade 0 Grade 0 Grade 0 Grade 0 Grade 0   Dysesthesia Grade 0 Grade 0 Grade 0 Grade 0 Grade 0 Grade 0   Dysgeusia Grade 0 Grade 0 Grade 1 Grade 2       loss of sense of taste Grade 2 Grade 2   Facial Nerve Disorder Grade 0 Grade 0 Grade 0 Grade 0 Grade 0 Grade 0   Hypoglossal Nerve Disorder Grade 0 Grade 0 Grade 0 Grade 0 Grade 0 Grade 0   Oculomotor Nerve Disorder Grade 0 Grade 0 Grade 0 Grade 0 Grade 0 Grade 0   Paresthesia Grade 0 Grade 0 Grade 0 Grade 0 Grade 0 Grade 0   Stroke Grade 0 Grade 0 Grade 0 Grade 0 Grade 0 Grade 0   Transient Ischemic Attacks Grade 0 Grade 0 Grade 0 Grade 0 Grade 0 Grade 0   Trigeminal Nerve Disorder Grade 0 Grade 0 Grade 0 Grade 0 Grade 0 Grade 0   Aspiration Grade 0 Grade 0 Grade 0 Grade 0 Grade 0 Grade 0   Hoarseness Grade 1 Grade 2 Grade 2 Grade 2 Grade 2 Grade 2   Laryngeal Edema Grade 0 Grade 0 Grade 0 Grade 0 Grade 0 Grade 0   Stridor Grade 0 Grade 0 Grade 0 Grade 0 Grade 0 Grade 0   Tracheal Mucositis Grade 0 Grade 0 Grade 0 Grade 0 Grade 0 Grade 0   Voice Alteration Grade 1 Grade 2 Grade 2 Grade 2 Grade 2 Grade 0        Assessment / Plan:  The patient is tolerating radiation therapy as anticipated.  Continue per current treatment plan.

## 2024-08-19 ENCOUNTER — OFFICE VISIT (OUTPATIENT)
Dept: HEMATOLOGY/ONCOLOGY | Facility: CLINIC | Age: 79
End: 2024-08-19
Payer: MEDICARE

## 2024-08-19 ENCOUNTER — HOSPITAL ENCOUNTER (OUTPATIENT)
Dept: RADIATION ONCOLOGY | Facility: CLINIC | Age: 79
Setting detail: RADIATION/ONCOLOGY SERIES
Discharge: HOME | End: 2024-08-19
Payer: MEDICARE

## 2024-08-19 ENCOUNTER — LAB (OUTPATIENT)
Dept: HEMATOLOGY/ONCOLOGY | Facility: CLINIC | Age: 79
End: 2024-08-19
Payer: MEDICARE

## 2024-08-19 VITALS
BODY MASS INDEX: 23.05 KG/M2 | HEART RATE: 71 BPM | DIASTOLIC BLOOD PRESSURE: 65 MMHG | RESPIRATION RATE: 16 BRPM | OXYGEN SATURATION: 98 % | TEMPERATURE: 96.2 F | SYSTOLIC BLOOD PRESSURE: 136 MMHG | WEIGHT: 134.26 LBS

## 2024-08-19 DIAGNOSIS — M54.50 CHRONIC MIDLINE LOW BACK PAIN WITHOUT SCIATICA: Primary | ICD-10-CM

## 2024-08-19 DIAGNOSIS — C32.9 LARYNGEAL CANCER (MULTI): ICD-10-CM

## 2024-08-19 DIAGNOSIS — Z51.0 ENCOUNTER FOR ANTINEOPLASTIC RADIATION THERAPY: ICD-10-CM

## 2024-08-19 DIAGNOSIS — C32.0 MALIGNANT NEOPLASM OF GLOTTIS (MULTI): ICD-10-CM

## 2024-08-19 DIAGNOSIS — C32.0 SQUAMOUS CELL CARCINOMA OF LEFT VOCAL CORD (MULTI): ICD-10-CM

## 2024-08-19 DIAGNOSIS — R21 LOCALIZED MACULAR RASH: ICD-10-CM

## 2024-08-19 DIAGNOSIS — Z51.11 ENCOUNTER FOR ANTINEOPLASTIC CHEMOTHERAPY: ICD-10-CM

## 2024-08-19 DIAGNOSIS — G89.29 CHRONIC MIDLINE LOW BACK PAIN WITHOUT SCIATICA: Primary | ICD-10-CM

## 2024-08-19 LAB
ALBUMIN SERPL BCP-MCNC: 3.7 G/DL (ref 3.4–5)
ALP SERPL-CCNC: 57 U/L (ref 33–136)
ALT SERPL W P-5'-P-CCNC: 10 U/L (ref 7–45)
ANION GAP SERPL CALC-SCNC: 12 MMOL/L (ref 10–20)
AST SERPL W P-5'-P-CCNC: 20 U/L (ref 9–39)
BILIRUB SERPL-MCNC: 0.3 MG/DL (ref 0–1.2)
BUN SERPL-MCNC: 21 MG/DL (ref 6–23)
CALCIUM SERPL-MCNC: 8.6 MG/DL (ref 8.6–10.3)
CHLORIDE SERPL-SCNC: 109 MMOL/L (ref 98–107)
CO2 SERPL-SCNC: 24 MMOL/L (ref 21–32)
CREAT SERPL-MCNC: 1.13 MG/DL (ref 0.5–1.05)
EGFRCR SERPLBLD CKD-EPI 2021: 50 ML/MIN/1.73M*2
GLUCOSE SERPL-MCNC: 98 MG/DL (ref 74–99)
MAGNESIUM SERPL-MCNC: 1.42 MG/DL (ref 1.6–2.4)
POTASSIUM SERPL-SCNC: 4.3 MMOL/L (ref 3.5–5.3)
PROT SERPL-MCNC: 6 G/DL (ref 6.4–8.2)
RAD ONC MSQ ACTUAL FRACTIONS DELIVERED: 28
RAD ONC MSQ ACTUAL SESSION DELIVERED DOSE: 200 CGRAY
RAD ONC MSQ ACTUAL TOTAL DOSE: 5600 CGRAY
RAD ONC MSQ ELAPSED DAYS: 47
RAD ONC MSQ LAST DATE: NORMAL
RAD ONC MSQ PRESCRIBED FRACTIONAL DOSE: 200 CGRAY
RAD ONC MSQ PRESCRIBED NUMBER OF FRACTIONS: 35
RAD ONC MSQ PRESCRIBED TECHNIQUE: NORMAL
RAD ONC MSQ PRESCRIBED TOTAL DOSE: 7000 CGRAY
RAD ONC MSQ PRESCRIPTION PATTERN COMMENT: NORMAL
RAD ONC MSQ START DATE: NORMAL
RAD ONC MSQ TREATMENT COURSE NUMBER: 2
RAD ONC MSQ TREATMENT SITE: NORMAL
SODIUM SERPL-SCNC: 141 MMOL/L (ref 136–145)

## 2024-08-19 PROCEDURE — 77386 HC INTENSITY-MODULATED RADIATION THERAPY (IMRT), COMPLEX: CPT | Performed by: STUDENT IN AN ORGANIZED HEALTH CARE EDUCATION/TRAINING PROGRAM

## 2024-08-19 PROCEDURE — 36591 DRAW BLOOD OFF VENOUS DEVICE: CPT

## 2024-08-19 PROCEDURE — 99215 OFFICE O/P EST HI 40 MIN: CPT | Performed by: NURSE PRACTITIONER

## 2024-08-19 PROCEDURE — 2500000004 HC RX 250 GENERAL PHARMACY W/ HCPCS (ALT 636 FOR OP/ED): Performed by: NURSE PRACTITIONER

## 2024-08-19 PROCEDURE — 83735 ASSAY OF MAGNESIUM: CPT | Performed by: NURSE PRACTITIONER

## 2024-08-19 PROCEDURE — 84075 ASSAY ALKALINE PHOSPHATASE: CPT | Performed by: NURSE PRACTITIONER

## 2024-08-19 PROCEDURE — 1126F AMNT PAIN NOTED NONE PRSNT: CPT | Performed by: NURSE PRACTITIONER

## 2024-08-19 PROCEDURE — 1159F MED LIST DOCD IN RCRD: CPT | Performed by: NURSE PRACTITIONER

## 2024-08-19 RX ORDER — HEPARIN SODIUM,PORCINE/PF 10 UNIT/ML
50 SYRINGE (ML) INTRAVENOUS AS NEEDED
Status: CANCELLED | OUTPATIENT
Start: 2024-08-19

## 2024-08-19 RX ORDER — HEPARIN 100 UNIT/ML
500 SYRINGE INTRAVENOUS AS NEEDED
Status: DISCONTINUED | OUTPATIENT
Start: 2024-08-19 | End: 2024-08-19 | Stop reason: HOSPADM

## 2024-08-19 RX ORDER — MAGNESIUM SULFATE HEPTAHYDRATE 40 MG/ML
4 INJECTION, SOLUTION INTRAVENOUS ONCE AS NEEDED
Status: CANCELLED | OUTPATIENT
Start: 2024-08-20

## 2024-08-19 RX ORDER — LIDOCAINE 50 MG/G
1 PATCH TOPICAL DAILY
Qty: 7 PATCH | Refills: 2 | Status: SHIPPED | OUTPATIENT
Start: 2024-08-19 | End: 2024-08-26

## 2024-08-19 RX ORDER — MAGNESIUM SULFATE HEPTAHYDRATE 40 MG/ML
2 INJECTION, SOLUTION INTRAVENOUS ONCE AS NEEDED
Status: CANCELLED | OUTPATIENT
Start: 2024-08-20

## 2024-08-19 RX ORDER — HEPARIN 100 UNIT/ML
500 SYRINGE INTRAVENOUS AS NEEDED
Status: CANCELLED | OUTPATIENT
Start: 2024-08-19

## 2024-08-19 ASSESSMENT — PAIN SCALES - GENERAL: PAINLEVEL: 0-NO PAIN

## 2024-08-19 ASSESSMENT — ENCOUNTER SYMPTOMS
VOICE CHANGE: 0
BACK PAIN: 1
NECK PAIN: 0
NEUROLOGICAL NEGATIVE: 1
FATIGUE: 1
COUGH: 0
DIARRHEA: 1
VOMITING: 0
RESPIRATORY NEGATIVE: 1
CARDIOVASCULAR NEGATIVE: 1
NAUSEA: 0

## 2024-08-19 NOTE — PROGRESS NOTES
Patient here with her  for follow up with CAIN Arauz NP; seen for colon cancer.    Receives Cetixumab with concurrent radiation; per NP patient to receive as scheduled.    Reconciled and reviewed medication and allergy list with patient.    Patient states she is having pain in her back between her shoulder blades.  Also patient has redness noted on throat due to radiation treatments.  NP aware.    Patient to return for treatment as scheduled.  Patient to have PORT draw for labs today.  Has scheduled CT on 9/23/24 and will see the dietician on 8/20/24.  Patient to be seen in follow up on 8/26/24 per NP orders.    Reminded patient to check MyChart for any updates to scheduling and to review medication list against what  has at home.  Also reviewed patient can obtain lab results on My Chart which will be reviewed at follow up visits.    No barriers to education noted, pt. Agreed to plan and verbalized understanding using teach back method

## 2024-08-19 NOTE — PROGRESS NOTES
Patient ID: Padilla Campuzano is a 78 y.o. female.  Diagnosis: Laryngeal cancer  Staging: T3N0M0  Date of Diagnosis: 5/22/24    Providers:  ENT Surgeon: Dr. Ferny Rhoades  [facility]:   Dr. Kyunghee Burkitt/Megan Arauz APRARNEL  [facility]: Dr. Sue Oliva    Ms. Campuzano is 77 y/o female with recent diagnosis of laryngeal cancer who is referred to  med onc  to discuss treatment plan.    -2/2024: pt presented with hoarse voice  -3/21/24: CT chest showed left pulmonary nodule  -4/3/24: PET/CT showed uptake in left vocal cord extending  to the right with limited subglottic extension  -4/23/24: biopsy of LLL nodule showed negative for malignancy  -5/7/24: seen by thoracic surgeon Dr. Linda, plan is to follow up image in 3 months  -5/20/24: s/p direct laryngoscopy. She was found to have transglottic lesion extending from primarily the left glottis into the subglottis and wrapping around anteriorly. 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)  No date: Breast cancer (Multi)  2012: Cerebral hemorrhage (Multi)  No date: CKD (chronic kidney disease)  No date: Colon cancer (Multi)  No date: Colorectal cancer (Multi)  No date: Diverticulosis of intestine, part unspecified, without   perforation or abscess without bleeding      Comment:  Diverticulosis  No date: GERD (gastroesophageal reflux disease)  No date: Hypertension  No date: Irritable bowel syndrome  No date: Kidney disease  No date: Malignant neoplasm of colon, unspecified (Multi)      Comment:  Colon cancer  No date: Personal history of colonic polyps      Comment:  History of colonic polyps  No date: Personal history of irradiation  No date: Personal history of malignant neoplasm of breast      Comment:  History of malignant neoplasm of female breast  No date: Personal history of other diseases of the respiratory system      Comment:  History of respiratory failure  No date: Personal history of other malignant  neoplasm of skin      Comment:  Personal history of malignant neoplasm of skin  2016: Right upper quadrant pain      Comment:  Abdominal pain, RUQ (right upper quadrant)  2012: Stroke (Multi)  2016: Unspecified symptoms and signs involving the   genitourinary system      Comment:  Urinary symptom or sign   Surgical History:    Past Surgical History:   Procedure Laterality Date    BI US GUIDED BREAST RIGHT CYST ASPIRATION Right 2019    BI BREAST CYST ASPIRATION RIGHT [facility]    BREAST LUMPECTOMY  2014    Breast Surgery Lumpectomy    CHOLECYSTECTOMY      CT GUIDED IMAGING FOR ABSCESS DRAIN  2015    CT GUIDED IMAGING FOR ABSCESS DRAIN [facility]PATIENT LEGAC[facility]    HYSTERECTOMY  1984    Hysterectomy    ILEOSTOMY  2015    Ileostomy    ILEOSTOMY CLOSURE  2015    Ileostomy Closure    OTHER SURGICAL HISTORY      Cerebral artery coiling      Family History:    Family History   Problem Relation Name Age of Onset    Colon cancer Mother      Liver cancer Mother      Kidney cancer Mother      Heart attack Father      Blood clot Father      Cancer Brother      Cancer Brother       Family Oncology History:    Cancer-related family history includes Cancer in her brother and brother; Colon cancer in her mother; Kidney cancer in her mother; Liver cancer in her mother.  Social History:    Social History     Tobacco Use    Smoking status: Former     Current packs/day: 0.00     Types: Cigarettes     Quit date:      Years since quittin.6     Passive exposure: Past    Smokeless tobacco: Never   Vaping Use    Vaping status: Never Used   Substance Use Topics    Alcohol use: Never    Drug use: Never        Subjective   Chief Complaint: Laryngeal cancer    HPI    Interval History  Pt present in clinic for follow-up and readiness to treat visit.  Her  is present for visit.  No changed in energy level.  Activities limited d/t back pain.  Appetite - reports an appetite. Taking  two supplements/day.  +3 meals/day.  Soft foods.  Diarrhea on Friday - sick all day. Multiple episodes.  Started at noon.  Lomotil taken - four tabs, effective.  Missed Friday RT and hydration visit.  Also diarrhea on Saturday after salad was eaten.  Lomotil taken.   Spouse reports increase episodes of diarrhea with tx.  Questran resumed.  Denies n/v/c.  No fevers, chills, or CP.  Rash to mid chest 2/2 RT.  Reports baclofen did not help. Denies pain with sitting.  Pain with movement.  Tylenol taken today, 500mg. Believes if she was seen by a chiropractor, this would correct her back pain.  Prefers to wait until CRT is completed before scheduling.  Back pain is chronic.  Previously seen by a chiropractor.  Rx sent for Lidocaine patches.  No other concerns today.  Ready for treatment tomorrow.  Mg 1.42.  Pt to receive 2g Mg prior to treatment.      ROS  Review of Systems   Constitutional:  Positive for fatigue.   HENT:  Negative.  Negative for voice change.    Respiratory: Negative.  Negative for cough.    Cardiovascular: Negative.    Gastrointestinal:  Positive for diarrhea. Negative for nausea and vomiting.   Genitourinary: Negative.     Musculoskeletal:  Positive for back pain. Negative for neck pain.   Skin:  Positive for rash.   Neurological: Negative.        Allergies  Allergies   Allergen Reactions    Paclitaxel GI Upset and Nausea/vomiting     See adverse drug reaction note dated 7/9/24    Hydrocodone-Acetaminophen Dizziness    Oxycodone Hcl Nausea/vomiting    Oxycodone-Acetaminophen Nausea/vomiting    Aspirin GI Upset and Unknown    Sulfa (Sulfonamide Antibiotics) Rash        Medications  Current Outpatient Medications   Medication Instructions    acetaminophen (TYLENOL) 1,000 mg, oral, Every 6 hours PRN    baclofen (LIORESAL) 5 mg, oral, 3 times daily    biotin 5 mg capsule Every 24 hours    cholecalciferol (Vitamin D-3) 50 mcg (2,000 unit) capsule 1 capsule, Every 24 hours    cholestyramine (Questran) 4  gram powder once daily.    clindamycin (Cleocin T) 1 % lotion Apply topically to affected area twice daily.    doxycycline (VIBRAMYCIN) 100 mg, oral, 2 times daily, For rash prevention. Take with at least 8 ounces (large glass) of water, do not lie down for 30 minutes after    hydrocortisone 1 % cream Apply topically to face, hands, feet, neck, back, and chest once daily at bedtime for rash prevention.    lidocaine (Lidoderm) 5 % patch 1 patch, transdermal, Daily, Remove & discard patch within 12 hours or as directed by MD.    lipase-protease-amylase (Creon) 24,000-76,000 -120,000 unit capsule TAKE 3 CAPSULES BY MOUTH 3 TIMES DAILY WITH MEALS AND 1 TO 2  CAPSULES BY MOUTH WITH SNACKS.  MAX 13 CAPSULE DAILY    loperamide (Imodium) 2 mg capsule Take 2 capsules (4 mg) by mouth with the first episode of diarrhea and 1 capsule (2 mg) by mouth with any additional episodes. Maximum 8 capsules (16 mg) per day.    losartan (COZAAR) 25 mg, oral, Daily    magnesium oxide (MAG-OX) 400 mg, oral, Daily    ondansetron (ZOFRAN) 8 mg, oral, Every 8 hours PRN    pantoprazole (ProtoNix) 40 mg EC tablet TAKE 1 TABLET BY MOUTH TWICE  DAILY    potassium chloride (Klor-Con) 20 mEq packet 20 mEq, oral, Daily    prochlorperazine (COMPAZINE) 10 mg, oral, Every 6 hours PRN        Objective   VS: /65 (BP Location: Left arm, Patient Position: Sitting, BP Cuff Size: Adult)   Pulse 71   Temp 35.7 °C (96.2 °F) (Temporal)   Resp 16   Wt 60.9 kg (134 lb 4.2 oz)   SpO2 98%   BMI 23.05 kg/m²   Weight: Daily Weight  08/19/24 : 60.9 kg (134 lb 4.2 oz)  08/15/24 : 61.7 kg (135 lb 14.6 oz)  08/13/24 : 62.1 kg (137 lb 0.3 oz)  08/13/24 : 62.1 kg (137 lb 0.3 oz)  08/12/24 : 62 kg (136 lb 11 oz)  08/12/24 : 62.1 kg (136 lb 12.7 oz)  08/09/24 : 61.8 kg (136 lb 3.9 oz)      Physical Exam  Vitals reviewed.   Constitutional:       Appearance: Normal appearance.   HENT:      Head: Normocephalic.      Nose: Nose normal.      Mouth/Throat:      Mouth:  Mucous membranes are moist.      Pharynx: Oropharynx is clear.   Eyes:      Extraocular Movements: Extraocular movements intact.      Conjunctiva/sclera: Conjunctivae normal.      Pupils: Pupils are equal, round, and reactive to light.   Cardiovascular:      Rate and Rhythm: Normal rate and regular rhythm.      Pulses: Normal pulses.      Heart sounds: Normal heart sounds.   Pulmonary:      Breath sounds: Normal breath sounds.   Abdominal:      General: Bowel sounds are normal.      Palpations: Abdomen is soft.   Musculoskeletal:         General: Normal range of motion.      Cervical back: Normal range of motion and neck supple.   Skin:     General: Skin is warm and dry.      Findings: Rash present.      Comments: Flat macular rash to mid, upper chest.    Neurological:      General: No focal deficit present.      Mental Status: She is alert and oriented to person, place, and time.   Psychiatric:         Mood and Affect: Mood normal.         Behavior: Behavior normal.         Thought Content: Thought content normal.         Judgment: Judgment normal.     Diagnostic Results            Results from last 7 days   Lab Units 08/19/24  1418   GLUCOSE mg/dL 98   SODIUM mmol/L 141   POTASSIUM mmol/L 4.3   CHLORIDE mmol/L 109*   CO2 mmol/L 24   BUN mg/dL 21   CREATININE mg/dL 1.13*   EGFR mL/min/1.73m*2 50*   CALCIUM mg/dL 8.6   MAGNESIUM mg/dL 1.42*   ALBUMIN g/dL 3.7   PROTEIN TOTAL g/dL 6.0*   BILIRUBIN TOTAL mg/dL 0.3   ALK PHOS U/L 57   ALT U/L 10   AST U/L 20                   Images  MRI Cervical and Thoracic Spine 7/8/24:    Cervical, Thoracic Spine: Diffuse soft tissue thickening and enhancement of the glottic larynx extending into the subglottic larynx and involving the left cricoid, arytenoid, and thyroid cartilages, consistent with known tumor.  No evidence of metastatic disease within the cervical or thoracic  spine.    Pathology  Lab Results   Component Value Date    PATHREP  12/06/2021                                                      MRN: [id number]  Patient Name RONALD LENNON  Accession #: [id number]  Date of Procedure:  12/6/2021       Date Reported: 12/9/2021  Date Received:  12/7/2021  Date of Birth / Sex 1945 (Age: 76) / F  Race: WHITE  Submitting Physician: ROD VALLE MD    Other External #          FINAL CYTOLOGICAL INTERPRETATION    A.   FINE NEEDLE ASPIRATION BREAST - RIGHT, CYTOLOGY AND CELL BLOCK:  --NO MALIGNANT CELLS IDENTIFIED.  --SPECIMEN CONSISTS OF PROTEINACEOUS DEBRIS, OCCASIONAL FOAMY MACROPHAGES, FEW  INFLAMMATORY CELLS AND RARE EPITHELIAL GROUP.        Slide(s) initially screened by a Cytotechnologist at [place] [address]      Electronically Signed Out By GABINO GARCES MD    By the signature on this report, the individual or group listed as making the  Final Interpretation/Diagnosis certifies that they have reviewed this case.  Slide(s) initially screened by a Cytotechnologist at [facility]     Clinical History      Clinical Diagnosis History: Cyst of right breast - (N60.01)   Source of Specimen  A:  FINE NEEDLE ASPIRATION BREAST - RIGHT     Specimen Submitted as:  A:   FINE NEEDLE ASPIRATION BREAST - RIGHT        Pap non-gyn ThinPrep slide, CELL BLOCK, H&E, Initial    Gross Description  A.   FINE NEEDLE ASPIRATION BREAST - RIGHT:  35cc MILKY YELLOW NEEDLE RINSE IN  CYTOLYT                 [facility]  Department of Pathology  [address] [place] [address]        PATHREP  09/09/2019     Name RONALD LENNON                                                                                                   Accession #: [id number]            Pathologist:                   YUE FIGUEROA MD  Date of Procedure:    9/9/2019  Date Received:          9/9/2019  Date Reported           9/13/2019  Submitting Physician:   SALIMA WHEELER MD  Location:                     "[facility]  Other External #                                                                    FINAL DIAGNOSIS  A.  BIOPSY OF ANAL CANAL:  MINUTE FRAGMENTS OF SOFT TISSUE WITH NO SIGNIFICANT  PATHOLOGICAL FINDINGS, SEE NOTE.    Note: Deeper sections were obtained.                                                                                                                                                                                                                                                                                                                                                                                                                                                                                       Electronically Signed Out By YUE FIGUEROA MD/ANASTASIYA  By the signature on this report, the individual or group listed as making the  Final Interpretation/Diagnosis certifies that they have reviewed this case.           Clinical History:  Colorectal cancer, squamous cell, status post chemo radiation    Specimens Submitted As:  A: BIOPSY OF ANAL CANAL     Gross Description:  Received in formalin, labeled with the patient's name and hospital number and  \"anal canal\", are multiple fragments of tan, soft tissue aggregating to 1.7 x  1.0 x 0.1 cm. The specimen is submitted in toto in one cassette.  CJN    cjn/9/9/2019               [facility]  Department of Pathology   [address] [place] [address]        COMDX  05/20/2024     Case reviewed at ENT consensus conference via [employer] technology on 5/22/2024.       Assessment/Plan   Ms. Campuzano is 77 y/o female with recent diagnosis of laryngeal cancer who is referred to  med onc  to discuss treatment plan.    # Laryngeal cancer (T3N0M0)  -Based on PET/CT from 4/3/24, there was uptake in LLL nodule, however, biopsy of LLL nodule showed negative for malignancy  -seen by Thoracic surgeon, plan to have repeat CT chest " without contrast in 3 month  -since her GFR is 40 (no hearing deficit), she is not a candidate for cisplatin, will treat her with weekly carboplatin and paclitaxel, reviewed chemo related side effects with patient in detail.  Consent obtained.  -pt developed abdominal pain post C1, during C2 infusion of paclitaxel,  she experienced worsening of abdominal pain, pt wanted to stop chemo on 7/9 and also didn't want further chemotherapy.  -7/10/24: discussed with patient about changing treatment to cetuximab. Explained to patient about main side effects (skin rash and hypomagnesemia), she is good with the plan.  I couldn't place consent due to epic issue, I will place it later.  - Pt consented by Dr. Burkitt 7/11/24  - Pt tolerated her C1 Cetuximab tx well.  Intermittent diarrhea managed with PRN Imodium     - Diarrhea 8/11/24 with Lomotil taken x4.  Today back to her norm.    - Diarrhea 8/16/24 with Lomotil taken x4.  Also 8/25/24 diet related.  Questran resumed.      7/2/24 - C1 Carboplatin/Paciltaxel  7/9/24 - pt declined C2 carboplatin/paciltaxel  7/15/24 - PowerPort mediport placed   7/16/24 - C1 Cetuximab  7/23/24 - C2 Cetuximab  7/30/24 - C3 Cetuximab  8/6/24 - C4 Cetuximab  8/13/24 - C5 Cetuximab  8/20/24 - scheduled C6 Cetuximab, confirmed with Dr. Burkitt -  final tx    # Hx of anal, breast and cutaneous malignancies  -s/p lumpectomy with radiation in 2012  -s/p radiation to anal cancer in 2018  -s/p resection of skin cancer on nose in 2010    # Chronic diarrhea   - from previous colonic surgery  -3-4 episodes per day, while on 3 imodium three times a day, 3-4 episodes of diarrhea daily.  - Concern for flares with chemotherapy tx's  - Tolerated C1 Carboplatin/Paciltaxel  - Currently managed with Creon and Questran - stools forms, soft  - 7/22:  Following C1 Cetuximab, intermittent diarrhea - managed with PRN Imodium   - 7/29:  Imodium admin x1 following C2.    - ongoing monitoring  - ED 7/31 - d/t dehydration  2/2 diarrhea, pt left AMA  - 8/5:  Dr. Moura provided Lomotil Rx.  Diarrhea managed with Lomotil - x2/day   - 8/12:  Managed with PRN Lomotil.    - 8/19:  Managed with PRN Lomotil. Questran resumed.        # Back pain, area between her shoulder blades, more so on the left.  Has worsened over the last few weeks.  Pain with activity.  Resolves with rest.  No acute injury.  No falls.    - Will order MRI C & T (+/-) next available.  MRI's scheduled 7/8/24.    - 7/8:  Cervical, Thoracic Spine: Diffuse soft tissue thickening and enhancement of the glottic larynx extending into the subglottic larynx and involving the left cricoid, arytenoid, and thyroid cartilages, consistent with known tumor.  No evidence of metastatic disease within the cervical or thoracic  spine.   - Currently managed with routine Tylenol 1,000mg/daily  - Supportive onc referral placed.  Pt to return sup onc call.  - 7/22:  No current c/o back pain    - 7/29:  Recommended she continue Tylenol and heating pad for pain control  - 8/5:  Managed with PRN Tylenol  - 8/12:  Will trial Baclofen 5mg TID PRN.  Instructed no concurrent admin with Lomotil.  Pt verbalized understanding.   - 8/19:  Reports no benefit with Baclofen.  Resumed PRN Tylenol.  Rx sent for Lidocaine patches, #7. PA provided.  Chronic back pain.  Previously seen and managed by a chiropractor.  Wishes to wait until CRT completed before scheduling.  Needs to establish with new MD.  Declined referral for [facility] d/t distance from her home.  She will follow-up with her PCP.      # Hypomagnesia: 7/2/24 Mg 1.41  - Will start oral Mag Oxide - 1 tab/day, Rx sent (not covered by pt's insurance)  - Will replete with IV hydration visit 7/5/24.  - If consistently low, will replete weekly via IV   - 7/8/24 - Mg 1.71.    - 7/15/24 - Mg 1.31 - 2g Mg prior to 7/16 infusion   - 7/22/24 - Mg 1.42 - 2g Mg prior to 7/23 infusion   - 7/29/24 - Mg 1.52 - 2g Mg prior to 7/30 infusion  -  8/6/24 - Mg 1.60 - 2g Mg   - 8/12/24 - Mg 1.26 - 2g Mg with infusion 8/13/24.  Pt ran out of her magnesium supplement, will pick-up refill today.    - 8/19/24 - Mg 1.42 - 2g Mg with infusion 8/20/24.      # Hypokalemia 8/2/24 K 3.2  - Rx sent - potassium packets 20mEq/day  - 8/19: K+ 4.3      # Mediport placement, per pt request  - IR consult order in place  - INR lab completed.  - 7/8:  Encouraged pt to return [facility] call to schedule placement.    - 7/15:  PowerPort mediport placed  - EMLA Rx sent 7/22/24     # Elevated Cr/BUN 1/24/24  - at/near baseline  - 1.17 7/8/24  - 1.43/25 7/15/24   - 1.05/30 7/22/24 - encouraged fluids   - 1.17/34 7/29/24 - encouraged fluids, IV hydration 8/2/24  - ongoing monitoring  - 1.06/28 8/2/24 - improved  - 1.09 8/12/24 - stable  - 1.13 8/19/24 - stable    # Localized rash  - flat, macular rash to upper, mid chest area <10% BSA  - Continue Clindamycin lotion to area     # Rx's  - Rx's sent to pt's pharmacy 7/15  - Spouse reports not all meds available today, expect to have all meds on 7/16.  Meds to be picked-up on 7/16/24.  Spouse waiting for all meds to be available, prior to pick-up.    - 7/22:  encouraged pt to apply Hydrocortisone ointment per tx plan instructions.  No current skin concerns.      PLAN  - C6 Cetuximab 8/20/24  - IV hydration visit 8/23/24   - RTC in one week for NP visit

## 2024-08-20 ENCOUNTER — NUTRITION (OUTPATIENT)
Dept: HEMATOLOGY/ONCOLOGY | Facility: CLINIC | Age: 79
End: 2024-08-20
Payer: MEDICARE

## 2024-08-20 ENCOUNTER — INFUSION (OUTPATIENT)
Dept: HEMATOLOGY/ONCOLOGY | Facility: CLINIC | Age: 79
End: 2024-08-20
Payer: MEDICARE

## 2024-08-20 ENCOUNTER — HOSPITAL ENCOUNTER (OUTPATIENT)
Dept: RADIATION ONCOLOGY | Facility: CLINIC | Age: 79
Setting detail: RADIATION/ONCOLOGY SERIES
Discharge: HOME | End: 2024-08-20
Payer: MEDICARE

## 2024-08-20 VITALS
SYSTOLIC BLOOD PRESSURE: 158 MMHG | BODY MASS INDEX: 23.16 KG/M2 | WEIGHT: 134.92 LBS | TEMPERATURE: 96.6 F | OXYGEN SATURATION: 96 % | RESPIRATION RATE: 18 BRPM | DIASTOLIC BLOOD PRESSURE: 77 MMHG

## 2024-08-20 DIAGNOSIS — C32.9 LARYNGEAL CANCER (MULTI): ICD-10-CM

## 2024-08-20 DIAGNOSIS — C32.0 MALIGNANT NEOPLASM OF GLOTTIS (MULTI): ICD-10-CM

## 2024-08-20 DIAGNOSIS — Z51.0 ENCOUNTER FOR ANTINEOPLASTIC RADIATION THERAPY: ICD-10-CM

## 2024-08-20 LAB
RAD ONC MSQ ACTUAL FRACTIONS DELIVERED: 29
RAD ONC MSQ ACTUAL SESSION DELIVERED DOSE: 200 CGRAY
RAD ONC MSQ ACTUAL TOTAL DOSE: 5800 CGRAY
RAD ONC MSQ ELAPSED DAYS: 48
RAD ONC MSQ LAST DATE: NORMAL
RAD ONC MSQ PRESCRIBED FRACTIONAL DOSE: 200 CGRAY
RAD ONC MSQ PRESCRIBED NUMBER OF FRACTIONS: 35
RAD ONC MSQ PRESCRIBED TECHNIQUE: NORMAL
RAD ONC MSQ PRESCRIBED TOTAL DOSE: 7000 CGRAY
RAD ONC MSQ PRESCRIPTION PATTERN COMMENT: NORMAL
RAD ONC MSQ START DATE: NORMAL
RAD ONC MSQ TREATMENT COURSE NUMBER: 2
RAD ONC MSQ TREATMENT SITE: NORMAL

## 2024-08-20 PROCEDURE — 96367 TX/PROPH/DG ADDL SEQ IV INF: CPT

## 2024-08-20 PROCEDURE — 2500000004 HC RX 250 GENERAL PHARMACY W/ HCPCS (ALT 636 FOR OP/ED): Performed by: NURSE PRACTITIONER

## 2024-08-20 PROCEDURE — 96375 TX/PRO/DX INJ NEW DRUG ADDON: CPT | Mod: INF

## 2024-08-20 PROCEDURE — 77386 HC INTENSITY-MODULATED RADIATION THERAPY (IMRT), COMPLEX: CPT | Performed by: STUDENT IN AN ORGANIZED HEALTH CARE EDUCATION/TRAINING PROGRAM

## 2024-08-20 PROCEDURE — 96413 CHEMO IV INFUSION 1 HR: CPT

## 2024-08-20 PROCEDURE — 77336 RADIATION PHYSICS CONSULT: CPT | Performed by: STUDENT IN AN ORGANIZED HEALTH CARE EDUCATION/TRAINING PROGRAM

## 2024-08-20 RX ORDER — ALBUTEROL SULFATE 0.83 MG/ML
3 SOLUTION RESPIRATORY (INHALATION) AS NEEDED
Status: DISCONTINUED | OUTPATIENT
Start: 2024-08-20 | End: 2024-08-20 | Stop reason: HOSPADM

## 2024-08-20 RX ORDER — EPINEPHRINE 0.3 MG/.3ML
0.3 INJECTION SUBCUTANEOUS EVERY 5 MIN PRN
Status: DISCONTINUED | OUTPATIENT
Start: 2024-08-20 | End: 2024-08-20 | Stop reason: HOSPADM

## 2024-08-20 RX ORDER — PROCHLORPERAZINE EDISYLATE 5 MG/ML
10 INJECTION INTRAMUSCULAR; INTRAVENOUS EVERY 6 HOURS PRN
Status: DISCONTINUED | OUTPATIENT
Start: 2024-08-20 | End: 2024-08-20 | Stop reason: HOSPADM

## 2024-08-20 RX ORDER — HEPARIN 100 UNIT/ML
500 SYRINGE INTRAVENOUS AS NEEDED
Status: CANCELLED | OUTPATIENT
Start: 2024-08-20

## 2024-08-20 RX ORDER — HEPARIN SODIUM,PORCINE/PF 10 UNIT/ML
50 SYRINGE (ML) INTRAVENOUS AS NEEDED
Status: ACTIVE | OUTPATIENT
Start: 2024-08-20

## 2024-08-20 RX ORDER — HEPARIN SODIUM,PORCINE/PF 10 UNIT/ML
50 SYRINGE (ML) INTRAVENOUS AS NEEDED
Status: CANCELLED | OUTPATIENT
Start: 2024-08-20

## 2024-08-20 RX ORDER — DIPHENHYDRAMINE HYDROCHLORIDE 50 MG/ML
50 INJECTION INTRAMUSCULAR; INTRAVENOUS AS NEEDED
Status: DISCONTINUED | OUTPATIENT
Start: 2024-08-20 | End: 2024-08-20 | Stop reason: HOSPADM

## 2024-08-20 RX ORDER — FAMOTIDINE 10 MG/ML
20 INJECTION INTRAVENOUS ONCE AS NEEDED
Status: DISCONTINUED | OUTPATIENT
Start: 2024-08-20 | End: 2024-08-20 | Stop reason: HOSPADM

## 2024-08-20 RX ORDER — HEPARIN 100 UNIT/ML
500 SYRINGE INTRAVENOUS AS NEEDED
Status: ACTIVE | OUTPATIENT
Start: 2024-08-20

## 2024-08-20 RX ORDER — PROCHLORPERAZINE MALEATE 10 MG
10 TABLET ORAL EVERY 6 HOURS PRN
Status: DISCONTINUED | OUTPATIENT
Start: 2024-08-20 | End: 2024-08-20 | Stop reason: HOSPADM

## 2024-08-20 RX ORDER — MAGNESIUM SULFATE HEPTAHYDRATE 40 MG/ML
4 INJECTION, SOLUTION INTRAVENOUS ONCE AS NEEDED
Status: DISCONTINUED | OUTPATIENT
Start: 2024-08-20 | End: 2024-08-20 | Stop reason: HOSPADM

## 2024-08-20 RX ORDER — MAGNESIUM SULFATE HEPTAHYDRATE 40 MG/ML
2 INJECTION, SOLUTION INTRAVENOUS ONCE AS NEEDED
Status: DISCONTINUED | OUTPATIENT
Start: 2024-08-20 | End: 2024-08-20 | Stop reason: HOSPADM

## 2024-08-20 ASSESSMENT — PAIN SCALES - GENERAL: PAINLEVEL: 0-NO PAIN

## 2024-08-20 NOTE — PROGRESS NOTES
NUTRITION Follow Up NOTE    Nutrition Assessment     Reason for Visit:  Padilla Campuzano is a 78 y.o. female who presents for nutrition consult 2/2 dx.  DX scc L vocal cord  TX concurrent chemoradiation, chemotherapy changed to Cetuximab 7/16/24. XRT 7/2-8/27  Hx anal and breast cancer. 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 Radiation completes 8/28. Seen in infusion. Pleasant and talkative. +back pain, chronic. Using lidocaine patches called in by Megan Arauz yesterday, states they are providing relief.       Lab Results   Component Value Date/Time    GLUCOSE 98 08/19/2024 1418     08/19/2024 1418    K 4.3 08/19/2024 1418     (H) 08/19/2024 1418    CO2 24 08/19/2024 1418    ANIONGAP 12 08/19/2024 1418    BUN 21 08/19/2024 1418    CREATININE 1.13 (H) 08/19/2024 1418    EGFR 50 (L) 08/19/2024 1418    CALCIUM 8.6 08/19/2024 1418    ALBUMIN 3.7 08/19/2024 1418    ALKPHOS 57 08/19/2024 1418    PROT 6.0 (L) 08/19/2024 1418    AST 20 08/19/2024 1418    BILITOT 0.3 08/19/2024 1418    ALT 10 08/19/2024 1418     Lab Results   Component Value Date/Time    VITD25 53 05/01/2024 0904   +oral Mg    Anthropometrics:           Wt Readings from Last 10 Encounters:   08/20/24 61.2 kg (134 lb 14.7 oz)   08/19/24 60.9 kg (134 lb 4.2 oz)   08/15/24 61.7 kg (135 lb 14.6 oz)   08/13/24 62.1 kg (137 lb 0.3 oz)   08/13/24 62.1 kg (137 lb 0.3 oz)   08/12/24 62 kg (136 lb 11 oz)   08/12/24 62.1 kg (136 lb 12.7 oz)   08/09/24 61.8 kg (136 lb 3.9 oz)   08/08/24 61.4 kg (135 lb 5.8 oz)   08/06/24 61.7 kg (136 lb 0.4 oz)   7/2- first chemotherapy 65 kg  7/9- 65.1 kg  7/16 - 64.7 kg  7/23- 62.9 kg  8/6- 61.7 kg  8/13- 62.1 kg  8/20- 61.2 kg  Food And Nutrient Intake:  Food and Nutrient History  Energy Intake: Fair 50-75 %, Good > 75 %  GI Symptoms: diarrhea (diarrhea last Friday, resolved with Lomotil, they resumed Questran)  GI Symptoms greater than 2 weeks:  intermittent  Oral Problems: odynophagia, xerostomia, dysgeusia (hoarse voice, + probiotic lozenges, s/s rinses. Increased thick saliva)     Food Intake  Amount of Food: eating 3 meals/day  Snacks: Ritz crackers, PNB crackers    Food Preparation  Cooking: Spouse/Significant Other  Grocery Shopping: Spouse/Significant Other                                       Food Supplement Intake  Oral Nutrition Supplements:  (back to taking Boost VHC BID)    Medication and Complementary/Alternative Medicine Use  OTC Medication Use: Biotin, Vit D      Nutrition Focused Physical Exam Findings: 7/2/24                          Energy Needs  Estimated Energy Needs  Total Energy Estimated Needs (kCal): 1825 kCal  Total Estimated Energy Need per Day (kCal/kg): 28 kCal/kg  Estimated Fluid Needs  Total Fluid Estimated Needs (mL): 1825 mL  Method for Estimating Needs: 1 ml/kcal  Estimated Protein Needs  Total Protein Estimated Needs (g): 78 g  Total Protein Estimated Needs (g/kg): 1.2 g/kg        Nutrition Diagnosis   Malnutrition Diagnosis  Patient has Malnutrition Diagnosis: No    Nutrition Diagnosis  Patient has Nutrition Diagnosis: Yes  Diagnosis Status (1): Ongoing  Nutrition Diagnosis 1: Predicted inadequate energy intake  Related to (1): pathophysiology of disease  As Evidenced by (1): high risk for nutrition impact symptoms impairing ability to meet estimated energy needs, affect oral intake and weight status    Nutrition Interventions/Recommendations   Nutrition Prescription  Individualized Nutrition Prescription Provided for : diet during cancer treatment    Food and Nutrition Delivery  Food and Nutrition Delivery  Meals & Snacks: Energy-modified diet, Modify Composition of Meals/Snacks, Protein-modified diet, Fluid-modified diet  Goals: incorporate soft high calorie high protein foods modified texture as needed, moist foods as needed. Include consistent meals and snacks. Avoid dry harsh  foods and acidic foods/beverages.  Include adequate fluids  Medical Food Supplement: Commercial beverage (Boost VHC BID as tolerated, or plus ONS TID)    Nutrition Education - provided 7/22       Coordination of Care     8/20 - Again discussed increased fluids, electrolyte beverages when she has diarrhea, also acceptable foods, she often eats rice. Discussed briefly nutrition after treatment, maintaining ONS, transitioning to regular diet        Nutrition Monitoring and Evaluation   Food/Nutrient Related History Monitoring  Monitoring and Evaluation Plan: Energy intake, Meal/snack pattern, Protein intake, Fluid intake  Energy Intake: Estimated energy intake  Criteria: Meet >75% estimated energy needs  Fluid Intake: Estimated fluid intake  Criteria: maintain hydration  Meal/Snack Pattern: Estimated meal and snack pattern  Criteria: 4-6 meals/snacks daily  Estimated protein intake: Estimated protein intake  Criteria: meet > 75% estimated protein needs  Body Composition/Growth/Weight History  Monitoring and Evaluation Plan: Weight  Weight: Measured weight  Criteria: maintain weight    Following as needed  Pt has contact information    Time Spent  Prep time on day of patient encounter: 5 minutes  Time spent directly with patient, family or caregiver: 15 minutes  Additional Time Spent on Patient Care Activities: 0 minutes  Documentation Time: 15 minutes  Other Time Spent: 0 minutes  Total: 35 minutes                      Readiness to Change : Good  Level of Understanding : Good  Anticipated Compliant : Good

## 2024-08-21 ENCOUNTER — APPOINTMENT (OUTPATIENT)
Dept: RADIATION ONCOLOGY | Facility: CLINIC | Age: 79
End: 2024-08-21
Payer: MEDICARE

## 2024-08-21 ENCOUNTER — HOSPITAL ENCOUNTER (OUTPATIENT)
Dept: RADIATION ONCOLOGY | Facility: CLINIC | Age: 79
Setting detail: RADIATION/ONCOLOGY SERIES
Discharge: HOME | End: 2024-08-21
Payer: MEDICARE

## 2024-08-21 DIAGNOSIS — C32.0 MALIGNANT NEOPLASM OF GLOTTIS (MULTI): ICD-10-CM

## 2024-08-21 DIAGNOSIS — Z51.0 ENCOUNTER FOR ANTINEOPLASTIC RADIATION THERAPY: ICD-10-CM

## 2024-08-21 LAB
RAD ONC MSQ ACTUAL FRACTIONS DELIVERED: 30
RAD ONC MSQ ACTUAL SESSION DELIVERED DOSE: 200 CGRAY
RAD ONC MSQ ACTUAL TOTAL DOSE: 6000 CGRAY
RAD ONC MSQ ELAPSED DAYS: 49
RAD ONC MSQ LAST DATE: NORMAL
RAD ONC MSQ PRESCRIBED FRACTIONAL DOSE: 200 CGRAY
RAD ONC MSQ PRESCRIBED NUMBER OF FRACTIONS: 35
RAD ONC MSQ PRESCRIBED TECHNIQUE: NORMAL
RAD ONC MSQ PRESCRIBED TOTAL DOSE: 7000 CGRAY
RAD ONC MSQ PRESCRIPTION PATTERN COMMENT: NORMAL
RAD ONC MSQ START DATE: NORMAL
RAD ONC MSQ TREATMENT COURSE NUMBER: 2
RAD ONC MSQ TREATMENT SITE: NORMAL

## 2024-08-21 PROCEDURE — 77386 HC INTENSITY-MODULATED RADIATION THERAPY (IMRT), COMPLEX: CPT | Performed by: STUDENT IN AN ORGANIZED HEALTH CARE EDUCATION/TRAINING PROGRAM

## 2024-08-22 ENCOUNTER — HOSPITAL ENCOUNTER (OUTPATIENT)
Dept: RADIATION ONCOLOGY | Facility: CLINIC | Age: 79
Setting detail: RADIATION/ONCOLOGY SERIES
Discharge: HOME | End: 2024-08-22
Payer: MEDICARE

## 2024-08-22 ENCOUNTER — RADIATION ONCOLOGY OTV (OUTPATIENT)
Dept: RADIATION ONCOLOGY | Facility: CLINIC | Age: 79
End: 2024-08-22
Payer: MEDICARE

## 2024-08-22 VITALS
DIASTOLIC BLOOD PRESSURE: 69 MMHG | SYSTOLIC BLOOD PRESSURE: 130 MMHG | WEIGHT: 135.47 LBS | RESPIRATION RATE: 18 BRPM | BODY MASS INDEX: 23.25 KG/M2 | HEART RATE: 80 BPM | TEMPERATURE: 97.5 F | OXYGEN SATURATION: 95 %

## 2024-08-22 DIAGNOSIS — C32.9 LARYNGEAL CANCER (MULTI): ICD-10-CM

## 2024-08-22 DIAGNOSIS — Z51.0 ENCOUNTER FOR ANTINEOPLASTIC RADIATION THERAPY: ICD-10-CM

## 2024-08-22 DIAGNOSIS — C32.0 MALIGNANT NEOPLASM OF GLOTTIS (MULTI): ICD-10-CM

## 2024-08-22 LAB
RAD ONC MSQ ACTUAL FRACTIONS DELIVERED: 31
RAD ONC MSQ ACTUAL SESSION DELIVERED DOSE: 200 CGRAY
RAD ONC MSQ ACTUAL TOTAL DOSE: 6200 CGRAY
RAD ONC MSQ ELAPSED DAYS: 50
RAD ONC MSQ LAST DATE: NORMAL
RAD ONC MSQ PRESCRIBED FRACTIONAL DOSE: 200 CGRAY
RAD ONC MSQ PRESCRIBED NUMBER OF FRACTIONS: 35
RAD ONC MSQ PRESCRIBED TECHNIQUE: NORMAL
RAD ONC MSQ PRESCRIBED TOTAL DOSE: 7000 CGRAY
RAD ONC MSQ PRESCRIPTION PATTERN COMMENT: NORMAL
RAD ONC MSQ START DATE: NORMAL
RAD ONC MSQ TREATMENT COURSE NUMBER: 2
RAD ONC MSQ TREATMENT SITE: NORMAL

## 2024-08-22 PROCEDURE — 77386 HC INTENSITY-MODULATED RADIATION THERAPY (IMRT), COMPLEX: CPT | Performed by: STUDENT IN AN ORGANIZED HEALTH CARE EDUCATION/TRAINING PROGRAM

## 2024-08-22 ASSESSMENT — COLUMBIA-SUICIDE SEVERITY RATING SCALE - C-SSRS
1. IN THE PAST MONTH, HAVE YOU WISHED YOU WERE DEAD OR WISHED YOU COULD GO TO SLEEP AND NOT WAKE UP?: NO
6. HAVE YOU EVER DONE ANYTHING, STARTED TO DO ANYTHING, OR PREPARED TO DO ANYTHING TO END YOUR LIFE?: NO
2. HAVE YOU ACTUALLY HAD ANY THOUGHTS OF KILLING YOURSELF?: NO

## 2024-08-22 ASSESSMENT — ENCOUNTER SYMPTOMS
LOSS OF SENSATION IN FEET: 0
DEPRESSION: 0
OCCASIONAL FEELINGS OF UNSTEADINESS: 0

## 2024-08-22 ASSESSMENT — PAIN SCALES - GENERAL: PAINLEVEL: 0-NO PAIN

## 2024-08-22 NOTE — PROGRESS NOTES
Radiation Oncology On Treatment Visit    Patient Name:  Padilla Campuzano  MRN:  [id number]  :  1945    Referring Provider: No ref. provider found  Primary Care Provider: Naheed Clements MD  Care Team: Patient Care Team:  Naheed Clements MD as PCP - General (Internal Medicine)  Naheed Clements MD as PCP - [facility]ributed Provider  DO Carroll Calabrese MD as Consulting Physician (Hematology and Oncology)  Kyunghee Burkitt, DO as Consulting Physician (Hematology and Oncology)  Eden Moss MD as Medical Oncologist (Hematology and Oncology)    Date of Service: 2024     Diagnosis:   Specialty Problems          Radiation Oncology Problems    Breast cancer (Multi)        Malignant neoplasm of colon (Multi)        Laryngeal cancer (Multi)         Treatment Summary:  IMRT: Bilateral Head and neck    Treatment Period Technique Fraction Dose Fractions Total Dose   Course 2 7/3/2024-2024  (days elapsed: 50)         H&N 7/3/2024-2024 VMAT 200 / 200 cGy  6200 / 7,000 cGy     SUBJECTIVE: Continues to tolerate well. Stable hoarseness and very mild odynophagia, unchanged. Continues to tolerate p.o. diet, mostly soft foods. Weight has been stable. Having some increased upper back pain, which has been exacerbated by the treatment position. Lidocaine patches sent in by medical oncology with some improvement. Discussed referral to oncology rehab if needed following treatment conclusion.     OBJECTIVE:   Vital Signs:  /69 (BP Location: Left arm, Patient Position: Sitting, BP Cuff Size: Large adult)   Pulse 80   Temp 36.4 °C (97.5 °F) (Temporal)   Resp 18   Wt 61.4 kg (135 lb 7.6 oz)   SpO2 95%   BMI 23.25 kg/m²    Pain Scale: The patient's current pain level was assessed.  They report currently having a pain of 0 out of 10.    Other Pertinent Findings:   Wt Readings from Last 10 Encounters:   24 61.4 kg (135 lb 7.6 oz)   24 61.2 kg (134 lb 14.7 oz)    08/19/24 60.9 kg (134 lb 4.2 oz)   08/15/24 61.7 kg (135 lb 14.6 oz)   08/13/24 62.1 kg (137 lb 0.3 oz)   08/13/24 62.1 kg (137 lb 0.3 oz)   08/12/24 62 kg (136 lb 11 oz)   08/12/24 62.1 kg (136 lb 12.7 oz)   08/09/24 61.8 kg (136 lb 3.9 oz)   08/08/24 61.4 kg (135 lb 5.8 oz)         Toxicity Assessment          7/18/2024    13:45 7/25/2024    13:50 8/2/2024    14:37 8/8/2024    13:26 8/15/2024    13:35 8/22/2024    13:45   Toxicity Assessment   Treatment  and neck Head and neck Head and neck Head and neck Head and neck Head and neck   Anorexia Grade 0       eating pretty much what she wants and 1 Boost/day Grade 1       eating soft foods and 1 Boost/day Grade 1       ensure is hurting her stomach Grade 1       ensure 1x daily, mostly soft foods Grade 0       eating softer foods and 1 boost/day Grade 1       eating soft foods   Anxiety Grade 1 Grade 0 Grade 0 Grade 0 Grade 0 Grade 0   Dehydration Grade 0 Grade 0 Grade 2       getting IV fluid infusions Grade 2       IV fluids on Fridays Grade 0 Grade 0   Depression Grade 1 Grade 0 Grade 0 Grade 0 Grade 0 Grade 0   Dermatitis Radiation Grade 0 Grade 0       Using Udderly smooth once a day Grade 0       using udderly smooth Grade 1       red splotches on chest, udderly smooth Grade 1 Grade 1       Lanocaine BID   Diarrhea Grade 1       baseline Grade 1       baseline Grade 2 Grade 2       some days 3-4x, some days none Grade 1       baseline--has gotten better Grade 1       baseline   Fatigue Grade 1       naps during the day and goes to bed early Grade 1       naps during the day Grade 2       baseline Grade 1       napping, going to bed early Grade 1       naps during the day Grade 1       goes to bed early   Fibrosis Deep Connective Tissue Grade 0 Grade 0 Grade 0 Grade 0 Grade 0 Grade 0   Fracture Grade 0 Grade 0 Grade 0 Grade 0 Grade 0 Grade 0   Nausea Grade 0 Grade 0 Grade 2       taking anti nausea medicine Grade 0 Grade 0 Grade 0   Pain Grade 2        pain level 7 to back--takes Tylenol with some relief Grade 1       pain level 3 to abdomen Grade 0 Grade 0 Grade 0 Grade 0   Treatment Related Secondary Malignancy Grade 0 Grade 0 Grade 0 Grade 0 Grade 0 Grade 0   Tumor Pain Grade 0 Grade 0 Grade 0 Grade 0 Grade 0 Grade 0   Vomiting Grade 0 Grade 0 Grade 2 Grade 0 Grade 0 Grade 0   Dysphagia Grade 0 Grade 1  Grade 2 Grade 0 Grade 0   Esophagitis Grade 1 Grade 1   Grade 1 Grade 1   Mucositis Oral Grade 0       using Blis lozenges and salt and soda rinses Grade 0       using Blis lozenges and salt and soda rinses  Grade 1 Grade 0       using Blis lozenges Grade 0   Hearing Impaired Grade 0 Grade 0  Grade 0 Grade 0 Grade 0   Blurred Vision Grade 0 Grade 0  Grade 0 Grade 0 Grade 0   Dry Eye Grade 0 Grade 0  Grade 0 Grade 0 Grade 0   Eye Pain Grade 0 Grade 0  Grade 0 Grade 0 Grade 0   Retinopathy Grade 0 Grade 0  Grade 0 Grade 0 Grade 0   Central Nervous System Necrosis Grade 0 Grade 0  Grade 0 Grade 0 Grade 0   Edema Cerebral Grade 0 Grade 0  Grade 0 Grade 0 Grade 0   Dry Mouth Grade 0 Grade 1  Grade 1 Grade 1 Grade 1   Pneumonitis Grade 0 Grade 0   Grade 0 Grade 0   Febrile Neutropenia Grade 0 Grade 0 Grade 0 Grade 0 Grade 0 Grade 0   Ear Pain Grade 0 Grade 0 Grade 0 Grade 0 Grade 0 Grade 0   External Ear Pain Grade 0 Grade 0 Grade 0 Grade 0 Grade 0 Grade 0   Tinnitus Grade 0 Grade 0 Grade 0 Grade 0 Grade 0 Grade 0   Watering Eyes Grade 0 Grade 0 Grade 0 Grade 0 Grade 0 Grade 0   Oral Pain Grade 0 Grade 0 Grade 0 Grade 1       2/10 pain in tongue, constant Grade 0 Grade 0   Salivary Duct Inflammation Grade 0 Grade 1 Grade 2       using baking soda/salt rinse Grade 2       using baking soda/salt rinse, blis lozenges Grade 1 Grade 0   Edema Face Grade 0 Grade 0 Grade 0 Grade 0 Grade 0 Grade 0   Malaise Grade 0 Grade 0 Grade 0 Grade 0 Grade 0 Grade 0   Neck Edema Grade 0 Grade 0 Grade 0 Grade 0 Grade 0 Grade 0   Corneal Infection Grade 0 Grade 0 Grade 0 Grade 0 Grade 0  Grade 0   Mucosal Infection Grade 0 Grade 0 Grade 0 Grade 0 Grade 0 Grade 0   Otitis Media Grade 0 Grade 0 Grade 0 Grade 0 Grade 0 Grade 0   Sepsis Grade 0 Grade 0 Grade 0 Grade 0 Grade 0 Grade 0   Sinusitis Grade 0 Grade 0 Grade 0 Grade 0 Grade 0 Grade 0   Skin Infection Grade 0 Grade 0 Grade 0 Grade 0 Grade 0 Grade 0   Soft Tissue Infection Grade 0 Grade 0 Grade 0 Grade 0 Grade 0 Grade 0   Head Soft Tissue Necrosis Grade 0 Grade 0 Grade 0 Grade 0 Grade 0 Grade 0   Neck Soft Tissue Necrosis Grade 0 Grade 0 Grade 0 Grade 0 Grade 0 Grade 0   Osteonecrosis of Jaw Grade 0 Grade 0 Grade 0 Grade 0 Grade 0 Grade 0   Superficial Soft Tissue Fibrosis Grade 0 Grade 0 Grade 0 Grade 0 Grade 0 Grade 0   Trismus Grade 0 Grade 0 Grade 0 Grade 0 Grade 0 Grade 0   Dysarthria Grade 0 Grade 0 Grade 0 Grade 0 Grade 0 Grade 0   Dysesthesia Grade 0 Grade 0 Grade 0 Grade 0 Grade 0 Grade 0   Dysgeusia Grade 0 Grade 1 Grade 2       loss of sense of taste Grade 2 Grade 2 Grade 2   Facial Nerve Disorder Grade 0 Grade 0 Grade 0 Grade 0 Grade 0 Grade 0   Hypoglossal Nerve Disorder Grade 0 Grade 0 Grade 0 Grade 0 Grade 0 Grade 0   Oculomotor Nerve Disorder Grade 0 Grade 0 Grade 0 Grade 0 Grade 0 Grade 0   Paresthesia Grade 0 Grade 0 Grade 0 Grade 0 Grade 0 Grade 0   Stroke Grade 0 Grade 0 Grade 0 Grade 0 Grade 0 Grade 0   Transient Ischemic Attacks Grade 0 Grade 0 Grade 0 Grade 0 Grade 0 Grade 0   Trigeminal Nerve Disorder Grade 0 Grade 0 Grade 0 Grade 0 Grade 0 Grade 0   Aspiration Grade 0 Grade 0 Grade 0 Grade 0 Grade 0 Grade 0   Hoarseness Grade 2 Grade 2 Grade 2 Grade 2 Grade 2 Grade 2   Laryngeal Edema Grade 0 Grade 0 Grade 0 Grade 0 Grade 0 Grade 0   Stridor Grade 0 Grade 0 Grade 0 Grade 0 Grade 0 Grade 0   Tracheal Mucositis Grade 0 Grade 0 Grade 0 Grade 0 Grade 0 Grade 0   Voice Alteration Grade 2 Grade 2 Grade 2 Grade 2 Grade 0 Grade 0        Assessment / Plan:  The patient is tolerating radiation therapy as anticipated.  Continue per  current treatment plan.

## 2024-08-23 ENCOUNTER — HOSPITAL ENCOUNTER (OUTPATIENT)
Dept: RADIATION ONCOLOGY | Facility: CLINIC | Age: 79
Setting detail: RADIATION/ONCOLOGY SERIES
Discharge: HOME | End: 2024-08-23
Payer: MEDICARE

## 2024-08-23 ENCOUNTER — INFUSION (OUTPATIENT)
Dept: HEMATOLOGY/ONCOLOGY | Facility: CLINIC | Age: 79
End: 2024-08-23
Payer: MEDICARE

## 2024-08-23 ENCOUNTER — APPOINTMENT (OUTPATIENT)
Dept: HEMATOLOGY/ONCOLOGY | Facility: CLINIC | Age: 79
End: 2024-08-23
Payer: MEDICARE

## 2024-08-23 VITALS
BODY MASS INDEX: 23.01 KG/M2 | DIASTOLIC BLOOD PRESSURE: 78 MMHG | TEMPERATURE: 96.3 F | HEART RATE: 96 BPM | OXYGEN SATURATION: 94 % | WEIGHT: 134.04 LBS | SYSTOLIC BLOOD PRESSURE: 149 MMHG | RESPIRATION RATE: 17 BRPM

## 2024-08-23 DIAGNOSIS — C32.0 MALIGNANT NEOPLASM OF GLOTTIS (MULTI): ICD-10-CM

## 2024-08-23 DIAGNOSIS — Z51.0 ENCOUNTER FOR ANTINEOPLASTIC RADIATION THERAPY: ICD-10-CM

## 2024-08-23 DIAGNOSIS — C32.9 LARYNGEAL CANCER (MULTI): ICD-10-CM

## 2024-08-23 LAB
RAD ONC MSQ ACTUAL FRACTIONS DELIVERED: 32
RAD ONC MSQ ACTUAL SESSION DELIVERED DOSE: 200 CGRAY
RAD ONC MSQ ACTUAL TOTAL DOSE: 6400 CGRAY
RAD ONC MSQ ELAPSED DAYS: 51
RAD ONC MSQ LAST DATE: NORMAL
RAD ONC MSQ PRESCRIBED FRACTIONAL DOSE: 200 CGRAY
RAD ONC MSQ PRESCRIBED NUMBER OF FRACTIONS: 35
RAD ONC MSQ PRESCRIBED TECHNIQUE: NORMAL
RAD ONC MSQ PRESCRIBED TOTAL DOSE: 7000 CGRAY
RAD ONC MSQ PRESCRIPTION PATTERN COMMENT: NORMAL
RAD ONC MSQ START DATE: NORMAL
RAD ONC MSQ TREATMENT COURSE NUMBER: 2
RAD ONC MSQ TREATMENT SITE: NORMAL

## 2024-08-23 PROCEDURE — 2500000004 HC RX 250 GENERAL PHARMACY W/ HCPCS (ALT 636 FOR OP/ED): Performed by: NURSE PRACTITIONER

## 2024-08-23 PROCEDURE — 77386 HC INTENSITY-MODULATED RADIATION THERAPY (IMRT), COMPLEX: CPT | Performed by: STUDENT IN AN ORGANIZED HEALTH CARE EDUCATION/TRAINING PROGRAM

## 2024-08-23 PROCEDURE — 96360 HYDRATION IV INFUSION INIT: CPT | Mod: INF

## 2024-08-23 RX ORDER — HEPARIN SODIUM,PORCINE/PF 10 UNIT/ML
50 SYRINGE (ML) INTRAVENOUS AS NEEDED
OUTPATIENT
Start: 2024-08-23

## 2024-08-23 RX ORDER — HEPARIN 100 UNIT/ML
500 SYRINGE INTRAVENOUS AS NEEDED
OUTPATIENT
Start: 2024-08-23

## 2024-08-23 RX ORDER — HEPARIN SODIUM,PORCINE/PF 10 UNIT/ML
50 SYRINGE (ML) INTRAVENOUS AS NEEDED
Status: DISCONTINUED | OUTPATIENT
Start: 2024-08-23 | End: 2024-08-23 | Stop reason: HOSPADM

## 2024-08-23 RX ORDER — HEPARIN 100 UNIT/ML
500 SYRINGE INTRAVENOUS AS NEEDED
Status: DISCONTINUED | OUTPATIENT
Start: 2024-08-23 | End: 2024-08-23 | Stop reason: HOSPADM

## 2024-08-23 ASSESSMENT — PAIN SCALES - GENERAL: PAINLEVEL: 0-NO PAIN

## 2024-08-26 ENCOUNTER — OFFICE VISIT (OUTPATIENT)
Dept: HEMATOLOGY/ONCOLOGY | Facility: CLINIC | Age: 79
End: 2024-08-26
Payer: MEDICARE

## 2024-08-26 ENCOUNTER — HOSPITAL ENCOUNTER (OUTPATIENT)
Dept: RADIATION ONCOLOGY | Facility: CLINIC | Age: 79
Setting detail: RADIATION/ONCOLOGY SERIES
Discharge: HOME | End: 2024-08-26
Payer: MEDICARE

## 2024-08-26 ENCOUNTER — LAB (OUTPATIENT)
Dept: HEMATOLOGY/ONCOLOGY | Facility: CLINIC | Age: 79
End: 2024-08-26
Payer: MEDICARE

## 2024-08-26 VITALS
TEMPERATURE: 96.2 F | BODY MASS INDEX: 22.95 KG/M2 | OXYGEN SATURATION: 96 % | WEIGHT: 133.71 LBS | SYSTOLIC BLOOD PRESSURE: 107 MMHG | RESPIRATION RATE: 16 BRPM | DIASTOLIC BLOOD PRESSURE: 59 MMHG | HEART RATE: 81 BPM

## 2024-08-26 DIAGNOSIS — Z51.0 ENCOUNTER FOR ANTINEOPLASTIC RADIATION THERAPY: ICD-10-CM

## 2024-08-26 DIAGNOSIS — K52.1 CHEMOTHERAPY INDUCED DIARRHEA: Primary | ICD-10-CM

## 2024-08-26 DIAGNOSIS — M54.50 CHRONIC MIDLINE LOW BACK PAIN WITHOUT SCIATICA: ICD-10-CM

## 2024-08-26 DIAGNOSIS — C32.9 LARYNGEAL CANCER (MULTI): ICD-10-CM

## 2024-08-26 DIAGNOSIS — G89.29 CHRONIC MIDLINE LOW BACK PAIN WITHOUT SCIATICA: ICD-10-CM

## 2024-08-26 DIAGNOSIS — E83.42 HYPOMAGNESEMIA: ICD-10-CM

## 2024-08-26 DIAGNOSIS — C32.0 MALIGNANT NEOPLASM OF GLOTTIS (MULTI): ICD-10-CM

## 2024-08-26 DIAGNOSIS — T45.1X5A CHEMOTHERAPY INDUCED DIARRHEA: Primary | ICD-10-CM

## 2024-08-26 LAB
ALBUMIN SERPL BCP-MCNC: 3.6 G/DL (ref 3.4–5)
ALP SERPL-CCNC: 60 U/L (ref 33–136)
ALT SERPL W P-5'-P-CCNC: 9 U/L (ref 7–45)
ANION GAP SERPL CALC-SCNC: 13 MMOL/L (ref 10–20)
AST SERPL W P-5'-P-CCNC: 18 U/L (ref 9–39)
BILIRUB SERPL-MCNC: 0.4 MG/DL (ref 0–1.2)
BUN SERPL-MCNC: 19 MG/DL (ref 6–23)
CALCIUM SERPL-MCNC: 8.5 MG/DL (ref 8.6–10.3)
CHLORIDE SERPL-SCNC: 109 MMOL/L (ref 98–107)
CO2 SERPL-SCNC: 22 MMOL/L (ref 21–32)
CREAT SERPL-MCNC: 1.06 MG/DL (ref 0.5–1.05)
EGFRCR SERPLBLD CKD-EPI 2021: 54 ML/MIN/1.73M*2
GLUCOSE SERPL-MCNC: 88 MG/DL (ref 74–99)
MAGNESIUM SERPL-MCNC: 1.32 MG/DL (ref 1.6–2.4)
POTASSIUM SERPL-SCNC: 4.2 MMOL/L (ref 3.5–5.3)
PROT SERPL-MCNC: 5.8 G/DL (ref 6.4–8.2)
RAD ONC MSQ ACTUAL FRACTIONS DELIVERED: 33
RAD ONC MSQ ACTUAL SESSION DELIVERED DOSE: 200 CGRAY
RAD ONC MSQ ACTUAL TOTAL DOSE: 6600 CGRAY
RAD ONC MSQ ELAPSED DAYS: 54
RAD ONC MSQ LAST DATE: NORMAL
RAD ONC MSQ PRESCRIBED FRACTIONAL DOSE: 200 CGRAY
RAD ONC MSQ PRESCRIBED NUMBER OF FRACTIONS: 35
RAD ONC MSQ PRESCRIBED TECHNIQUE: NORMAL
RAD ONC MSQ PRESCRIBED TOTAL DOSE: 7000 CGRAY
RAD ONC MSQ PRESCRIPTION PATTERN COMMENT: NORMAL
RAD ONC MSQ START DATE: NORMAL
RAD ONC MSQ TREATMENT COURSE NUMBER: 2
RAD ONC MSQ TREATMENT SITE: NORMAL
SODIUM SERPL-SCNC: 140 MMOL/L (ref 136–145)

## 2024-08-26 PROCEDURE — 36591 DRAW BLOOD OFF VENOUS DEVICE: CPT

## 2024-08-26 PROCEDURE — 99215 OFFICE O/P EST HI 40 MIN: CPT | Performed by: NURSE PRACTITIONER

## 2024-08-26 PROCEDURE — 77386 HC INTENSITY-MODULATED RADIATION THERAPY (IMRT), COMPLEX: CPT | Performed by: STUDENT IN AN ORGANIZED HEALTH CARE EDUCATION/TRAINING PROGRAM

## 2024-08-26 PROCEDURE — 2500000004 HC RX 250 GENERAL PHARMACY W/ HCPCS (ALT 636 FOR OP/ED): Performed by: NURSE PRACTITIONER

## 2024-08-26 PROCEDURE — 1126F AMNT PAIN NOTED NONE PRSNT: CPT | Performed by: NURSE PRACTITIONER

## 2024-08-26 RX ORDER — MAGNESIUM SULFATE HEPTAHYDRATE 40 MG/ML
2 INJECTION, SOLUTION INTRAVENOUS ONCE
Status: CANCELLED | OUTPATIENT
Start: 2024-08-27

## 2024-08-26 RX ORDER — HEPARIN 100 UNIT/ML
500 SYRINGE INTRAVENOUS AS NEEDED
Status: CANCELLED | OUTPATIENT
Start: 2024-08-26

## 2024-08-26 RX ORDER — HEPARIN 100 UNIT/ML
500 SYRINGE INTRAVENOUS AS NEEDED
Status: DISCONTINUED | OUTPATIENT
Start: 2024-08-26 | End: 2024-08-26 | Stop reason: HOSPADM

## 2024-08-26 RX ORDER — LIDOCAINE 50 MG/G
1 PATCH TOPICAL DAILY
Qty: 21 PATCH | Refills: 2 | Status: SHIPPED | OUTPATIENT
Start: 2024-08-26 | End: 2024-09-16

## 2024-08-26 RX ORDER — HEPARIN SODIUM,PORCINE/PF 10 UNIT/ML
50 SYRINGE (ML) INTRAVENOUS AS NEEDED
Status: CANCELLED | OUTPATIENT
Start: 2024-08-26

## 2024-08-26 RX ORDER — DIPHENOXYLATE HYDROCHLORIDE AND ATROPINE SULFATE 2.5; .025 MG/1; MG/1
1 TABLET ORAL 4 TIMES DAILY PRN
Qty: 28 TABLET | Refills: 0 | Status: SHIPPED | OUTPATIENT
Start: 2024-08-26 | End: 2024-09-02

## 2024-08-26 RX ORDER — HEPARIN SODIUM,PORCINE/PF 10 UNIT/ML
50 SYRINGE (ML) INTRAVENOUS AS NEEDED
Status: DISCONTINUED | OUTPATIENT
Start: 2024-08-26 | End: 2024-08-26 | Stop reason: HOSPADM

## 2024-08-26 ASSESSMENT — ENCOUNTER SYMPTOMS
NEUROLOGICAL NEGATIVE: 1
VOMITING: 0
COUGH: 0
RESPIRATORY NEGATIVE: 1
FATIGUE: 1
CARDIOVASCULAR NEGATIVE: 1
DIARRHEA: 1
VOICE CHANGE: 0
NAUSEA: 0
BACK PAIN: 1
NECK PAIN: 0

## 2024-08-26 ASSESSMENT — PAIN SCALES - GENERAL: PAINLEVEL: 0-NO PAIN

## 2024-08-26 NOTE — PROGRESS NOTES
Patient ID: Padilla Campuzano is a 79 y.o. female.  Diagnosis: Laryngeal cancer  Staging: T3N0M0  Date of Diagnosis: 5/22/24    Providers:  ENT Surgeon: Dr. Ferny Rhoades  [facility]:   Dr. Kyunghee Burkitt/Megan Arauz APRARNEL  [facility]: Dr. Sue Oliva    Ms. Campuzano is 77 y/o female with recent diagnosis of laryngeal cancer who is referred to  med onc  to discuss treatment plan.    -2/2024: pt presented with hoarse voice  -3/21/24: CT chest showed left pulmonary nodule  -4/3/24: PET/CT showed uptake in left vocal cord extending  to the right with limited subglottic extension  -4/23/24: biopsy of LLL nodule showed negative for malignancy  -5/7/24: seen by thoracic surgeon Dr. Linda, plan is to follow up image in 3 months  -5/20/24: s/p direct laryngoscopy. She was found to have transglottic lesion extending from primarily the left glottis into the subglottis and wrapping around anteriorly. 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)  No date: Breast cancer (Multi)  2012: Cerebral hemorrhage (Multi)  No date: CKD (chronic kidney disease)  No date: Colon cancer (Multi)  No date: Colorectal cancer (Multi)  No date: Diverticulosis of intestine, part unspecified, without   perforation or abscess without bleeding      Comment:  Diverticulosis  No date: GERD (gastroesophageal reflux disease)  No date: Hypertension  No date: Irritable bowel syndrome  No date: Kidney disease  No date: Malignant neoplasm of colon, unspecified (Multi)      Comment:  Colon cancer  No date: Personal history of colonic polyps      Comment:  History of colonic polyps  No date: Personal history of irradiation  No date: Personal history of malignant neoplasm of breast      Comment:  History of malignant neoplasm of female breast  No date: Personal history of other diseases of the respiratory system      Comment:  History of respiratory failure  No date: Personal history of other malignant  neoplasm of skin      Comment:  Personal history of malignant neoplasm of skin  2016: Right upper quadrant pain      Comment:  Abdominal pain, RUQ (right upper quadrant)  2012: Stroke (Multi)  2016: Unspecified symptoms and signs involving the   genitourinary system      Comment:  Urinary symptom or sign   Surgical History:    Past Surgical History:   Procedure Laterality Date    BI US GUIDED BREAST RIGHT CYST ASPIRATION Right 2019    BI BREAST CYST ASPIRATION RIGHT [facility]    BREAST LUMPECTOMY  2014    Breast Surgery Lumpectomy    CHOLECYSTECTOMY      CT GUIDED IMAGING FOR ABSCESS DRAIN  2015    CT GUIDED IMAGING FOR ABSCESS DRAIN [facility]    HYSTERECTOMY  1984    Hysterectomy    ILEOSTOMY  2015    Ileostomy    ILEOSTOMY CLOSURE  2015    Ileostomy Closure    OTHER SURGICAL HISTORY      Cerebral artery coiling      Family History:    Family History   Problem Relation Name Age of Onset    Colon cancer Mother      Liver cancer Mother      Kidney cancer Mother      Heart attack Father      Blood clot Father      Cancer Brother      Cancer Brother       Family Oncology History:    Cancer-related family history includes Cancer in her brother and brother; Colon cancer in her mother; Kidney cancer in her mother; Liver cancer in her mother.  Social History:    Social History     Tobacco Use    Smoking status: Former     Current packs/day: 0.00     Types: Cigarettes     Quit date:      Years since quittin.6     Passive exposure: Past    Smokeless tobacco: Never   Vaping Use    Vaping status: Never Used   Substance Use Topics    Alcohol use: Never    Drug use: Never        Subjective   Chief Complaint: Laryngeal cancer    HPI    Interval History  Pt present in clinic for follow-up visit.  Her  is present for visit.  Feeling good today.  Appetite is good with stable wt.  Denies n/v/c.  Reports diarrhea is improving.  Managed with Lomotil.   Recently taking one/day.  Lidocaine patches effective for her low-back pain.  Refill Rx sent.  No fevers, chills, or CP.   Radiation dermatitis (g1) to chest.  No other concerns today.       ROS  Review of Systems   Constitutional:  Positive for fatigue.   HENT:  Negative.  Negative for voice change.    Respiratory: Negative.  Negative for cough.    Cardiovascular: Negative.    Gastrointestinal:  Positive for diarrhea. Negative for nausea and vomiting.   Genitourinary: Negative.     Musculoskeletal:  Positive for back pain. Negative for neck pain.   Skin:  Positive for rash.   Neurological: Negative.        Allergies  Allergies   Allergen Reactions    Paclitaxel GI Upset and Nausea/vomiting     See adverse drug reaction note dated 7/9/24    Hydrocodone-Acetaminophen Dizziness    Oxycodone Hcl Nausea/vomiting    Oxycodone-Acetaminophen Nausea/vomiting    Aspirin GI Upset and Unknown    Sulfa (Sulfonamide Antibiotics) Rash        Medications  Current Outpatient Medications   Medication Instructions    acetaminophen (TYLENOL) 1,000 mg, oral, Every 6 hours PRN    baclofen (LIORESAL) 5 mg, oral, 3 times daily    biotin 5 mg capsule Every 24 hours    cholecalciferol (Vitamin D-3) 50 mcg (2,000 unit) capsule 1 capsule, Every 24 hours    cholestyramine (Questran) 4 gram powder once daily.    clindamycin (Cleocin T) 1 % lotion Apply topically to affected area twice daily.    diphenoxylate-atropine (LomotiL) 2.5-0.025 mg tablet 1 tablet, oral, 4 times daily PRN    doxycycline (VIBRAMYCIN) 100 mg, oral, 2 times daily, For rash prevention. Take with at least 8 ounces (large glass) of water, do not lie down for 30 minutes after    hydrocortisone 1 % cream Apply topically to face, hands, feet, neck, back, and chest once daily at bedtime for rash prevention.    lidocaine (Lidoderm) 5 % patch 1 patch, transdermal, Daily, Remove & discard patch within 12 hours or as directed by MD.    lipase-protease-amylase (Creon) 24,000-76,000  -120,000 unit capsule TAKE 3 CAPSULES BY MOUTH 3 TIMES DAILY WITH MEALS AND 1 TO 2  CAPSULES BY MOUTH WITH SNACKS.  MAX 13 CAPSULE DAILY    loperamide (Imodium) 2 mg capsule Take 2 capsules (4 mg) by mouth with the first episode of diarrhea and 1 capsule (2 mg) by mouth with any additional episodes. Maximum 8 capsules (16 mg) per day.    losartan (COZAAR) 25 mg, oral, Daily    magnesium oxide (MAG-OX) 400 mg, oral, Daily    ondansetron (ZOFRAN) 8 mg, oral, Every 8 hours PRN    pantoprazole (ProtoNix) 40 mg EC tablet TAKE 1 TABLET BY MOUTH TWICE  DAILY    potassium chloride (Klor-Con) 20 mEq packet 20 mEq, oral, Daily    prochlorperazine (COMPAZINE) 10 mg, oral, Every 6 hours PRN        Objective   VS: /59 (BP Location: Left arm, Patient Position: Sitting, BP Cuff Size: Adult)   Pulse 81   Temp 35.7 °C (96.2 °F) (Temporal)   Resp 16   Wt 60.6 kg (133 lb 11.3 oz)   SpO2 96%   BMI 22.95 kg/m²   Weight: Daily Weight  08/26/24 : 60.6 kg (133 lb 11.3 oz)  08/23/24 : 60.8 kg (134 lb 0.6 oz)  08/22/24 : 61.4 kg (135 lb 7.6 oz)  08/20/24 : 61.2 kg (134 lb 14.7 oz)  08/19/24 : 60.9 kg (134 lb 4.2 oz)  08/15/24 : 61.7 kg (135 lb 14.6 oz)  08/13/24 : 62.1 kg (137 lb 0.3 oz)      Physical Exam  Vitals reviewed.   Constitutional:       Appearance: Normal appearance.   HENT:      Head: Normocephalic.      Nose: Nose normal.      Mouth/Throat:      Mouth: Mucous membranes are moist.      Pharynx: Oropharynx is clear.   Eyes:      Extraocular Movements: Extraocular movements intact.      Conjunctiva/sclera: Conjunctivae normal.      Pupils: Pupils are equal, round, and reactive to light.   Cardiovascular:      Rate and Rhythm: Normal rate and regular rhythm.      Pulses: Normal pulses.      Heart sounds: Normal heart sounds.   Pulmonary:      Breath sounds: Normal breath sounds.   Abdominal:      General: Bowel sounds are normal.      Palpations: Abdomen is soft.   Musculoskeletal:         General: Normal range of  motion.      Cervical back: Normal range of motion and neck supple.   Skin:     General: Skin is warm and dry.      Findings: Rash present.      Comments: Radiation dermatitis (g1) to upper chest.   Neurological:      General: No focal deficit present.      Mental Status: She is alert and oriented to person, place, and time.   Psychiatric:         Mood and Affect: Mood normal.         Behavior: Behavior normal.         Thought Content: Thought content normal.         Judgment: Judgment normal.       Diagnostic Results            Results from last 7 days   Lab Units 08/26/24  1436   GLUCOSE mg/dL 88   SODIUM mmol/L 140   POTASSIUM mmol/L 4.2   CHLORIDE mmol/L 109*   CO2 mmol/L 22   BUN mg/dL 19   CREATININE mg/dL 1.06*   EGFR mL/min/1.73m*2 54*   CALCIUM mg/dL 8.5*   MAGNESIUM mg/dL 1.32*   ALBUMIN g/dL 3.6   PROTEIN TOTAL g/dL 5.8*   BILIRUBIN TOTAL mg/dL 0.4   ALK PHOS U/L 60   ALT U/L 9   AST U/L 18               Images  MRI Cervical and Thoracic Spine 7/8/24:    Cervical, Thoracic Spine: Diffuse soft tissue thickening and enhancement of the glottic larynx extending into the subglottic larynx and involving the left cricoid, arytenoid, and thyroid cartilages, consistent with known tumor.  No evidence of metastatic disease within the cervical or thoracic  spine.    Pathology  Lab Results   Component Value Date    PATHREP  12/06/2021                                                     MRN: [id number]  Patient Name RONALD LENNON  Accession #: [id number]  Date of Procedure:  12/6/2021       Date Reported: 12/9/2021  Date Received:  12/7/2021  Date of Birth / Sex 1945 (Age: 76) / F  Race: WHITE  Submitting Physician: ROD VALLE MD    Other External #          FINAL CYTOLOGICAL INTERPRETATION    A.   FINE NEEDLE ASPIRATION BREAST - RIGHT, CYTOLOGY AND CELL BLOCK:  --NO MALIGNANT CELLS IDENTIFIED.  --SPECIMEN CONSISTS OF PROTEINACEOUS DEBRIS, OCCASIONAL FOAMY MACROPHAGES, FEW  INFLAMMATORY CELLS AND RARE  EPITHELIAL GROUP.        Slide(s) initially screened by a Cytotechnologist at [place] [id number]      Electronically Signed Out By GABINO GARCES MD    By the signature on this report, the individual or group listed as making the  Final Interpretation/Diagnosis certifies that they have reviewed this case.  Slide(s) initially screened by a Cytotechnologist at [facility]     Clinical History      Clinical Diagnosis History: Cyst of right breast - (N60.01)   Source of Specimen  A:  FINE NEEDLE ASPIRATION BREAST - RIGHT     Specimen Submitted as:  A:   FINE NEEDLE ASPIRATION BREAST - RIGHT        Pap non-gyn ThinPrep slide, CELL BLOCK, H&E, Initial    Gross Description  A.   FINE NEEDLE ASPIRATION BREAST - RIGHT:  35cc MILKY YELLOW NEEDLE RINSE IN  CYTOLYT                 [facility]  Department of Pathology  [address] [place] [address]        PATHREP  09/09/2019     Name RONALD LENNON                                                                                                   Accession #: [id number]            Pathologist:                   YUE FIGUEROA MD  Date of Procedure:    9/9/2019  Date Received:          9/9/2019  Date Reported           9/13/2019  Submitting Physician:   SALIMA WHEELER MD  Location:                    [facility]  Other External #                                                                    FINAL DIAGNOSIS  A.  BIOPSY OF ANAL CANAL:  MINUTE FRAGMENTS OF SOFT TISSUE WITH NO SIGNIFICANT  PATHOLOGICAL FINDINGS, SEE NOTE.    Note: Deeper sections were obtained.                                                                                                                                                                                                                                                                                                                 "                                                                                                                                                                       Electronically Signed Out By YUE FIGUEROA MD/ANASTASIYA  By the signature on this report, the individual or group listed as making the  Final Interpretation/Diagnosis certifies that they have reviewed this case.           Clinical History:  Colorectal cancer, squamous cell, status post chemo radiation    Specimens Submitted As:  A: BIOPSY OF ANAL CANAL     Gross Description:  Received in formalin, labeled with the patient's name and hospital number and  \"anal canal\", are multiple fragments of tan, soft tissue aggregating to 1.7 x  1.0 x 0.1 cm. The specimen is submitted in toto in one cassette.  CJN    cjn/9/9/2019               [facility]  Department of Pathology   [id number] [place] [id number]        COMDX  05/20/2024     Case reviewed at ENT consensus conference via [employer] technology on 5/22/2024.       Assessment/Plan   Ms. Campuzano is 78 y/o female with recent diagnosis of laryngeal cancer who is referred to  med onc  to discuss treatment plan.    # Laryngeal cancer (T3N0M0)  -Based on PET/CT from 4/3/24, there was uptake in LLL nodule, however, biopsy of LLL nodule showed negative for malignancy  -seen by Thoracic surgeon, plan to have repeat CT chest without contrast in 3 month  -since her GFR is 40 (no hearing deficit), she is not a candidate for cisplatin, will treat her with weekly carboplatin and paclitaxel, reviewed chemo related side effects with patient in detail.  Consent obtained.  -pt developed abdominal pain post C1, during C2 infusion of paclitaxel,  she experienced worsening of abdominal pain, pt wanted to stop chemo on 7/9 and also didn't want further chemotherapy.  -7/10/24: discussed with patient about changing treatment to cetuximab. Explained to patient about main side effects (skin rash " and hypomagnesemia), she is good with the plan.  I couldn't place consent due to epic issue, I will place it later.  - Pt consented by Dr. Burkitt 7/11/24  - Pt tolerated her C1 Cetuximab tx well.  Intermittent diarrhea managed with PRN Imodium     - Diarrhea 8/11/24 with Lomotil taken x4.  Today back to her norm.    - Diarrhea 8/16/24 with Lomotil taken x4.  Also 8/25/24 diet related.  Questran resumed.      7/2/24 - C1 Carboplatin/Paciltaxel  7/9/24 - pt declined C2 carboplatin/paciltaxel  7/15/24 - PowerPort mediport placed   7/16/24 - C1 Cetuximab  7/23/24 - C2 Cetuximab  7/30/24 - C3 Cetuximab  8/6/24 -  C4 Cetuximab  8/13/24 - C5 Cetuximab  8/20/24 - C6 Cetuximab, confirmed with Dr. Burkitt -  final tx    # Hx of anal, breast and cutaneous malignancies  -s/p lumpectomy with radiation in 2012  -s/p radiation to anal cancer in 2018  -s/p resection of skin cancer on nose in 2010    # Chronic diarrhea   - from previous colonic surgery  -3-4 episodes per day, while on 3 imodium three times a day, 3-4 episodes of diarrhea daily.  - Concern for flares with chemotherapy tx's  - Tolerated C1 Carboplatin/Paciltaxel  - Currently managed with Creon and Questran - stools forms, soft  - 7/22:  Following C1 Cetuximab, intermittent diarrhea - managed with PRN Imodium   - 7/29:  Imodium admin x1 following C2.    - ongoing monitoring  - ED 7/31 - d/t dehydration 2/2 diarrhea, pt left AMA  - 8/5:  Dr. Moura provided Lomotil Rx.  Diarrhea managed with Lomotil - x2/day   - 8/12:  Managed with PRN Lomotil.    - 8/19:  Managed with PRN Lomotil. Questran resumed.    - 8/26:  Managed with PRN Lomotil.  Rx sent #28.        # Back pain, area between her shoulder blades, more so on the left.  Has worsened over the last few weeks.  Pain with activity.  Resolves with rest.  No acute injury.  No falls.    - Will order MRI C & T (+/-) next available.  MRI's scheduled 7/8/24.    - 7/8:  Cervical, Thoracic Spine: Diffuse soft tissue  thickening and enhancement of the glottic larynx extending into the subglottic larynx and involving the left cricoid, arytenoid, and thyroid cartilages, consistent with known tumor.  No evidence of metastatic disease within the cervical or thoracic  spine.   - Currently managed with routine Tylenol 1,000mg/daily  - Supportive onc referral placed.  Pt to return sup onc call.  - 7/22:  No current c/o back pain    - 7/29:  Recommended she continue Tylenol and heating pad for pain control  - 8/5:  Managed with PRN Tylenol  - 8/12:  Will trial Baclofen 5mg TID PRN.  Instructed no concurrent admin with Lomotil.  Pt verbalized understanding.   - 8/19:  Reports no benefit with Baclofen.  Resumed PRN Tylenol.  Rx sent for Lidocaine patches, #7. PA provided.  Chronic back pain.  Previously seen and managed by a chiropractor.  Wishes to wait until CRT completed before scheduling.  Needs to establish with new MD.  Declined referral for [facility] d/t distance from her home.  She will follow-up with her PCP.    - 8/26:  establish with PCP.  Pain managed with Lidocaine patches.  Refill Rx sent #21.      # Hypomagnesia: 7/2/24 Mg 1.41  - Will start oral Mag Oxide - 1 tab/day, Rx sent (not covered by pt's insurance)  - Will replete with IV hydration visit 7/5/24.  - If consistently low, will replete weekly via IV   - 7/8/24 - Mg 1.71.    - 7/15/24 - Mg 1.31 - 2g Mg prior to 7/16 infusion   - 7/22/24 - Mg 1.42 - 2g Mg prior to 7/23 infusion   - 7/29/24 - Mg 1.52 - 2g Mg prior to 7/30 infusion  - 8/6/24 - Mg 1.60 - 2g Mg   - 8/12/24 - Mg 1.26 - 2g Mg with infusion 8/13/24.  Pt ran out of her magnesium supplement, will pick-up refill today.    - 8/19/24 - Mg 1.42 - 2g Mg with infusion 8/20/24.    - 8/26/24  - Mg 1.32.  Pt to continue oral Mag Oxide.  Will check infusion availability for 8/27 or 8/28.    Repeat labs 9/11/24.     # Hypokalemia 8/2/24 K 3.2  - Rx sent - potassium packets 20mEq/day  - 8/19: K+ 4.3    -  stable with oral potassium.  Repeat labs 9/11/24.     # Mediport placement, per pt request  - IR consult order in place  - INR lab completed.  - 7/8:  Encouraged pt to return [facility] call to schedule placement.    - 7/15:  PowerPort mediport placed  - EMLA Rx sent 7/22/24     # Elevated Cr/BUN 1/24/24  - at/near baseline  - 1.17 7/8/24  - 1.43/25 7/15/24   - 1.05/30 7/22/24 - encouraged fluids   - 1.17/34 7/29/24 - encouraged fluids, IV hydration 8/2/24  - ongoing monitoring  - 1.06/28 8/2/24 - improved  - 1.09 8/12/24 - stable  - 1.13 8/19/24 - stable    # Localized rash/radiation dermatitis   - flat, macular rash to upper, mid chest area <10% BSA  - Continue topical lotion to area     # Rx's  - Rx's sent to pt's pharmacy 7/15  - Spouse reports not all meds available today, expect to have all meds on 7/16.  Meds to be picked-up on 7/16/24.  Spouse waiting for all meds to be available, prior to pick-up.    - 7/22:  encouraged pt to apply Hydrocortisone ointment per tx plan instructions.  No current skin concerns.      PLAN  9/11/24 - Dr. Burkitt visit with rodrigo albarran

## 2024-08-27 ENCOUNTER — HOSPITAL ENCOUNTER (OUTPATIENT)
Dept: RADIATION ONCOLOGY | Facility: CLINIC | Age: 79
Setting detail: RADIATION/ONCOLOGY SERIES
Discharge: HOME | End: 2024-08-27
Payer: MEDICARE

## 2024-08-27 ENCOUNTER — INFUSION (OUTPATIENT)
Dept: HEMATOLOGY/ONCOLOGY | Facility: CLINIC | Age: 79
End: 2024-08-27
Payer: MEDICARE

## 2024-08-27 VITALS
BODY MASS INDEX: 22.88 KG/M2 | HEART RATE: 83 BPM | TEMPERATURE: 96.6 F | OXYGEN SATURATION: 93 % | RESPIRATION RATE: 18 BRPM | DIASTOLIC BLOOD PRESSURE: 60 MMHG | WEIGHT: 133.27 LBS | SYSTOLIC BLOOD PRESSURE: 112 MMHG

## 2024-08-27 DIAGNOSIS — E61.2 MAGNESIUM DEFICIENCY: ICD-10-CM

## 2024-08-27 DIAGNOSIS — E61.2 MAGNESIUM DEFICIENCY: Primary | ICD-10-CM

## 2024-08-27 DIAGNOSIS — Z51.0 ENCOUNTER FOR ANTINEOPLASTIC RADIATION THERAPY: ICD-10-CM

## 2024-08-27 DIAGNOSIS — C32.9 LARYNGEAL CANCER (MULTI): ICD-10-CM

## 2024-08-27 DIAGNOSIS — C32.0 MALIGNANT NEOPLASM OF GLOTTIS (MULTI): ICD-10-CM

## 2024-08-27 LAB
RAD ONC MSQ ACTUAL FRACTIONS DELIVERED: 34
RAD ONC MSQ ACTUAL SESSION DELIVERED DOSE: 200 CGRAY
RAD ONC MSQ ACTUAL TOTAL DOSE: 6800 CGRAY
RAD ONC MSQ ELAPSED DAYS: 55
RAD ONC MSQ LAST DATE: NORMAL
RAD ONC MSQ PRESCRIBED FRACTIONAL DOSE: 200 CGRAY
RAD ONC MSQ PRESCRIBED NUMBER OF FRACTIONS: 35
RAD ONC MSQ PRESCRIBED TECHNIQUE: NORMAL
RAD ONC MSQ PRESCRIBED TOTAL DOSE: 7000 CGRAY
RAD ONC MSQ PRESCRIPTION PATTERN COMMENT: NORMAL
RAD ONC MSQ START DATE: NORMAL
RAD ONC MSQ TREATMENT COURSE NUMBER: 2
RAD ONC MSQ TREATMENT SITE: NORMAL

## 2024-08-27 PROCEDURE — 96365 THER/PROPH/DIAG IV INF INIT: CPT | Mod: INF

## 2024-08-27 PROCEDURE — 2500000004 HC RX 250 GENERAL PHARMACY W/ HCPCS (ALT 636 FOR OP/ED): Performed by: NURSE PRACTITIONER

## 2024-08-27 PROCEDURE — 77336 RADIATION PHYSICS CONSULT: CPT | Performed by: STUDENT IN AN ORGANIZED HEALTH CARE EDUCATION/TRAINING PROGRAM

## 2024-08-27 PROCEDURE — 77386 HC INTENSITY-MODULATED RADIATION THERAPY (IMRT), COMPLEX: CPT | Performed by: STUDENT IN AN ORGANIZED HEALTH CARE EDUCATION/TRAINING PROGRAM

## 2024-08-27 RX ORDER — HEPARIN SODIUM,PORCINE/PF 10 UNIT/ML
50 SYRINGE (ML) INTRAVENOUS AS NEEDED
OUTPATIENT
Start: 2024-08-27

## 2024-08-27 RX ORDER — HEPARIN 100 UNIT/ML
500 SYRINGE INTRAVENOUS AS NEEDED
OUTPATIENT
Start: 2024-08-27

## 2024-08-27 RX ORDER — HEPARIN SODIUM,PORCINE/PF 10 UNIT/ML
50 SYRINGE (ML) INTRAVENOUS AS NEEDED
Status: DISCONTINUED | OUTPATIENT
Start: 2024-08-27 | End: 2024-08-27 | Stop reason: HOSPADM

## 2024-08-27 RX ORDER — HEPARIN 100 UNIT/ML
500 SYRINGE INTRAVENOUS AS NEEDED
Status: DISCONTINUED | OUTPATIENT
Start: 2024-08-27 | End: 2024-08-27 | Stop reason: HOSPADM

## 2024-08-27 RX ORDER — MAGNESIUM SULFATE HEPTAHYDRATE 40 MG/ML
2 INJECTION, SOLUTION INTRAVENOUS ONCE
Status: COMPLETED | OUTPATIENT
Start: 2024-08-27 | End: 2024-08-27

## 2024-08-27 ASSESSMENT — PAIN SCALES - GENERAL: PAINLEVEL: 0-NO PAIN

## 2024-08-28 ENCOUNTER — APPOINTMENT (OUTPATIENT)
Dept: PRIMARY CARE | Facility: CLINIC | Age: 79
End: 2024-08-28
Payer: MEDICARE

## 2024-08-28 ENCOUNTER — HOSPITAL ENCOUNTER (OUTPATIENT)
Dept: RADIATION ONCOLOGY | Facility: CLINIC | Age: 79
Setting detail: RADIATION/ONCOLOGY SERIES
Discharge: HOME | End: 2024-08-28
Payer: MEDICARE

## 2024-08-28 DIAGNOSIS — Z51.0 ENCOUNTER FOR ANTINEOPLASTIC RADIATION THERAPY: ICD-10-CM

## 2024-08-28 DIAGNOSIS — C32.0 MALIGNANT NEOPLASM OF GLOTTIS (MULTI): ICD-10-CM

## 2024-08-28 LAB
RAD ONC MSQ ACTUAL FRACTIONS DELIVERED: 35
RAD ONC MSQ ACTUAL SESSION DELIVERED DOSE: 200 CGRAY
RAD ONC MSQ ACTUAL TOTAL DOSE: 7000 CGRAY
RAD ONC MSQ ELAPSED DAYS: 56
RAD ONC MSQ LAST DATE: NORMAL
RAD ONC MSQ PRESCRIBED FRACTIONAL DOSE: 200 CGRAY
RAD ONC MSQ PRESCRIBED NUMBER OF FRACTIONS: 35
RAD ONC MSQ PRESCRIBED TECHNIQUE: NORMAL
RAD ONC MSQ PRESCRIBED TOTAL DOSE: 7000 CGRAY
RAD ONC MSQ PRESCRIPTION PATTERN COMMENT: NORMAL
RAD ONC MSQ START DATE: NORMAL
RAD ONC MSQ TREATMENT COURSE NUMBER: 2
RAD ONC MSQ TREATMENT SITE: NORMAL

## 2024-08-28 PROCEDURE — 77386 HC INTENSITY-MODULATED RADIATION THERAPY (IMRT), COMPLEX: CPT | Performed by: STUDENT IN AN ORGANIZED HEALTH CARE EDUCATION/TRAINING PROGRAM

## 2024-08-28 NOTE — PROGRESS NOTES
Patient completed 35 fractions of radiation to the glottis today. Patient given and reviewed What you need to know after radiation. We reviewed again side effect of radiation and how the effects can linger for 1-2 weeks. Patient instructed to call with questions or concerns. Per Dr. Oliva, patient to follow up on 9/24/24. Patient verbalizes understanding with verbal teach back. Marina Ahn MSN, RN, OCN

## 2024-08-28 NOTE — PROGRESS NOTES
Radiation Oncology Treatment Summary    Patient Name:  Padilla Campuzano  MRN:  [id number]  :  1945    Referring Provider: Ferny Rhoades MD  Primary Care Provider: Naheed Clements MD    Brief History: Padilla Campuzano is a 79 y.o. female with Laryngeal cancer (Multi), Clinical: Stage III (cT3, cN0, cM0).  The patient completed radiotherapy as outlined below.    Radiation Treatment Summary:    IMRT: Bilateral Head and neck    Treatment Period Technique Fraction Dose Fractions Total Dose   Course 2 7/3/2024-2024  (days elapsed: 56)         H&N 7/3/2024-2024 VMAT 200 / 200 cGy 35 / 35 7000 / 7,000 cGy       Please see the patient's [facility] chart for further details regarding the radiation plan, including beam energy.    Concurrent Chemotherapy:  Treatment Plans       Name Type Plan Dates Plan Provider         Active    Cetuximab with Concurrent Radiation, Weekly Oncology Treatment  7/15/2024 - Present Kyunghee Burkitt, DO                    [facility] Toxicity Overview:   Toxicity Assessment          2024    13:50 2024    14:37 2024    13:26 8/15/2024    13:35 2024    13:45   Toxicity Assessment   Treatment  and neck Head and neck Head and neck Head and neck Head and neck   Anorexia Grade 1       eating soft foods and 1 Boost/day Grade 1       ensure is hurting her stomach Grade 1       ensure 1x daily, mostly soft foods Grade 0       eating softer foods and 1 boost/day Grade 1       eating soft foods   Anxiety Grade 0 Grade 0 Grade 0 Grade 0 Grade 0   Dehydration Grade 0 Grade 2       getting IV fluid infusions Grade 2       IV fluids on  Grade 0 Grade 0   Depression Grade 0 Grade 0 Grade 0 Grade 0 Grade 0   Dermatitis Radiation Grade 0       Using Udderly smooth once a day Grade 0       using udderly smooth Grade 1       red splotches on chest, udderly smooth Grade 1 Grade 1       Lanocaine BID   Diarrhea Grade 1       baseline Grade 2 Grade 2       some days 3-4x, some days  none Grade 1       baseline--has gotten better Grade 1       baseline   Fatigue Grade 1       naps during the day Grade 2       baseline Grade 1       napping, going to bed early Grade 1       naps during the day Grade 1       goes to bed early   Fibrosis Deep Connective Tissue Grade 0 Grade 0 Grade 0 Grade 0 Grade 0   Fracture Grade 0 Grade 0 Grade 0 Grade 0 Grade 0   Nausea Grade 0 Grade 2       taking anti nausea medicine Grade 0 Grade 0 Grade 0   Pain Grade 1       pain level 3 to abdomen Grade 0 Grade 0 Grade 0 Grade 0   Treatment Related Secondary Malignancy Grade 0 Grade 0 Grade 0 Grade 0 Grade 0   Tumor Pain Grade 0 Grade 0 Grade 0 Grade 0 Grade 0   Vomiting Grade 0 Grade 2 Grade 0 Grade 0 Grade 0   Dysphagia Grade 1  Grade 2 Grade 0 Grade 0   Esophagitis Grade 1   Grade 1 Grade 1   Mucositis Oral Grade 0       using Blis lozenges and salt and soda rinses  Grade 1 Grade 0       using Blis lozenges Grade 0   Hearing Impaired Grade 0  Grade 0 Grade 0 Grade 0   Blurred Vision Grade 0  Grade 0 Grade 0 Grade 0   Dry Eye Grade 0  Grade 0 Grade 0 Grade 0   Eye Pain Grade 0  Grade 0 Grade 0 Grade 0   Retinopathy Grade 0  Grade 0 Grade 0 Grade 0   Central Nervous System Necrosis Grade 0  Grade 0 Grade 0 Grade 0   Edema Cerebral Grade 0  Grade 0 Grade 0 Grade 0   Dry Mouth Grade 1  Grade 1 Grade 1 Grade 1   Pneumonitis Grade 0   Grade 0 Grade 0   Febrile Neutropenia Grade 0 Grade 0 Grade 0 Grade 0 Grade 0   Ear Pain Grade 0 Grade 0 Grade 0 Grade 0 Grade 0   External Ear Pain Grade 0 Grade 0 Grade 0 Grade 0 Grade 0   Tinnitus Grade 0 Grade 0 Grade 0 Grade 0 Grade 0   Watering Eyes Grade 0 Grade 0 Grade 0 Grade 0 Grade 0   Oral Pain Grade 0 Grade 0 Grade 1       2/10 pain in tongue, constant Grade 0 Grade 0   Salivary Duct Inflammation Grade 1 Grade 2       using baking soda/salt rinse Grade 2       using baking soda/salt rinse, blis lozenges Grade 1 Grade 0   Edema Face Grade 0 Grade 0 Grade 0 Grade 0 Grade 0   Malaise  Grade 0 Grade 0 Grade 0 Grade 0 Grade 0   Neck Edema Grade 0 Grade 0 Grade 0 Grade 0 Grade 0   Corneal Infection Grade 0 Grade 0 Grade 0 Grade 0 Grade 0   Mucosal Infection Grade 0 Grade 0 Grade 0 Grade 0 Grade 0   Otitis Media Grade 0 Grade 0 Grade 0 Grade 0 Grade 0   Sepsis Grade 0 Grade 0 Grade 0 Grade 0 Grade 0   Sinusitis Grade 0 Grade 0 Grade 0 Grade 0 Grade 0   Skin Infection Grade 0 Grade 0 Grade 0 Grade 0 Grade 0   Soft Tissue Infection Grade 0 Grade 0 Grade 0 Grade 0 Grade 0   Head Soft Tissue Necrosis Grade 0 Grade 0 Grade 0 Grade 0 Grade 0   Neck Soft Tissue Necrosis Grade 0 Grade 0 Grade 0 Grade 0 Grade 0   Osteonecrosis of Jaw Grade 0 Grade 0 Grade 0 Grade 0 Grade 0   Superficial Soft Tissue Fibrosis Grade 0 Grade 0 Grade 0 Grade 0 Grade 0   Trismus Grade 0 Grade 0 Grade 0 Grade 0 Grade 0   Dysarthria Grade 0 Grade 0 Grade 0 Grade 0 Grade 0   Dysesthesia Grade 0 Grade 0 Grade 0 Grade 0 Grade 0   Dysgeusia Grade 1 Grade 2       loss of sense of taste Grade 2 Grade 2 Grade 2   Facial Nerve Disorder Grade 0 Grade 0 Grade 0 Grade 0 Grade 0   Hypoglossal Nerve Disorder Grade 0 Grade 0 Grade 0 Grade 0 Grade 0   Oculomotor Nerve Disorder Grade 0 Grade 0 Grade 0 Grade 0 Grade 0   Paresthesia Grade 0 Grade 0 Grade 0 Grade 0 Grade 0   Stroke Grade 0 Grade 0 Grade 0 Grade 0 Grade 0   Transient Ischemic Attacks Grade 0 Grade 0 Grade 0 Grade 0 Grade 0   Trigeminal Nerve Disorder Grade 0 Grade 0 Grade 0 Grade 0 Grade 0   Aspiration Grade 0 Grade 0 Grade 0 Grade 0 Grade 0   Hoarseness Grade 2 Grade 2 Grade 2 Grade 2 Grade 2   Laryngeal Edema Grade 0 Grade 0 Grade 0 Grade 0 Grade 0   Stridor Grade 0 Grade 0 Grade 0 Grade 0 Grade 0   Tracheal Mucositis Grade 0 Grade 0 Grade 0 Grade 0 Grade 0   Voice Alteration Grade 2 Grade 2 Grade 2 Grade 0 Grade 0     Patient Disposition: Patient to follow up on 9/24/24.

## 2024-09-05 ENCOUNTER — TELEPHONE (OUTPATIENT)
Dept: RADIATION ONCOLOGY | Facility: CLINIC | Age: 79
End: 2024-09-05
Payer: MEDICARE

## 2024-09-05 NOTE — TELEPHONE ENCOUNTER
9/5/24 0928 Left message for patient to call back regarding how she is feeling after radiation. Marina Ahn, JOSEY, RN, OCN    9/5/24 2957 left message to call back. Marina Ahn, MSN, RN, OCN

## 2024-09-09 ENCOUNTER — APPOINTMENT (OUTPATIENT)
Dept: OTOLARYNGOLOGY | Facility: CLINIC | Age: 79
End: 2024-09-09
Payer: MEDICARE

## 2024-09-10 ENCOUNTER — TELEPHONE (OUTPATIENT)
Dept: RADIATION ONCOLOGY | Facility: CLINIC | Age: 79
End: 2024-09-10
Payer: MEDICARE

## 2024-09-10 DIAGNOSIS — C32.9 LARYNGEAL CANCER (MULTI): Primary | ICD-10-CM

## 2024-09-11 ENCOUNTER — LAB (OUTPATIENT)
Dept: HEMATOLOGY/ONCOLOGY | Facility: HOSPITAL | Age: 79
End: 2024-09-11
Payer: MEDICARE

## 2024-09-11 ENCOUNTER — TELEPHONE (OUTPATIENT)
Dept: HEMATOLOGY/ONCOLOGY | Facility: HOSPITAL | Age: 79
End: 2024-09-11

## 2024-09-11 ENCOUNTER — OFFICE VISIT (OUTPATIENT)
Dept: HEMATOLOGY/ONCOLOGY | Facility: HOSPITAL | Age: 79
End: 2024-09-11
Payer: MEDICARE

## 2024-09-11 VITALS
OXYGEN SATURATION: 94 % | BODY MASS INDEX: 23.24 KG/M2 | WEIGHT: 135.4 LBS | HEART RATE: 84 BPM | SYSTOLIC BLOOD PRESSURE: 141 MMHG | TEMPERATURE: 96.6 F | DIASTOLIC BLOOD PRESSURE: 70 MMHG

## 2024-09-11 DIAGNOSIS — E83.42 HYPOMAGNESEMIA: Primary | ICD-10-CM

## 2024-09-11 DIAGNOSIS — C32.9 LARYNGEAL CANCER (MULTI): ICD-10-CM

## 2024-09-11 LAB
ALBUMIN SERPL BCP-MCNC: 3.3 G/DL (ref 3.4–5)
ALP SERPL-CCNC: 65 U/L (ref 33–136)
ALT SERPL W P-5'-P-CCNC: 9 U/L (ref 7–45)
ANION GAP SERPL CALC-SCNC: 13 MMOL/L (ref 10–20)
AST SERPL W P-5'-P-CCNC: 16 U/L (ref 9–39)
BASOPHILS # BLD AUTO: 0.02 X10*3/UL (ref 0–0.1)
BASOPHILS NFR BLD AUTO: 0.4 %
BILIRUB SERPL-MCNC: 0.4 MG/DL (ref 0–1.2)
BUN SERPL-MCNC: 20 MG/DL (ref 6–23)
CALCIUM SERPL-MCNC: 7.7 MG/DL (ref 8.6–10.3)
CHLORIDE SERPL-SCNC: 109 MMOL/L (ref 98–107)
CO2 SERPL-SCNC: 24 MMOL/L (ref 21–32)
CREAT SERPL-MCNC: 1.04 MG/DL (ref 0.5–1.05)
EGFRCR SERPLBLD CKD-EPI 2021: 55 ML/MIN/1.73M*2
EOSINOPHIL # BLD AUTO: 0.11 X10*3/UL (ref 0–0.4)
EOSINOPHIL NFR BLD AUTO: 2.1 %
ERYTHROCYTE [DISTWIDTH] IN BLOOD BY AUTOMATED COUNT: 14.5 % (ref 11.5–14.5)
GLUCOSE SERPL-MCNC: 85 MG/DL (ref 74–99)
HCT VFR BLD AUTO: 33.6 % (ref 36–46)
HGB BLD-MCNC: 10.7 G/DL (ref 12–16)
IMM GRANULOCYTES # BLD AUTO: 0.02 X10*3/UL (ref 0–0.5)
IMM GRANULOCYTES NFR BLD AUTO: 0.4 % (ref 0–0.9)
LYMPHOCYTES # BLD AUTO: 0.9 X10*3/UL (ref 0.8–3)
LYMPHOCYTES NFR BLD AUTO: 17.2 %
MAGNESIUM SERPL-MCNC: 1.1 MG/DL (ref 1.6–2.4)
MCH RBC QN AUTO: 32.1 PG (ref 26–34)
MCHC RBC AUTO-ENTMCNC: 31.8 G/DL (ref 32–36)
MCV RBC AUTO: 101 FL (ref 80–100)
MONOCYTES # BLD AUTO: 0.72 X10*3/UL (ref 0.05–0.8)
MONOCYTES NFR BLD AUTO: 13.8 %
NEUTROPHILS # BLD AUTO: 3.45 X10*3/UL (ref 1.6–5.5)
NEUTROPHILS NFR BLD AUTO: 66.1 %
NRBC BLD-RTO: 0 /100 WBCS (ref 0–0)
PLATELET # BLD AUTO: 222 X10*3/UL (ref 150–450)
POTASSIUM SERPL-SCNC: 3.4 MMOL/L (ref 3.5–5.3)
PROT SERPL-MCNC: 5.6 G/DL (ref 6.4–8.2)
RBC # BLD AUTO: 3.33 X10*6/UL (ref 4–5.2)
SODIUM SERPL-SCNC: 143 MMOL/L (ref 136–145)
WBC # BLD AUTO: 5.2 X10*3/UL (ref 4.4–11.3)

## 2024-09-11 PROCEDURE — 80053 COMPREHEN METABOLIC PANEL: CPT | Performed by: NURSE PRACTITIONER

## 2024-09-11 PROCEDURE — 2500000004 HC RX 250 GENERAL PHARMACY W/ HCPCS (ALT 636 FOR OP/ED): Mod: JZ | Performed by: NURSE PRACTITIONER

## 2024-09-11 PROCEDURE — 36593 DECLOT VASCULAR DEVICE: CPT

## 2024-09-11 PROCEDURE — 36591 DRAW BLOOD OFF VENOUS DEVICE: CPT

## 2024-09-11 PROCEDURE — 85025 COMPLETE CBC W/AUTO DIFF WBC: CPT | Performed by: NURSE PRACTITIONER

## 2024-09-11 PROCEDURE — 2500000004 HC RX 250 GENERAL PHARMACY W/ HCPCS (ALT 636 FOR OP/ED): Performed by: NURSE PRACTITIONER

## 2024-09-11 PROCEDURE — 1125F AMNT PAIN NOTED PAIN PRSNT: CPT | Performed by: INTERNAL MEDICINE

## 2024-09-11 PROCEDURE — 99215 OFFICE O/P EST HI 40 MIN: CPT | Performed by: INTERNAL MEDICINE

## 2024-09-11 PROCEDURE — 83735 ASSAY OF MAGNESIUM: CPT | Performed by: NURSE PRACTITIONER

## 2024-09-11 PROCEDURE — 1036F TOBACCO NON-USER: CPT | Performed by: INTERNAL MEDICINE

## 2024-09-11 RX ORDER — MAGNESIUM SULFATE HEPTAHYDRATE 40 MG/ML
4 INJECTION, SOLUTION INTRAVENOUS ONCE
Status: CANCELLED | OUTPATIENT
Start: 2024-09-12

## 2024-09-11 RX ORDER — HEPARIN 100 UNIT/ML
500 SYRINGE INTRAVENOUS AS NEEDED
Status: CANCELLED | OUTPATIENT
Start: 2024-09-11

## 2024-09-11 RX ORDER — HEPARIN SODIUM,PORCINE/PF 10 UNIT/ML
50 SYRINGE (ML) INTRAVENOUS AS NEEDED
Status: CANCELLED | OUTPATIENT
Start: 2024-09-11

## 2024-09-11 RX ORDER — HEPARIN 100 UNIT/ML
500 SYRINGE INTRAVENOUS AS NEEDED
Status: DISCONTINUED | OUTPATIENT
Start: 2024-09-11 | End: 2024-09-11 | Stop reason: HOSPADM

## 2024-09-11 ASSESSMENT — ENCOUNTER SYMPTOMS
NAUSEA: 0
NECK PAIN: 0
VOMITING: 0
BACK PAIN: 1
NEUROLOGICAL NEGATIVE: 1
DIARRHEA: 1
CARDIOVASCULAR NEGATIVE: 1
COUGH: 0
VOICE CHANGE: 0
FATIGUE: 1
RESPIRATORY NEGATIVE: 1

## 2024-09-11 ASSESSMENT — PAIN SCALES - GENERAL: PAINLEVEL: 4

## 2024-09-11 NOTE — PROGRESS NOTES
Patient ID: Padilla Campuzano is a 79 y.o. female.  Diagnosis: Laryngeal cancer  Staging: T3N0M0  Date of Diagnosis: 5/22/24    Providers:  ENT Surgeon: Dr. Ferny Rhoades  [facility]:   Dr. Kyunghee Burkitt/Megan Arauz APRARNEL  [facility]: Dr. Sue Oliva    Ms. Campuzano is 79 y/o female with recent diagnosis of laryngeal cancer who is referred to  med onc  to discuss treatment plan.    -2/2024: pt presented with hoarse voice  -3/21/24: CT chest showed left pulmonary nodule  -4/3/24: PET/CT showed uptake in left vocal cord extending  to the right with limited subglottic extension  -4/23/24: biopsy of LLL nodule showed negative for malignancy  -5/7/24: seen by thoracic surgeon Dr. Linda, plan is to follow up image in 3 months  -5/20/24: s/p direct laryngoscopy. She was found to have transglottic lesion extending from primarily the left glottis into the subglottis and wrapping around anteriorly. 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)  No date: Breast cancer (Multi)  2012: Cerebral hemorrhage (Multi)  No date: CKD (chronic kidney disease)  No date: Colon cancer (Multi)  No date: Colorectal cancer (Multi)  No date: Diverticulosis of intestine, part unspecified, without   perforation or abscess without bleeding      Comment:  Diverticulosis  No date: GERD (gastroesophageal reflux disease)  No date: Hypertension  No date: Irritable bowel syndrome  No date: Kidney disease  No date: Malignant neoplasm of colon, unspecified (Multi)      Comment:  Colon cancer  No date: Personal history of colonic polyps      Comment:  History of colonic polyps  No date: Personal history of irradiation  No date: Personal history of malignant neoplasm of breast      Comment:  History of malignant neoplasm of female breast  No date: Personal history of other diseases of the respiratory system      Comment:  History of respiratory failure  No date: Personal history of other malignant  neoplasm of skin      Comment:  Personal history of malignant neoplasm of skin  2016: Right upper quadrant pain      Comment:  Abdominal pain, RUQ (right upper quadrant)  2012: Stroke (Multi)  2016: Unspecified symptoms and signs involving the   genitourinary system      Comment:  Urinary symptom or sign   Surgical History:    Past Surgical History:   Procedure Laterality Date    BI US GUIDED BREAST RIGHT CYST ASPIRATION Right 2019    BI BREAST CYST ASPIRATION RIGHT [facility]    BREAST LUMPECTOMY  2014    Breast Surgery Lumpectomy    CHOLECYSTECTOMY      CT GUIDED IMAGING FOR ABSCESS DRAIN  2015    CT GUIDED IMAGING FOR ABSCESS DRAIN [facility]IENT LEGAC[facility]    HYSTERECTOMY  1984    Hysterectomy    ILEOSTOMY  2015    Ileostomy    ILEOSTOMY CLOSURE  2015    Ileostomy Closure    OTHER SURGICAL HISTORY      Cerebral artery coiling      Family History:    Family History   Problem Relation Name Age of Onset    Colon cancer Mother      Liver cancer Mother      Kidney cancer Mother      Heart attack Father      Blood clot Father      Cancer Brother      Cancer Brother       Family Oncology History:    Cancer-related family history includes Cancer in her brother and brother; Colon cancer in her mother; Kidney cancer in her mother; Liver cancer in her mother.  Social History:    Social History     Tobacco Use    Smoking status: Former     Current packs/day: 0.00     Types: Cigarettes     Quit date:      Years since quittin.7     Passive exposure: Past    Smokeless tobacco: Never   Vaping Use    Vaping status: Never Used   Substance Use Topics    Alcohol use: Never    Drug use: Never        Subjective   Chief Complaint: Laryngeal cancer    HPI    Interval History  Pt present in clinic for follow-up visit.  Her  is present for visit.  Feeling good today.  Appetite is good with stable wt.  Denies n/v/c.  Reports diarrhea is improving.  Managed with Lomotil.   Recently taking one/day.  Lidocaine patches effective for her low-back pain.  Refill Rx sent.  No fevers, chills, or CP.   Radiation dermatitis (g1) to chest.  No other concerns today.       ROS  Review of Systems   Constitutional:  Positive for fatigue.   HENT:  Negative.  Negative for voice change.    Respiratory: Negative.  Negative for cough.    Cardiovascular: Negative.    Gastrointestinal:  Positive for diarrhea. Negative for nausea and vomiting.   Genitourinary: Negative.     Musculoskeletal:  Positive for back pain. Negative for neck pain.   Skin:  Positive for rash.   Neurological: Negative.        Allergies  Allergies   Allergen Reactions    Paclitaxel GI Upset and Nausea/vomiting     See adverse drug reaction note dated 7/9/24    Hydrocodone-Acetaminophen Dizziness    Oxycodone Hcl Nausea/vomiting    Oxycodone-Acetaminophen Nausea/vomiting    Aspirin GI Upset and Unknown    Sulfa (Sulfonamide Antibiotics) Rash        Medications  Current Outpatient Medications   Medication Instructions    acetaminophen (TYLENOL) 1,000 mg, oral, Every 6 hours PRN    baclofen (LIORESAL) 5 mg, oral, 3 times daily    biotin 5 mg capsule Every 24 hours    cholecalciferol (Vitamin D-3) 50 mcg (2,000 unit) capsule 1 capsule, Every 24 hours    cholestyramine (Questran) 4 gram powder once daily.    clindamycin (Cleocin T) 1 % lotion Apply topically to affected area twice daily.    diphenoxylate-atropine (LomotiL) 2.5-0.025 mg tablet 1 tablet, oral, 4 times daily PRN    doxycycline (VIBRAMYCIN) 100 mg, oral, 2 times daily, For rash prevention. Take with at least 8 ounces (large glass) of water, do not lie down for 30 minutes after    hydrocortisone 1 % cream Apply topically to face, hands, feet, neck, back, and chest once daily at bedtime for rash prevention.    lidocaine (Lidoderm) 5 % patch 1 patch, transdermal, Daily, Remove & discard patch within 12 hours or as directed by MD.    lipase-protease-amylase (Creon) 24,000-76,000  -120,000 unit capsule TAKE 3 CAPSULES BY MOUTH 3 TIMES DAILY WITH MEALS AND 1 TO 2  CAPSULES BY MOUTH WITH SNACKS.  MAX 13 CAPSULE DAILY    loperamide (Imodium) 2 mg capsule Take 2 capsules (4 mg) by mouth with the first episode of diarrhea and 1 capsule (2 mg) by mouth with any additional episodes. Maximum 8 capsules (16 mg) per day.    losartan (COZAAR) 25 mg, oral, Daily    magnesium oxide (MAG-OX) 400 mg, oral, Daily    ondansetron (ZOFRAN) 8 mg, oral, Every 8 hours PRN    pantoprazole (ProtoNix) 40 mg EC tablet TAKE 1 TABLET BY MOUTH TWICE  DAILY    potassium chloride (Klor-Con) 20 mEq packet 20 mEq, oral, Daily    prochlorperazine (COMPAZINE) 10 mg, oral, Every 6 hours PRN        Objective   VS: /70 (BP Location: Left arm, Patient Position: Sitting, BP Cuff Size: Adult)   Pulse 84   Temp 35.9 °C (96.6 °F) (Temporal)   Wt 61.4 kg (135 lb 6.4 oz)   SpO2 94%   BMI 23.24 kg/m²   Weight: Daily Weight  09/11/24 : 61.4 kg (135 lb 6.4 oz)  08/27/24 : 60.5 kg (133 lb 4.3 oz)  08/26/24 : 60.6 kg (133 lb 11.3 oz)  08/23/24 : 60.8 kg (134 lb 0.6 oz)  08/22/24 : 61.4 kg (135 lb 7.6 oz)  08/20/24 : 61.2 kg (134 lb 14.7 oz)  08/19/24 : 60.9 kg (134 lb 4.2 oz)      Physical Exam  Vitals reviewed.   Constitutional:       Appearance: Normal appearance.   HENT:      Head: Normocephalic.      Nose: Nose normal.      Mouth/Throat:      Mouth: Mucous membranes are moist.      Pharynx: Oropharynx is clear.   Eyes:      Extraocular Movements: Extraocular movements intact.      Conjunctiva/sclera: Conjunctivae normal.      Pupils: Pupils are equal, round, and reactive to light.   Cardiovascular:      Rate and Rhythm: Normal rate and regular rhythm.      Pulses: Normal pulses.      Heart sounds: Normal heart sounds.   Pulmonary:      Breath sounds: Normal breath sounds.   Abdominal:      General: Bowel sounds are normal.      Palpations: Abdomen is soft.   Musculoskeletal:         General: Normal range of motion.       Cervical back: Normal range of motion and neck supple.   Skin:     General: Skin is warm and dry.      Findings: Rash present.      Comments: Radiation dermatitis (g1) to upper chest.   Neurological:      General: No focal deficit present.      Mental Status: She is alert and oriented to person, place, and time.   Psychiatric:         Mood and Affect: Mood normal.         Behavior: Behavior normal.         Thought Content: Thought content normal.         Judgment: Judgment normal.     Diagnostic Results                            Images  MRI Cervical and Thoracic Spine 7/8/24:    Cervical, Thoracic Spine: Diffuse soft tissue thickening and enhancement of the glottic larynx extending into the subglottic larynx and involving the left cricoid, arytenoid, and thyroid cartilages, consistent with known tumor.  No evidence of metastatic disease within the cervical or thoracic  spine.    Pathology  Lab Results   Component Value Date    PATHREP  12/06/2021                                                     MRN: [id number]  Patient Name RONALD LENNON  Accession #: [id number]  Date of Procedure:  12/6/2021       Date Reported: 12/9/2021  Date Received:  12/7/2021  Date of Birth / Sex 1945 (Age: 76) / F  Race: WHITE  Submitting Physician: ROD VALLE MD    Other External #          FINAL CYTOLOGICAL INTERPRETATION    A.   FINE NEEDLE ASPIRATION BREAST - RIGHT, CYTOLOGY AND CELL BLOCK:  --NO MALIGNANT CELLS IDENTIFIED.  --SPECIMEN CONSISTS OF PROTEINACEOUS DEBRIS, OCCASIONAL FOAMY MACROPHAGES, FEW  INFLAMMATORY CELLS AND RARE EPITHELIAL GROUP.        Slide(s) initially screened by a Cytotechnologist at [place] [address]      Electronically Signed Out By GABINO GARCES MD    By the signature on this report, the individual or group listed as making the  Final Interpretation/Diagnosis certifies that they have reviewed this case.  Slide(s) initially  screened by a Cytotechnologist at [facility]     Clinical History      Clinical Diagnosis History: Cyst of right breast - (N60.01)   Source of Specimen  A:  FINE NEEDLE ASPIRATION BREAST - RIGHT     Specimen Submitted as:  A:   FINE NEEDLE ASPIRATION BREAST - RIGHT        Pap non-gyn ThinPrep slide, CELL BLOCK, H&E, Initial    Gross Description  A.   FINE NEEDLE ASPIRATION BREAST - RIGHT:  35cc MILKY YELLOW NEEDLE RINSE IN  CYTOLYT                 [facility]  Department of Pathology  [address] [place] [address]        PATHREP  09/09/2019     Name RONALD LENNON                                                                                                   Accession #: [id number]            Pathologist:                   YUE FIGUEROA MD  Date of Procedure:    9/9/2019  Date Received:          9/9/2019  Date Reported           9/13/2019  Submitting Physician:   SALIMA WHEELER MD  Location:                    [facility]  Other External #                                                                    FINAL DIAGNOSIS  A.  BIOPSY OF ANAL CANAL:  MINUTE FRAGMENTS OF SOFT TISSUE WITH NO SIGNIFICANT  PATHOLOGICAL FINDINGS, SEE NOTE.    Note: Deeper sections were obtained.                                                                                                                                                                                                                                                                                                                                                                                                                                                                                       Electronically Signed Out By YUE FIGUEROA MD/ANASTASIYA  By the signature on this report, the individual or group listed as making the  Final Interpretation/Diagnosis certifies that they have reviewed this case.           Clinical  "History:  Colorectal cancer, squamous cell, status post chemo radiation    Specimens Submitted As:  A: BIOPSY OF ANAL CANAL     Gross Description:  Received in formalin, labeled with the patient's name and hospital number and  \"anal canal\", are multiple fragments of tan, soft tissue aggregating to 1.7 x  1.0 x 0.1 cm. The specimen is submitted in toto in one cassette.  CJN    cjn/9/9/2019               [facility]  Department of Pathology   [address] [place] [address]        COMDX  05/20/2024     Case reviewed at ENT consensus conference via [employer] on 5/22/2024.       Assessment/Plan   Ms. Campuzano is 78 y/o female with recent diagnosis of laryngeal cancer who is referred to  med onc  to discuss treatment plan.    # Laryngeal cancer (T3N0M0)  -Based on PET/CT from 4/3/24, there was uptake in LLL nodule, however, biopsy of LLL nodule showed negative for malignancy  -seen by Thoracic surgeon, plan to have repeat CT chest without contrast in 3 month  -since her GFR is 40 (no hearing deficit), she is not a candidate for cisplatin, will treat her with weekly carboplatin and paclitaxel, reviewed chemo related side effects with patient in detail.  Consent obtained.  -pt developed abdominal pain post C1, during C2 infusion of paclitaxel,  she experienced worsening of abdominal pain, pt wanted to stop chemo on 7/9 and also didn't want further chemotherapy.  -7/10/24: discussed with patient about changing treatment to cetuximab. Explained to patient about main side effects (skin rash and hypomagnesemia), she is good with the plan.  I couldn't place consent due to epic issue, I will place it later.  - Pt consented on 7/11/24  - Pt tolerated her C1 Cetuximab tx well.  Intermittent diarrhea managed with PRN Imodium     - Diarrhea 8/11/24 with Lomotil taken x4.  Today back to her norm.    - Diarrhea 8/16/24 with Lomotil taken x4.  Also 8/25/24 diet related.  Questran resumed.  "     7/2/24 - C1 Carboplatin/Paciltaxel  7/9/24 - pt declined C2 carboplatin/paciltaxel  7/15/24 - PowerPort mediport placed   7/16/24 - C1 Cetuximab  7/23/24 - C2 Cetuximab  7/30/24 - C3 Cetuximab  8/6/24 -  C4 Cetuximab  8/13/24 - C5 Cetuximab  8/20/24 - C6 Cetuximab, final tx  -completed chemoradiation, ongoing radiation dermatitis in neck/upper chest, which is slowly improving, Mg low at 1.1, pt prefers to receive IV in another day.    # Hx of anal, breast and cutaneous malignancies  -s/p lumpectomy with radiation in 2012  -s/p radiation to anal cancer in 2018  -s/p resection of skin cancer on nose in 2010    # Chronic diarrhea   - from previous colonic surgery  -3-4 episodes per day, while on 3 imodium three times a day, 3-4 episodes of diarrhea daily.  - Concern for flares with chemotherapy tx's  - Tolerated C1 Carboplatin/Paciltaxel  - Currently managed with Creon and Questran - stools forms, soft  - 7/22:  Following C1 Cetuximab, intermittent diarrhea - managed with PRN Imodium   - 7/29:  Imodium admin x1 following C2.    - ongoing monitoring  - ED 7/31 - d/t dehydration 2/2 diarrhea, pt left AMA  - 8/5:  Dr. Moura provided Lomotil Rx.  Diarrhea managed with Lomotil - x2/day   - 8/12:  Managed with PRN Lomotil.    - 8/19:  Managed with PRN Lomotil. Questran resumed.    - 8/26:  Managed with PRN Lomotil.  Rx sent #28.        # Back pain, area between her shoulder blades, more so on the left.  Has worsened over the last few weeks.  Pain with activity.  Resolves with rest.  No acute injury.  No falls.    - Will order MRI C & T (+/-) next available.  MRI's scheduled 7/8/24.    - 7/8:  Cervical, Thoracic Spine: Diffuse soft tissue thickening and enhancement of the glottic larynx extending into the subglottic larynx and involving the left cricoid, arytenoid, and thyroid cartilages, consistent with known tumor.  No evidence of metastatic disease within the cervical or thoracic  spine.   - Currently managed with  routine Tylenol 1,000mg/daily  - Supportive onc referral placed.  Pt to return sup onc call.  - 7/22:  No current c/o back pain    - 7/29:  Recommended she continue Tylenol and heating pad for pain control  - 8/5:  Managed with PRN Tylenol  - 8/12:  Will trial Baclofen 5mg TID PRN.  Instructed no concurrent admin with Lomotil.  Pt verbalized understanding.   - 8/19:  Reports no benefit with Baclofen.  Resumed PRN Tylenol.  Rx sent for Lidocaine patches, #7. PA provided.  Chronic back pain.  Previously seen and managed by a chiropractor.  Wishes to wait until CRT completed before scheduling.  Needs to establish with new MD.  Declined referral for [facility] d/t distance from her home.  She will follow-up with her PCP.    - 8/26:  establish with PCP.  Pain managed with Lidocaine patches.  Refill Rx sent #21.      # Hypomagnesia: 7/2/24 Mg 1.41  - Will start oral Mag Oxide - 1 tab/day, Rx sent (not covered by pt's insurance)  - Will replete with IV hydration visit 7/5/24.  - If consistently low, will replete weekly via IV   - 7/8/24 - Mg 1.71.    - 7/15/24 - Mg 1.31 - 2g Mg prior to 7/16 infusion   - 7/22/24 - Mg 1.42 - 2g Mg prior to 7/23 infusion   - 7/29/24 - Mg 1.52 - 2g Mg prior to 7/30 infusion  - 8/6/24 - Mg 1.60 - 2g Mg   - 8/12/24 - Mg 1.26 - 2g Mg with infusion 8/13/24.  Pt ran out of her magnesium supplement, will pick-up refill today.    - 8/19/24 - Mg 1.42 - 2g Mg with infusion 8/20/24.    - 8/26/24  - Mg 1.32.  Pt to continue oral Mag Oxide.  Will check infusion availability for 8/27 or 8/28.    Repeat labs 9/11/24.     # Hypokalemia  - 8/2, K 3.2  - Rx sent - potassium packets 20mEq/day  - 8/19, K 4.3    - stable with oral potassium.  Repeat labs 9/11/24.     # Mediport placement, per pt request  - IR consult order in place  - INR lab completed.  - 7/8:  Encouraged pt to return [facility] call to schedule placement.    - 7/15:  PowerPort mediport placed  - EMLA Rx sent 7/22/24     #  Elevated Cr/BUN 1/24/24  - at/near baseline  - 1.17 7/8/24  - 1.43/25 7/15/24   - 1.05/30 7/22/24 - encouraged fluids   - 1.17/34 7/29/24 - encouraged fluids, IV hydration 8/2/24  - ongoing monitoring  - 1.06/28 8/2/24 - improved  - 1.09 8/12/24 - stable  - 1.13 8/19/24 - stable    # Localized rash/radiation dermatitis   - flat, macular rash to upper, mid chest area <10% BSA  - Continue topical lotion to area     # Rx's  - Rx's sent to pt's pharmacy 7/15  - Spouse reports not all meds available today, expect to have all meds on 7/16.  Meds to be picked-up on 7/16/24.  Spouse waiting for all meds to be available, prior to pick-up.    - 7/22:  encouraged pt to apply Hydrocortisone ointment per tx plan instructions.  No current skin concerns.      PLAN  - will give IV Mg 4 g on 9/12  - follow up with Megan Arauz on 9/30

## 2024-09-11 NOTE — TELEPHONE ENCOUNTER
TC to pt with [facility] result - 1.10.  Lab resulted following pt visit.   Pt with PCP 1:30 appt tomorrow at [facility].    Infusion availability 9/12 at 11:00 for 2H slot.   Order placed for 4g Mg repletion.   Pt agreeable to infusion appt.   Will repeat labs with infusion slot next week.

## 2024-09-12 ENCOUNTER — INFUSION (OUTPATIENT)
Dept: HEMATOLOGY/ONCOLOGY | Facility: HOSPITAL | Age: 79
End: 2024-09-12
Payer: MEDICARE

## 2024-09-12 ENCOUNTER — APPOINTMENT (OUTPATIENT)
Dept: PRIMARY CARE | Facility: CLINIC | Age: 79
End: 2024-09-12
Payer: MEDICARE

## 2024-09-12 ENCOUNTER — DOCUMENTATION (OUTPATIENT)
Dept: RADIATION ONCOLOGY | Facility: CLINIC | Age: 79
End: 2024-09-12

## 2024-09-12 VITALS
DIASTOLIC BLOOD PRESSURE: 64 MMHG | HEART RATE: 75 BPM | BODY MASS INDEX: 22.9 KG/M2 | SYSTOLIC BLOOD PRESSURE: 140 MMHG | TEMPERATURE: 97.1 F | WEIGHT: 133.4 LBS | OXYGEN SATURATION: 99 %

## 2024-09-12 VITALS
RESPIRATION RATE: 16 BRPM | WEIGHT: 133.38 LBS | TEMPERATURE: 96.4 F | HEART RATE: 87 BPM | OXYGEN SATURATION: 97 % | DIASTOLIC BLOOD PRESSURE: 73 MMHG | BODY MASS INDEX: 22.89 KG/M2 | SYSTOLIC BLOOD PRESSURE: 151 MMHG

## 2024-09-12 DIAGNOSIS — I10 HYPERTENSION, UNSPECIFIED TYPE: ICD-10-CM

## 2024-09-12 DIAGNOSIS — C32.9 LARYNGEAL CANCER (MULTI): Primary | ICD-10-CM

## 2024-09-12 DIAGNOSIS — N18.31 STAGE 3A CHRONIC KIDNEY DISEASE (MULTI): ICD-10-CM

## 2024-09-12 DIAGNOSIS — C32.9 LARYNGEAL CANCER (MULTI): ICD-10-CM

## 2024-09-12 DIAGNOSIS — C32.0 SQUAMOUS CELL CARCINOMA OF LEFT VOCAL CORD (MULTI): Primary | ICD-10-CM

## 2024-09-12 DIAGNOSIS — E83.42 HYPOMAGNESEMIA: ICD-10-CM

## 2024-09-12 DIAGNOSIS — R19.7 DIARRHEA, UNSPECIFIED TYPE: ICD-10-CM

## 2024-09-12 PROCEDURE — 1160F RVW MEDS BY RX/DR IN RCRD: CPT | Performed by: INTERNAL MEDICINE

## 2024-09-12 PROCEDURE — 2500000004 HC RX 250 GENERAL PHARMACY W/ HCPCS (ALT 636 FOR OP/ED): Performed by: NURSE PRACTITIONER

## 2024-09-12 PROCEDURE — 3078F DIAST BP <80 MM HG: CPT | Performed by: INTERNAL MEDICINE

## 2024-09-12 PROCEDURE — 1159F MED LIST DOCD IN RCRD: CPT | Performed by: INTERNAL MEDICINE

## 2024-09-12 PROCEDURE — 3077F SYST BP >= 140 MM HG: CPT | Performed by: INTERNAL MEDICINE

## 2024-09-12 PROCEDURE — 96366 THER/PROPH/DIAG IV INF ADDON: CPT

## 2024-09-12 PROCEDURE — 99214 OFFICE O/P EST MOD 30 MIN: CPT | Performed by: INTERNAL MEDICINE

## 2024-09-12 PROCEDURE — 96365 THER/PROPH/DIAG IV INF INIT: CPT | Mod: INF

## 2024-09-12 RX ORDER — EPINEPHRINE 0.3 MG/.3ML
0.3 INJECTION SUBCUTANEOUS EVERY 5 MIN PRN
Status: CANCELLED | OUTPATIENT
Start: 2024-09-19

## 2024-09-12 RX ORDER — LOSARTAN POTASSIUM 25 MG/1
25 TABLET ORAL DAILY
Qty: 90 TABLET | Refills: 0 | Status: SHIPPED | OUTPATIENT
Start: 2024-09-12 | End: 2024-12-11

## 2024-09-12 RX ORDER — HEPARIN SODIUM,PORCINE/PF 10 UNIT/ML
50 SYRINGE (ML) INTRAVENOUS AS NEEDED
OUTPATIENT
Start: 2024-09-12

## 2024-09-12 RX ORDER — HEPARIN 100 UNIT/ML
500 SYRINGE INTRAVENOUS AS NEEDED
OUTPATIENT
Start: 2024-09-12

## 2024-09-12 RX ORDER — DIPHENHYDRAMINE HYDROCHLORIDE 50 MG/ML
50 INJECTION INTRAMUSCULAR; INTRAVENOUS AS NEEDED
Status: CANCELLED | OUTPATIENT
Start: 2024-09-19

## 2024-09-12 RX ORDER — HEPARIN 100 UNIT/ML
500 SYRINGE INTRAVENOUS AS NEEDED
Status: DISCONTINUED | OUTPATIENT
Start: 2024-09-12 | End: 2024-09-12 | Stop reason: HOSPADM

## 2024-09-12 RX ORDER — HEPARIN SODIUM,PORCINE/PF 10 UNIT/ML
50 SYRINGE (ML) INTRAVENOUS AS NEEDED
Status: DISCONTINUED | OUTPATIENT
Start: 2024-09-12 | End: 2024-09-12 | Stop reason: HOSPADM

## 2024-09-12 RX ORDER — LOPERAMIDE HYDROCHLORIDE 2 MG/1
CAPSULE ORAL
Qty: 100 CAPSULE | Refills: 0 | Status: SHIPPED | OUTPATIENT
Start: 2024-09-12

## 2024-09-12 RX ORDER — MAGNESIUM SULFATE HEPTAHYDRATE 40 MG/ML
4 INJECTION, SOLUTION INTRAVENOUS ONCE
Status: CANCELLED | OUTPATIENT
Start: 2024-09-19

## 2024-09-12 RX ORDER — ALBUTEROL SULFATE 0.83 MG/ML
3 SOLUTION RESPIRATORY (INHALATION) AS NEEDED
Status: CANCELLED | OUTPATIENT
Start: 2024-09-19

## 2024-09-12 RX ORDER — FAMOTIDINE 10 MG/ML
20 INJECTION INTRAVENOUS ONCE AS NEEDED
Status: CANCELLED | OUTPATIENT
Start: 2024-09-19

## 2024-09-12 RX ORDER — CHOLESTYRAMINE 4 G/9G
4 POWDER, FOR SUSPENSION ORAL DAILY
Qty: 90 PACKET | Refills: 0 | Status: SHIPPED | OUTPATIENT
Start: 2024-09-12 | End: 2024-12-11

## 2024-09-12 RX ORDER — MAGNESIUM SULFATE HEPTAHYDRATE 40 MG/ML
4 INJECTION, SOLUTION INTRAVENOUS ONCE
Status: COMPLETED | OUTPATIENT
Start: 2024-09-12 | End: 2024-09-12

## 2024-09-12 RX ORDER — MAGNESIUM SULFATE HEPTAHYDRATE 40 MG/ML
2 INJECTION, SOLUTION INTRAVENOUS ONCE
Status: CANCELLED | OUTPATIENT
Start: 2024-09-19

## 2024-09-12 ASSESSMENT — ENCOUNTER SYMPTOMS
LOSS OF SENSATION IN FEET: 0
DEPRESSION: 0
OCCASIONAL FEELINGS OF UNSTEADINESS: 0

## 2024-09-12 ASSESSMENT — PATIENT HEALTH QUESTIONNAIRE - PHQ9
1. LITTLE INTEREST OR PLEASURE IN DOING THINGS: NOT AT ALL
SUM OF ALL RESPONSES TO PHQ9 QUESTIONS 1 & 2: 0
2. FEELING DOWN, DEPRESSED OR HOPELESS: NOT AT ALL

## 2024-09-12 ASSESSMENT — PAIN SCALES - GENERAL: PAINLEVEL: 2

## 2024-09-12 NOTE — PROGRESS NOTES
Subjective   Patient ID: Padilla Campuzano is a 79 y.o. female who presents for Follow-up (3 month ).  HPI    Routine follow up    Spouse / Bryson present H&P    Doing as well as can under circumstances    Hypertension stable on rx no side effects  On losartan 25 mg daily     CKD stage 3a  GFR 55 9-24     Follow up CT chest  CT/PET rev'd / abnormal  oncology following     Patient presented in December 2023  with laryngitis.  She was seen and evaluated in the [place] urgent care.  Diagnosed with laryngeal cancer  Voice has still not returned.  ENT following  Had XRT  Now receiving infusions     H/o lung nodule on CT 2-23  Bx negative  h/o smoker     History of breast cancer  mammogram ordered     History of colon cancer     History of anal cancer  Chronic diarrhea on rx     History of cerebral hemorrhage 2012.     Diet and exercise reviewed    Review of Systems   All other systems reviewed and are negative.      Objective   /64   Pulse 75   Temp 36.2 °C (97.1 °F)   Wt 60.5 kg (133 lb 6.4 oz)   SpO2 99%   BMI 22.90 kg/m²   Lab Results   Component Value Date    WBC 5.2 09/11/2024    HGB 10.7 (L) 09/11/2024    HCT 33.6 (L) 09/11/2024     09/11/2024    CHOL 123 05/01/2024    TRIG 130 05/01/2024    HDL 44.8 05/01/2024    ALT 9 09/11/2024    AST 16 09/11/2024     09/11/2024    K 3.4 (L) 09/11/2024     (H) 09/11/2024    CREATININE 1.04 09/11/2024    BUN 20 09/11/2024    CO2 24 09/11/2024    TSH 4.06 (H) 06/21/2024    INR 1.1 07/31/2024    ALBUR 80 (H) 01/10/2023           Physical Exam  Vitals reviewed.   Constitutional:       Appearance: Normal appearance. She is normal weight.   HENT:      Head: Normocephalic and atraumatic.      Comments: Raspy voice     Mouth/Throat:      Pharynx: No posterior oropharyngeal erythema.   Eyes:      General: No scleral icterus.     Conjunctiva/sclera: Conjunctivae normal.      Pupils: Pupils are equal, round, and reactive to light.   Cardiovascular:      Rate and  Rhythm: Normal rate and regular rhythm.      Heart sounds: Normal heart sounds.   Pulmonary:      Effort: No respiratory distress.      Breath sounds: No wheezing.   Abdominal:      General: Abdomen is flat. Bowel sounds are normal. There is no distension.      Palpations: Abdomen is soft. There is no mass.      Tenderness: There is no abdominal tenderness. There is no rebound.   Musculoskeletal:         General: Normal range of motion.      Cervical back: Normal range of motion and neck supple.   Skin:     General: Skin is warm and dry.   Neurological:      General: No focal deficit present.      Mental Status: She is alert and oriented to person, place, and time. Mental status is at baseline.   Psychiatric:         Mood and Affect: Mood normal.         Behavior: Behavior normal.         Thought Content: Thought content normal.         Judgment: Judgment normal.         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  [facility]  Diagnosed with laryngeal cancer  Voice has still not returned.  ENT following  Had XRT  Now receiving infusions     H/o lung nodule on CT 2-23  Bx negative  h/o smoker      History of breast cancer  mammogram ordered     History of colon cancer     History of anal cancer  Chronic diarrhea on rx     History of cerebral hemorrhage 2012.     Diet and exercise reviewed    Mammogram  11-21 / ordered not done  Dexa n/a  Colonoscopy n/a  CT chest lung cancer screening n/a  GYN n/a  immunizations rev'd RSV, shingrix, pneumo  BMI 22.9    Follow up 3 months

## 2024-09-19 ENCOUNTER — INFUSION (OUTPATIENT)
Dept: HEMATOLOGY/ONCOLOGY | Facility: HOSPITAL | Age: 79
End: 2024-09-19
Payer: MEDICARE

## 2024-09-19 VITALS
HEART RATE: 83 BPM | DIASTOLIC BLOOD PRESSURE: 79 MMHG | TEMPERATURE: 95.9 F | RESPIRATION RATE: 16 BRPM | SYSTOLIC BLOOD PRESSURE: 143 MMHG | OXYGEN SATURATION: 94 %

## 2024-09-19 DIAGNOSIS — C32.9 LARYNGEAL CANCER (MULTI): Primary | ICD-10-CM

## 2024-09-19 DIAGNOSIS — E61.2 MAGNESIUM DEFICIENCY: ICD-10-CM

## 2024-09-19 DIAGNOSIS — C32.9 LARYNGEAL CANCER (MULTI): ICD-10-CM

## 2024-09-19 LAB
ALBUMIN SERPL BCP-MCNC: 3.3 G/DL (ref 3.4–5)
ALP SERPL-CCNC: 63 U/L (ref 33–136)
ALT SERPL W P-5'-P-CCNC: 7 U/L (ref 7–45)
ANION GAP SERPL CALC-SCNC: 11 MMOL/L (ref 10–20)
AST SERPL W P-5'-P-CCNC: 15 U/L (ref 9–39)
BILIRUB SERPL-MCNC: 0.3 MG/DL (ref 0–1.2)
BUN SERPL-MCNC: 19 MG/DL (ref 6–23)
CALCIUM SERPL-MCNC: 8 MG/DL (ref 8.6–10.3)
CHLORIDE SERPL-SCNC: 109 MMOL/L (ref 98–107)
CO2 SERPL-SCNC: 25 MMOL/L (ref 21–32)
CREAT SERPL-MCNC: 1.05 MG/DL (ref 0.5–1.05)
EGFRCR SERPLBLD CKD-EPI 2021: 54 ML/MIN/1.73M*2
GLUCOSE SERPL-MCNC: 92 MG/DL (ref 74–99)
MAGNESIUM SERPL-MCNC: 1.1 MG/DL (ref 1.6–2.4)
POTASSIUM SERPL-SCNC: 4.1 MMOL/L (ref 3.5–5.3)
PROT SERPL-MCNC: 5.7 G/DL (ref 6.4–8.2)
SODIUM SERPL-SCNC: 141 MMOL/L (ref 136–145)

## 2024-09-19 PROCEDURE — 2500000004 HC RX 250 GENERAL PHARMACY W/ HCPCS (ALT 636 FOR OP/ED): Performed by: NURSE PRACTITIONER

## 2024-09-19 PROCEDURE — 83735 ASSAY OF MAGNESIUM: CPT | Performed by: NURSE PRACTITIONER

## 2024-09-19 PROCEDURE — 80053 COMPREHEN METABOLIC PANEL: CPT | Performed by: NURSE PRACTITIONER

## 2024-09-19 PROCEDURE — 96365 THER/PROPH/DIAG IV INF INIT: CPT | Mod: INF

## 2024-09-19 PROCEDURE — 96366 THER/PROPH/DIAG IV INF ADDON: CPT

## 2024-09-19 RX ORDER — MAGNESIUM SULFATE HEPTAHYDRATE 40 MG/ML
2 INJECTION, SOLUTION INTRAVENOUS ONCE
Status: CANCELLED | OUTPATIENT
Start: 2024-09-26 | End: 2024-09-26

## 2024-09-19 RX ORDER — MAGNESIUM SULFATE HEPTAHYDRATE 40 MG/ML
2 INJECTION, SOLUTION INTRAVENOUS ONCE
Status: COMPLETED | OUTPATIENT
Start: 2024-09-19 | End: 2024-09-19

## 2024-09-19 RX ORDER — ALBUTEROL SULFATE 0.83 MG/ML
3 SOLUTION RESPIRATORY (INHALATION) AS NEEDED
Status: DISCONTINUED | OUTPATIENT
Start: 2024-09-19 | End: 2024-09-19 | Stop reason: HOSPADM

## 2024-09-19 RX ORDER — MAGNESIUM SULFATE HEPTAHYDRATE 40 MG/ML
4 INJECTION, SOLUTION INTRAVENOUS ONCE
Status: CANCELLED | OUTPATIENT
Start: 2024-09-19 | End: 2024-09-19

## 2024-09-19 RX ORDER — HEPARIN SODIUM,PORCINE/PF 10 UNIT/ML
50 SYRINGE (ML) INTRAVENOUS AS NEEDED
Status: DISCONTINUED | OUTPATIENT
Start: 2024-09-19 | End: 2024-09-19 | Stop reason: HOSPADM

## 2024-09-19 RX ORDER — EPINEPHRINE 0.3 MG/.3ML
0.3 INJECTION SUBCUTANEOUS EVERY 5 MIN PRN
Status: DISCONTINUED | OUTPATIENT
Start: 2024-09-19 | End: 2024-09-19 | Stop reason: HOSPADM

## 2024-09-19 RX ORDER — FAMOTIDINE 10 MG/ML
20 INJECTION INTRAVENOUS ONCE AS NEEDED
Status: DISCONTINUED | OUTPATIENT
Start: 2024-09-19 | End: 2024-09-19 | Stop reason: HOSPADM

## 2024-09-19 RX ORDER — DIPHENHYDRAMINE HYDROCHLORIDE 50 MG/ML
50 INJECTION INTRAMUSCULAR; INTRAVENOUS AS NEEDED
OUTPATIENT
Start: 2024-09-26

## 2024-09-19 RX ORDER — HEPARIN SODIUM,PORCINE/PF 10 UNIT/ML
50 SYRINGE (ML) INTRAVENOUS AS NEEDED
OUTPATIENT
Start: 2024-09-19

## 2024-09-19 RX ORDER — ALBUTEROL SULFATE 0.83 MG/ML
3 SOLUTION RESPIRATORY (INHALATION) AS NEEDED
OUTPATIENT
Start: 2024-09-26

## 2024-09-19 RX ORDER — MAGNESIUM SULFATE HEPTAHYDRATE 40 MG/ML
2 INJECTION, SOLUTION INTRAVENOUS ONCE
Status: CANCELLED | OUTPATIENT
Start: 2024-09-19 | End: 2024-09-19

## 2024-09-19 RX ORDER — DIPHENHYDRAMINE HYDROCHLORIDE 50 MG/ML
50 INJECTION INTRAMUSCULAR; INTRAVENOUS AS NEEDED
Status: DISCONTINUED | OUTPATIENT
Start: 2024-09-19 | End: 2024-09-19 | Stop reason: HOSPADM

## 2024-09-19 RX ORDER — HEPARIN 100 UNIT/ML
500 SYRINGE INTRAVENOUS AS NEEDED
OUTPATIENT
Start: 2024-09-19

## 2024-09-19 RX ORDER — MAGNESIUM SULFATE HEPTAHYDRATE 40 MG/ML
4 INJECTION, SOLUTION INTRAVENOUS ONCE
Status: CANCELLED | OUTPATIENT
Start: 2024-09-26 | End: 2024-09-26

## 2024-09-19 RX ORDER — MAGNESIUM SULFATE HEPTAHYDRATE 40 MG/ML
2 INJECTION, SOLUTION INTRAVENOUS ONCE
OUTPATIENT
Start: 2024-09-26 | End: 2024-09-26

## 2024-09-19 RX ORDER — FAMOTIDINE 10 MG/ML
20 INJECTION INTRAVENOUS ONCE AS NEEDED
OUTPATIENT
Start: 2024-09-26

## 2024-09-19 RX ORDER — EPINEPHRINE 0.3 MG/.3ML
0.3 INJECTION SUBCUTANEOUS EVERY 5 MIN PRN
OUTPATIENT
Start: 2024-09-26

## 2024-09-19 RX ORDER — HEPARIN 100 UNIT/ML
500 SYRINGE INTRAVENOUS AS NEEDED
Status: DISCONTINUED | OUTPATIENT
Start: 2024-09-19 | End: 2024-09-19 | Stop reason: HOSPADM

## 2024-09-19 RX ORDER — MAGNESIUM SULFATE HEPTAHYDRATE 40 MG/ML
4 INJECTION, SOLUTION INTRAVENOUS ONCE
OUTPATIENT
Start: 2024-09-26 | End: 2024-09-26

## 2024-09-19 ASSESSMENT — PAIN SCALES - GENERAL: PAINLEVEL: 0-NO PAIN

## 2024-09-23 ENCOUNTER — HOSPITAL ENCOUNTER (OUTPATIENT)
Dept: RADIOLOGY | Facility: HOSPITAL | Age: 79
Discharge: HOME | End: 2024-09-23
Payer: MEDICARE

## 2024-09-23 DIAGNOSIS — R91.1 LUNG NODULE: ICD-10-CM

## 2024-09-23 PROCEDURE — 71250 CT THORAX DX C-: CPT

## 2024-09-23 PROCEDURE — 71250 CT THORAX DX C-: CPT | Performed by: RADIOLOGY

## 2024-09-24 ENCOUNTER — OFFICE VISIT (OUTPATIENT)
Dept: CARDIAC SURGERY | Facility: CLINIC | Age: 79
End: 2024-09-24
Payer: MEDICARE

## 2024-09-24 ENCOUNTER — APPOINTMENT (OUTPATIENT)
Dept: HEMATOLOGY/ONCOLOGY | Facility: CLINIC | Age: 79
End: 2024-09-24
Payer: MEDICARE

## 2024-09-24 VITALS
BODY MASS INDEX: 23.25 KG/M2 | RESPIRATION RATE: 18 BRPM | HEART RATE: 84 BPM | SYSTOLIC BLOOD PRESSURE: 123 MMHG | HEIGHT: 63 IN | DIASTOLIC BLOOD PRESSURE: 62 MMHG | WEIGHT: 131.2 LBS | OXYGEN SATURATION: 97 %

## 2024-09-24 DIAGNOSIS — R91.1 LUNG NODULE: Primary | ICD-10-CM

## 2024-09-24 PROCEDURE — 99215 OFFICE O/P EST HI 40 MIN: CPT | Performed by: THORACIC SURGERY (CARDIOTHORACIC VASCULAR SURGERY)

## 2024-09-24 PROCEDURE — 1126F AMNT PAIN NOTED NONE PRSNT: CPT | Performed by: THORACIC SURGERY (CARDIOTHORACIC VASCULAR SURGERY)

## 2024-09-24 PROCEDURE — 1159F MED LIST DOCD IN RCRD: CPT | Performed by: THORACIC SURGERY (CARDIOTHORACIC VASCULAR SURGERY)

## 2024-09-24 PROCEDURE — 1036F TOBACCO NON-USER: CPT | Performed by: THORACIC SURGERY (CARDIOTHORACIC VASCULAR SURGERY)

## 2024-09-24 ASSESSMENT — PATIENT HEALTH QUESTIONNAIRE - PHQ9
1. LITTLE INTEREST OR PLEASURE IN DOING THINGS: NOT AT ALL
SUM OF ALL RESPONSES TO PHQ9 QUESTIONS 1 AND 2: 0
2. FEELING DOWN, DEPRESSED OR HOPELESS: NOT AT ALL

## 2024-09-24 ASSESSMENT — ENCOUNTER SYMPTOMS
UNEXPECTED WEIGHT CHANGE: 0
EYES NEGATIVE: 1
PSYCHIATRIC NEGATIVE: 1
DIAPHORESIS: 0
OCCASIONAL FEELINGS OF UNSTEADINESS: 0
CHEST TIGHTNESS: 0
LOSS OF SENSATION IN FEET: 0
DEPRESSION: 0
NAUSEA: 0
FEVER: 0
STRIDOR: 0
CHILLS: 0
WHEEZING: 0
CHOKING: 0
APPETITE CHANGE: 0
CONSTIPATION: 0
SHORTNESS OF BREATH: 0
FATIGUE: 0
DIARRHEA: 0
ENDOCRINE NEGATIVE: 1
PALPITATIONS: 0
ALLERGIC/IMMUNOLOGIC NEGATIVE: 1
NEUROLOGICAL NEGATIVE: 1
COUGH: 0
ABDOMINAL DISTENTION: 0
HEMATOLOGIC/LYMPHATIC NEGATIVE: 1
VOMITING: 0
ABDOMINAL PAIN: 0
MUSCULOSKELETAL NEGATIVE: 1

## 2024-09-24 ASSESSMENT — PAIN SCALES - GENERAL: PAINLEVEL: 0-NO PAIN

## 2024-09-24 ASSESSMENT — LIFESTYLE VARIABLES
SKIP TO QUESTIONS 9-10: 1
AUDIT-C TOTAL SCORE: 0
HOW OFTEN DO YOU HAVE SIX OR MORE DRINKS ON ONE OCCASION: NEVER
HOW OFTEN DO YOU HAVE A DRINK CONTAINING ALCOHOL: NEVER
HOW MANY STANDARD DRINKS CONTAINING ALCOHOL DO YOU HAVE ON A TYPICAL DAY: PATIENT DOES NOT DRINK

## 2024-09-24 NOTE — PROGRESS NOTES
Subjective   Patient ID: Padilla Campuzano is a 79 y.o. female who presents for Follow-up (3 mo w ct).  HPI    78-year-old female with multiple medical problems and history of breast cancer colon cancer and anal cancer.  She was found to have a left lower lobe nodule which is new in nature and some cavitation as well.  This nodule is concerning for malignancy.  She is scheduled to have a biopsy in a PET scan.  I will add PFTs.  I think based on those results we can formulate a better plan for her.  See her again with results.  She seems like an active woman we will assess her candidacy for resection if one is needed with her PFTs.    Update 5/7/2024  PFTs show an FEV1 of 110% of predicted and a DLCO of 72%.  She underwent an IR guided biopsy that showed lung parenchyma with focal intra-alveolar fibrin and neutrophils with some lymphoid aggregates and recent hemorrhage but negative for malignancy.  I discussion with her about the next steps.  We discussed the option of close follow-up with a CT scan in 3 months versus surgical resection via wedge resection to deal with his lung nodule.  She wants to wait 3 months with a CT scan she is not interested in the surgery at this point.  I will see her again in 3 months with a CT of the chest.    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  She is still recovering from it.  She has finished the treatments.  She underwent a repeat CT scan for which we do not have a final read.  However the lung nodule in the lower lobe looks a couple millimeters larger to me.  I am still concerned that this is a malignancy.  I have conveyed to her and her  that I am concerned about this being a malignancy and how we can treat this with a wedge resection in the middle invasive way.  She does not feel like she wants to undergo any surgeries at this point and she wants to wait.  I have expressed that I am not completely comfortable with just waiting but I will see her  again in 3 months with a CT of the chest.  I recommended a short interval but she wants to wait till December.    Review of Systems   Constitutional:  Negative for appetite change, chills, diaphoresis, fatigue, fever and unexpected weight change.   HENT: Negative.     Eyes: Negative.    Respiratory:  Negative for cough, choking, chest tightness, shortness of breath, wheezing and stridor.    Cardiovascular:  Negative for chest pain, palpitations and leg swelling.   Gastrointestinal:  Negative for abdominal distention, abdominal pain, constipation, diarrhea, nausea and vomiting.   Endocrine: Negative.    Genitourinary: Negative.    Musculoskeletal: Negative.    Skin: Negative.    Allergic/Immunologic: Negative.    Neurological: Negative.    Hematological: Negative.    Psychiatric/Behavioral: Negative.     All other systems reviewed and are negative.      Objective   Physical Exam  Constitutional:       Appearance: Normal appearance.   HENT:      Head: Normocephalic and atraumatic.      Nose: Nose normal.      Mouth/Throat:      Mouth: Mucous membranes are moist.      Pharynx: Oropharynx is clear.   Eyes:      Extraocular Movements: Extraocular movements intact.      Conjunctiva/sclera: Conjunctivae normal.      Pupils: Pupils are equal, round, and reactive to light.   Cardiovascular:      Rate and Rhythm: Normal rate and regular rhythm.      Pulses: Normal pulses.      Heart sounds: Normal heart sounds.   Pulmonary:      Effort: Pulmonary effort is normal. No respiratory distress.      Breath sounds: Normal breath sounds. No stridor. No wheezing, rhonchi or rales.   Chest:      Chest wall: No tenderness.   Abdominal:      General: Abdomen is flat. Bowel sounds are normal.      Palpations: Abdomen is soft.   Musculoskeletal:         General: Normal range of motion.      Cervical back: Normal range of motion and neck supple.   Skin:     General: Skin is warm and dry.      Capillary Refill: Capillary refill takes less  than 2 seconds.   Neurological:      General: No focal deficit present.      Mental Status: She is alert and oriented to person, place, and time.         Assessment/Plan   Diagnoses and all orders for this visit:  Solitary pulmonary nodule on lung CT  -     Follow-up in 3 months with a CT of the chest.    Abad Pereyra MD  Thoracic and Esophageal Surgery         Abad Linda MD 09/24/24 10:45 AM

## 2024-09-26 ENCOUNTER — OFFICE VISIT (OUTPATIENT)
Dept: HEMATOLOGY/ONCOLOGY | Facility: HOSPITAL | Age: 79
End: 2024-09-26
Payer: MEDICARE

## 2024-09-26 ENCOUNTER — INFUSION (OUTPATIENT)
Dept: HEMATOLOGY/ONCOLOGY | Facility: HOSPITAL | Age: 79
End: 2024-09-26
Payer: MEDICARE

## 2024-09-26 VITALS
OXYGEN SATURATION: 96 % | RESPIRATION RATE: 18 BRPM | DIASTOLIC BLOOD PRESSURE: 71 MMHG | HEART RATE: 76 BPM | WEIGHT: 130.73 LBS | SYSTOLIC BLOOD PRESSURE: 118 MMHG | BODY MASS INDEX: 23.16 KG/M2 | TEMPERATURE: 97.5 F

## 2024-09-26 DIAGNOSIS — C32.9 LARYNGEAL CANCER (MULTI): Primary | ICD-10-CM

## 2024-09-26 DIAGNOSIS — E83.42 HYPOMAGNESEMIA: ICD-10-CM

## 2024-09-26 DIAGNOSIS — R91.1 LUNG NODULE: ICD-10-CM

## 2024-09-26 DIAGNOSIS — C32.9 LARYNGEAL CANCER (MULTI): ICD-10-CM

## 2024-09-26 LAB
ALBUMIN SERPL BCP-MCNC: 3.6 G/DL (ref 3.4–5)
ALP SERPL-CCNC: 68 U/L (ref 33–136)
ALT SERPL W P-5'-P-CCNC: 7 U/L (ref 7–45)
ANION GAP SERPL CALC-SCNC: 12 MMOL/L (ref 10–20)
AST SERPL W P-5'-P-CCNC: 16 U/L (ref 9–39)
BASOPHILS # BLD AUTO: 0.03 X10*3/UL (ref 0–0.1)
BASOPHILS NFR BLD AUTO: 0.6 %
BILIRUB SERPL-MCNC: 0.4 MG/DL (ref 0–1.2)
BUN SERPL-MCNC: 25 MG/DL (ref 6–23)
CALCIUM SERPL-MCNC: 8.6 MG/DL (ref 8.6–10.3)
CHLORIDE SERPL-SCNC: 111 MMOL/L (ref 98–107)
CO2 SERPL-SCNC: 23 MMOL/L (ref 21–32)
CREAT SERPL-MCNC: 1.17 MG/DL (ref 0.5–1.05)
EGFRCR SERPLBLD CKD-EPI 2021: 48 ML/MIN/1.73M*2
EOSINOPHIL # BLD AUTO: 0.1 X10*3/UL (ref 0–0.4)
EOSINOPHIL NFR BLD AUTO: 2.1 %
ERYTHROCYTE [DISTWIDTH] IN BLOOD BY AUTOMATED COUNT: 13.9 % (ref 11.5–14.5)
GLUCOSE SERPL-MCNC: 93 MG/DL (ref 74–99)
HCT VFR BLD AUTO: 35.5 % (ref 36–46)
HGB BLD-MCNC: 11.4 G/DL (ref 12–16)
IMM GRANULOCYTES # BLD AUTO: 0.01 X10*3/UL (ref 0–0.5)
IMM GRANULOCYTES NFR BLD AUTO: 0.2 % (ref 0–0.9)
LYMPHOCYTES # BLD AUTO: 1.64 X10*3/UL (ref 0.8–3)
LYMPHOCYTES NFR BLD AUTO: 33.8 %
MAGNESIUM SERPL-MCNC: 1.32 MG/DL (ref 1.6–2.4)
MCH RBC QN AUTO: 33.2 PG (ref 26–34)
MCHC RBC AUTO-ENTMCNC: 32.1 G/DL (ref 32–36)
MCV RBC AUTO: 104 FL (ref 80–100)
MONOCYTES # BLD AUTO: 0.7 X10*3/UL (ref 0.05–0.8)
MONOCYTES NFR BLD AUTO: 14.4 %
NEUTROPHILS # BLD AUTO: 2.37 X10*3/UL (ref 1.6–5.5)
NEUTROPHILS NFR BLD AUTO: 48.9 %
NRBC BLD-RTO: 0 /100 WBCS (ref 0–0)
PLATELET # BLD AUTO: 279 X10*3/UL (ref 150–450)
POTASSIUM SERPL-SCNC: 4.3 MMOL/L (ref 3.5–5.3)
PROT SERPL-MCNC: 6.3 G/DL (ref 6.4–8.2)
RBC # BLD AUTO: 3.43 X10*6/UL (ref 4–5.2)
SODIUM SERPL-SCNC: 142 MMOL/L (ref 136–145)
WBC # BLD AUTO: 4.9 X10*3/UL (ref 4.4–11.3)

## 2024-09-26 PROCEDURE — 99215 OFFICE O/P EST HI 40 MIN: CPT | Mod: 25 | Performed by: INTERNAL MEDICINE

## 2024-09-26 PROCEDURE — 1159F MED LIST DOCD IN RCRD: CPT | Performed by: INTERNAL MEDICINE

## 2024-09-26 PROCEDURE — 2500000004 HC RX 250 GENERAL PHARMACY W/ HCPCS (ALT 636 FOR OP/ED): Performed by: NURSE PRACTITIONER

## 2024-09-26 PROCEDURE — 99215 OFFICE O/P EST HI 40 MIN: CPT | Performed by: INTERNAL MEDICINE

## 2024-09-26 PROCEDURE — 80053 COMPREHEN METABOLIC PANEL: CPT

## 2024-09-26 PROCEDURE — 1125F AMNT PAIN NOTED PAIN PRSNT: CPT | Performed by: INTERNAL MEDICINE

## 2024-09-26 PROCEDURE — 85025 COMPLETE CBC W/AUTO DIFF WBC: CPT

## 2024-09-26 PROCEDURE — 96365 THER/PROPH/DIAG IV INF INIT: CPT | Mod: INF

## 2024-09-26 PROCEDURE — 83735 ASSAY OF MAGNESIUM: CPT

## 2024-09-26 PROCEDURE — 2500000004 HC RX 250 GENERAL PHARMACY W/ HCPCS (ALT 636 FOR OP/ED): Performed by: INTERNAL MEDICINE

## 2024-09-26 RX ORDER — MAGNESIUM SULFATE HEPTAHYDRATE 40 MG/ML
2 INJECTION, SOLUTION INTRAVENOUS ONCE
Status: CANCELLED | OUTPATIENT
Start: 2024-09-26 | End: 2024-09-26

## 2024-09-26 RX ORDER — MAGNESIUM SULFATE HEPTAHYDRATE 40 MG/ML
4 INJECTION, SOLUTION INTRAVENOUS ONCE
Status: CANCELLED | OUTPATIENT
Start: 2024-10-03 | End: 2024-10-03

## 2024-09-26 RX ORDER — MAGNESIUM SULFATE HEPTAHYDRATE 40 MG/ML
2 INJECTION, SOLUTION INTRAVENOUS ONCE
OUTPATIENT
Start: 2024-10-03 | End: 2024-10-03

## 2024-09-26 RX ORDER — DIPHENHYDRAMINE HYDROCHLORIDE 50 MG/ML
50 INJECTION INTRAMUSCULAR; INTRAVENOUS AS NEEDED
OUTPATIENT
Start: 2024-10-03

## 2024-09-26 RX ORDER — LANOLIN ALCOHOL/MO/W.PET/CERES
400 CREAM (GRAM) TOPICAL 2 TIMES DAILY
Qty: 60 TABLET | Refills: 1 | Status: SHIPPED | OUTPATIENT
Start: 2024-09-26 | End: 2024-11-25

## 2024-09-26 RX ORDER — HEPARIN 100 UNIT/ML
500 SYRINGE INTRAVENOUS AS NEEDED
Status: DISCONTINUED | OUTPATIENT
Start: 2024-09-26 | End: 2024-09-26 | Stop reason: HOSPADM

## 2024-09-26 RX ORDER — HEPARIN SODIUM,PORCINE/PF 10 UNIT/ML
50 SYRINGE (ML) INTRAVENOUS AS NEEDED
OUTPATIENT
Start: 2024-09-26

## 2024-09-26 RX ORDER — MAGNESIUM SULFATE HEPTAHYDRATE 40 MG/ML
2 INJECTION, SOLUTION INTRAVENOUS ONCE
Status: COMPLETED | OUTPATIENT
Start: 2024-09-26 | End: 2024-09-26

## 2024-09-26 RX ORDER — EPINEPHRINE 0.3 MG/.3ML
0.3 INJECTION SUBCUTANEOUS EVERY 5 MIN PRN
OUTPATIENT
Start: 2024-10-03

## 2024-09-26 RX ORDER — FAMOTIDINE 10 MG/ML
20 INJECTION INTRAVENOUS ONCE AS NEEDED
OUTPATIENT
Start: 2024-10-03

## 2024-09-26 RX ORDER — ALBUTEROL SULFATE 0.83 MG/ML
3 SOLUTION RESPIRATORY (INHALATION) AS NEEDED
OUTPATIENT
Start: 2024-10-03

## 2024-09-26 RX ORDER — HEPARIN 100 UNIT/ML
500 SYRINGE INTRAVENOUS AS NEEDED
OUTPATIENT
Start: 2024-09-26

## 2024-09-26 RX ORDER — MAGNESIUM SULFATE HEPTAHYDRATE 40 MG/ML
2 INJECTION, SOLUTION INTRAVENOUS ONCE
Status: CANCELLED | OUTPATIENT
Start: 2024-10-03 | End: 2024-10-03

## 2024-09-26 ASSESSMENT — ENCOUNTER SYMPTOMS
CARDIOVASCULAR NEGATIVE: 1
GASTROINTESTINAL NEGATIVE: 1
FEVER: 0
RESPIRATORY NEGATIVE: 1
FATIGUE: 1

## 2024-09-26 ASSESSMENT — PATIENT HEALTH QUESTIONNAIRE - PHQ9
2. FEELING DOWN, DEPRESSED OR HOPELESS: NOT AT ALL
1. LITTLE INTEREST OR PLEASURE IN DOING THINGS: NOT AT ALL
SUM OF ALL RESPONSES TO PHQ9 QUESTIONS 1 & 2: 0

## 2024-09-26 ASSESSMENT — PAIN SCALES - GENERAL: PAINLEVEL: 3

## 2024-09-26 NOTE — PROGRESS NOTES
May run Mag 2 grams over one hour, Mag 4 over two, if second one needed. Do not need to wait for mag level to start first two grams.  Vo Dr Burden/Adrienne, RN  9/26/24 1208pm

## 2024-09-26 NOTE — PROGRESS NOTES
Patient ID: Padilla Campuzano is a 79 y.o. female.    Subjective:  Returns for follow up for laryngeal cancer. Completed chemoRT last month. Feels sore in throat. Dysphagia and odynophagia+. Low appetite. Slowly losing weight. Denies fever.     Heme/Onc History:  Past cancer:  Early stage colon cancer in 2001, s/p surgery alone  Breast cancer in 2012. S/p surgery, chemo, and radiation. Had significant side effects due to chemo. Had not received hormonal therapy  Anal cancer in 2017. S/p Tru protocol    - p/w hoarseness in early 2024. Found to have a vocal cord mass. Scans showed a LLL lung nodule.   - PET/CT (Apr 2024): Uptake in LLL lung nodule. 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 Bx: SCC  - Started chemoRT with carbo/taxol (Jul - Aug 2024): After 2 doses fo chemo, could not tolerate it and declined further chemo then continued with weekly cetuximab    Assessment/Plan:  ? Laryngeal cancer: s/p chemoRT with carbo/taxol then cetuximab. Completed therapy 4 weeks ago. Side effects still linger. Dysphagia+. Hypomagnesemia+  A. Increase MgOx to BID. Give magnesium IV weekly.   B. Needs to see ENT for re-evaluation. Made an appt with them in November  C. Will repeat CT Neck and chest in December    ? Lung nodule: I reviewed CT and PET images. This has grown slightly from Apr to Sep 2024. However, she had received chemo in between. Cannot say this is malignant or not. She was offered wedge resection but she deferred it. Another option is SBRT. She wants to wait and repeat scans in 3 months. I ordered those.    Review Of Systems:  Review of Systems   Constitutional:  Positive for fatigue. Negative for fever.   HENT:  Negative.     Respiratory: Negative.     Cardiovascular: Negative.    Gastrointestinal: Negative.        Physical Exam:  /71 (BP Location: Left  arm, Patient Position: Sitting)   Pulse 76   Temp 36.4 °C (97.5 °F) (Temporal)   Resp 18   Wt 59.3 kg (130 lb 11.7 oz)   SpO2 96%   BMI 23.16 kg/m²   BSA: 1.62 meters squared  Performance Status: Asymptomatic  Physical Exam  HENT:      Head: Normocephalic and atraumatic.   Eyes:      General: No scleral icterus.  Pulmonary:      Effort: Pulmonary effort is normal.   Musculoskeletal:         General: Normal range of motion.      Cervical back: No rigidity or tenderness.   Lymphadenopathy:      Cervical: No cervical adenopathy.   Skin:     Coloration: Skin is not jaundiced.   Neurological:      General: No focal deficit present.      Mental Status: She is alert and oriented to person, place, and time.         Results:  Diagnostic Results   Lab Results   Component Value Date    WBC 5.2 09/11/2024    HGB 10.7 (L) 09/11/2024    HCT 33.6 (L) 09/11/2024     (H) 09/11/2024     09/11/2024     Lab Results   Component Value Date    CALCIUM 8.0 (L) 09/19/2024     09/19/2024    K 4.1 09/19/2024    CO2 25 09/19/2024     (H) 09/19/2024    BUN 19 09/19/2024    CREATININE 1.05 09/19/2024    ALT 7 09/19/2024    AST 15 09/19/2024       Current Outpatient Medications:     acetaminophen (Tylenol) 500 mg tablet, Take 2 tablets (1,000 mg) by mouth every 6 hours if needed for mild pain (1 - 3)., Disp: , Rfl:     biotin 5 mg capsule, once every 24 hours., Disp: , Rfl:     cholecalciferol (Vitamin D-3) 50 mcg (2,000 unit) capsule, 1 capsule (50 mcg) once every 24 hours., Disp: , Rfl:     cholestyramine (Questran) 4 gram powder, Take 1 packet (4 g) by mouth once daily., Disp: 90 packet, Rfl: 0    clindamycin (Cleocin T) 1 % lotion, Apply topically to affected area twice daily., Disp: 60 mL, Rfl: 3    doxycycline (Vibramycin) 100 mg capsule, Take 1 capsule (100 mg) by mouth 2 times a day. For rash prevention. Take with at least 8 ounces (large glass) of water, do not lie down for 30 minutes after, Disp: 56  capsule, Rfl: 3    hydrocortisone 1 % cream, Apply topically to face, hands, feet, neck, back, and chest once daily at bedtime for rash prevention., Disp: 453.6 g, Rfl: 3    lipase-protease-amylase (Creon) 24,000-76,000 -120,000 unit capsule, TAKE 3 CAPSULES BY MOUTH 3 TIMES DAILY WITH MEALS AND 1 TO 2  CAPSULES BY MOUTH WITH SNACKS.  MAX 13 CAPSULE DAILY, Disp: 400 capsule, Rfl: 10    loperamide (Imodium) 2 mg capsule, Take 2 capsules (4 mg) by mouth with the first episode of diarrhea and 1 capsule (2 mg) by mouth with any additional episodes. Maximum 8 capsules (16 mg) per day., Disp: 100 capsule, Rfl: 0    losartan (Cozaar) 25 mg tablet, Take 1 tablet (25 mg) by mouth once daily., Disp: 90 tablet, Rfl: 0    ondansetron (Zofran) 8 mg tablet, Take 1 tablet (8 mg) by mouth every 8 hours if needed for nausea or vomiting., Disp: 30 tablet, Rfl: 5    pantoprazole (ProtoNix) 40 mg EC tablet, TAKE 1 TABLET BY MOUTH TWICE  DAILY, Disp: 180 tablet, Rfl: 0    prochlorperazine (Compazine) 10 mg tablet, Take 1 tablet (10 mg) by mouth every 6 hours if needed for nausea or vomiting., Disp: 30 tablet, Rfl: 5    diphenoxylate-atropine (LomotiL) 2.5-0.025 mg tablet, Take 1 tablet by mouth 4 times a day as needed for diarrhea for up to 7 days., Disp: 28 tablet, Rfl: 0    lidocaine (Lidoderm) 5 % patch, Place 1 patch over 12 hours on the skin once daily for 21 days. Remove & discard patch within 12 hours or as directed by MD., Disp: 21 patch, Rfl: 2    magnesium oxide (Mag-Ox) 400 mg (241.3 mg magnesium) tablet, Take 1 tablet (400 mg) by mouth 2 times a day., Disp: 60 tablet, Rfl: 1    potassium chloride (Klor-Con) 20 mEq packet, Take 20 mEq by mouth once daily., Disp: 30 packet, Rfl: 0  No current facility-administered medications for this visit.    Facility-Administered Medications Ordered in Other Visits:     alteplase (Cathflo Activase) injection 2 mg, 2 mg, intra-catheter, ANTIONEN, Megan Arauz, APRN-CNP    heparin flush 10  unit/mL syringe 50 Units, 50 Units, intra-catheter, PRN, ÁNGEL Maloney-CNP    heparin flush 100 unit/mL syringe 500 Units, 500 Units, intra-catheter, PRN, ÁNGEL Maloney-CNP, 500 Units at 08/20/24 1510    magnesium sulfate 2 g in sterile water for injection 50 mL, 2 g, intravenous, Once, Malik Gordillo MD     Past Surgical History:   Procedure Laterality Date    BI US GUIDED BREAST RIGHT CYST ASPIRATION Right 12/31/2019    BI BREAST CYST ASPIRATION RIGHT [facility]    BREAST LUMPECTOMY  05/16/2014    Breast Surgery Lumpectomy    CHOLECYSTECTOMY      CT GUIDED IMAGING FOR ABSCESS DRAIN  07/08/2015    CT GUIDED IMAGING FOR ABSCESS DRAIN [facility]    HYSTERECTOMY  1984    Hysterectomy    ILEOSTOMY  11/23/2015    Ileostomy    ILEOSTOMY CLOSURE  11/23/2015    Ileostomy Closure    OTHER SURGICAL HISTORY  2012    Cerebral artery coiling     Family History   Problem Relation Name Age of Onset    Colon cancer Mother      Liver cancer Mother      Kidney cancer Mother      Heart attack Father      Blood clot Father      Cancer Brother      Cancer Brother        reports that she quit smoking about 13 years ago. Her smoking use included cigarettes. She has been exposed to tobacco smoke. She has never used smokeless tobacco.    Diagnoses and all orders for this visit:  Laryngeal cancer (Multi)  -     CBC and Auto Differential; Future  -     Clinic Appointment Request; Future  -     CBC and Auto Differential; Future  -     Comprehensive Metabolic Panel; Future  -     CT chest wo IV contrast; Future  -     magnesium oxide (Mag-Ox) 400 mg (241.3 mg magnesium) tablet; Take 1 tablet (400 mg) by mouth 2 times a day.  Hypomagnesemia  -     CBC and Auto Differential; Future  -     Clinic Appointment Request; Future  -     CBC and Auto Differential; Future  -     Comprehensive Metabolic Panel; Future  -     CT chest wo IV contrast; Future  -     magnesium oxide (Mag-Ox) 400 mg (241.3 mg magnesium)  tablet; Take 1 tablet (400 mg) by mouth 2 times a day.  Lung nodule  -     Clinic Appointment Request; Future  -     CBC and Auto Differential; Future  -     Comprehensive Metabolic Panel; Future  -     CT chest wo IV contrast; Future  -     magnesium oxide (Mag-Ox) 400 mg (241.3 mg magnesium) tablet; Take 1 tablet (400 mg) by mouth 2 times a day.  Other orders  -     magnesium sulfate 2 g in sterile water for injection 50 mL  -     magnesium sulfate 2 g in sterile water for injection 50 mL  -     magnesium sulfate 2 g in sterile water for injection 50 mL       I spent more than 40 minutes for the patient today, including face-to-face conversation, pre-visit preparation, post-visit orders, and others.   Malik Gordillo MD

## 2024-09-30 ENCOUNTER — APPOINTMENT (OUTPATIENT)
Dept: HEMATOLOGY/ONCOLOGY | Facility: CLINIC | Age: 79
End: 2024-09-30
Payer: MEDICARE

## 2024-10-03 ENCOUNTER — INFUSION (OUTPATIENT)
Dept: HEMATOLOGY/ONCOLOGY | Facility: HOSPITAL | Age: 79
End: 2024-10-03
Payer: MEDICARE

## 2024-10-03 VITALS
TEMPERATURE: 96.4 F | WEIGHT: 133.16 LBS | DIASTOLIC BLOOD PRESSURE: 77 MMHG | SYSTOLIC BLOOD PRESSURE: 141 MMHG | RESPIRATION RATE: 16 BRPM | BODY MASS INDEX: 23.59 KG/M2 | HEART RATE: 78 BPM

## 2024-10-03 DIAGNOSIS — C32.9 LARYNGEAL CANCER (MULTI): ICD-10-CM

## 2024-10-03 LAB
ALBUMIN SERPL BCP-MCNC: 3.4 G/DL (ref 3.4–5)
ALP SERPL-CCNC: 70 U/L (ref 33–136)
ALT SERPL W P-5'-P-CCNC: 6 U/L (ref 7–45)
ANION GAP SERPL CALC-SCNC: 11 MMOL/L (ref 10–20)
AST SERPL W P-5'-P-CCNC: 14 U/L (ref 9–39)
BILIRUB SERPL-MCNC: 0.4 MG/DL (ref 0–1.2)
BUN SERPL-MCNC: 22 MG/DL (ref 6–23)
CALCIUM SERPL-MCNC: 8.7 MG/DL (ref 8.6–10.3)
CHLORIDE SERPL-SCNC: 108 MMOL/L (ref 98–107)
CO2 SERPL-SCNC: 25 MMOL/L (ref 21–32)
CREAT SERPL-MCNC: 1.18 MG/DL (ref 0.5–1.05)
EGFRCR SERPLBLD CKD-EPI 2021: 47 ML/MIN/1.73M*2
GLUCOSE SERPL-MCNC: 95 MG/DL (ref 74–99)
MAGNESIUM SERPL-MCNC: 1.5 MG/DL (ref 1.6–2.4)
POTASSIUM SERPL-SCNC: 3.6 MMOL/L (ref 3.5–5.3)
PROT SERPL-MCNC: 5.8 G/DL (ref 6.4–8.2)
SODIUM SERPL-SCNC: 140 MMOL/L (ref 136–145)

## 2024-10-03 PROCEDURE — 2500000004 HC RX 250 GENERAL PHARMACY W/ HCPCS (ALT 636 FOR OP/ED): Performed by: INTERNAL MEDICINE

## 2024-10-03 PROCEDURE — 96365 THER/PROPH/DIAG IV INF INIT: CPT | Mod: INF

## 2024-10-03 PROCEDURE — 83735 ASSAY OF MAGNESIUM: CPT

## 2024-10-03 PROCEDURE — 84075 ASSAY ALKALINE PHOSPHATASE: CPT

## 2024-10-03 PROCEDURE — 2500000004 HC RX 250 GENERAL PHARMACY W/ HCPCS (ALT 636 FOR OP/ED): Performed by: NURSE PRACTITIONER

## 2024-10-03 RX ORDER — MAGNESIUM SULFATE HEPTAHYDRATE 40 MG/ML
2 INJECTION, SOLUTION INTRAVENOUS ONCE
OUTPATIENT
Start: 2024-10-10 | End: 2024-10-10

## 2024-10-03 RX ORDER — HEPARIN 100 UNIT/ML
500 SYRINGE INTRAVENOUS AS NEEDED
OUTPATIENT
Start: 2024-10-03

## 2024-10-03 RX ORDER — EPINEPHRINE 0.3 MG/.3ML
0.3 INJECTION SUBCUTANEOUS EVERY 5 MIN PRN
OUTPATIENT
Start: 2024-10-10

## 2024-10-03 RX ORDER — ALBUTEROL SULFATE 0.83 MG/ML
3 SOLUTION RESPIRATORY (INHALATION) AS NEEDED
OUTPATIENT
Start: 2024-10-10

## 2024-10-03 RX ORDER — MAGNESIUM SULFATE HEPTAHYDRATE 40 MG/ML
2 INJECTION, SOLUTION INTRAVENOUS ONCE
Status: COMPLETED | OUTPATIENT
Start: 2024-10-03 | End: 2024-10-03

## 2024-10-03 RX ORDER — HEPARIN SODIUM,PORCINE/PF 10 UNIT/ML
50 SYRINGE (ML) INTRAVENOUS AS NEEDED
OUTPATIENT
Start: 2024-10-03

## 2024-10-03 RX ORDER — DIPHENHYDRAMINE HYDROCHLORIDE 50 MG/ML
50 INJECTION INTRAMUSCULAR; INTRAVENOUS AS NEEDED
OUTPATIENT
Start: 2024-10-10

## 2024-10-03 RX ORDER — FAMOTIDINE 10 MG/ML
20 INJECTION INTRAVENOUS ONCE AS NEEDED
OUTPATIENT
Start: 2024-10-10

## 2024-10-03 RX ORDER — HEPARIN 100 UNIT/ML
500 SYRINGE INTRAVENOUS AS NEEDED
Status: DISCONTINUED | OUTPATIENT
Start: 2024-10-03 | End: 2024-10-03 | Stop reason: HOSPADM

## 2024-10-03 ASSESSMENT — PATIENT HEALTH QUESTIONNAIRE - PHQ9
SUM OF ALL RESPONSES TO PHQ9 QUESTIONS 1 & 2: 0
1. LITTLE INTEREST OR PLEASURE IN DOING THINGS: NOT AT ALL
2. FEELING DOWN, DEPRESSED OR HOPELESS: NOT AT ALL

## 2024-10-03 ASSESSMENT — PAIN SCALES - GENERAL: PAINLEVEL: 0-NO PAIN

## 2024-10-10 ENCOUNTER — INFUSION (OUTPATIENT)
Dept: HEMATOLOGY/ONCOLOGY | Facility: HOSPITAL | Age: 79
End: 2024-10-10
Payer: MEDICARE

## 2024-10-10 VITALS
SYSTOLIC BLOOD PRESSURE: 152 MMHG | BODY MASS INDEX: 23.59 KG/M2 | OXYGEN SATURATION: 93 % | WEIGHT: 133.16 LBS | TEMPERATURE: 96.4 F | DIASTOLIC BLOOD PRESSURE: 63 MMHG | HEART RATE: 73 BPM | RESPIRATION RATE: 16 BRPM

## 2024-10-10 DIAGNOSIS — C32.9 LARYNGEAL CANCER (MULTI): ICD-10-CM

## 2024-10-10 LAB
ALBUMIN SERPL BCP-MCNC: 3.4 G/DL (ref 3.4–5)
ALP SERPL-CCNC: 65 U/L (ref 33–136)
ALT SERPL W P-5'-P-CCNC: 6 U/L (ref 7–45)
ANION GAP SERPL CALC-SCNC: 11 MMOL/L (ref 10–20)
AST SERPL W P-5'-P-CCNC: 19 U/L (ref 9–39)
BILIRUB SERPL-MCNC: 0.3 MG/DL (ref 0–1.2)
BUN SERPL-MCNC: 27 MG/DL (ref 6–23)
CALCIUM SERPL-MCNC: 8.8 MG/DL (ref 8.6–10.3)
CHLORIDE SERPL-SCNC: 109 MMOL/L (ref 98–107)
CO2 SERPL-SCNC: 25 MMOL/L (ref 21–32)
CREAT SERPL-MCNC: 1.25 MG/DL (ref 0.5–1.05)
EGFRCR SERPLBLD CKD-EPI 2021: 44 ML/MIN/1.73M*2
GLUCOSE SERPL-MCNC: 92 MG/DL (ref 74–99)
MAGNESIUM SERPL-MCNC: 1.72 MG/DL (ref 1.6–2.4)
POTASSIUM SERPL-SCNC: 4.6 MMOL/L (ref 3.5–5.3)
PROT SERPL-MCNC: 5.6 G/DL (ref 6.4–8.2)
SODIUM SERPL-SCNC: 140 MMOL/L (ref 136–145)

## 2024-10-10 PROCEDURE — 96365 THER/PROPH/DIAG IV INF INIT: CPT | Mod: INF

## 2024-10-10 PROCEDURE — 2500000004 HC RX 250 GENERAL PHARMACY W/ HCPCS (ALT 636 FOR OP/ED): Mod: JZ,TB | Performed by: NURSE PRACTITIONER

## 2024-10-10 PROCEDURE — 84075 ASSAY ALKALINE PHOSPHATASE: CPT

## 2024-10-10 PROCEDURE — 83735 ASSAY OF MAGNESIUM: CPT

## 2024-10-10 PROCEDURE — 2500000004 HC RX 250 GENERAL PHARMACY W/ HCPCS (ALT 636 FOR OP/ED): Performed by: INTERNAL MEDICINE

## 2024-10-10 PROCEDURE — 36593 DECLOT VASCULAR DEVICE: CPT

## 2024-10-10 RX ORDER — EPINEPHRINE 0.3 MG/.3ML
0.3 INJECTION SUBCUTANEOUS EVERY 5 MIN PRN
OUTPATIENT
Start: 2024-10-17

## 2024-10-10 RX ORDER — DIPHENHYDRAMINE HYDROCHLORIDE 50 MG/ML
50 INJECTION INTRAMUSCULAR; INTRAVENOUS AS NEEDED
OUTPATIENT
Start: 2024-10-17

## 2024-10-10 RX ORDER — MAGNESIUM SULFATE HEPTAHYDRATE 40 MG/ML
2 INJECTION, SOLUTION INTRAVENOUS ONCE
OUTPATIENT
Start: 2024-10-17 | End: 2024-10-17

## 2024-10-10 RX ORDER — HEPARIN 100 UNIT/ML
500 SYRINGE INTRAVENOUS AS NEEDED
Status: DISCONTINUED | OUTPATIENT
Start: 2024-10-10 | End: 2024-10-10 | Stop reason: HOSPADM

## 2024-10-10 RX ORDER — FAMOTIDINE 10 MG/ML
20 INJECTION INTRAVENOUS ONCE AS NEEDED
Status: DISCONTINUED | OUTPATIENT
Start: 2024-10-10 | End: 2024-10-10 | Stop reason: HOSPADM

## 2024-10-10 RX ORDER — HEPARIN 100 UNIT/ML
500 SYRINGE INTRAVENOUS AS NEEDED
OUTPATIENT
Start: 2024-10-10

## 2024-10-10 RX ORDER — ALBUTEROL SULFATE 0.83 MG/ML
3 SOLUTION RESPIRATORY (INHALATION) AS NEEDED
OUTPATIENT
Start: 2024-10-17

## 2024-10-10 RX ORDER — DIPHENHYDRAMINE HYDROCHLORIDE 50 MG/ML
50 INJECTION INTRAMUSCULAR; INTRAVENOUS AS NEEDED
Status: DISCONTINUED | OUTPATIENT
Start: 2024-10-10 | End: 2024-10-10 | Stop reason: HOSPADM

## 2024-10-10 RX ORDER — HEPARIN SODIUM,PORCINE/PF 10 UNIT/ML
50 SYRINGE (ML) INTRAVENOUS AS NEEDED
Status: DISCONTINUED | OUTPATIENT
Start: 2024-10-10 | End: 2024-10-10 | Stop reason: HOSPADM

## 2024-10-10 RX ORDER — EPINEPHRINE 0.3 MG/.3ML
0.3 INJECTION SUBCUTANEOUS EVERY 5 MIN PRN
Status: DISCONTINUED | OUTPATIENT
Start: 2024-10-10 | End: 2024-10-10 | Stop reason: HOSPADM

## 2024-10-10 RX ORDER — ALBUTEROL SULFATE 0.83 MG/ML
3 SOLUTION RESPIRATORY (INHALATION) AS NEEDED
Status: DISCONTINUED | OUTPATIENT
Start: 2024-10-10 | End: 2024-10-10 | Stop reason: HOSPADM

## 2024-10-10 RX ORDER — MAGNESIUM SULFATE HEPTAHYDRATE 40 MG/ML
2 INJECTION, SOLUTION INTRAVENOUS ONCE
Status: COMPLETED | OUTPATIENT
Start: 2024-10-10 | End: 2024-10-10

## 2024-10-10 RX ORDER — FAMOTIDINE 10 MG/ML
20 INJECTION INTRAVENOUS ONCE AS NEEDED
OUTPATIENT
Start: 2024-10-17

## 2024-10-10 RX ORDER — HEPARIN SODIUM,PORCINE/PF 10 UNIT/ML
50 SYRINGE (ML) INTRAVENOUS AS NEEDED
OUTPATIENT
Start: 2024-10-10

## 2024-10-10 ASSESSMENT — PAIN SCALES - GENERAL: PAINLEVEL: 0-NO PAIN

## 2024-10-20 DIAGNOSIS — K52.9 CHRONIC DIARRHEA: ICD-10-CM

## 2024-10-21 RX ORDER — PANTOPRAZOLE SODIUM 40 MG/1
TABLET, DELAYED RELEASE ORAL
Qty: 180 TABLET | Refills: 0 | Status: SHIPPED | OUTPATIENT
Start: 2024-10-21

## 2024-11-07 ENCOUNTER — INFUSION (OUTPATIENT)
Dept: HEMATOLOGY/ONCOLOGY | Facility: HOSPITAL | Age: 79
End: 2024-11-07
Payer: MEDICARE

## 2024-11-07 VITALS
RESPIRATION RATE: 16 BRPM | DIASTOLIC BLOOD PRESSURE: 74 MMHG | SYSTOLIC BLOOD PRESSURE: 141 MMHG | OXYGEN SATURATION: 97 % | TEMPERATURE: 95.9 F | HEART RATE: 79 BPM

## 2024-11-07 DIAGNOSIS — C32.9 LARYNGEAL CANCER (MULTI): ICD-10-CM

## 2024-11-07 PROCEDURE — 2500000004 HC RX 250 GENERAL PHARMACY W/ HCPCS (ALT 636 FOR OP/ED): Performed by: NURSE PRACTITIONER

## 2024-11-07 PROCEDURE — 36415 COLL VENOUS BLD VENIPUNCTURE: CPT

## 2024-11-07 PROCEDURE — 96523 IRRIG DRUG DELIVERY DEVICE: CPT

## 2024-11-07 RX ORDER — HEPARIN 100 UNIT/ML
500 SYRINGE INTRAVENOUS AS NEEDED
OUTPATIENT
Start: 2024-11-07

## 2024-11-07 RX ORDER — HEPARIN 100 UNIT/ML
500 SYRINGE INTRAVENOUS AS NEEDED
Status: DISCONTINUED | OUTPATIENT
Start: 2024-11-07 | End: 2024-11-07 | Stop reason: HOSPADM

## 2024-11-07 RX ORDER — HEPARIN SODIUM,PORCINE/PF 10 UNIT/ML
50 SYRINGE (ML) INTRAVENOUS AS NEEDED
Status: DISCONTINUED | OUTPATIENT
Start: 2024-11-07 | End: 2024-11-07 | Stop reason: HOSPADM

## 2024-11-07 RX ORDER — HEPARIN SODIUM,PORCINE/PF 10 UNIT/ML
50 SYRINGE (ML) INTRAVENOUS AS NEEDED
OUTPATIENT
Start: 2024-11-07

## 2024-11-07 ASSESSMENT — PATIENT HEALTH QUESTIONNAIRE - PHQ9
1. LITTLE INTEREST OR PLEASURE IN DOING THINGS: NOT AT ALL
2. FEELING DOWN, DEPRESSED OR HOPELESS: NOT AT ALL
SUM OF ALL RESPONSES TO PHQ9 QUESTIONS 1 & 2: 0

## 2024-11-07 ASSESSMENT — PAIN SCALES - GENERAL: PAINLEVEL_OUTOF10: 8

## 2024-11-07 ASSESSMENT — ENCOUNTER SYMPTOMS
OCCASIONAL FEELINGS OF UNSTEADINESS: 0
LOSS OF SENSATION IN FEET: 0
DEPRESSION: 0

## 2024-11-13 DIAGNOSIS — I10 HYPERTENSION, UNSPECIFIED TYPE: ICD-10-CM

## 2024-11-13 DIAGNOSIS — R19.7 DIARRHEA, UNSPECIFIED TYPE: ICD-10-CM

## 2024-11-13 RX ORDER — CHOLESTYRAMINE 4 G/9G
POWDER, FOR SUSPENSION ORAL
Qty: 756 G | Refills: 0 | Status: SHIPPED | OUTPATIENT
Start: 2024-11-13

## 2024-11-13 RX ORDER — LOSARTAN POTASSIUM 25 MG/1
25 TABLET ORAL DAILY
Qty: 90 TABLET | Refills: 0 | Status: SHIPPED | OUTPATIENT
Start: 2024-11-13

## 2024-12-11 ENCOUNTER — HOSPITAL ENCOUNTER (OUTPATIENT)
Dept: RADIOLOGY | Facility: HOSPITAL | Age: 79
Discharge: HOME | End: 2024-12-11
Payer: MEDICARE

## 2024-12-11 ENCOUNTER — INFUSION (OUTPATIENT)
Dept: HEMATOLOGY/ONCOLOGY | Facility: HOSPITAL | Age: 79
End: 2024-12-11
Payer: MEDICARE

## 2024-12-11 VITALS
SYSTOLIC BLOOD PRESSURE: 137 MMHG | HEART RATE: 78 BPM | DIASTOLIC BLOOD PRESSURE: 69 MMHG | TEMPERATURE: 96.4 F | RESPIRATION RATE: 16 BRPM | OXYGEN SATURATION: 95 %

## 2024-12-11 DIAGNOSIS — E83.42 HYPOMAGNESEMIA: ICD-10-CM

## 2024-12-11 DIAGNOSIS — R91.1 LUNG NODULE: ICD-10-CM

## 2024-12-11 DIAGNOSIS — C32.9 LARYNGEAL CANCER (MULTI): ICD-10-CM

## 2024-12-11 LAB
ALBUMIN SERPL BCP-MCNC: 3.7 G/DL (ref 3.4–5)
ALP SERPL-CCNC: 67 U/L (ref 33–136)
ALT SERPL W P-5'-P-CCNC: 5 U/L (ref 7–45)
ANION GAP SERPL CALC-SCNC: 11 MMOL/L (ref 10–20)
AST SERPL W P-5'-P-CCNC: 15 U/L (ref 9–39)
BASOPHILS # BLD AUTO: 0.03 X10*3/UL (ref 0–0.1)
BASOPHILS NFR BLD AUTO: 0.5 %
BILIRUB SERPL-MCNC: 0.3 MG/DL (ref 0–1.2)
BUN SERPL-MCNC: 22 MG/DL (ref 6–23)
CALCIUM SERPL-MCNC: 8.9 MG/DL (ref 8.6–10.3)
CHLORIDE SERPL-SCNC: 108 MMOL/L (ref 98–107)
CO2 SERPL-SCNC: 25 MMOL/L (ref 21–32)
CREAT SERPL-MCNC: 1.29 MG/DL (ref 0.5–1.05)
EGFRCR SERPLBLD CKD-EPI 2021: 42 ML/MIN/1.73M*2
EOSINOPHIL # BLD AUTO: 0.1 X10*3/UL (ref 0–0.4)
EOSINOPHIL NFR BLD AUTO: 1.6 %
ERYTHROCYTE [DISTWIDTH] IN BLOOD BY AUTOMATED COUNT: 11.9 % (ref 11.5–14.5)
GLUCOSE SERPL-MCNC: 91 MG/DL (ref 74–99)
HCT VFR BLD AUTO: 34.3 % (ref 36–46)
HGB BLD-MCNC: 11.1 G/DL (ref 12–16)
IMM GRANULOCYTES # BLD AUTO: 0.02 X10*3/UL (ref 0–0.5)
IMM GRANULOCYTES NFR BLD AUTO: 0.3 % (ref 0–0.9)
LYMPHOCYTES # BLD AUTO: 1.55 X10*3/UL (ref 0.8–3)
LYMPHOCYTES NFR BLD AUTO: 25.5 %
MCH RBC QN AUTO: 33.2 PG (ref 26–34)
MCHC RBC AUTO-ENTMCNC: 32.4 G/DL (ref 32–36)
MCV RBC AUTO: 103 FL (ref 80–100)
MONOCYTES # BLD AUTO: 0.69 X10*3/UL (ref 0.05–0.8)
MONOCYTES NFR BLD AUTO: 11.3 %
NEUTROPHILS # BLD AUTO: 3.7 X10*3/UL (ref 1.6–5.5)
NEUTROPHILS NFR BLD AUTO: 60.8 %
NRBC BLD-RTO: 0 /100 WBCS (ref 0–0)
PLATELET # BLD AUTO: 249 X10*3/UL (ref 150–450)
POTASSIUM SERPL-SCNC: 3.8 MMOL/L (ref 3.5–5.3)
PROT SERPL-MCNC: 6.3 G/DL (ref 6.4–8.2)
RBC # BLD AUTO: 3.34 X10*6/UL (ref 4–5.2)
SODIUM SERPL-SCNC: 140 MMOL/L (ref 136–145)
WBC # BLD AUTO: 6.1 X10*3/UL (ref 4.4–11.3)

## 2024-12-11 PROCEDURE — 70491 CT SOFT TISSUE NECK W/DYE: CPT | Performed by: RADIOLOGY

## 2024-12-11 PROCEDURE — 36591 DRAW BLOOD OFF VENOUS DEVICE: CPT

## 2024-12-11 PROCEDURE — 85025 COMPLETE CBC W/AUTO DIFF WBC: CPT

## 2024-12-11 PROCEDURE — 2500000004 HC RX 250 GENERAL PHARMACY W/ HCPCS (ALT 636 FOR OP/ED): Performed by: NURSE PRACTITIONER

## 2024-12-11 PROCEDURE — 71260 CT THORAX DX C+: CPT

## 2024-12-11 PROCEDURE — 70491 CT SOFT TISSUE NECK W/DYE: CPT

## 2024-12-11 PROCEDURE — 2550000001 HC RX 255 CONTRASTS: Performed by: INTERNAL MEDICINE

## 2024-12-11 PROCEDURE — 80053 COMPREHEN METABOLIC PANEL: CPT

## 2024-12-11 RX ORDER — HEPARIN 100 UNIT/ML
500 SYRINGE INTRAVENOUS AS NEEDED
Status: DISCONTINUED | OUTPATIENT
Start: 2024-12-11 | End: 2024-12-11 | Stop reason: HOSPADM

## 2024-12-11 RX ORDER — HEPARIN 100 UNIT/ML
500 SYRINGE INTRAVENOUS AS NEEDED
OUTPATIENT
Start: 2024-12-11

## 2024-12-11 RX ORDER — HEPARIN SODIUM,PORCINE/PF 10 UNIT/ML
50 SYRINGE (ML) INTRAVENOUS AS NEEDED
Status: DISCONTINUED | OUTPATIENT
Start: 2024-12-11 | End: 2024-12-11 | Stop reason: HOSPADM

## 2024-12-11 RX ORDER — HEPARIN SODIUM,PORCINE/PF 10 UNIT/ML
50 SYRINGE (ML) INTRAVENOUS AS NEEDED
OUTPATIENT
Start: 2024-12-11

## 2024-12-11 ASSESSMENT — PAIN SCALES - GENERAL: PAINLEVEL_OUTOF10: 3

## 2024-12-11 ASSESSMENT — PATIENT HEALTH QUESTIONNAIRE - PHQ9
2. FEELING DOWN, DEPRESSED OR HOPELESS: NOT AT ALL
SUM OF ALL RESPONSES TO PHQ9 QUESTIONS 1 & 2: 0
1. LITTLE INTEREST OR PLEASURE IN DOING THINGS: NOT AT ALL

## 2024-12-11 ASSESSMENT — ENCOUNTER SYMPTOMS
DEPRESSION: 0
OCCASIONAL FEELINGS OF UNSTEADINESS: 0
LOSS OF SENSATION IN FEET: 0

## 2024-12-12 ENCOUNTER — APPOINTMENT (OUTPATIENT)
Dept: PRIMARY CARE | Facility: CLINIC | Age: 79
End: 2024-12-12
Payer: MEDICARE

## 2024-12-19 ENCOUNTER — OFFICE VISIT (OUTPATIENT)
Dept: HEMATOLOGY/ONCOLOGY | Facility: HOSPITAL | Age: 79
End: 2024-12-19
Payer: MEDICARE

## 2024-12-19 ENCOUNTER — TELEPHONE (OUTPATIENT)
Dept: CARDIAC SURGERY | Facility: CLINIC | Age: 79
End: 2024-12-19

## 2024-12-19 VITALS
TEMPERATURE: 96.1 F | HEIGHT: 62 IN | HEART RATE: 73 BPM | BODY MASS INDEX: 23.57 KG/M2 | OXYGEN SATURATION: 96 % | WEIGHT: 128.09 LBS | RESPIRATION RATE: 16 BRPM

## 2024-12-19 DIAGNOSIS — R91.1 LUNG NODULE: ICD-10-CM

## 2024-12-19 DIAGNOSIS — E83.42 HYPOMAGNESEMIA: ICD-10-CM

## 2024-12-19 DIAGNOSIS — C32.9 LARYNGEAL CANCER (MULTI): Primary | ICD-10-CM

## 2024-12-19 PROCEDURE — 99215 OFFICE O/P EST HI 40 MIN: CPT | Performed by: INTERNAL MEDICINE

## 2024-12-19 PROCEDURE — 1125F AMNT PAIN NOTED PAIN PRSNT: CPT | Performed by: INTERNAL MEDICINE

## 2024-12-19 PROCEDURE — 1159F MED LIST DOCD IN RCRD: CPT | Performed by: INTERNAL MEDICINE

## 2024-12-19 ASSESSMENT — ENCOUNTER SYMPTOMS
RESPIRATORY NEGATIVE: 1
FATIGUE: 1
GASTROINTESTINAL NEGATIVE: 1
FEVER: 0
CARDIOVASCULAR NEGATIVE: 1

## 2024-12-19 ASSESSMENT — PAIN SCALES - GENERAL: PAINLEVEL_OUTOF10: 3

## 2024-12-19 NOTE — TELEPHONE ENCOUNTER
VM left to schedule pt to come in for a follow up after the PET ordered by onc per Dr. Roddy yanez

## 2024-12-19 NOTE — PROGRESS NOTES
Patient ID: Padilla Campuzano is a 79 y.o. female.    Subjective:  Returns for follow up for laryngeal cancer. Feels sore in throat but less than 3 mos ago. Appetite somewhat better. Denies fever or cough.     Heme/Onc History:  Past cancer:  Early stage colon cancer in 2001, s/p surgery alone  Breast cancer in 2012. S/p surgery, chemo, and radiation. Had significant side effects due to chemo. Had not received hormonal therapy  Anal cancer in 2017. S/p Tru protocol  Laryngeal cancer in 2024. See below.    - p/w hoarseness in early 2024. Found to have a vocal cord mass. Scans showed a LLL lung nodule.   - PET/CT (Apr 2024): Uptake in LLL lung nodule. 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 Bx: SCC  - Started chemoRT with carbo/taxol (Jul - Aug 2024): After 2 doses fo chemo, could not tolerate it and declined further chemo then continued with weekly cetuximab  - CT Chest (Dec 2024): LLL lung nodule increased in size again => Plan for PET/CT and surgery/rad Onc eval.    Assessment/Plan:  ? Laryngeal cancer: s/p chemoRT with carbo/taxol then cetuximab. Completed therapy in August. Doing better now. CT Neck does not show sign of recurrence. She needs to see ENT. Could not make an appointment so far. I made a new referral. Needs laryngoscopy for evaluation.    ? Lung nodule: I reviewed CT images again. Slowly growing. Biopsy in Apr 2024 was negative but this is a cavitary lesion and biopsies can be falsely negative. I contacted [facility] Thoracic Surgery. They will evaluate her again. Meanwhile, we will get a repeat PET/CT to evaluate for distant mets or Lymph nodes involvement. Velasquez ee her back in 2 months    Review Of Systems:  Review of Systems   Constitutional:  Positive for fatigue. Negative for fever.   HENT:  Negative.     Respiratory: Negative.    "  Cardiovascular: Negative.    Gastrointestinal: Negative.        Physical Exam:  Pulse 73   Temp 35.6 °C (96.1 °F) (Temporal)   Resp 16   Ht 1.575 m (5' 2\")   Wt 58.1 kg (128 lb 1.4 oz)   SpO2 96%   BMI 23.43 kg/m²   BSA: 1.59 meters squared  Performance Status: Asymptomatic  Physical Exam  HENT:      Head: Normocephalic and atraumatic.   Eyes:      General: No scleral icterus.  Pulmonary:      Effort: Pulmonary effort is normal.   Musculoskeletal:         General: Normal range of motion.      Cervical back: No rigidity or tenderness.   Lymphadenopathy:      Cervical: No cervical adenopathy.   Skin:     Coloration: Skin is not jaundiced.   Neurological:      General: No focal deficit present.      Mental Status: She is alert and oriented to person, place, and time.         Results:  Diagnostic Results   Lab Results   Component Value Date    WBC 6.1 12/11/2024    HGB 11.1 (L) 12/11/2024    HCT 34.3 (L) 12/11/2024     (H) 12/11/2024     12/11/2024     Lab Results   Component Value Date    CALCIUM 8.9 12/11/2024     12/11/2024    K 3.8 12/11/2024    CO2 25 12/11/2024     (H) 12/11/2024    BUN 22 12/11/2024    CREATININE 1.29 (H) 12/11/2024    ALT 5 (L) 12/11/2024    AST 15 12/11/2024       Current Outpatient Medications:     acetaminophen (Tylenol) 500 mg tablet, Take 2 tablets (1,000 mg) by mouth every 6 hours if needed for mild pain (1 - 3)., Disp: , Rfl:     biotin 5 mg capsule, once every 24 hours., Disp: , Rfl:     cholecalciferol (Vitamin D-3) 50 mcg (2,000 unit) capsule, 1 capsule (50 mcg) once every 24 hours., Disp: , Rfl:     cholestyramine (Questran) 4 gram powder, MIX 1 SCOOP WITH AT LEAST 2 TO 6 OUNCES LIQUID AND DRINK ONCE  DAILY, Disp: 756 g, Rfl: 0    diphenoxylate-atropine (LomotiL) 2.5-0.025 mg tablet, Take 1 tablet by mouth 4 times a day as needed for diarrhea for up to 7 days., Disp: 28 tablet, Rfl: 0    lidocaine (Lidoderm) 5 % patch, Place 1 patch over 12 hours on " the skin once daily for 21 days. Remove & discard patch within 12 hours or as directed by MD., Disp: 21 patch, Rfl: 2    lipase-protease-amylase (Creon) 24,000-76,000 -120,000 unit capsule, TAKE 3 CAPSULES BY MOUTH 3 TIMES DAILY WITH MEALS AND 1 TO 2  CAPSULES BY MOUTH WITH SNACKS.  MAX 13 CAPSULE DAILY, Disp: 400 capsule, Rfl: 10    losartan (Cozaar) 25 mg tablet, TAKE 1 TABLET BY MOUTH ONCE  DAILY, Disp: 90 tablet, Rfl: 0    magnesium oxide (Mag-Ox) 400 mg (241.3 mg magnesium) tablet, Take 1 tablet (400 mg) by mouth 2 times a day., Disp: 60 tablet, Rfl: 1    ondansetron (Zofran) 8 mg tablet, Take 1 tablet (8 mg) by mouth every 8 hours if needed for nausea or vomiting., Disp: 30 tablet, Rfl: 5    pantoprazole (ProtoNix) 40 mg EC tablet, TAKE 1 TABLET BY MOUTH TWICE  DAILY, Disp: 180 tablet, Rfl: 0    prochlorperazine (Compazine) 10 mg tablet, Take 1 tablet (10 mg) by mouth every 6 hours if needed for nausea or vomiting., Disp: 30 tablet, Rfl: 5    clindamycin (Cleocin T) 1 % lotion, Apply topically to affected area twice daily. (Patient not taking: Reported on 12/19/2024), Disp: 60 mL, Rfl: 3    doxycycline (Vibramycin) 100 mg capsule, Take 1 capsule (100 mg) by mouth 2 times a day. For rash prevention. Take with at least 8 ounces (large glass) of water, do not lie down for 30 minutes after (Patient not taking: Reported on 12/19/2024), Disp: 56 capsule, Rfl: 3    hydrocortisone 1 % cream, Apply topically to face, hands, feet, neck, back, and chest once daily at bedtime for rash prevention. (Patient not taking: Reported on 12/19/2024), Disp: 453.6 g, Rfl: 3    loperamide (Imodium) 2 mg capsule, Take 2 capsules (4 mg) by mouth with the first episode of diarrhea and 1 capsule (2 mg) by mouth with any additional episodes. Maximum 8 capsules (16 mg) per day. (Patient not taking: Reported on 12/19/2024), Disp: 100 capsule, Rfl: 0    potassium chloride (Klor-Con) 20 mEq packet, Take 20 mEq by mouth once daily. (Patient  not taking: Reported on 12/19/2024), Disp: 30 packet, Rfl: 0  No current facility-administered medications for this visit.    Facility-Administered Medications Ordered in Other Visits:     alteplase (Cathflo Activase) injection 2 mg, 2 mg, intra-catheter, PRN, Megan L Boggins, APRN-CNP    heparin flush 10 unit/mL syringe 50 Units, 50 Units, intra-catheter, PRN, Megan L Boggins, APRN-CNP    heparin flush 100 unit/mL syringe 500 Units, 500 Units, intra-catheter, PRN, Megan L Boggins, APRN-CNP, 500 Units at 08/20/24 1510     Past Surgical History:   Procedure Laterality Date    BI US GUIDED BREAST RIGHT CYST ASPIRATION Right 12/31/2019    BI BREAST CYST ASPIRATION RIGHT [facility]    BREAST LUMPECTOMY  05/16/2014    Breast Surgery Lumpectomy    CHOLECYSTECTOMY      CT GUIDED IMAGING FOR ABSCESS DRAIN  07/08/2015    CT GUIDED IMAGING FOR ABSCESS DRAIN [facility]    HYSTERECTOMY  1984    Hysterectomy    ILEOSTOMY  11/23/2015    Ileostomy    ILEOSTOMY CLOSURE  11/23/2015    Ileostomy Closure    OTHER SURGICAL HISTORY  2012    Cerebral artery coiling     Family History   Problem Relation Name Age of Onset    Colon cancer Mother      Liver cancer Mother      Kidney cancer Mother      Heart attack Father      Blood clot Father      Cancer Brother      Cancer Brother        reports that she quit smoking about 13 years ago. Her smoking use included cigarettes. She has been exposed to tobacco smoke. She has never used smokeless tobacco.    Diagnoses and all orders for this visit:  Laryngeal cancer (Multi)  -     Clinic Appointment Request  -     Urinalysis with Reflex Microscopic; Future  -     CT chest wo IV contrast; Future  -     NM PET CT bone skull base to mid thigh; Future  -     Clinic Appointment Request; Future  -     Referral to ENT; Future  Hypomagnesemia  -     Clinic Appointment Request  Lung nodule  -     Clinic Appointment Request  -     Urinalysis with Reflex Microscopic; Future  -      CT chest wo IV contrast; Future  -     Clinic Appointment Request; Future       I spent more than 40 minutes for the patient today, including face-to-face conversation, pre-visit preparation, post-visit orders, and others.   Malik Gordillo MD

## 2025-01-08 ENCOUNTER — HOSPITAL ENCOUNTER (OUTPATIENT)
Dept: RADIOLOGY | Facility: HOSPITAL | Age: 80
Discharge: HOME | End: 2025-01-08
Payer: MEDICARE

## 2025-01-08 ENCOUNTER — APPOINTMENT (OUTPATIENT)
Dept: HEMATOLOGY/ONCOLOGY | Facility: HOSPITAL | Age: 80
End: 2025-01-08
Payer: MEDICARE

## 2025-01-08 VITALS
SYSTOLIC BLOOD PRESSURE: 146 MMHG | DIASTOLIC BLOOD PRESSURE: 75 MMHG | HEART RATE: 75 BPM | RESPIRATION RATE: 16 BRPM | OXYGEN SATURATION: 95 %

## 2025-01-08 DIAGNOSIS — C32.9 LARYNGEAL CANCER (MULTI): ICD-10-CM

## 2025-01-08 PROCEDURE — 78815 PET IMAGE W/CT SKULL-THIGH: CPT | Mod: PET TUMOR SUBSQ TX STRATEGY | Performed by: STUDENT IN AN ORGANIZED HEALTH CARE EDUCATION/TRAINING PROGRAM

## 2025-01-08 PROCEDURE — 96523 IRRIG DRUG DELIVERY DEVICE: CPT

## 2025-01-08 PROCEDURE — A9552 F18 FDG: HCPCS | Performed by: INTERNAL MEDICINE

## 2025-01-08 PROCEDURE — 2500000004 HC RX 250 GENERAL PHARMACY W/ HCPCS (ALT 636 FOR OP/ED): Performed by: NURSE PRACTITIONER

## 2025-01-08 PROCEDURE — 3430000001 HC RX 343 DIAGNOSTIC RADIOPHARMACEUTICALS: Performed by: INTERNAL MEDICINE

## 2025-01-08 PROCEDURE — 78815 PET IMAGE W/CT SKULL-THIGH: CPT | Mod: PS

## 2025-01-08 RX ORDER — HEPARIN SODIUM,PORCINE/PF 10 UNIT/ML
50 SYRINGE (ML) INTRAVENOUS AS NEEDED
OUTPATIENT
Start: 2025-01-08

## 2025-01-08 RX ORDER — HEPARIN 100 UNIT/ML
500 SYRINGE INTRAVENOUS AS NEEDED
OUTPATIENT
Start: 2025-01-08

## 2025-01-08 RX ORDER — HEPARIN SODIUM,PORCINE/PF 10 UNIT/ML
50 SYRINGE (ML) INTRAVENOUS AS NEEDED
Status: DISCONTINUED | OUTPATIENT
Start: 2025-01-08 | End: 2025-01-08 | Stop reason: HOSPADM

## 2025-01-08 RX ORDER — FLUDEOXYGLUCOSE F 18 200 MCI/ML
12.5 INJECTION, SOLUTION INTRAVENOUS
Status: COMPLETED | OUTPATIENT
Start: 2025-01-08 | End: 2025-01-08

## 2025-01-08 RX ORDER — HEPARIN 100 UNIT/ML
500 SYRINGE INTRAVENOUS AS NEEDED
Status: DISCONTINUED | OUTPATIENT
Start: 2025-01-08 | End: 2025-01-08 | Stop reason: HOSPADM

## 2025-01-08 RX ADMIN — FLUDEOXYGLUCOSE F 18 12.5 MILLICURIE: 200 INJECTION, SOLUTION INTRAVENOUS at 10:32

## 2025-01-08 RX ADMIN — HEPARIN 500 UNITS: 100 SYRINGE at 12:08

## 2025-01-08 ASSESSMENT — PAIN SCALES - GENERAL: PAINLEVEL_OUTOF10: 8

## 2025-01-14 ENCOUNTER — APPOINTMENT (OUTPATIENT)
Dept: PRIMARY CARE | Facility: CLINIC | Age: 80
End: 2025-01-14
Payer: MEDICARE

## 2025-01-14 VITALS
TEMPERATURE: 97.1 F | HEART RATE: 57 BPM | SYSTOLIC BLOOD PRESSURE: 130 MMHG | WEIGHT: 128.6 LBS | DIASTOLIC BLOOD PRESSURE: 50 MMHG | OXYGEN SATURATION: 92 % | BODY MASS INDEX: 23.52 KG/M2

## 2025-01-14 DIAGNOSIS — Z85.048 HISTORY OF RECTAL CANCER: ICD-10-CM

## 2025-01-14 DIAGNOSIS — K52.9 CHRONIC DIARRHEA: ICD-10-CM

## 2025-01-14 DIAGNOSIS — C32.9 LARYNGEAL CANCER (MULTI): ICD-10-CM

## 2025-01-14 DIAGNOSIS — R91.1 LUNG NODULE: ICD-10-CM

## 2025-01-14 DIAGNOSIS — E83.42 HYPOMAGNESEMIA: ICD-10-CM

## 2025-01-14 DIAGNOSIS — M54.50 CHRONIC MIDLINE LOW BACK PAIN WITHOUT SCIATICA: ICD-10-CM

## 2025-01-14 DIAGNOSIS — I10 HYPERTENSION, UNSPECIFIED TYPE: Primary | ICD-10-CM

## 2025-01-14 DIAGNOSIS — C34.32 MALIGNANT NEOPLASM OF LOWER LOBE, LEFT BRONCHUS OR LUNG: ICD-10-CM

## 2025-01-14 DIAGNOSIS — G89.29 CHRONIC MIDLINE LOW BACK PAIN WITHOUT SCIATICA: ICD-10-CM

## 2025-01-14 DIAGNOSIS — N18.31 STAGE 3A CHRONIC KIDNEY DISEASE (MULTI): ICD-10-CM

## 2025-01-14 DIAGNOSIS — Z85.038 HISTORY OF COLON CANCER: ICD-10-CM

## 2025-01-14 DIAGNOSIS — I10 HYPERTENSION, UNSPECIFIED TYPE: ICD-10-CM

## 2025-01-14 DIAGNOSIS — C50.919 MALIGNANT NEOPLASM OF FEMALE BREAST, UNSPECIFIED ESTROGEN RECEPTOR STATUS, UNSPECIFIED LATERALITY, UNSPECIFIED SITE OF BREAST: ICD-10-CM

## 2025-01-14 DIAGNOSIS — C32.0 SQUAMOUS CELL CARCINOMA OF LEFT VOCAL CORD (MULTI): ICD-10-CM

## 2025-01-14 PROBLEM — N18.32 STAGE 3B CHRONIC KIDNEY DISEASE (MULTI): Status: ACTIVE | Noted: 2024-03-01

## 2025-01-14 PROCEDURE — 1160F RVW MEDS BY RX/DR IN RCRD: CPT | Performed by: INTERNAL MEDICINE

## 2025-01-14 PROCEDURE — 99214 OFFICE O/P EST MOD 30 MIN: CPT | Performed by: INTERNAL MEDICINE

## 2025-01-14 PROCEDURE — 1036F TOBACCO NON-USER: CPT | Performed by: INTERNAL MEDICINE

## 2025-01-14 PROCEDURE — 3075F SYST BP GE 130 - 139MM HG: CPT | Performed by: INTERNAL MEDICINE

## 2025-01-14 PROCEDURE — 3078F DIAST BP <80 MM HG: CPT | Performed by: INTERNAL MEDICINE

## 2025-01-14 PROCEDURE — G2211 COMPLEX E/M VISIT ADD ON: HCPCS | Performed by: INTERNAL MEDICINE

## 2025-01-14 PROCEDURE — 1159F MED LIST DOCD IN RCRD: CPT | Performed by: INTERNAL MEDICINE

## 2025-01-14 RX ORDER — LIDOCAINE 50 MG/G
1 PATCH TOPICAL DAILY
Qty: 90 PATCH | Refills: 0 | Status: SHIPPED | OUTPATIENT
Start: 2025-01-14 | End: 2025-04-14

## 2025-01-14 RX ORDER — LANOLIN ALCOHOL/MO/W.PET/CERES
400 CREAM (GRAM) TOPICAL DAILY
Qty: 90 TABLET | Refills: 0 | Status: SHIPPED | OUTPATIENT
Start: 2025-01-14 | End: 2025-04-14

## 2025-01-14 RX ORDER — PANCRELIPASE 24000; 76000; 120000 [USP'U]/1; [USP'U]/1; [USP'U]/1
CAPSULE, DELAYED RELEASE PELLETS ORAL
Qty: 400 CAPSULE | Refills: 0 | Status: SHIPPED | OUTPATIENT
Start: 2025-01-14

## 2025-01-14 NOTE — PROGRESS NOTES
Subjective   Patient ID: Padilla Campuzano is a 79 y.o. female who presents for Follow-up (3 month ) and Medication Question (Magnesium - would like to know if she needs to continue taking it? ///Lidocaine patch - stronger dose? ).  HPI    Routine follow up    Spouse / Bryson present H&P     Doing as well as can under circumstances    Tests rev'd    Chronic back pain without sciatica  Uses lidocaie patches     Hypertension stable on rx no side effects  On losartan 25 mg daily     CKD stage 3a  GFR 47  10-24    Hypomagnesemia on supplement     Follow up CT chest  CT/PET rev'd / abnormal  oncology following     Patient presented in December 2023  with laryngitis.  She was seen and evaluated in the [place] urgent care.  Diagnosed with laryngeal cancer  Voice has still not returned.  ENT following  Had XRT, chemo  Now receiving infusions     H/o lung nodule on CT 2-23  Bx negative  PET scan worse  h/o smoker    Chronic diarrhea on creon     History of breast cancer  mammogram ordered     History of colon cancer     History of anal cancer  Chronic diarrhea on rx     History of cerebral hemorrhage 2012.     Diet and exercise reviewed    Review of Systems   All other systems reviewed and are negative.      Objective   /50   Pulse 57   Temp 36.2 °C (97.1 °F)   Wt 58.3 kg (128 lb 9.6 oz)   SpO2 92%   BMI 23.52 kg/m²   Lab Results   Component Value Date    WBC 6.1 12/11/2024    HGB 11.1 (L) 12/11/2024    HCT 34.3 (L) 12/11/2024     12/11/2024    CHOL 123 05/01/2024    TRIG 130 05/01/2024    HDL 44.8 05/01/2024    ALT 5 (L) 12/11/2024    AST 15 12/11/2024     12/11/2024    K 3.8 12/11/2024     (H) 12/11/2024    CREATININE 1.29 (H) 12/11/2024    BUN 22 12/11/2024    CO2 25 12/11/2024    TSH 4.06 (H) 06/21/2024    INR 1.1 07/31/2024    ALBUR 80 (H) 01/10/2023           Physical Exam  Vitals reviewed.   Constitutional:       Appearance: Normal appearance. She is normal weight.   HENT:      Head:  Normocephalic and atraumatic.      Mouth/Throat:      Pharynx: No posterior oropharyngeal erythema.   Eyes:      General: No scleral icterus.     Conjunctiva/sclera: Conjunctivae normal.      Pupils: Pupils are equal, round, and reactive to light.   Cardiovascular:      Rate and Rhythm: Normal rate and regular rhythm.      Heart sounds: Normal heart sounds.   Pulmonary:      Effort: No respiratory distress.      Breath sounds: No wheezing.   Abdominal:      General: Abdomen is flat. Bowel sounds are normal. There is no distension.      Palpations: Abdomen is soft. There is no mass.      Tenderness: There is no abdominal tenderness. There is no rebound.   Musculoskeletal:         General: Normal range of motion.      Cervical back: Normal range of motion and neck supple.   Skin:     General: Skin is warm and dry.   Neurological:      General: No focal deficit present.      Mental Status: She is alert and oriented to person, place, and time. Mental status is at baseline.   Psychiatric:         Mood and Affect: Mood normal.         Behavior: Behavior normal.         Thought Content: Thought content normal.         Judgment: Judgment normal.         Problem List Items Addressed This Visit             ICD-10-CM    Breast cancer (Multi) C50.919    Chronic diarrhea K52.9    Relevant Medications    lipase-protease-amylase (Creon) 24,000-76,000 -120,000 unit capsule    Hypomagnesemia E83.42    Relevant Medications    magnesium oxide (Mag-Ox) 400 mg (241.3 mg magnesium) tablet    History of colon cancer Z85.038    History of rectal cancer Z85.048    Lung nodule R91.1    Relevant Medications    magnesium oxide (Mag-Ox) 400 mg (241.3 mg magnesium) tablet    Laryngeal cancer (Multi) C32.9    Relevant Medications    magnesium oxide (Mag-Ox) 400 mg (241.3 mg magnesium) tablet    Chronic midline low back pain without sciatica M54.50, G89.29    Relevant Medications    lidocaine (Lidoderm) 5 % patch    Malignant neoplasm of lower  lobe, 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     Other Visit Diagnoses         Codes    Hypertension, unspecified type    -  Primary I10    Stage 3a chronic kidney disease (Multi)     N18.31    BMI 23.0-23.9, adult     Z68.23          Assessment/Plan       Spouse / Bryson present H&P     Doing as well as can under circumstances    Tests rev'd    Chronic back pain without sciatica  Uses lidocaie patches     Hypertension stable on rx no side effects  On losartan 25 mg daily     CKD stage 3a  GFR 47  10-24    Hypomagnesemia on supplement     Follow up CT chest  CT/PET rev'd / abnormal  oncology following     Patient presented in December 2023  with laryngitis.  She was seen and evaluated in the [place] urgent care.  Diagnosed with laryngeal cancer  Voice has still not returned.  ENT following  Had XRT, chemo  Now receiving infusions     H/o lung nodule on CT 2-23  Bx negative  PET scan worse  h/o smoker    Chronic diarrhea on creon     History of breast cancer  mammogram ordered     History of colon cancer     History of anal cancer  Chronic diarrhea on rx     History of cerebral hemorrhage 2012.     Diet and exercise reviewed    Mammogram  11-21 / ordered not done  Dexa n/a  Colonoscopy n/a  CT chest lung cancer screening n/a  GYN n/a  immunizations rev'd RSV, shingrix, pneumo  BMI 23.5    Follow up 3 months / medicare physical

## 2025-01-15 RX ORDER — LOPERAMIDE HYDROCHLORIDE 2 MG/1
CAPSULE ORAL
Qty: 100 CAPSULE | Refills: 0 | Status: SHIPPED | OUTPATIENT
Start: 2025-01-15

## 2025-01-15 RX ORDER — PANTOPRAZOLE SODIUM 40 MG/1
TABLET, DELAYED RELEASE ORAL
Qty: 180 TABLET | Refills: 0 | Status: SHIPPED | OUTPATIENT
Start: 2025-01-15

## 2025-01-15 RX ORDER — LOSARTAN POTASSIUM 25 MG/1
25 TABLET ORAL DAILY
Qty: 90 TABLET | Refills: 0 | Status: SHIPPED | OUTPATIENT
Start: 2025-01-15

## 2025-01-17 ENCOUNTER — APPOINTMENT (OUTPATIENT)
Dept: OTOLARYNGOLOGY | Facility: CLINIC | Age: 80
End: 2025-01-17
Payer: MEDICARE

## 2025-01-17 DIAGNOSIS — C32.9 LARYNGEAL CANCER (MULTI): ICD-10-CM

## 2025-01-17 NOTE — LETTER
January 18, 2025     Malik Gordillo MD  [address] [facility] [address]    Patient: Padilla Campuzano   YOB: 1945   Date of Visit: 1/17/2025       Dear Dr. Malik Gordillo MD:    Thank you for referring Padilla Campuzano to me for evaluation. Below are my notes for this consultation.  If you have questions, please do not hesitate to call me. I look forward to following your patient along with you.       Sincerely,     Hussain Vieira MD      CC: No Recipients  ______________________________________________________________________________________    History Of Present Illness  Padilla Campuzano is a 79 y.o. 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 She had chemoradiation, finished 09.19.2024. She is kindly referred by Dr. Gordillo.    Brief Past History:    - PET/CT (Apr 2024): Uptake in LLL lung nodule. Uptake in vocal cords  - ENT eval (Apr 2024): L vocal cord fixed  - Lung bx (Apr 2024): Neg  - Direct laryngoscopy (May 2024): L true vocal cord mucosal irregularity extending into subglottis, anterior commissure and to under surface of right vocal cord. Bx: SCC  - Started chemoRT with carbo/taxol (Jul - Aug 2024): After 2 doses of chemo, could not tolerate it and declined further chemo then continued with weekly cetuximab  - CT Chest (Dec 2024): LLL lung nodule increased in size again => Plan for PET/CT and surgery/rad Onc eval.  - PET?CT (Jan 2025):  Newly developed FDG avidity at the right true vocal cord with SUV max of 12.4, No FDG avid cervical lymphadenopathy is present.     1. Interval increase in size and FDG avidity of a cavitary lesion in the left lower lobe, concerning for a primary lung neoplasm, even though previous biopsy was inconclusive.    2. Near-complete metabolic resolution of FDG avidity in the left vocal cord. The remaining FDG avidity likely represents either residual viable  disease or post-treatment inflammation, attention on follow-up study.    3.  Newly developed FDG avidity in the right true vocal cord,     Fiberoptic endoscopy shows that nasal septum is deviated to right.  Endoscope was advanced through patient's left nasal cavity.  On examination, left arytenoid, left aryepiglottic fold and left ventricular are severely swollen. Left arytenoid and ventricular band are fixated. The left vocal cord could not be visualized due to the severe edema-swelling of left ventricular band, arytenoid and aryepiglottic fold. There could be submucosal tumor under these structures, or this could be secondary to RT. No visible vegetative mass or ulceration. Right vocal cord looks anatomically normal and mobile, but I'd like to note that previous fiberoptic exam in June 2024 has shown that disease was extending towards the anterior 1/4 of right vocal cord.      No palpable neck nodes.    Plan:   1-referral to head and neck surgical oncology for further evaluation. Increased metabolic activity of right vocal cord, exam looks normal.     Past Medical History  She has a past medical history of Anal cancer (Multi), Breast cancer (Multi), Cerebral hemorrhage (Multi) (2012), CKD (chronic kidney disease), Colon cancer (Multi), Colorectal cancer (Multi), Diverticulosis of intestine, part unspecified, without perforation or abscess without bleeding, GERD (gastroesophageal reflux disease), Hypertension, Irritable bowel syndrome, Kidney disease, Malignant neoplasm of colon, unspecified, Personal history of colonic polyps, Personal history of irradiation, Personal history of malignant neoplasm of breast, Personal history of other diseases of the respiratory system, Personal history of other malignant neoplasm of skin, Right upper quadrant pain (01/22/2016), Stroke (Multi) (2012), and Unspecified symptoms and signs involving the genitourinary system (01/22/2016).    Surgical History  She has a past surgical  history that includes Breast lumpectomy (05/16/2014); Hysterectomy (1984); Ileostomy closure (11/23/2015); Ileostomy (11/23/2015); BI US breast right cyst aspiration (Right, 12/31/2019); CT guided imaging for abscess drain (07/08/2015); Cholecystectomy; and Other surgical history (2012).     Social History  She reports that she quit smoking about 14 years ago. Her smoking use included cigarettes. She has been exposed to tobacco smoke. She has never used smokeless tobacco. She reports that she does not drink alcohol and does not use drugs.    Family History  Family History   Problem Relation Name Age of Onset   • Colon cancer Mother     • Liver cancer Mother     • Kidney cancer Mother     • Heart attack Father     • Blood clot Father     • Cancer Brother     • Cancer Brother          Allergies  Paclitaxel, Hydrocodone-acetaminophen, Oxycodone hcl, Oxycodone-acetaminophen, Aspirin, and Sulfa (sulfonamide antibiotics)    Review of Systems   Follow up T3 laryngeal SCC     Physical Exam    General appearance: Healthy-appearing, well-nourished, well groomed, in no acute distress.     Head and Face: Atraumatic with no masses, lesions, or scarring.      Salivary glands: No tenderness of the parotid glands or parotid masses.     No tenderness of the submandibular glands or submandibular masses.      Facial strength: Normal strength and symmetry, no synkinesis or facial tic.     Eyes: Conjunctivas look non-hyperemic bilaterally    Ears: Bilaterally ear canals look normal. Tympanic membranes look intact, no hyperemia, fluid or retraction.     Nose: Please see endoscopy note.    Oral Cavity/Mouth: Lips and tongue look normal.     Throat: No postnasal discharge. No tonsil hypertrophy. No hyperemia.    Neck: Symmetrical, trachea midline.     Pulmonary: Normal respiratory effort.     Lymphatic: No palpable pathologic lymph nodes at neck.     Neurological/Psychiatric Orientation to person, place, and time: Normal.     Mood and  affect: Normal.      Extremities: No clubbing.     Skin: No significant skin lesions were noted at face or neck        Procedure  FLEXIBLE ENDOSCOPY 01.18.2025  After application of topical lidocaine and decongestant, flexible endoscope was advanced through patient's nasal cavities. Nasal septum is deviated to right.  Endoscope was advanced through patient's left nasal cavity.      On examination, left arytenoid, left aryepiglottic fold and left ventricular are severely swollen. Left arytenoid and ventricular band are fixated. The left vocal cord could not be visualized due to the severe edema-swelling of left ventricular band, arytenoid and aryepiglottic fold. There could be submucosal tumor under these structures, or this could be secondary to RT. No visible ulceration. Right vocal cord looks anatomically normal and mobile, but I'd like to note that previous direct laryngoscopy (May 2024) has shown that disease was extending towards the anterior 1/4 of right vocal cord.         Last Recorded Vitals  There were no vitals taken for this visit.    Relevant Results  Prior to Admission medications    Medication Sig Start Date End Date Taking? Authorizing Provider   acetaminophen (Tylenol) 500 mg tablet Take 2 tablets (1,000 mg) by mouth every 6 hours if needed for mild pain (1 - 3).    Historical Provider, MD   biotin 5 mg capsule once every 24 hours.    Historical Provider, MD   cholecalciferol (Vitamin D-3) 50 mcg (2,000 unit) capsule 1 capsule (50 mcg) once every 24 hours.    Historical Provider, MD   cholestyramine (Questran) 4 gram powder MIX 1 SCOOP WITH AT LEAST 2 TO 6 OUNCES LIQUID AND DRINK ONCE  DAILY 11/13/24   Nhaeed Clements MD   clindamycin (Cleocin T) 1 % lotion Apply topically to affected area twice daily. 7/15/24   Kyunghee Burkitt, DO   diphenoxylate-atropine (LomotiL) 2.5-0.025 mg tablet Take 1 tablet by mouth 4 times a day as needed for diarrhea for up to 7 days. 8/26/24 12/19/24  Megan HAMPTON  Boggins, APRN-CNP   doxycycline (Vibramycin) 100 mg capsule Take 1 capsule (100 mg) by mouth 2 times a day. For rash prevention. Take with at least 8 ounces (large glass) of water, do not lie down for 30 minutes after 7/15/24   Kyunghee Burkitt, DO   hydrocortisone 1 % cream Apply topically to face, hands, feet, neck, back, and chest once daily at bedtime for rash prevention. 7/15/24   Kyunghee Burkitt,    lidocaine (Lidoderm) 5 % patch Place 1 patch over 12 hours on the skin once daily. Remove & discard patch within 12 hours or as directed by MD. 1/14/25 4/14/25  Naheed Clements MD   lipase-protease-amylase (Creon) 24,000-76,000 -120,000 unit capsule TAKE 3 CAPSULES BY MOUTH 3 TIMES DAILY WITH MEALS AND 1 TO 2  CAPSULES BY MOUTH WITH SNACKS.  MAX 13 CAPSULE DAILY 1/14/25   Naheed Clements MD   loperamide (Imodium) 2 mg capsule TAKE 2 CAPSULES BY MOUTH WITH  THE FIRST EPISODE OF DIARRHEA  AND 1 CAPSULE WITH ANY  ADDITIONAL EPISODES. MAXIMUM 8  CAPSULES DAILY 1/15/25   Naheed Clements MD   losartan (Cozaar) 25 mg tablet TAKE 1 TABLET BY MOUTH ONCE  DAILY 1/15/25   Naheed Clements MD   magnesium oxide (Mag-Ox) 400 mg (241.3 mg magnesium) tablet Take 1 tablet (400 mg) by mouth once daily. 1/14/25 4/14/25  Naheed Clements MD   ondansetron (Zofran) 8 mg tablet Take 1 tablet (8 mg) by mouth every 8 hours if needed for nausea or vomiting. 7/1/24   POP Maloney   pantoprazole (ProtoNix) 40 mg EC tablet TAKE 1 TABLET BY MOUTH TWICE  DAILY 1/15/25   Naheed Clements MD   potassium chloride (Klor-Con) 20 mEq packet Take 20 mEq by mouth once daily.  Patient not taking: Reported on 12/19/2024 8/2/24 9/1/24  POP Maloney   prochlorperazine (Compazine) 10 mg tablet Take 1 tablet (10 mg) by mouth every 6 hours if needed for nausea or vomiting. 7/15/24   Kyunghee Burkitt, DO   lidocaine (Lidoderm) 5 % patch Place 1 patch over 12 hours on the skin once daily for 21 days.  Remove & discard patch within 12 hours or as directed by MD. 8/26/24 1/14/25  ÁNGEL Maloney-CNP   lipase-protease-amylase (Creon) 24,000-76,000 -120,000 unit capsule TAKE 3 CAPSULES BY MOUTH 3 TIMES DAILY WITH MEALS AND 1 TO 2  CAPSULES BY MOUTH WITH SNACKS.  MAX 13 CAPSULE DAILY 12/22/23 1/14/25  Leander Sun MD   loperamide (Imodium) 2 mg capsule Take 2 capsules (4 mg) by mouth with the first episode of diarrhea and 1 capsule (2 mg) by mouth with any additional episodes. Maximum 8 capsules (16 mg) per day. 9/12/24 1/15/25  Naheed Clements MD   losartan (Cozaar) 25 mg tablet TAKE 1 TABLET BY MOUTH ONCE  DAILY 11/13/24 1/15/25  Naheed Clements MD   magnesium oxide (Mag-Ox) 400 mg (241.3 mg magnesium) tablet Take 1 tablet (400 mg) by mouth 2 times a day. 9/26/24 1/14/25  Malik Matthew MD   pantoprazole (ProtoNix) 40 mg EC tablet TAKE 1 TABLET BY MOUTH TWICE  DAILY 10/21/24 1/15/25  Naheed Clements MD     NM PET CT bone skull base to mid thigh    Result Date: 1/9/2025  Interpreted By:  Nohelia Rhoades and Bera Kaustav STUDY: NM PET CT SKULL BASE TO MID THIGH;  1/8/2025 11:57 am   INDICATION: Signs/Symptoms:Lung cancer?.   COMPARISON: CT chest and neck with contrast on 12/11/2024 PET-CT on 04/03/2024   ACCESSION NUMBER(S): [id number]   ORDERING CLINICIAN: MALIK MATTHEW   TECHNIQUE: DIVISION OF NUCLEAR MEDICINE POSITRON EMISSION TOMOGRAPHY (PET-CT)   The patient received an intravenous dose of 12.5 mCi of Fluorine-18 fluorodeoxyglucose (FDG).  Positron emission tomographic (PET) images from mid thigh to skull base were then acquired after a one hour delay. Additional PET images of the head was also obtained with the patient lying still. Also acquired was a contemporaneous low dose non-contrast CT scan performed for attenuation correction of PET images and anatomic localization.  The PET and CT images were digitally fused for display.  All images were acquired on a combined PET-CT scanner  unit.  Some areas of FDG accumulation may be described in standardized uptake value (SUV) units.   CODING: Subsequent Treatment Strategy (PS)   CALIBRATION: Dose Injection-to-Scan Interval (mins): 58 min Mediastinal bloodpool SUV (normal 1.5-2.5): 2.2 Blood glucose: 97 mg/dL   FINDINGS: HEAD AND NECK: *No evidence of focal FDG avid lesion in the partially visualized brain parenchyma, noting that evaluation is limited because of the expected physiologic diffuse FDG uptake in the brain. *Nearly complete metabolic resolution of FDG avidity in the left vocal cord with SUV max 4.2 versus 16 previously. 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 * No FDG avid cervical lymphadenopathy is present. * No paranasal sinus diease. * Thyroid gland is unremarkable.   CHEST: *Interval increase in size and FDG avidity of a cavitary lesion in the left lower lobe with SUV max of 6.6, as compared to 5.4 previously, which was previously biopsied and shown to be nonmalignant. Right lung is unremarkable. *No FDG avid mediastinal, hilar or axillary lymphadenopathy. *Right chest wall MediPort in place. *Non FDG avid hypoattenuating lesion right breast, stable in comparison to prior study. Left breast is unremarkable.   ABDOMEN AND PELVIS: *No FDG avid  lymphadenopathy in the abdomen or pelvis. *Bilateral adrenal glands are unremarkable. *Cholecystectomy. Postsurgical changes from partial colectomy with a left lower quadrant anastomosis, without focal FDG activity at the surgical bed to suggest recurrent/residual disease. Diverticulosis without diverticulitis. *Again seen, focal hypermetabolic activity at the anal region with SUV max of 6.9, which is mildly decreased in size and extent as compared to prior where SUV max of 7.5. Previously seen hypermetabolic focus in the small bowel of the inferior margin of the liver is again seen, and likely represents  physiologic bowel activity. *Physiologic radiotracer uptake is present in the liver and spleen with excretion into the bowel loops and the genitourinary tract.   MUSCULOSKELETAL: *No concerning FDG avid bone lesion throughout axial and appendicular skeleton to suggest osseous metastasis.       1. Interval increase in size and FDG avidity of a cavitary lesion in the left lower lobe, concerning for a primary lung neoplasm, even though previous biopsy was inconclusive. 2. Near-complete metabolic resolution of FDG avidity in the left vocal cord. The remaining FDG avidity likely represents either residual viable disease or post-treatment inflammation, attention on follow-up study. 3.  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   I personally reviewed the image(s) / study and agree with the findings and interpretation as stated. This study was interpreted at [facility].   Signed by: Nohelia Rhoades 1/9/2025 1:38 PM Dictation workstation:   [id number]    Narrative & Impression   Interpreted By:  Solitario Watson,  and Vannesa Cisneros   STUDY:  CT SOFT TISSUE NECK W IV CONTRAST;  12/11/2024 12:25 pm      INDICATION:  Signs/Symptoms:laryngeal cancer.      ,E83.42 Hypomagnesemia,C32.9 Malignant neoplasm of larynx,  unspecified,R91.1 Solitary pulmonary nodule      Status post left vocal cord mass chemoradiotherapy ending August 20, 2024      COMPARISON:  CT of the chest obtained September 23, 2024 as well as performed  concurrently. MRI of the cervical spine obtained July 8, 2024.  PET-CT dated 04/03/2024.      ACCESSION NUMBER(S):  [id number]      ORDERING CLINICIAN:  KAYA MATTHEW      TECHNIQUE:  Helical CT images of the neck were obtained.  The patient received 60  ML of Omnipaque 350 intravenous contrast agent. The images were  reformatted in angled axial, coronal and sagittal planes.      FINDINGS:  Right chemotherapy chest  port observed without complete visualization  of tubing terminus.      Oral Cavity, Pharynx and Larynx:  Nasopharyngeal soft tissues appear  normal. Patient is edentulous. Base of the tongue and floor of the  mouth appears normal. Circumferential pharyngeal soft tissue  thickening/edema observed, which appears to narrow the oropharyngeal  airway. Epiglottic vallecula appears mostly effaced. Piriform sinuses  not well visualized. There is thickening of the epiglottis and  aryepiglottic folds as well. Asymmetric appearance of the vocal  cords. Thickening and leftward deviation of the right true vocal cord  is observed (axial series 201, image 75). Atrophic appearance of the  left vocal cord. There is thickening and distortion of the  postcricoid space. Subglottic soft tissues and airway appear normal.      Retropharyngeal and Prevertebral Soft Tissues: Posttreatment change  suggested with mildly edematous appearance. No discrete fluid  collection. No evidence of pathologically enlarged lymph node or mass.      Lymph nodes: No morphologically abnormal or pathologically enlarged  cervical station lymph nodes observed.      Neck vessels: Bilateral neck vessels appear patent. 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 Predominantly mild multifocal  athero sclerotic calcification. More focal plaque components at the  right subclavian artery origin with possible flow significant  stenosis.      Thyroid gland: The thyroid gland is unremarkable in size and  appearance.      Parotid and submandibular glands: Mild atrophy of the salivary  glands. Bilateral parotid and submandibular glands are otherwise  unremarkable in appearance.      Paranasal Sinuses and Mastoids: Small amount of fluid within the  right mastoid. Visualized paranasal sinuses and left mastoids are  clear.       Visualized orbital structures are unremarkable.      Thoracic findings detailed separately. There is redemonstrated  moderate emphysema. As compared with prior examination there is an 8  mm irregular nodular subpleural opacity along the mesial aspect of  the right upper lobe (series 203, image 220).      No acute or aggressive appearing osseous abnormalities of the  cervical spine.      IMPRESSION:  1. Edematous appearance of the laryngeal and glottic soft tissues  consistent with recent chemoradiotherapy. There is asymmetry of the  vocal cords raising the possibility of underlying vocal cord  paresis/paralysis. No discrete residual laryngeal mass appreciated on  this examination. There is no evidence of morphologically abnormal or  pathologically enlarged cervical lymph nodes.  2. Irregular left upper lobe pulmonary nodule, which appears new from  the September 23 2024 CT of the chest. For full assessment of the  chest, please see same day CT of the chest under a separate accession  number.      I personally reviewed the images/study and I agree with the findings  as stated. This study was interpreted at [place], [place].      MACRO:  None      Signed by: Solitario Watson 12/11/2024 1:21 PM  Dictation workstation:   [id number]       Assessment and Plan:  Padilla Campuzano is a 79 y.o. 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 She had chemoradiation, finished 09.19.2024. She is kindly referred by Dr. Gordillo.    Brief Past History:    - PET/CT (Apr 2024): Uptake in LLL lung nodule. Uptake in vocal cords  - ENT eval (Apr 2024): L vocal cord fixed  - Lung bx (Apr 2024): Neg  - Direct laryngoscopy (May 2024): L true vocal cord mucosal irregularity extending into subglottis, anterior commissure and to under surface of right vocal cord. Bx: SCC  - Started chemoRT with  carbo/taxol (Jul - Aug 2024): After 2 doses of chemo, could not tolerate it and declined further chemo then continued with weekly cetuximab  - CT Chest (Dec 2024): LLL lung nodule increased in size again => Plan for PET/CT and surgery/rad Onc eval.  - PET?CT (Jan 2025):  Newly developed FDG avidity at the right true vocal cord with SUV max of 12.4, No FDG avid cervical lymphadenopathy is present.     1. Interval increase in size and FDG avidity of a cavitary lesion in the left lower lobe, concerning for a primary lung neoplasm, even though previous biopsy was inconclusive.    2. Near-complete metabolic resolution of FDG avidity in the left vocal cord. The remaining FDG avidity likely represents either residual viable disease or post-treatment inflammation, attention on follow-up study.    3.  Newly developed FDG avidity in the right true vocal cord,     Fiberoptic endoscopy shows that nasal septum is deviated to right.  Endoscope was advanced through patient's left nasal cavity.  On examination, left arytenoid, left aryepiglottic fold and left ventricular are severely swollen. Left arytenoid and ventricular band are fixated. The left vocal cord could not be visualized due to the severe edema-swelling of left ventricular band, arytenoid and aryepiglottic fold. There could be submucosal tumor under these structures, or this could be secondary to RT. No visible vegetative mass or ulceration. Right vocal cord looks anatomically normal and mobile, but I'd like to note that previous fiberoptic exam in June 2024 has shown that disease was extending towards the anterior 1/4 of right vocal cord.      No palpable neck nodes.    Plan:   1-referral to head and neck surgical oncology for further evaluation. Increased metabolic activity of right vocal cord, exam looks normal.     Hussain Vieira MD  Otolaryngology - Head & Neck Surgery

## 2025-01-17 NOTE — PROGRESS NOTES
History Of Present Illness  Padilla Campuzano is a 79 y.o. 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 She had chemoradiation, finished 09.19.2024. She is kindly referred by Dr. Gordillo.    Brief Past History:    - PET/CT (Apr 2024): Uptake in LLL lung nodule. Uptake in vocal cords  - ENT eval (Apr 2024): L vocal cord fixed  - Lung bx (Apr 2024): Neg  - Direct laryngoscopy (May 2024): L true vocal cord mucosal irregularity extending into subglottis, anterior commissure and to under surface of right vocal cord. Bx: SCC  - Started chemoRT with carbo/taxol (Jul - Aug 2024): After 2 doses of chemo, could not tolerate it and declined further chemo then continued with weekly cetuximab  - CT Chest (Dec 2024): LLL lung nodule increased in size again => Plan for PET/CT and surgery/rad Onc eval.  - PET?CT (Jan 2025):  Newly developed FDG avidity at the right true vocal cord with SUV max of 12.4, No FDG avid cervical lymphadenopathy is present.     1. Interval increase in size and FDG avidity of a cavitary lesion in the left lower lobe, concerning for a primary lung neoplasm, even though previous biopsy was inconclusive.    2. Near-complete metabolic resolution of FDG avidity in the left vocal cord. The remaining FDG avidity likely represents either residual viable disease or post-treatment inflammation, attention on follow-up study.    3.  Newly developed FDG avidity in the right true vocal cord,     Fiberoptic endoscopy shows that nasal septum is deviated to right.  Endoscope was advanced through patient's left nasal cavity.  On examination, left arytenoid, left aryepiglottic fold and left ventricular are severely swollen. Left arytenoid and ventricular band are fixated. The left vocal cord could not be visualized due to the severe edema-swelling of left ventricular band, arytenoid and aryepiglottic fold. There could be  submucosal tumor under these structures, or this could be secondary to RT. No visible vegetative mass or ulceration. Right vocal cord looks anatomically normal and mobile, but I'd like to note that previous fiberoptic exam in June 2024 has shown that disease was extending towards the anterior 1/4 of right vocal cord.      No palpable neck nodes.    Plan:   1-referral to head and neck surgical oncology for further evaluation. Increased metabolic activity of right vocal cord, exam looks normal.     Past Medical History  She has a past medical history of Anal cancer (Multi), Breast cancer (Multi), Cerebral hemorrhage (Multi) (2012), CKD (chronic kidney disease), Colon cancer (Multi), Colorectal cancer (Multi), Diverticulosis of intestine, part unspecified, without perforation or abscess without bleeding, GERD (gastroesophageal reflux disease), Hypertension, Irritable bowel syndrome, Kidney disease, Malignant neoplasm of colon, unspecified, Personal history of colonic polyps, Personal history of irradiation, Personal history of malignant neoplasm of breast, Personal history of other diseases of the respiratory system, Personal history of other malignant neoplasm of skin, Right upper quadrant pain (01/22/2016), Stroke (Multi) (2012), and Unspecified symptoms and signs involving the genitourinary system (01/22/2016).    Surgical History  She has a past surgical history that includes Breast lumpectomy (05/16/2014); Hysterectomy (1984); Ileostomy closure (11/23/2015); Ileostomy (11/23/2015); BI US breast right cyst aspiration (Right, 12/31/2019); CT guided imaging for abscess drain (07/08/2015); Cholecystectomy; and Other surgical history (2012).     Social History  She reports that she quit smoking about 14 years ago. Her smoking use included cigarettes. She has been exposed to tobacco smoke. She has never used smokeless tobacco. She reports that she does not drink alcohol and does not use drugs.    Family History  Family  History   Problem Relation Name Age of Onset    Colon cancer Mother      Liver cancer Mother      Kidney cancer Mother      Heart attack Father      Blood clot Father      Cancer Brother      Cancer Brother          Allergies  Paclitaxel, Hydrocodone-acetaminophen, Oxycodone hcl, Oxycodone-acetaminophen, Aspirin, and Sulfa (sulfonamide antibiotics)    Review of Systems   Follow up T3 laryngeal SCC     Physical Exam    General appearance: Healthy-appearing, well-nourished, well groomed, in no acute distress.     Head and Face: Atraumatic with no masses, lesions, or scarring.      Salivary glands: No tenderness of the parotid glands or parotid masses.     No tenderness of the submandibular glands or submandibular masses.      Facial strength: Normal strength and symmetry, no synkinesis or facial tic.     Eyes: Conjunctivas look non-hyperemic bilaterally    Ears: Bilaterally ear canals look normal. Tympanic membranes look intact, no hyperemia, fluid or retraction.     Nose: Please see endoscopy note.    Oral Cavity/Mouth: Lips and tongue look normal.     Throat: No postnasal discharge. No tonsil hypertrophy. No hyperemia.    Neck: Symmetrical, trachea midline.     Pulmonary: Normal respiratory effort.     Lymphatic: No palpable pathologic lymph nodes at neck.     Neurological/Psychiatric Orientation to person, place, and time: Normal.     Mood and affect: Normal.      Extremities: No clubbing.     Skin: No significant skin lesions were noted at face or neck        Procedure  FLEXIBLE ENDOSCOPY 01.18.2025  After application of topical lidocaine and decongestant, flexible endoscope was advanced through patient's nasal cavities. Nasal septum is deviated to right.  Endoscope was advanced through patient's left nasal cavity.      On examination, left arytenoid, left aryepiglottic fold and left ventricular are severely swollen. Left arytenoid and ventricular band are fixated. The left vocal cord could not be visualized due  to the severe edema-swelling of left ventricular band, arytenoid and aryepiglottic fold. There could be submucosal tumor under these structures, or this could be secondary to RT. No visible ulceration. Right vocal cord looks anatomically normal and mobile, but I'd like to note that previous direct laryngoscopy (May 2024) has shown that disease was extending towards the anterior 1/4 of right vocal cord.         Last Recorded Vitals  There were no vitals taken for this visit.    Relevant Results  Prior to Admission medications    Medication Sig Start Date End Date Taking? Authorizing Provider   acetaminophen (Tylenol) 500 mg tablet Take 2 tablets (1,000 mg) by mouth every 6 hours if needed for mild pain (1 - 3).    Historical Provider, MD   biotin 5 mg capsule once every 24 hours.    Historical Provider, MD   cholecalciferol (Vitamin D-3) 50 mcg (2,000 unit) capsule 1 capsule (50 mcg) once every 24 hours.    Historical Provider, MD   cholestyramine (Questran) 4 gram powder MIX 1 SCOOP WITH AT LEAST 2 TO 6 OUNCES LIQUID AND DRINK ONCE  DAILY 11/13/24   Naheed Clements MD   clindamycin (Cleocin T) 1 % lotion Apply topically to affected area twice daily. 7/15/24   Kyunghee Burkitt, DO   diphenoxylate-atropine (LomotiL) 2.5-0.025 mg tablet Take 1 tablet by mouth 4 times a day as needed for diarrhea for up to 7 days. 8/26/24 12/19/24  ÁNGEL Maloney-CNP   doxycycline (Vibramycin) 100 mg capsule Take 1 capsule (100 mg) by mouth 2 times a day. For rash prevention. Take with at least 8 ounces (large glass) of water, do not lie down for 30 minutes after 7/15/24   Kyunghee Burkitt, DO   hydrocortisone 1 % cream Apply topically to face, hands, feet, neck, back, and chest once daily at bedtime for rash prevention. 7/15/24   Kyunghee Burkitt, DO   lidocaine (Lidoderm) 5 % patch Place 1 patch over 12 hours on the skin once daily. Remove & discard patch within 12 hours or as directed by MD. 1/14/25 4/14/25  Naheed ALBRECHT  MD Tyree   lipase-protease-amylase (Creon) 24,000-76,000 -120,000 unit capsule TAKE 3 CAPSULES BY MOUTH 3 TIMES DAILY WITH MEALS AND 1 TO 2  CAPSULES BY MOUTH WITH SNACKS.  MAX 13 CAPSULE DAILY 1/14/25   Naheed Clements MD   loperamide (Imodium) 2 mg capsule TAKE 2 CAPSULES BY MOUTH WITH  THE FIRST EPISODE OF DIARRHEA  AND 1 CAPSULE WITH ANY  ADDITIONAL EPISODES. MAXIMUM 8  CAPSULES DAILY 1/15/25   Naheed Cleemnts MD   losartan (Cozaar) 25 mg tablet TAKE 1 TABLET BY MOUTH ONCE  DAILY 1/15/25   Naheed Clements MD   magnesium oxide (Mag-Ox) 400 mg (241.3 mg magnesium) tablet Take 1 tablet (400 mg) by mouth once daily. 1/14/25 4/14/25  Naheed Clements MD   ondansetron (Zofran) 8 mg tablet Take 1 tablet (8 mg) by mouth every 8 hours if needed for nausea or vomiting. 7/1/24   POP Maloney   pantoprazole (ProtoNix) 40 mg EC tablet TAKE 1 TABLET BY MOUTH TWICE  DAILY 1/15/25   Naheed Clements MD   potassium chloride (Klor-Con) 20 mEq packet Take 20 mEq by mouth once daily.  Patient not taking: Reported on 12/19/2024 8/2/24 9/1/24  POP Maloney   prochlorperazine (Compazine) 10 mg tablet Take 1 tablet (10 mg) by mouth every 6 hours if needed for nausea or vomiting. 7/15/24   Kyunghee Burkitt, DO   lidocaine (Lidoderm) 5 % patch Place 1 patch over 12 hours on the skin once daily for 21 days. Remove & discard patch within 12 hours or as directed by MD. 8/26/24 1/14/25  POP Maloney   lipase-protease-amylase (Creon) 24,000-76,000 -120,000 unit capsule TAKE 3 CAPSULES BY MOUTH 3 TIMES DAILY WITH MEALS AND 1 TO 2  CAPSULES BY MOUTH WITH SNACKS.  MAX 13 CAPSULE DAILY 12/22/23 1/14/25  Leander Sun MD   loperamide (Imodium) 2 mg capsule Take 2 capsules (4 mg) by mouth with the first episode of diarrhea and 1 capsule (2 mg) by mouth with any additional episodes. Maximum 8 capsules (16 mg) per day. 9/12/24 1/15/25  Naheed Clements MD   losartan ([facility])  25 mg tablet TAKE 1 TABLET BY MOUTH ONCE  DAILY 11/13/24 1/15/25  Naheed Clements MD   magnesium oxide (Mag-Ox) 400 mg (241.3 mg magnesium) tablet Take 1 tablet (400 mg) by mouth 2 times a day. 9/26/24 1/14/25  Kaya Matthew MD   pantoprazole (ProtoNix) 40 mg EC tablet TAKE 1 TABLET BY MOUTH TWICE  DAILY 10/21/24 1/15/25  Naheed Clements MD     NM PET CT bone skull base to mid thigh    Result Date: 1/9/2025  Interpreted By:  Nohelia Rhoades and Bera Kaustav STUDY: NM PET CT SKULL BASE TO MID THIGH;  1/8/2025 11:57 am   INDICATION: Signs/Symptoms:Lung cancer?.   COMPARISON: CT chest and neck with contrast on 12/11/2024 PET-CT on 04/03/2024   ACCESSION NUMBER(S): [id number]   ORDERING CLINICIAN: KAYA MATTHEW   TECHNIQUE: DIVISION OF NUCLEAR MEDICINE POSITRON EMISSION TOMOGRAPHY (PET-CT)   The patient received an intravenous dose of 12.5 mCi of Fluorine-18 fluorodeoxyglucose (FDG).  Positron emission tomographic (PET) images from mid thigh to skull base were then acquired after a one hour delay. Additional PET images of the head was also obtained with the patient lying still. Also acquired was a contemporaneous low dose non-contrast CT scan performed for attenuation correction of PET images and anatomic localization.  The PET and CT images were digitally fused for display.  All images were acquired on a combined PET-CT scanner unit.  Some areas of FDG accumulation may be described in standardized uptake value (SUV) units.   CODING: Subsequent Treatment Strategy (PS)   CALIBRATION: Dose Injection-to-Scan Interval (mins): 58 min Mediastinal bloodpool SUV (normal 1.5-2.5): 2.2 Blood glucose: 97 mg/dL   FINDINGS: HEAD AND NECK: *No evidence of focal FDG avid lesion in the partially visualized brain parenchyma, noting that evaluation is limited because of the expected physiologic diffuse FDG uptake in the brain. *Nearly complete metabolic resolution of FDG avidity in the left vocal cord with SUV max 4.2  versus 16 previously. 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 * No FDG avid cervical lymphadenopathy is present. * No paranasal sinus diease. * Thyroid gland is unremarkable.   CHEST: *Interval increase in size and FDG avidity of a cavitary lesion in the left lower lobe with SUV max of 6.6, as compared to 5.4 previously, which was previously biopsied and shown to be nonmalignant. Right lung is unremarkable. *No FDG avid mediastinal, hilar or axillary lymphadenopathy. *Right chest wall MediPort in place. *Non FDG avid hypoattenuating lesion right breast, stable in comparison to prior study. Left breast is unremarkable.   ABDOMEN AND PELVIS: *No FDG avid  lymphadenopathy in the abdomen or pelvis. *Bilateral adrenal glands are unremarkable. *Cholecystectomy. Postsurgical changes from partial colectomy with a left lower quadrant anastomosis, without focal FDG activity at the surgical bed to suggest recurrent/residual disease. Diverticulosis without diverticulitis. *Again seen, focal hypermetabolic activity at the anal region with SUV max of 6.9, which is mildly decreased in size and extent as compared to prior where SUV max of 7.5. Previously seen hypermetabolic focus in the small bowel of the inferior margin of the liver is again seen, and likely represents physiologic bowel activity. *Physiologic radiotracer uptake is present in the liver and spleen with excretion into the bowel loops and the genitourinary tract.   MUSCULOSKELETAL: *No concerning FDG avid bone lesion throughout axial and appendicular skeleton to suggest osseous metastasis.       1. Interval increase in size and FDG avidity of a cavitary lesion in the left lower lobe, concerning for a primary lung neoplasm, even though previous biopsy was inconclusive. 2. Near-complete metabolic resolution of FDG avidity in the left vocal cord. The remaining FDG avidity likely  represents either residual viable disease or post-treatment inflammation, attention on follow-up study. 3.  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   I personally reviewed the image(s) / study and agree with the findings and interpretation as stated. This study was interpreted at [facility].   Signed by: Nohelia Rhoades 1/9/2025 1:38 PM Dictation workstation:   [id number]    Narrative & Impression   Interpreted By:  Solitario Watson,  Nelly Cisneros   STUDY:  CT SOFT TISSUE NECK W IV CONTRAST;  12/11/2024 12:25 pm      INDICATION:  Signs/Symptoms:laryngeal cancer.      ,E83.42 Hypomagnesemia,C32.9 Malignant neoplasm of larynx,  unspecified,R91.1 Solitary pulmonary nodule      Status post left vocal cord mass chemoradiotherapy ending August 20, 2024      COMPARISON:  CT of the chest obtained September 23, 2024 as well as performed  concurrently. MRI of the cervical spine obtained July 8, 2024.  PET-CT dated 04/03/2024.      ACCESSION NUMBER(S):  [id number]      ORDERING CLINICIAN:  KAYA MATTHEW      TECHNIQUE:  Helical CT images of the neck were obtained.  The patient received 60  ML of Omnipaque 350 intravenous contrast agent. The images were  reformatted in angled axial, coronal and sagittal planes.      FINDINGS:  Right chemotherapy chest port observed without complete visualization  of tubing terminus.      Oral Cavity, Pharynx and Larynx:  Nasopharyngeal soft tissues appear  normal. Patient is edentulous. Base of the tongue and floor of the  mouth appears normal. Circumferential pharyngeal soft tissue  thickening/edema observed, which appears to narrow the oropharyngeal  airway. Epiglottic vallecula appears mostly effaced. Piriform sinuses  not well visualized. There is thickening of the epiglottis and  aryepiglottic folds as well. Asymmetric appearance of the vocal  cords. Thickening and leftward  deviation of the right true vocal cord  is observed (axial series 201, image 75). Atrophic appearance of the  left vocal cord. There is thickening and distortion of the  postcricoid space. Subglottic soft tissues and airway appear normal.      Retropharyngeal and Prevertebral Soft Tissues: Posttreatment change  suggested with mildly edematous appearance. No discrete fluid  collection. No evidence of pathologically enlarged lymph node or mass.      Lymph nodes: No morphologically abnormal or pathologically enlarged  cervical station lymph nodes observed.      Neck vessels: Bilateral neck vessels appear patent. 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 Predominantly mild multifocal  athero sclerotic calcification. More focal plaque components at the  right subclavian artery origin with possible flow significant  stenosis.      Thyroid gland: The thyroid gland is unremarkable in size and  appearance.      Parotid and submandibular glands: Mild atrophy of the salivary  glands. Bilateral parotid and submandibular glands are otherwise  unremarkable in appearance.      Paranasal Sinuses and Mastoids: Small amount of fluid within the  right mastoid. Visualized paranasal sinuses and left mastoids are  clear.      Visualized orbital structures are unremarkable.      Thoracic findings detailed separately. There is redemonstrated  moderate emphysema. As compared with prior examination there is an 8  mm irregular nodular subpleural opacity along the mesial aspect of  the right upper lobe (series 203, image 220).      No acute or aggressive appearing osseous abnormalities of the  cervical spine.      IMPRESSION:  1. Edematous appearance of the laryngeal and glottic soft tissues  consistent with recent chemoradiotherapy. There is asymmetry of the  vocal cords raising the possibility  of underlying vocal cord  paresis/paralysis. No discrete residual laryngeal mass appreciated on  this examination. There is no evidence of morphologically abnormal or  pathologically enlarged cervical lymph nodes.  2. Irregular left upper lobe pulmonary nodule, which appears new from  the September 23 2024 CT of the chest. For full assessment of the  chest, please see same day CT of the chest under a separate accession  number.      I personally reviewed the images/study and I agree with the findings  as stated. This study was interpreted at [facility], [place], [place].      MACRO:  None      Signed by: Solitario Watson 12/11/2024 1:21 PM  Dictation workstation:   [id number]       Assessment and Plan:  Padilla Campuzano is a 79 y.o. 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 She had chemoradiation, finished 09.19.2024. She is kindly referred by Dr. Gordillo.    Brief Past History:    - PET/CT (Apr 2024): Uptake in LLL lung nodule. Uptake in vocal cords  - ENT eval (Apr 2024): L vocal cord fixed  - Lung bx (Apr 2024): Neg  - Direct laryngoscopy (May 2024): L true vocal cord mucosal irregularity extending into subglottis, anterior commissure and to under surface of right vocal cord. Bx: SCC  - Started chemoRT with carbo/taxol (Jul - Aug 2024): After 2 doses of chemo, could not tolerate it and declined further chemo then continued with weekly cetuximab  - CT Chest (Dec 2024): LLL lung nodule increased in size again => Plan for PET/CT and surgery/rad Onc eval.  - PET?CT (Jan 2025):  Newly developed FDG avidity at the right true vocal cord with SUV max of 12.4, No FDG avid cervical lymphadenopathy is present.     1. Interval increase in size and FDG avidity of a cavitary lesion in the left lower lobe, concerning for a primary lung neoplasm, even though previous biopsy was  inconclusive.    2. Near-complete metabolic resolution of FDG avidity in the left vocal cord. The remaining FDG avidity likely represents either residual viable disease or post-treatment inflammation, attention on follow-up study.    3.  Newly developed FDG avidity in the right true vocal cord,     Fiberoptic endoscopy shows that nasal septum is deviated to right.  Endoscope was advanced through patient's left nasal cavity.  On examination, left arytenoid, left aryepiglottic fold and left ventricular are severely swollen. Left arytenoid and ventricular band are fixated. The left vocal cord could not be visualized due to the severe edema-swelling of left ventricular band, arytenoid and aryepiglottic fold. There could be submucosal tumor under these structures, or this could be secondary to RT. No visible vegetative mass or ulceration. Right vocal cord looks anatomically normal and mobile, but I'd like to note that previous fiberoptic exam in June 2024 has shown that disease was extending towards the anterior 1/4 of right vocal cord.      No palpable neck nodes.    Plan:   1-referral to head and neck surgical oncology for further evaluation. Increased metabolic activity of right vocal cord, exam looks normal.     Hussain Vieira MD  Otolaryngology - Head & Neck Surgery

## 2025-01-21 ENCOUNTER — OFFICE VISIT (OUTPATIENT)
Dept: CARDIAC SURGERY | Facility: CLINIC | Age: 80
End: 2025-01-21
Payer: MEDICARE

## 2025-01-21 VITALS
WEIGHT: 127 LBS | OXYGEN SATURATION: 100 % | SYSTOLIC BLOOD PRESSURE: 170 MMHG | DIASTOLIC BLOOD PRESSURE: 68 MMHG | BODY MASS INDEX: 23.23 KG/M2 | HEART RATE: 66 BPM

## 2025-01-21 DIAGNOSIS — R91.1 LUNG NODULE: Primary | ICD-10-CM

## 2025-01-21 DIAGNOSIS — Z09 S/P LUNG SURGERY, FOLLOW-UP EXAM: ICD-10-CM

## 2025-01-21 PROCEDURE — 1126F AMNT PAIN NOTED NONE PRSNT: CPT | Performed by: THORACIC SURGERY (CARDIOTHORACIC VASCULAR SURGERY)

## 2025-01-21 PROCEDURE — 1036F TOBACCO NON-USER: CPT | Performed by: THORACIC SURGERY (CARDIOTHORACIC VASCULAR SURGERY)

## 2025-01-21 PROCEDURE — 99215 OFFICE O/P EST HI 40 MIN: CPT | Performed by: THORACIC SURGERY (CARDIOTHORACIC VASCULAR SURGERY)

## 2025-01-21 PROCEDURE — 1159F MED LIST DOCD IN RCRD: CPT | Performed by: THORACIC SURGERY (CARDIOTHORACIC VASCULAR SURGERY)

## 2025-01-21 ASSESSMENT — ENCOUNTER SYMPTOMS
FEVER: 0
ALLERGIC/IMMUNOLOGIC NEGATIVE: 1
CONSTIPATION: 0
OCCASIONAL FEELINGS OF UNSTEADINESS: 1
DIARRHEA: 0
CHEST TIGHTNESS: 0
COUGH: 0
CHILLS: 0
ABDOMINAL PAIN: 0
DEPRESSION: 0
DIAPHORESIS: 0
WHEEZING: 0
EYES NEGATIVE: 1
NEUROLOGICAL NEGATIVE: 1
VOMITING: 0
PALPITATIONS: 0
STRIDOR: 0
FATIGUE: 0
CHOKING: 0
LOSS OF SENSATION IN FEET: 0
HEMATOLOGIC/LYMPHATIC NEGATIVE: 1
ABDOMINAL DISTENTION: 0
ENDOCRINE NEGATIVE: 1
NAUSEA: 0
PSYCHIATRIC NEGATIVE: 1
UNEXPECTED WEIGHT CHANGE: 0
APPETITE CHANGE: 0
SHORTNESS OF BREATH: 0
MUSCULOSKELETAL NEGATIVE: 1

## 2025-01-21 ASSESSMENT — PAIN SCALES - GENERAL
PAINLEVEL_OUTOF10: 0-NO PAIN
PAINLEVEL_OUTOF10: 2

## 2025-01-21 ASSESSMENT — PATIENT HEALTH QUESTIONNAIRE - PHQ9
2. FEELING DOWN, DEPRESSED OR HOPELESS: NOT AT ALL
SUM OF ALL RESPONSES TO PHQ9 QUESTIONS 1 AND 2: 0
SUM OF ALL RESPONSES TO PHQ9 QUESTIONS 1 AND 2: 0
2. FEELING DOWN, DEPRESSED OR HOPELESS: NOT AT ALL
1. LITTLE INTEREST OR PLEASURE IN DOING THINGS: NOT AT ALL
1. LITTLE INTEREST OR PLEASURE IN DOING THINGS: NOT AT ALL

## 2025-01-21 ASSESSMENT — LIFESTYLE VARIABLES: TOTAL SCORE: 0

## 2025-01-21 NOTE — PROGRESS NOTES
Mauc Instructions: By selecting yes to the question below the MAUC number will be added into the note.  This will be calculated automatically based on the diagnosis chosen, the size entered, the body zone selected (H,M,L) and the specific indications you chose. You will also have the option to override the Mohs AUC if you disagree with the automatically calculated number and this option is found in the Case Summary tab. Subjective   Patient ID: Padilla Campuzano is a 79 y.o. female who presents for follow up with PET results.  HPI    78-year-old female with multiple medical problems and history of breast cancer colon cancer and anal cancer.  She was found to have a left lower lobe nodule which is new in nature and some cavitation as well.  This nodule is concerning for malignancy.  She is scheduled to have a biopsy in a PET scan.  I will add PFTs.  I think based on those results we can formulate a better plan for her.  See her again with results.  She seems like an active woman we will assess her candidacy for resection if one is needed with her PFTs.    Update 5/7/2024  PFTs show an FEV1 of 110% of predicted and a DLCO of 72%.  She underwent an IR guided biopsy that showed lung parenchyma with focal intra-alveolar fibrin and neutrophils with some lymphoid aggregates and recent hemorrhage but negative for malignancy.  I discussion with her about the next steps.  We discussed the option of close follow-up with a CT scan in 3 months versus surgical resection via wedge resection to deal with his lung nodule.  She wants to wait 3 months with a CT scan she is not interested in the surgery at this point.  I will see her again in 3 months with a CT of the chest.    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  She is still recovering from it.  She has finished the treatments.  She underwent a repeat CT scan for which we do not have a final read.  However the lung nodule in the lower lobe looks a couple millimeters larger to me.  I am still concerned that this is a malignancy.  I have conveyed to her and her  that I am concerned about this being a malignancy and how we can treat this with a wedge resection in the middle invasive way.  She does not feel like she wants to undergo any surgeries at this point and she wants to wait.  I have expressed that I am not completely comfortable with just waiting but I will  Modified Advancement Flap Text: The defect edges were debeveled with a #15 scalpel blade.  Given the location of the defect, shape of the defect and the proximity to free margins a modified advancement flap was deemed most appropriate.  Using a sterile surgical marker, an appropriate advancement flap was drawn incorporating the defect and placing the expected incisions within the relaxed skin tension lines where possible.    The area thus outlined was incised deep to adipose tissue with a #15 scalpel blade.  The skin margins were undermined to an appropriate distance in all directions utilizing iris scissors. see her again in 3 months with a CT of the chest.  I recommended a short interval but she wants to wait till December.    Update 1/21/2025  She has finished her treatment for her laryngeal cancer.  New imaging has revealed increase in size of the left lower lobe lung nodule as well as increased avidity on PET scan.  I do believe were dealing with a malignancy.  I discussed with her the mechanics of a sublobar lung resection.  We discussed length of stay potential complications and expected recovery.  Will plan to do a robotic assisted left lower lobe wedge resection on February 20 at [facility] [place].    Review of Systems   Constitutional:  Negative for appetite change, chills, diaphoresis, fatigue, fever and unexpected weight change.   HENT: Negative.     Eyes: Negative.    Respiratory:  Negative for cough, choking, chest tightness, shortness of breath, wheezing and stridor.    Cardiovascular:  Negative for chest pain, palpitations and leg swelling.   Gastrointestinal:  Negative for abdominal distention, abdominal pain, constipation, diarrhea, nausea and vomiting.   Endocrine: Negative.    Genitourinary: Negative.    Musculoskeletal: Negative.    Skin: Negative.    Allergic/Immunologic: Negative.    Neurological: Negative.    Hematological: Negative.    Psychiatric/Behavioral: Negative.     All other systems reviewed and are negative.      Objective   Physical Exam  Constitutional:       Appearance: Normal appearance.   HENT:      Head: Normocephalic and atraumatic.      Nose: Nose normal.      Mouth/Throat:      Mouth: Mucous membranes are moist.      Pharynx: Oropharynx is clear.   Eyes:      Extraocular Movements: Extraocular movements intact.      Conjunctiva/sclera: Conjunctivae normal.      Pupils: Pupils are equal, round, and reactive to light.   Cardiovascular:      Rate and Rhythm: Normal rate and regular rhythm.      Pulses: Normal pulses.      Heart sounds: Normal heart sounds.   Pulmonary:      Effort: Pulmonary  Show Eye Protection Variable: Yes effort is normal. No respiratory distress.      Breath sounds: Normal breath sounds. No stridor. No wheezing, rhonchi or rales.   Chest:      Chest wall: No tenderness.   Abdominal:      General: Abdomen is flat. Bowel sounds are normal.      Palpations: Abdomen is soft.   Musculoskeletal:         General: Normal range of motion.      Cervical back: Normal range of motion and neck supple.   Skin:     General: Skin is warm and dry.      Capillary Refill: Capillary refill takes less than 2 seconds.   Neurological:      General: No focal deficit present.      Mental Status: She is alert and oriented to person, place, and time.         Assessment/Plan   Diagnoses and all orders for this visit:  Solitary pulmonary nodule on lung CT  -     Robotic assisted left lower lobe wedge resection on February 20.  - Meche Pereyra MD  Thoracic and Esophageal Surgery         Abad Linda MD 01/21/25 12:16 PM    Eye Protection Verbiage: Before proceeding with the stage, a plastic scleral shield was inserted. The globe was anesthetized with a few drops of 1% lidocaine with 1:100,000 epinephrine. Then, an appropriate sized scleral shield was chosen and coated with lacrilube ointment. The shield was gently inserted and left in place for the duration of each stage. After the stage was completed, the shield was gently removed. Inflammation Suggestive Of Cancer Camouflage Histology Text: There was a dense lymphocytic infiltrate which prevented adequate histologic evaluation of adjacent structures. Mastoid Interpolation Flap Text: A decision was made to reconstruct the defect utilizing an interpolation axial flap and a staged reconstruction.  A telfa template was made of the defect.  This telfa template was then used to outline the mastoid interpolation flap.  The donor area for the pedicle flap was then injected with anesthesia.  The flap was excised through the skin and subcutaneous tissue down to the layer of the underlying musculature.  The pedicle flap was carefully excised within this deep plane to maintain its blood supply.  The edges of the donor site were undermined.   The donor site was closed in a primary fashion.  The pedicle was then rotated into position and sutured.  Once the tube was sutured into place, adequate blood supply was confirmed with blanching and refill.  The pedicle was then wrapped with xeroform gauze and dressed appropriately with a telfa and gauze bandage to ensure continued blood supply and protect the attached pedicle. Initial Size Of Lesion: 0.5 Validate Location Indication  (H, M, L In The Mohs Indications Tab): No Mid-Level Procedure Text (C): After obtaining clear surgical margins the patient was sent to a mid-level provider for surgical repair.  The patient understands they will receive post-surgical care and follow-up from the mid-level provider. Stage 11: Number Of Blocks?: 0 No Repair - Repaired With Adjacent Surgical Defect Text (Leave Blank If You Do Not Want): After obtaining clear surgical margins the defect was repaired concurrently with another surgical defect which was in close approximation. Dressing (No Sutures): dry sterile dressing Tarsorrhaphy Text: A tarsorrhaphy was performed using Frost sutures. Island Pedicle Flap With Canthal Suspension Text: The defect edges were debeveled with a #15 scalpel blade.  Given the location of the defect, shape of the defect and the proximity to free margins an island pedicle advancement flap was deemed most appropriate.  Using a sterile surgical marker, an appropriate advancement flap was drawn incorporating the defect, outlining the appropriate donor tissue and placing the expected incisions within the relaxed skin tension lines where possible. The area thus outlined was incised deep to adipose tissue with a #15 scalpel blade.  The skin margins were undermined to an appropriate distance in all directions around the primary defect and laterally outward around the island pedicle utilizing iris scissors.  There was minimal undermining beneath the pedicle flap. A suspension suture was placed in the canthal tendon to prevent tension and prevent ectropion. Consent (Spinal Accessory)/Introductory Paragraph: The rationale for Mohs was explained to the patient and consent was obtained. The risks, benefits and alternatives to therapy were discussed in detail. Specifically, the risks of damage to the spinal accessory nerve, infection, scarring, bleeding, prolonged wound healing, incomplete removal, allergy to anesthesia, and recurrence were addressed. Prior to the procedure, the treatment site was clearly identified and confirmed by the patient. All components of Universal Protocol/PAUSE Rule completed. Stage 6: Additional Anesthesia Type: 1% lidocaine with epinephrine Cheiloplasty (Complex) Text: A decision was made to reconstruct the defect with a  cheiloplasty.  The defect was undermined extensively.  Additional obicularis oris muscle was excised with a 15 blade scalpel.  The defect was converted into a full thickness wedge to facilite a better cosmetic result.  Small vessels were then tied off with 5-0 monocyrl. The obicularis oris, superficial fascia, adipose and dermis were then reapproximated.  After the deeper layers were approximated the epidermis was reapproximated with particular care given to realign the vermilion border. Spiral Flap Text: The defect edges were debeveled with a #15 scalpel blade.  Given the location of the defect, shape of the defect and the proximity to free margins a spiral flap was deemed most appropriate.  Using a sterile surgical marker, an appropriate rotation flap was drawn incorporating the defect and placing the expected incisions within the relaxed skin tension lines where possible. The area thus outlined was incised deep to adipose tissue with a #15 scalpel blade.  The skin margins were undermined to an appropriate distance in all directions utilizing iris scissors. Closure 4 Information: This tab is for additional flaps and grafts above and beyond our usual structured repairs.  Please note if you enter information here it will not currently bill and you will need to add the billing information manually. Keystone Flap Text: The defect edges were debeveled with a #15 scalpel blade.  Given the location of the defect, shape of the defect a keystone flap was deemed most appropriate.  Using a sterile surgical marker, an appropriate keystone flap was drawn incorporating the defect, outlining the appropriate donor tissue and placing the expected incisions within the relaxed skin tension lines where possible. The area thus outlined was incised deep to adipose tissue with a #15 scalpel blade.  The skin margins were undermined to an appropriate distance in all directions around the primary defect and laterally outward around the flap utilizing iris scissors. Referred To Asc For Closure Text (Leave Blank If You Do Not Want): After obtaining clear surgical margins the patient was sent to an ASC for surgical repair.  The patient understands they will receive post-surgical care and follow-up from the ASC physician. Mohs Case Number: [id number] Stage 2: Number Of Blocks?: 1 Plastic Surgeon Procedure Text (D): After obtaining clear surgical margins the patient was sent to plastics for surgical repair.  The patient understands they will receive post-surgical care and follow-up from the referring physician's office. Consent (Lip)/Introductory Paragraph: The rationale for Mohs was explained to the patient and consent was obtained. The risks, benefits and alternatives to therapy were discussed in detail. Specifically, the risks of lip deformity, changes in the oral aperture, infection, scarring, bleeding, prolonged wound healing, incomplete removal, allergy to anesthesia, nerve injury and recurrence were addressed. Prior to the procedure, the treatment site was clearly identified and confirmed by the patient. All components of Universal Protocol/PAUSE Rule completed. Split-Thickness Skin Graft Text: The defect edges were debeveled with a #15 scalpel blade.  Given the location of the defect, shape of the defect and the proximity to free margins a split thickness skin graft was deemed most appropriate.  Using a sterile surgical marker, the primary defect shape was transferred to the donor site. The split thickness graft was then harvested.  The skin graft was then placed in the primary defect and oriented appropriately. Melolabial Transposition Flap Text: The defect edges were debeveled with a #15 scalpel blade.  Given the location of the defect and the proximity to free margins a melolabial flap was deemed most appropriate.  Using a sterile surgical marker, an appropriate melolabial transposition flap was drawn incorporating the defect.    The area thus outlined was incised deep to adipose tissue with a #15 scalpel blade.  The skin margins were undermined to an appropriate distance in all directions utilizing iris scissors. Complex Repair Preamble Text (Leave Blank If You Do Not Want): Extensive wide undermining was performed. Rhombic Flap Text: The defect edges were debeveled with a #15 scalpel blade.  Given the location of the defect and the proximity to free margins a rhombic flap was deemed most appropriate.  Using a sterile surgical marker, an appropriate rhombic flap was drawn incorporating the defect.    The area thus outlined was incised deep to adipose tissue with a #15 scalpel blade.  The skin margins were undermined to an appropriate distance in all directions utilizing iris scissors. Donor Site Anesthesia Type: same as repair anesthesia Intermediate Repair Preamble Text (Leave Blank If You Do Not Want): Undermining was performed with blunt dissection. Cartilage Graft Text: The defect edges were debeveled with a #15 scalpel blade.  Given the location of the defect, shape of the defect, the fact the defect involved a full thickness cartilage defect a cartilage graft was deemed most appropriate.  An appropriate donor site was identified, cleansed, and anesthetized. The cartilage graft was then harvested and transferred to the recipient site, oriented appropriately and then sutured into place.  The secondary defect was then repaired using a primary closure. Referring Physician (Optional): YOKO Silva MD V-Y Plasty Text: The defect edges were debeveled with a #15 scalpel blade.  Given the location of the defect, shape of the defect and the proximity to free margins an V-Y advancement flap was deemed most appropriate.  Using a sterile surgical marker, an appropriate advancement flap was drawn incorporating the defect and placing the expected incisions within the relaxed skin tension lines where possible.    The area thus outlined was incised deep to adipose tissue with a #15 scalpel blade.  The skin margins were undermined to an appropriate distance in all directions utilizing iris scissors. Otolaryngologist Procedure Text (B): After obtaining clear surgical margins the patient was sent to otolaryngology for surgical repair.  The patient understands they will receive post-surgical care and follow-up from the referring physician's office. Surgical Defect Length In Cm (Optional): 0.8 Burow's Advancement Flap Text: The defect edges were debeveled with a #15 scalpel blade.  Given the location of the defect and the proximity to free margins a Burow's advancement flap was deemed most appropriate.  Using a sterile surgical marker, the appropriate advancement flap was drawn incorporating the defect and placing the expected incisions within the relaxed skin tension lines where possible.    The area thus outlined was incised deep to adipose tissue with a #15 scalpel blade.  The skin margins were undermined to an appropriate distance in all directions utilizing iris scissors. Home Suture Removal Text: Patient was provided instructions on removing sutures and will remove their sutures at home.  If they have any questions or difficulties they will call the office. Skin Substitute Text: The defect edges were debeveled with a #15 scalpel blade.  Given the location of the defect, shape of the defect and the proximity to free margins a skin substitute graft was deemed most appropriate.  The graft material was trimmed to fit the size of the defect. The graft was then placed in the primary defect and oriented appropriately. Bilateral Helical Rim Advancement Flap Text: The defect edges were debeveled with a #15 blade scalpel.  Given the location of the defect and the proximity to free margins (helical rim) a bilateral helical rim advancement flap was deemed most appropriate.  Using a sterile surgical marker, the appropriate advancement flaps were drawn incorporating the defect and placing the expected incisions between the helical rim and antihelix where possible.  The area thus outlined was incised through and through with a #15 scalpel blade.  With a skin hook and iris scissors, the flaps were gently and sharply undermined and freed up. Provider Procedure Text (B): After obtaining clear surgical margins the defect was repaired by another provider. Area H Indication Text: Tumors in this location are included in Area H (eyelids, eyebrows, nose, lips, chin, ear, pre-auricular, post-auricular, temple, genitalia, hands, feet, ankles and areola).  Tissue conservation is critical in these anatomic locations. Cheek Interpolation Flap Text: A decision was made to reconstruct the defect utilizing an interpolation axial flap and a staged reconstruction.  A telfa template was made of the defect.  This telfa template was then used to outline the Cheek Interpolation flap.  The donor area for the pedicle flap was then injected with anesthesia.  The flap was excised through the skin and subcutaneous tissue down to the layer of the underlying musculature.  The interpolation flap was carefully excised within this deep plane to maintain its blood supply.  The edges of the donor site were undermined.   The donor site was closed in a primary fashion.  The pedicle was then rotated into position and sutured.  Once the tube was sutured into place, adequate blood supply was confirmed with blanching and refill.  The pedicle was then wrapped with xeroform gauze and dressed appropriately with a telfa and gauze bandage to ensure continued blood supply and protect the attached pedicle. O-L Flap Text: The defect edges were debeveled with a #15 scalpel blade.  Given the location of the defect, shape of the defect and the proximity to free margins an O-L flap was deemed most appropriate.  Using a sterile surgical marker, an appropriate advancement flap was drawn incorporating the defect and placing the expected incisions within the relaxed skin tension lines where possible.    The area thus outlined was incised deep to adipose tissue with a #15 scalpel blade.  The skin margins were undermined to an appropriate distance in all directions utilizing iris scissors. Purse String (Intermediate) Text: Given the location of the defect and the characteristics of the surrounding skin a purse string intermediate closure was deemed most appropriate.  Undermining was performed circumfirentially around the surgical defect.  A purse string suture was then placed and tightened. Oculoplastic Surgeon Procedure Text (C): After obtaining clear surgical margins the patient was sent to oculoplastics for surgical repair.  The patient understands they will receive post-surgical care and follow-up from the referring physician's office. Additional Anesthesia Volume In Cc: 6 Consent 2/Introductory Paragraph: Mohs surgery was explained to the patient and consent was obtained. The risks, benefits and alternatives to therapy were discussed in detail. Specifically, the risks of infection, scarring, bleeding, prolonged wound healing, incomplete removal, allergy to anesthesia, nerve injury and recurrence were addressed. Prior to the procedure, the treatment site was clearly identified and confirmed by the patient. All components of Universal Protocol/PAUSE Rule completed. Bilobed Transposition Flap Text: The defect edges were debeveled with a #15 scalpel blade.  Given the location of the defect and the proximity to free margins a bilobed transposition flap was deemed most appropriate.  Using a sterile surgical marker, an appropriate bilobe flap drawn around the defect.    The area thus outlined was incised deep to adipose tissue with a #15 scalpel blade.  The skin margins were undermined to an appropriate distance in all directions utilizing iris scissors. Consent 3/Introductory Paragraph: I gave the patient a chance to ask questions they had about the procedure.  Following this I explained the Mohs procedure and consent was obtained. The risks, benefits and alternatives to therapy were discussed in detail. Specifically, the risks of infection, scarring, bleeding, prolonged wound healing, incomplete removal, allergy to anesthesia, nerve injury and recurrence were addressed. Prior to the procedure, the treatment site was clearly identified and confirmed by the patient. All components of Universal Protocol/PAUSE Rule completed. Non-Graft Cartilage Fenestration Text: The cartilage was fenestrated with a 2mm punch biopsy to help facilitate healing. Hemostasis: Electrocautery Mohs Method Verbiage: An incision at a 45 degree angle following the standard Mohs approach was done and the specimen was harvested as a microscopic controlled layer. Posterior Auricular Interpolation Flap Text: A decision was made to reconstruct the defect utilizing an interpolation axial flap and a staged reconstruction.  A telfa template was made of the defect.  This telfa template was then used to outline the posterior auricular interpolation flap.  The donor area for the pedicle flap was then injected with anesthesia.  The flap was excised through the skin and subcutaneous tissue down to the layer of the underlying musculature.  The pedicle flap was carefully excised within this deep plane to maintain its blood supply.  The edges of the donor site were undermined.   The donor site was closed in a primary fashion.  The pedicle was then rotated into position and sutured.  Once the tube was sutured into place, adequate blood supply was confirmed with blanching and refill.  The pedicle was then wrapped with xeroform gauze and dressed appropriately with a telfa and gauze bandage to ensure continued blood supply and protect the attached pedicle. X Size Of Lesion In Cm (Optional): 0.3 Mucosal Advancement Flap Text: Given the location of the defect, shape of the defect and the proximity to free margins a mucosal advancement flap was deemed most appropriate. Incisions were made with a 15 blade scalpel in the appropriate fashion along the cutaneous vermilion border and the mucosal lip. The remaining actinically damaged mucosal tissue was excised.  The mucosal advancement flap was then elevated to the gingival sulcus with care taken to preserve the neurovascular structures and advanced into the primary defect. Care was taken to ensure that precise realignment of the vermilion border was achieved. Postop Diagnosis: same Complex Repair And Flap Additional Text (Will Appearing After The Standard Complex Repair Text): The complex repair was not sufficient to completely close the primary defect. The remaining additional defect was repaired with the flap mentioned below. Graft Donor Site Bandage (Optional-Leave Blank If You Don't Want In Note): Steri-strips and a pressure bandage were applied to the donor site. Alar Island Pedicle Flap Text: The defect edges were debeveled with a #15 scalpel blade.  Given the location of the defect, shape of the defect and the proximity to the alar rim an island pedicle advancement flap was deemed most appropriate.  Using a sterile surgical marker, an appropriate advancement flap was drawn incorporating the defect, outlining the appropriate donor tissue and placing the expected incisions within the nasal ala running parallel to the alar rim. The area thus outlined was incised with a #15 scalpel blade.  The skin margins were undermined minimally to an appropriate distance in all directions around the primary defect and laterally outward around the island pedicle utilizing iris scissors.  There was minimal undermining beneath the pedicle flap. Consent (Near Eyelid Margin)/Introductory Paragraph: The rationale for Mohs was explained to the patient and consent was obtained. The risks, benefits and alternatives to therapy were discussed in detail. Specifically, the risks of ectropion or eyelid deformity, infection, scarring, bleeding, prolonged wound healing, incomplete removal, allergy to anesthesia, nerve injury and recurrence were addressed. Prior to the procedure, the treatment site was clearly identified and confirmed by the patient. All components of Universal Protocol/PAUSE Rule completed. Repair Type: Complex Repair Ear Wedge Repair Text: A wedge excision was completed by carrying down an excision through the full thickness of the ear and cartilage with an inward facing Burow's triangle. The wound was then closed in a layered fashion. Star Wedge Flap Text: The defect edges were debeveled with a #15 scalpel blade.  Given the location of the defect, shape of the defect and the proximity to free margins a star wedge flap was deemed most appropriate.  Using a sterile surgical marker, an appropriate rotation flap was drawn incorporating the defect and placing the expected incisions within the relaxed skin tension lines where possible. The area thus outlined was incised deep to adipose tissue with a #15 scalpel blade.  The skin margins were undermined to an appropriate distance in all directions utilizing iris scissors. O-T Plasty Text: The defect edges were debeveled with a #15 scalpel blade.  Given the location of the defect, shape of the defect and the proximity to free margins an O-T plasty was deemed most appropriate.  Using a sterile surgical marker, an appropriate O-T plasty was drawn incorporating the defect and placing the expected incisions within the relaxed skin tension lines where possible.    The area thus outlined was incised deep to adipose tissue with a #15 scalpel blade.  The skin margins were undermined to an appropriate distance in all directions utilizing iris scissors. Date Of Previous Biopsy (Optional): 11/19/2018 Consent (Scalp)/Introductory Paragraph: The rationale for Mohs was explained to the patient and consent was obtained. The risks, benefits and alternatives to therapy were discussed in detail. Specifically, the risks of changes in hair growth pattern secondary to repair, infection, scarring, bleeding, prolonged wound healing, incomplete removal, allergy to anesthesia, nerve injury and recurrence were addressed. Prior to the procedure, the treatment site was clearly identified and confirmed by the patient. All components of Universal Protocol/PAUSE Rule completed. Detail Level: Detailed Composite Graft Text: The defect edges were debeveled with a #15 scalpel blade.  Given the location of the defect, shape of the defect, the proximity to free margins and the fact the defect was full thickness a composite graft was deemed most appropriate.  The defect was outline and then transferred to the donor site.  A full thickness graft was then excised from the donor site. The graft was then placed in the primary defect, oriented appropriately and then sutured into place.  The secondary defect was then repaired using a primary closure. Rhomboid Transposition Flap Text: The defect edges were debeveled with a #15 scalpel blade.  Given the location of the defect and the proximity to free margins a rhomboid transposition flap was deemed most appropriate.  Using a sterile surgical marker, an appropriate rhomboid flap was drawn incorporating the defect.    The area thus outlined was incised deep to adipose tissue with a #15 scalpel blade.  The skin margins were undermined to an appropriate distance in all directions utilizing iris scissors. H Plasty Text: Given the location of the defect, shape of the defect and the proximity to free margins a H-plasty was deemed most appropriate for repair.  Using a sterile surgical marker, the appropriate advancement arms of the H-plasty were drawn incorporating the defect and placing the expected incisions within the relaxed skin tension lines where possible. The area thus outlined was incised deep to adipose tissue with a #15 scalpel blade. The skin margins were undermined to an appropriate distance in all directions utilizing iris scissors.  The opposing advancement arms were then advanced into place in opposite direction and anchored with interrupted buried subcutaneous sutures. Crescentic Advancement Flap Text: The defect edges were debeveled with a #15 scalpel blade.  Given the location of the defect and the proximity to free margins a crescentic advancement flap was deemed most appropriate.  Using a sterile surgical marker, the appropriate advancement flap was drawn incorporating the defect and placing the expected incisions within the relaxed skin tension lines where possible.    The area thus outlined was incised deep to adipose tissue with a #15 scalpel blade.  The skin margins were undermined to an appropriate distance in all directions utilizing iris scissors. Surgical Defect Width In Cm (Optional): 0.7 Consent Type: Consent 1 (Standard) Surgical Defect Width In Cm (Optional): 0.6 Tissue Cultured Epidermal Autograft Text: The defect edges were debeveled with a #15 scalpel blade.  Given the location of the defect, shape of the defect and the proximity to free margins a tissue cultured epidermal autograft was deemed most appropriate.  The graft was then trimmed to fit the size of the defect.  The graft was then placed in the primary defect and oriented appropriately. Ear Star Wedge Flap Text: The defect edges were debeveled with a #15 blade scalpel.  Given the location of the defect and the proximity to free margins (helical rim) an ear star wedge flap was deemed most appropriate.  Using a sterile surgical marker, the appropriate flap was drawn incorporating the defect and placing the expected incisions between the helical rim and antihelix where possible.  The area thus outlined was incised through and through with a #15 scalpel blade. Wound Care: Aquaphor Area M Indication Text: Tumors in this location are included in Area M (cheek, forehead, scalp, neck, jawline and pretibial skin).  Mohs surgery is indicated for tumors in these anatomic locations. Medical Necessity Statement: Based on my medical judgement, Mohs surgery is the most appropriate treatment for this cancer compared to other treatments. Epidermal Sutures: 5-0 Nylon V-Y Flap Text: The defect edges were debeveled with a #15 scalpel blade.  Given the location of the defect, shape of the defect and the proximity to free margins a V-Y flap was deemed most appropriate.  Using a sterile surgical marker, an appropriate advancement flap was drawn incorporating the defect and placing the expected incisions within the relaxed skin tension lines where possible.    The area thus outlined was incised deep to adipose tissue with a #15 scalpel blade.  The skin margins were undermined to an appropriate distance in all directions utilizing iris scissors. Cheek-To-Nose Interpolation Flap Text: A decision was made to reconstruct the defect utilizing an interpolation axial flap and a staged reconstruction.  A telfa template was made of the defect.  This telfa template was then used to outline the Cheek-To-Nose Interpolation flap.  The donor area for the pedicle flap was then injected with anesthesia.  The flap was excised through the skin and subcutaneous tissue down to the layer of the underlying musculature.  The interpolation flap was carefully excised within this deep plane to maintain its blood supply.  The edges of the donor site were undermined.   The donor site was closed in a primary fashion.  The pedicle was then rotated into position and sutured.  Once the tube was sutured into place, adequate blood supply was confirmed with blanching and refill.  The pedicle was then wrapped with xeroform gauze and dressed appropriately with a telfa and gauze bandage to ensure continued blood supply and protect the attached pedicle. Partial Purse String (Simple) Text: Given the location of the defect and the characteristics of the surrounding skin a simple purse string closure was deemed most appropriate.  Undermining was performed circumfirentially around the surgical defect.  A purse string suture was then placed and tightened. Wound tension only allowed a partial closure of the circular defect. Trilobed Flap Text: The defect edges were debeveled with a #15 scalpel blade.  Given the location of the defect and the proximity to free margins a trilobed flap was deemed most appropriate.  Using a sterile surgical marker, an appropriate trilobed flap drawn around the defect.    The area thus outlined was incised deep to adipose tissue with a #15 scalpel blade.  The skin margins were undermined to an appropriate distance in all directions utilizing iris scissors. Wound Care (No Sutures): Petrolatum Partial Purse String (Intermediate) Text: Given the location of the defect and the characteristics of the surrounding skin an intermediate purse string closure was deemed most appropriate.  Undermining was performed circumfirentially around the surgical defect.  A purse string suture was then placed and tightened. Wound tension only allowed a partial closure of the circular defect. Dorsal Nasal Flap Text: The defect edges were debeveled with a #15 scalpel blade.  Given the location of the defect and the proximity to free margins a dorsal nasal flap was deemed most appropriate.  Using a sterile surgical marker, an appropriate dorsal nasal flap was drawn around the defect.    The area thus outlined was incised deep to adipose tissue with a #15 scalpel blade.  The skin margins were undermined to an appropriate distance in all directions utilizing iris scissors. Graft Cartilage Fenestration Text: The cartilage was fenestrated with a 2mm punch biopsy to help facilitate graft survival and healing. Consent (Temporal Branch)/Introductory Paragraph: The rationale for Mohs was explained to the patient and consent was obtained. The risks, benefits and alternatives to therapy were discussed in detail. Specifically, the risks of damage to the temporal branch of the facial nerve, infection, scarring, bleeding, prolonged wound healing, incomplete removal, allergy to anesthesia, and recurrence were addressed. Prior to the procedure, the treatment site was clearly identified and confirmed by the patient. All components of Universal Protocol/PAUSE Rule completed. Number Of Stages: 2 Paramedian Forehead Flap Text: A decision was made to reconstruct the defect utilizing an interpolation axial flap and a staged reconstruction.  A telfa template was made of the defect.  This telfa template was then used to outline the paramedian forehead pedicle flap.  The donor area for the pedicle flap was then injected with anesthesia.  The flap was excised through the skin and subcutaneous tissue down to the layer of the underlying musculature.  The pedicle flap was carefully excised within this deep plane to maintain its blood supply.  The edges of the donor site were undermined.   The donor site was closed in a primary fashion.  The pedicle was then rotated into position and sutured.  Once the tube was sutured into place, adequate blood supply was confirmed with blanching and refill.  The pedicle was then wrapped with xeroform gauze and dressed appropriately with a telfa and gauze bandage to ensure continued blood supply and protect the attached pedicle. Hatchet Flap Text: The defect edges were debeveled with a #15 scalpel blade.  Given the location of the defect, shape of the defect and the proximity to free margins a hatchet flap was deemed most appropriate.  Using a sterile surgical marker, an appropriate hatchet flap was drawn incorporating the defect and placing the expected incisions within the relaxed skin tension lines where possible.    The area thus outlined was incised deep to adipose tissue with a #15 scalpel blade.  The skin margins were undermined to an appropriate distance in all directions utilizing iris scissors. Information: Selecting Yes will display possible errors in your note based on the variables you have selected. This validation is only offered as a suggestion for you. PLEASE NOTE THAT THE VALIDATION TEXT WILL BE REMOVED WHEN YOU FINALIZE YOUR NOTE. IF YOU WANT TO FAX A PRELIMINARY NOTE YOU WILL NEED TO TOGGLE THIS TO 'NO' IF YOU DO NOT WANT IT IN YOUR FAXED NOTE. Surgeon/Pathologist Verbiage (Will Incorporate Name Of Surgeon From Intro If Not Blank): operated in two distinct and integrated capacities as the surgeon and pathologist. Complex Repair And Graft Additional Text (Will Appearing After The Standard Complex Repair Text): The complex repair was not sufficient to completely close the primary defect. The remaining additional defect was repaired with the graft mentioned below. Suture Removal: 14 days Double Island Pedicle Flap Text: The defect edges were debeveled with a #15 scalpel blade.  Given the location of the defect, shape of the defect and the proximity to free margins a double island pedicle advancement flap was deemed most appropriate.  Using a sterile surgical marker, an appropriate advancement flap was drawn incorporating the defect, outlining the appropriate donor tissue and placing the expected incisions within the relaxed skin tension lines where possible.    The area thus outlined was incised deep to adipose tissue with a #15 scalpel blade.  The skin margins were undermined to an appropriate distance in all directions around the primary defect and laterally outward around the island pedicle utilizing iris scissors.  There was minimal undermining beneath the pedicle flap. Bcc Histology Text: There were numerous aggregates of basaloid cells. Subsequent Stages Histo Method Verbiage: Using a similar technique to that described above, a thin layer of tissue was removed from all areas where tumor was visible on the previous stage.  The tissue was again oriented, mapped, dyed, and processed as above. Full Thickness Lip Wedge Repair (Flap) Text: Given the location of the defect and the proximity to free margins a full thickness wedge repair was deemed most appropriate.  Using a sterile surgical marker, the appropriate repair was drawn incorporating the defect and placing the expected incisions perpendicular to the vermilion border.  The vermilion border was also meticulously outlined to ensure appropriate reapproximation during the repair.  The area thus outlined was incised through and through with a #15 scalpel blade.  The muscularis and dermis were reaproximated with deep sutures following hemostasis. Care was taken to realign the vermilion border before proceeding with the superficial closure.  Once the vermilion was realigned the superfical and mucosal closure was finished. Consent (Ear)/Introductory Paragraph: The rationale for Mohs was explained to the patient and consent was obtained. The risks, benefits and alternatives to therapy were discussed in detail. Specifically, the risks of ear deformity, infection, scarring, bleeding, prolonged wound healing, incomplete removal, allergy to anesthesia, nerve injury and recurrence were addressed. Prior to the procedure, the treatment site was clearly identified and confirmed by the patient. All components of Universal Protocol/PAUSE Rule completed. Surgeon: Ken Vásquez M.D. Transposition Flap Text: The defect edges were debeveled with a #15 scalpel blade.  Given the location of the defect and the proximity to free margins a transposition flap was deemed most appropriate.  Using a sterile surgical marker, an appropriate transposition flap was drawn incorporating the defect.    The area thus outlined was incised deep to adipose tissue with a #15 scalpel blade.  The skin margins were undermined to an appropriate distance in all directions utilizing iris scissors. Previous Accession (Optional): [id number] O-Z Plasty Text: The defect edges were debeveled with a #15 scalpel blade.  Given the location of the defect, shape of the defect and the proximity to free margins an O-Z plasty (double transposition flap) was deemed most appropriate.  Using a sterile surgical marker, the appropriate transposition flaps were drawn incorporating the defect and placing the expected incisions within the relaxed skin tension lines where possible.    The area thus outlined was incised deep to adipose tissue with a #15 scalpel blade.  The skin margins were undermined to an appropriate distance in all directions utilizing iris scissors.  Hemostasis was achieved with electrocautery.  The flaps were then transposed into place, one clockwise and the other counterclockwise, and anchored with interrupted buried subcutaneous sutures. Double O-Z Plasty Text: The defect edges were debeveled with a #15 scalpel blade.  Given the location of the defect, shape of the defect and the proximity to free margins a Double O-Z plasty (double transposition flap) was deemed most appropriate.  Using a sterile surgical marker, the appropriate transposition flaps were drawn incorporating the defect and placing the expected incisions within the relaxed skin tension lines where possible. The area thus outlined was incised deep to adipose tissue with a #15 scalpel blade.  The skin margins were undermined to an appropriate distance in all directions utilizing iris scissors.  Hemostasis was achieved with electrocautery.  The flaps were then transposed into place, one clockwise and the other counterclockwise, and anchored with interrupted buried subcutaneous sutures. Advancement Flap (Single) Text: The defect edges were debeveled with a #15 scalpel blade.  Given the location of the defect and the proximity to free margins a single advancement flap was deemed most appropriate.  Using a sterile surgical marker, an appropriate advancement flap was drawn incorporating the defect and placing the expected incisions within the relaxed skin tension lines where possible.    The area thus outlined was incised deep to adipose tissue with a #15 scalpel blade.  The skin margins were undermined to an appropriate distance in all directions utilizing iris scissors. Epidermal Autograft Text: The defect edges were debeveled with a #15 scalpel blade.  Given the location of the defect, shape of the defect and the proximity to free margins an epidermal autograft was deemed most appropriate.  Using a sterile surgical marker, the primary defect shape was transferred to the donor site. The epidermal graft was then harvested.  The skin graft was then placed in the primary defect and oriented appropriately. Bi-Rhombic Flap Text: The defect edges were debeveled with a #15 scalpel blade.  Given the location of the defect and the proximity to free margins a bi-rhombic flap was deemed most appropriate.  Using a sterile surgical marker, an appropriate rhombic flap was drawn incorporating the defect. The area thus outlined was incised deep to adipose tissue with a #15 scalpel blade.  The skin margins were undermined to an appropriate distance in all directions utilizing iris scissors. A-T Advancement Flap Text: The defect edges were debeveled with a #15 scalpel blade.  Given the location of the defect, shape of the defect and the proximity to free margins an A-T advancement flap was deemed most appropriate.  Using a sterile surgical marker, an appropriate advancement flap was drawn incorporating the defect and placing the expected incisions within the relaxed skin tension lines where possible.    The area thus outlined was incised deep to adipose tissue with a #15 scalpel blade.  The skin margins were undermined to an appropriate distance in all directions utilizing iris scissors. Manual Repair Warning Statement: We plan on removing the manually selected variable below in favor of our much easier automatic structured text blocks found in the previous tab. We decided to do this to help make the flow better and give you the full power of structured data. Manual selection is never going to be ideal in our platform and I would encourage you to avoid using manual selection from this point on, especially since I will be sunsetting this feature. It is important that you do one of two things with the customized text below. First, you can save all of the text in a word file so you can have it for future reference. Second, transfer the text to the appropriate area in the Library tab. Lastly, if there is a flap or graft type which we do not have you need to let us know right away so I can add it in before the variable is hidden. No need to panic, we plan to give you roughly 6 months to make the change. W Plasty Text: The lesion was extirpated to the level of the fat with a #15 scalpel blade.  Given the location of the defect, shape of the defect and the proximity to free margins a W-plasty was deemed most appropriate for repair.  Using a sterile surgical marker, the appropriate transposition arms of the W-plasty were drawn incorporating the defect and placing the expected incisions within the relaxed skin tension lines where possible.    The area thus outlined was incised deep to adipose tissue with a #15 scalpel blade.  The skin margins were undermined to an appropriate distance in all directions utilizing iris scissors.  The opposing transposition arms were then transposed into place in opposite direction and anchored with interrupted buried subcutaneous sutures. Simple / Intermediate / Complex Repair - Final Wound Length In Cm: 2.2 Xenograft Text: The defect edges were debeveled with a #15 scalpel blade.  Given the location of the defect, shape of the defect and the proximity to free margins a xenograft was deemed most appropriate.  The graft was then trimmed to fit the size of the defect.  The graft was then placed in the primary defect and oriented appropriately. Banner Transposition Flap Text: The defect edges were debeveled with a #15 scalpel blade.  Given the location of the defect and the proximity to free margins a Banner transposition flap was deemed most appropriate.  Using a sterile surgical marker, an appropriate flap drawn around the defect. The area thus outlined was incised deep to adipose tissue with a #15 scalpel blade.  The skin margins were undermined to an appropriate distance in all directions utilizing iris scissors. Alternatives Discussed Intro (Do Not Add Period): I discussed alternative treatments to Mohs surgery and specifically discussed the risks and benefits of Area L Indication Text: Tumors in this location are included in Area L (trunk and extremities).  Mohs surgery is indicated for larger tumors, or tumors with aggressive histologic features, in these anatomic locations. Same Histology In Subsequent Stages Text: The pattern and morphology of the tumor is as described in the first stage. Advancement-Rotation Flap Text: The defect edges were debeveled with a #15 scalpel blade.  Given the location of the defect, shape of the defect and the proximity to free margins an advancement-rotation flap was deemed most appropriate.  Using a sterile surgical marker, an appropriate flap was drawn incorporating the defect and placing the expected incisions within the relaxed skin tension lines where possible. The area thus outlined was incised deep to adipose tissue with a #15 scalpel blade.  The skin margins were undermined to an appropriate distance in all directions utilizing iris scissors. Interpolation Flap Text: A decision was made to reconstruct the defect utilizing an interpolation axial flap and a staged reconstruction.  A telfa template was made of the defect.  This telfa template was then used to outline the interpolation flap.  The donor area for the pedicle flap was then injected with anesthesia.  The flap was excised through the skin and subcutaneous tissue down to the layer of the underlying musculature.  The interpolation flap was carefully excised within this deep plane to maintain its blood supply.  The edges of the donor site were undermined.   The donor site was closed in a primary fashion.  The pedicle was then rotated into position and sutured.  Once the tube was sutured into place, adequate blood supply was confirmed with blanching and refill.  The pedicle was then wrapped with xeroform gauze and dressed appropriately with a telfa and gauze bandage to ensure continued blood supply and protect the attached pedicle. Melolabial Interpolation Flap Text: A decision was made to reconstruct the defect utilizing an interpolation axial flap and a staged reconstruction.  A telfa template was made of the defect.  This telfa template was then used to outline the melolabial interpolation flap.  The donor area for the pedicle flap was then injected with anesthesia.  The flap was excised through the skin and subcutaneous tissue down to the layer of the underlying musculature.  The pedicle flap was carefully excised within this deep plane to maintain its blood supply.  The edges of the donor site were undermined.   The donor site was closed in a primary fashion.  The pedicle was then rotated into position and sutured.  Once the tube was sutured into place, adequate blood supply was confirmed with blanching and refill.  The pedicle was then wrapped with xeroform gauze and dressed appropriately with a telfa and gauze bandage to ensure continued blood supply and protect the attached pedicle. Island Pedicle Flap Text: The defect edges were debeveled with a #15 scalpel blade.  Given the location of the defect, shape of the defect and the proximity to free margins an island pedicle advancement flap was deemed most appropriate.  Using a sterile surgical marker, an appropriate advancement flap was drawn incorporating the defect, outlining the appropriate donor tissue and placing the expected incisions within the relaxed skin tension lines where possible.    The area thus outlined was incised deep to adipose tissue with a #15 scalpel blade.  The skin margins were undermined to an appropriate distance in all directions around the primary defect and laterally outward around the island pedicle utilizing iris scissors.  There was minimal undermining beneath the pedicle flap. Epidermal Closure Graft Donor Site (Optional): simple interrupted Secondary Intention Text (Leave Blank If You Do Not Want): The defect will heal with secondary intention. Anesthesia Volume In Cc: 7 Localized Dermabrasion With Wire Brush Text: The patient was draped in routine manner.  Localized dermabrasion using 3 x 17 mm wire brush was performed in routine manner to papillary dermis. This spot dermabrasion is being performed to complete skin cancer reconstruction. It also will eliminate the other sun damaged precancerous cells that are known to be part of the regional effect of a lifetime's worth of sun exposure. This localized dermabrasion is therapeutic and should not be considered cosmetic in any regard. Consent (Marginal Mandibular)/Introductory Paragraph: The rationale for Mohs was explained to the patient and consent was obtained. The risks, benefits and alternatives to therapy were discussed in detail. Specifically, the risks of damage to the marginal mandibular branch of the facial nerve, infection, scarring, bleeding, prolonged wound healing, incomplete removal, allergy to anesthesia, and recurrence were addressed. Prior to the procedure, the treatment site was clearly identified and confirmed by the patient. All components of Universal Protocol/PAUSE Rule completed. Estimated Blood Loss (Cc): minimal Epidermal Closure: running Rotation Flap Text: The defect edges were debeveled with a #15 scalpel blade.  Given the location of the defect, shape of the defect and the proximity to free margins a rotation flap was deemed most appropriate.  Using a sterile surgical marker, an appropriate rotation flap was drawn incorporating the defect and placing the expected incisions within the relaxed skin tension lines where possible.    The area thus outlined was incised deep to adipose tissue with a #15 scalpel blade.  The skin margins were undermined to an appropriate distance in all directions utilizing iris scissors. Mohs Histo Method Verbiage: Each section was then chromacoded and processed in the Mohs lab using the Mohs protocol and submitted for frozen section. Cheiloplasty (Less Than 50%) Text: A decision was made to reconstruct the defect with a  cheiloplasty.  The defect was undermined extensively.  Additional obicularis oris muscle was excised with a 15 blade scalpel.  The defect was converted into a full thickness wedge, of less than 50% of the vertical height of the lip, to facilite a better cosmetic result.  Small vessels were then tied off with 5-0 monocyrl. The obicularis oris, superficial fascia, adipose and dermis were then reapproximated.  After the deeper layers were approximated the epidermis was reapproximated with particular care given to realign the vermilion border. Closure 2 Information: This tab is for additional flaps and grafts, including complex repair and grafts and complex repair and flaps. You can also specify a different location for the additional defect, if the location is the same you do not need to select a new one. We will insert the automated text for the repair you select below just as we do for solitary flaps and grafts. Please note that at this time if you select a location with a different insurance zone you will need to override the ICD10 and CPT if appropriate. Island Pedicle Flap-Requiring Vessel Identification Text: The defect edges were debeveled with a #15 scalpel blade.  Given the location of the defect, shape of the defect and the proximity to free margins an island pedicle advancement flap was deemed most appropriate.  Using a sterile surgical marker, an appropriate advancement flap was drawn, based on the axial vessel mentioned above, incorporating the defect, outlining the appropriate donor tissue and placing the expected incisions within the relaxed skin tension lines where possible.    The area thus outlined was incised deep to adipose tissue with a #15 scalpel blade.  The skin margins were undermined to an appropriate distance in all directions around the primary defect and laterally outward around the island pedicle utilizing iris scissors.  There was minimal undermining beneath the pedicle flap. Consent (Nose)/Introductory Paragraph: The rationale for Mohs was explained to the patient and consent was obtained. The risks, benefits and alternatives to therapy were discussed in detail. Specifically, the risks of nasal deformity, changes in the flow of air through the nose, infection, scarring, bleeding, prolonged wound healing, incomplete removal, allergy to anesthesia, nerve injury and recurrence were addressed. Prior to the procedure, the treatment site was clearly identified and confirmed by the patient. All components of Universal Protocol/PAUSE Rule completed. Bcc Infiltrative Histology Text: There were numerous aggregates of basaloid cells demonstrating an infiltrative pattern. Ftsg Text: The defect edges were debeveled with a #15 scalpel blade.  Given the location of the defect, shape of the defect and the proximity to free margins a full thickness skin graft was deemed most appropriate.  Using a sterile surgical marker, the primary defect shape was transferred to the donor site. The area thus outlined was incised deep to adipose tissue with a #15 scalpel blade.  The harvested graft was then trimmed of adipose tissue until only dermis and epidermis was left.  The skin margins of the secondary defect were undermined to an appropriate distance in all directions utilizing iris scissors.  The secondary defect was closed with interrupted buried subcutaneous sutures.  The skin edges were then re-apposed with running  sutures.  The skin graft was then placed in the primary defect and oriented appropriately. Muscle Hinge Flap Text: The defect edges were debeveled with a #15 scalpel blade.  Given the size, depth and location of the defect and the proximity to free margins a muscle hinge flap was deemed most appropriate.  Using a sterile surgical marker, an appropriate hinge flap was drawn incorporating the defect. The area thus outlined was incised with a #15 scalpel blade.  The skin margins were undermined to an appropriate distance in all directions utilizing iris scissors. Mohs Rapid Report Verbiage: The area of clinically evident tumor was marked with skin marking ink and appropriately hatched.  The initial incision was made following the Mohs approach through the skin.  The specimen was taken to the lab, divided into the necessary number of pieces, chromacoded and processed according to the Mohs protocol.  This was repeated in successive stages until a tumor free defect was achieved. S Plasty Text: Given the location and shape of the defect, and the orientation of relaxed skin tension lines, an S-plasty was deemed most appropriate for repair.  Using a sterile surgical marker, the appropriate outline of the S-plasty was drawn, incorporating the defect and placing the expected incisions within the relaxed skin tension lines where possible.  The area thus outlined was incised deep to adipose tissue with a #15 scalpel blade.  The skin margins were undermined to an appropriate distance in all directions utilizing iris scissors. The skin flaps were advanced over the defect.  The opposing margins were then approximated with interrupted buried subcutaneous sutures. Unna Boot Text: An Unna boot was placed to help immobilize the limb and facilitate more rapid healing. Advancement Flap (Double) Text: The defect edges were debeveled with a #15 scalpel blade.  Given the location of the defect and the proximity to free margins a double advancement flap was deemed most appropriate.  Using a sterile surgical marker, the appropriate advancement flaps were drawn incorporating the defect and placing the expected incisions within the relaxed skin tension lines where possible.    The area thus outlined was incised deep to adipose tissue with a #15 scalpel blade.  The skin margins were undermined to an appropriate distance in all directions utilizing iris scissors. Deep Sutures: 5-0 PDS Dermal Autograft Text: The defect edges were debeveled with a #15 scalpel blade.  Given the location of the defect, shape of the defect and the proximity to free margins a dermal autograft was deemed most appropriate.  Using a sterile surgical marker, the primary defect shape was transferred to the donor site. The area thus outlined was incised deep to adipose tissue with a #15 scalpel blade.  The harvested graft was then trimmed of adipose and epidermal tissue until only dermis was left.  The skin graft was then placed in the primary defect and oriented appropriately. Helical Rim Advancement Flap Text: The defect edges were debeveled with a #15 blade scalpel.  Given the location of the defect and the proximity to free margins (helical rim) a double helical rim advancement flap was deemed most appropriate.  Using a sterile surgical marker, the appropriate advancement flaps were drawn incorporating the defect and placing the expected incisions between the helical rim and antihelix where possible.  The area thus outlined was incised through and through with a #15 scalpel blade.  With a skin hook and iris scissors, the flaps were gently and sharply undermined and freed up. O-T Advancement Flap Text: The defect edges were debeveled with a #15 scalpel blade.  Given the location of the defect, shape of the defect and the proximity to free margins an O-T advancement flap was deemed most appropriate.  Using a sterile surgical marker, an appropriate advancement flap was drawn incorporating the defect and placing the expected incisions within the relaxed skin tension lines where possible.    The area thus outlined was incised deep to adipose tissue with a #15 scalpel blade.  The skin margins were undermined to an appropriate distance in all directions utilizing iris scissors. Post-Care Instructions: I reviewed with the patient in detail post-care instructions. Patient is not to engage in any heavy lifting, exercise, or swimming for the next 14 days. Should the patient develop any fevers, chills, bleeding, severe pain patient will contact the office immediately. Z Plasty Text: The lesion was extirpated to the level of the fat with a #15 scalpel blade.  Given the location of the defect, shape of the defect and the proximity to free margins a Z-plasty was deemed most appropriate for repair.  Using a sterile surgical marker, the appropriate transposition arms of the Z-plasty were drawn incorporating the defect and placing the expected incisions within the relaxed skin tension lines where possible.    The area thus outlined was incised deep to adipose tissue with a #15 scalpel blade.  The skin margins were undermined to an appropriate distance in all directions utilizing iris scissors.  The opposing transposition arms were then transposed into place in opposite direction and anchored with interrupted buried subcutaneous sutures. Consent 1/Introductory Paragraph: The rationale for Mohs was explained to the patient and consent was obtained. The risks, benefits and alternatives to therapy were discussed in detail. Specifically, the risks of infection, scarring, bleeding, prolonged wound healing, incomplete removal, allergy to anesthesia, nerve injury and recurrence were addressed. Prior to the procedure, the treatment site was clearly identified and confirmed by the patient. All components of Universal Protocol/PAUSE Rule completed. Purse String (Simple) Text: Given the location of the defect and the characteristics of the surrounding skin a purse string closure was deemed most appropriate.  Undermining was performed circumfirentially around the surgical defect.  A purse string suture was then placed and tightened. Dressing: pressure dressing with telfa Bilobed Flap Text: The defect edges were debeveled with a #15 scalpel blade.  Given the location of the defect and the proximity to free margins a bilobe flap was deemed most appropriate.  Using a sterile surgical marker, an appropriate bilobe flap drawn around the defect.    The area thus outlined was incised deep to adipose tissue with a #15 scalpel blade.  The skin margins were undermined to an appropriate distance in all directions utilizing iris scissors. Mercedes Flap Text: The defect edges were debeveled with a #15 scalpel blade.  Given the location of the defect, shape of the defect and the proximity to free margins a Mercedes flap was deemed most appropriate.  Using a sterile surgical marker, an appropriate advancement flap was drawn incorporating the defect and placing the expected incisions within the relaxed skin tension lines where possible. The area thus outlined was incised deep to adipose tissue with a #15 scalpel blade.  The skin margins were undermined to an appropriate distance in all directions utilizing iris scissors. No Residual Tumor Seen Histology Text: There were no malignant cells seen in the sections examined. Tumor Debulked?: curette

## 2025-01-21 NOTE — H&P (VIEW-ONLY)
Subjective   Patient ID: Padilla Campuzano is a 79 y.o. female who presents for follow up with PET results.  HPI    78-year-old female with multiple medical problems and history of breast cancer colon cancer and anal cancer.  She was found to have a left lower lobe nodule which is new in nature and some cavitation as well.  This nodule is concerning for malignancy.  She is scheduled to have a biopsy in a PET scan.  I will add PFTs.  I think based on those results we can formulate a better plan for her.  See her again with results.  She seems like an active woman we will assess her candidacy for resection if one is needed with her PFTs.    Update 5/7/2024  PFTs show an FEV1 of 110% of predicted and a DLCO of 72%.  She underwent an IR guided biopsy that showed lung parenchyma with focal intra-alveolar fibrin and neutrophils with some lymphoid aggregates and recent hemorrhage but negative for malignancy.  I discussion with her about the next steps.  We discussed the option of close follow-up with a CT scan in 3 months versus surgical resection via wedge resection to deal with his lung nodule.  She wants to wait 3 months with a CT scan she is not interested in the surgery at this point.  I will see her again in 3 months with a CT of the chest.    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  She is still recovering from it.  She has finished the treatments.  She underwent a repeat CT scan for which we do not have a final read.  However the lung nodule in the lower lobe looks a couple millimeters larger to me.  I am still concerned that this is a malignancy.  I have conveyed to her and her  that I am concerned about this being a malignancy and how we can treat this with a wedge resection in the middle invasive way.  She does not feel like she wants to undergo any surgeries at this point and she wants to wait.  I have expressed that I am not completely comfortable with just waiting but I will  see her again in 3 months with a CT of the chest.  I recommended a short interval but she wants to wait till December.    Update 1/21/2025  She has finished her treatment for her laryngeal cancer.  New imaging has revealed increase in size of the left lower lobe lung nodule as well as increased avidity on PET scan.  I do believe were dealing with a malignancy.  I discussed with her the mechanics of a sublobar lung resection.  We discussed length of stay potential complications and expected recovery.  [place] [facility] [place]    Review of Systems   Constitutional:  Negative for appetite change, chills, diaphoresis, fatigue, fever and unexpected weight change.   HENT: Negative.     Eyes: Negative.    Respiratory:  Negative for cough, choking, chest tightness, shortness of breath, wheezing and stridor.    Cardiovascular:  Negative for chest pain, palpitations and leg swelling.   Gastrointestinal:  Negative for abdominal distention, abdominal pain, constipation, diarrhea, nausea and vomiting.   Endocrine: Negative.    Genitourinary: Negative.    Musculoskeletal: Negative.    Skin: Negative.    Allergic/Immunologic: Negative.    Neurological: Negative.    Hematological: Negative.    Psychiatric/Behavioral: Negative.     All other systems reviewed and are negative.      Objective   Physical Exam  Constitutional:       Appearance: Normal appearance.   HENT:      Head: Normocephalic and atraumatic.      Nose: Nose normal.      Mouth/Throat:      Mouth: Mucous membranes are moist.      Pharynx: Oropharynx is clear.   Eyes:      Extraocular Movements: Extraocular movements intact.      Conjunctiva/sclera: Conjunctivae normal.      Pupils: Pupils are equal, round, and reactive to light.   Cardiovascular:      Rate and Rhythm: Normal rate and regular rhythm.      Pulses: Normal pulses.      Heart sounds: Normal heart sounds.   Pulmonary:      Effort: Pulmonary  effort is normal. No respiratory distress.      Breath sounds: Normal breath sounds. No stridor. No wheezing, rhonchi or rales.   Chest:      Chest wall: No tenderness.   Abdominal:      General: Abdomen is flat. Bowel sounds are normal.      Palpations: Abdomen is soft.   Musculoskeletal:         General: Normal range of motion.      Cervical back: Normal range of motion and neck supple.   Skin:     General: Skin is warm and dry.      Capillary Refill: Capillary refill takes less than 2 seconds.   Neurological:      General: No focal deficit present.      Mental Status: She is alert and oriented to person, place, and time.         Assessment/Plan   Diagnoses and all orders for this visit:  Solitary pulmonary nodule on lung CT  -     Robotic assisted left lower lobe wedge resection on February 20.  - Meche Pereyra MD  Thoracic and Esophageal Surgery         Abad Linda MD 01/21/25 12:16 PM

## 2025-01-22 ENCOUNTER — TELEPHONE (OUTPATIENT)
Dept: OTOLARYNGOLOGY | Facility: HOSPITAL | Age: 80
End: 2025-01-22
Payer: MEDICARE

## 2025-01-22 NOTE — TELEPHONE ENCOUNTER
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 Vieira   Over the last 2 weeks, how often have you been bothered by the following problems?          PHQ2 Score: 0  PHQ2 Score Interpretation: No further screening needed  1. Little interest or pleasure in activity?: 0  2. Feeling down, depressed, or hopeless?: 0     PHQ9 Score: 0  3. Trouble falling, staying asleep or sleeping all the time?: 0  4. Feeling tired or having little energy?: 0  5. Poor appetite or overeating?: 0  6. Feeling bad about yourself - or that you are a failure or that you have let yourself or your family down?: 0  7. Trouble concentrating on things such as reading a newspaper or watching television?: 0  8. Moving or speaking so slowly that other people could have noticed? Or the opposite - being so fidgety or restless that you were moving around a lot more than usual?: 0  9. Thoughts that you would be better off dead, or of hurting yourself in some way?: 0

## 2025-01-26 DIAGNOSIS — C32.0 SQUAMOUS CELL CARCINOMA OF LEFT VOCAL CORD (MULTI): ICD-10-CM

## 2025-01-27 RX ORDER — LOPERAMIDE HYDROCHLORIDE 2 MG/1
CAPSULE ORAL
Qty: 100 CAPSULE | Refills: 0 | OUTPATIENT
Start: 2025-01-27

## 2025-02-06 ENCOUNTER — CLINICAL SUPPORT (OUTPATIENT)
Dept: PREADMISSION TESTING | Facility: HOSPITAL | Age: 80
End: 2025-02-06
Payer: MEDICARE

## 2025-02-06 DIAGNOSIS — K52.9 CHRONIC DIARRHEA: ICD-10-CM

## 2025-02-06 RX ORDER — PANCRELIPASE 24000; 76000; 120000 [USP'U]/1; [USP'U]/1; [USP'U]/1
CAPSULE, DELAYED RELEASE PELLETS ORAL
Qty: 400 CAPSULE | Refills: 0 | Status: SHIPPED | OUTPATIENT
Start: 2025-02-06

## 2025-02-06 NOTE — CPM/PAT H&P
CPM/PAT Evaluation       Name: Padilla Campuzano (Padilla Campuzano)  /Age: 1945/79 y.o.     { PAT Visit Type:23587}      Chief Complaint: ***    HPI    Past Medical History:   Diagnosis Date    Acute kidney injury (CMS-HCC) 2015    Anal cancer (Multi) 2017    Breast cancer (Multi) 2012    Cerebral hemorrhage (Multi)     CKD (chronic kidney disease)     Colon cancer (Multi)     Colorectal cancer (Multi)     Diverticulosis of intestine, part unspecified, without perforation or abscess without bleeding     Diverticulosis    GERD (gastroesophageal reflux disease)     Hypertension     Irritable bowel syndrome     Kidney disease     Laryngeal cancer (Multi)     Lung nodule     Malignant neoplasm of colon, unspecified     Colon cancer    Personal history of colonic polyps     History of colonic polyps    Personal history of irradiation     Personal history of malignant neoplasm of breast     History of malignant neoplasm of female breast    Personal history of other diseases of the respiratory system     History of respiratory failure    Personal history of other malignant neoplasm of skin     Personal history of malignant neoplasm of skin    Right upper quadrant pain 2016    Abdominal pain, RUQ (right upper quadrant)    Stroke (Multi)     Unspecified symptoms and signs involving the genitourinary system 2016    Urinary symptom or sign       Past Surgical History:   Procedure Laterality Date    BI US GUIDED BREAST RIGHT CYST ASPIRATION Right 2019    BI BREAST CYST ASPIRATION RIGHT [facility]    BOWEL RESECTION  2001    large bowel resect for colon cancer    BREAST LUMPECTOMY  2012    Breast Surgery Lumpectomy    CHOLECYSTECTOMY  2014    CT GUIDED IMAGING FOR ABSCESS DRAIN  2015    CT GUIDED IMAGING FOR ABSCESS DRAIN [facility]    HYSTERECTOMY  1984    Hysterectomy    ILEOSTOMY  2014    Ileostomy    ILEOSTOMY CLOSURE  2015    Ileostomy  Closure    OTHER SURGICAL HISTORY  2012    Cerebral artery coiling       Patient Sexual activity questions deferred to the physician.    Family History   Problem Relation Name Age of Onset    Colon cancer Mother      Liver cancer Mother      Kidney cancer Mother      Heart attack Father      Blood clot Father      Cancer Brother      Cancer Brother         Allergies   Allergen Reactions    Paclitaxel GI Upset and Nausea/vomiting     See adverse drug reaction note dated 7/9/24    Hydrocodone-Acetaminophen Dizziness    Oxycodone Hcl Nausea/vomiting    Oxycodone-Acetaminophen Nausea/vomiting    Aspirin GI Upset and Unknown    Sulfa (Sulfonamide Antibiotics) Rash       Prior to Admission medications    Medication Sig Start Date End Date Taking? Authorizing Provider   acetaminophen (Tylenol) 500 mg tablet Take 2 tablets (1,000 mg) by mouth every 6 hours if needed for mild pain (1 - 3).    Historical Provider, MD   biotin 5 mg capsule once every 24 hours.    Historical Provider, MD   cholecalciferol (Vitamin D-3) 50 mcg (2,000 unit) capsule 1 capsule (50 mcg) once every 24 hours.    Historical Provider, MD   cholestyramine (Questran) 4 gram powder MIX 1 SCOOP WITH AT LEAST 2 TO 6 OUNCES LIQUID AND DRINK ONCE  DAILY 11/13/24   Naheed Clements MD   diphenoxylate-atropine (LomotiL) 2.5-0.025 mg tablet Take 1 tablet by mouth 4 times a day as needed for diarrhea for up to 7 days. 8/26/24 12/19/24  ÁNGEL Maloney-CNP   lidocaine (Lidoderm) 5 % patch Place 1 patch over 12 hours on the skin once daily. Remove & discard patch within 12 hours or as directed by MD. 1/14/25 4/14/25  Naheed Clements MD   lipase-protease-amylase (Creon) 24,000-76,000 -120,000 unit capsule TAKE 3 CAPSULES BY MOUTH 3 TIMES DAILY WITH MEALS AND 1 TO 2  CAPSULES BY MOUTH WITH SNACKS.  MAX 13 CAPSULE DAILY 1/14/25   Naheed Clements MD   loperamide (Imodium) 2 mg capsule TAKE 2 CAPSULES BY MOUTH WITH  THE FIRST EPISODE OF DIARRHEA   AND 1 CAPSULE WITH ANY  ADDITIONAL EPISODES. MAXIMUM 8  CAPSULES DAILY 1/15/25   Naheed Clements MD   losartan (Cozaar) 25 mg tablet TAKE 1 TABLET BY MOUTH ONCE  DAILY 1/15/25   Naheed Clements MD   magnesium oxide (Mag-Ox) 400 mg (241.3 mg magnesium) tablet Take 1 tablet (400 mg) by mouth once daily. 1/14/25 4/14/25  Naheed Clements MD   ondansetron (Zofran) 8 mg tablet Take 1 tablet (8 mg) by mouth every 8 hours if needed for nausea or vomiting. 7/1/24   POP Maloney   pantoprazole (ProtoNix) 40 mg EC tablet TAKE 1 TABLET BY MOUTH TWICE  DAILY 1/15/25   Naheed Clements MD   potassium chloride (Klor-Con) 20 mEq packet Take 20 mEq by mouth once daily.  Patient not taking: Reported on 12/19/2024 8/2/24 9/1/24  POP Maloney   prochlorperazine (Compazine) 10 mg tablet Take 1 tablet (10 mg) by mouth every 6 hours if needed for nausea or vomiting. 7/15/24   Kyunghee Burkitt, DO        PAT ROS     PAT Physical Exam     Airway    Testing/Diagnostic:     Patient Specialist/PCP:   Data only, no medication, providers, or history verified. Information updated based solely on chart review.    PCP  1/14/25 - Naheed Clements MD     CPM/PAT  ([facility]) 5/15/24 - Caitie Quiñones PA-C     Thoracic Surg  1/21/25 - Abad Linda MD   Solitary pulmonary nodule on lung CT, plan robotic assisted left lower lobe wedge resection on February 20, 2025.    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 Completed therapy in August 2024. Hx of early stage colon cancer in 2001, s/p surgery, breast cancer in 2012. S/p surgery, chemo, and radiation. Anal cancer in 2017 S/p Tru protocol, Laryngeal cancer in 2024. 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 She had  chemoradiation, finished 9/19/24.     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 Recently diagnosed with laryngeal cancer. Staging PET scan showed hypermetabolic activity in anus and rectum.     General Surg  12/1/23 - Leander Sun MD   Hx of carcinoma of the right breast. She has intermittent recurring seromas of the right chest wall.     Visit Vitals  OB Status Hysterectomy   Smoking Status Former       DASI Risk Score      Flowsheet Row Pre-Admission Testing from 5/15/2024 in [facility]   Can you take care of yourself (eat, dress, bathe, or use toilet)?  2.75 filed at 05/15/2024 1247   Can you walk indoors, such as around your house? 1.75 filed at 05/15/2024 1247   Can you walk a block or two on level ground?  2.75 filed at 05/15/2024 1247   Can you climb a flight of stairs or walk up a hill? 5.5 filed at 05/15/2024 1247   Can you run a short distance? 0 filed at 05/15/2024 1247   Can you do light work around the house like dusting or washing dishes? 2.7 filed at 05/15/2024 1247   Can you do moderate work around the house like vacuuming, sweeping floors or carrying groceries? 3.5 filed at 05/15/2024 1247   Can you do heavy work around the house like scrubbing floors or lifting and moving heavy furniture?  0 filed at 05/15/2024 1247   Can you do yard work like raking leaves, weeding or pushing a mower? 4.5 filed at 05/15/2024 1247   Can you have sexual relations? 0 filed at 05/15/2024 1247   Can you participate in moderate recreational activities like golf, bowling, dancing, doubles tennis or throwing a baseball or football? 0 filed at 05/15/2024 1247   Can you participate in strenous sports like swimming, singles tennis, football, basketball, or skiing? 0 filed at 05/15/2024 1247   DASI SCORE 23.45 filed at 05/15/2024 1247   METS Score (Will be calculated only when all the questions are answered) 5.6 filed at  05/15/2024 1247          Caprini DVT Assessment    No data to display       Modified Frailty Index    No data to display       CHADS2 Stroke Risk  Current as of 2 hours ago        N/A 3 to 100%: High Risk   2 to < 3%: Medium Risk   0 to < 2%: Low Risk     Last Change: N/A          This score determines the patient's risk of having a stroke if the patient has atrial fibrillation.        This score is not applicable to this patient. Components are not calculated.          Revised Cardiac Risk Index      Flowsheet Row Pre-Admission Testing from 5/15/2024 in [facility]   High-Risk Surgery (Intraperitoneal, Intrathoracic,Suprainguinal vascular) 0 filed at 05/15/2024 1312   History of ischemic heart disease (History of MI, History of positive exercuse test, Current chest paint considered due to myocardial ischemia, Use of nitrate therapy, ECG with pathological Q Waves) 0 filed at 05/15/2024 1312   History of congestive heart failure (pulmonary edemia, bilateral rales or S3 gallop, Paroxysmal nocturnal dyspnea, CXR showing pulmonary vascular redistribution) 0 filed at 05/15/2024 1312   History of cerebrovascular disease (Prior TIA or stroke) 1 filed at 05/15/2024 1312   Pre-operative insulin treatment 0 filed at 05/15/2024 1312   Pre-operative creatinine>2 mg/dl 0 filed at 05/15/2024 1312   Revised Cardiac Risk Calculator 1 filed at 05/15/2024 1312          Apfel Simplified Score    No data to display       Risk Analysis Index Results This Encounter    No data found in the last 10 encounters.       Stop Bang Score      Flowsheet Row Pre-Admission Testing from 5/15/2024 in [facility]   Do you snore loudly? 0 filed at 05/15/2024 1250   Do you often feel tired or fatigued after your sleep? 0 filed at 05/15/2024 1250   Has anyone ever observed you stop breathing in your sleep? 0 filed at 05/15/2024 1250   Do you have or are you being treated for high blood pressure? 1 filed at 05/15/2024 1250    Recent BMI (Calculated) 26 filed at 05/15/2024 1250   Is BMI greater than 35 kg/m2? 0=No filed at 05/15/2024 1250   Age older than 50 years old? 1=Yes filed at 05/15/2024 1250   Is your neck circumference greater than 17 inches (Male) or 16 inches (Female)? 0 filed at 05/15/2024 1250   Gender - Male 0=No filed at 05/15/2024 1250   STOP-BANG Total Score 2 filed at 05/15/2024 1250          Prodigy: High Risk  Total Score: 12              Prodigy Age Score           ARISCAT Score for Postoperative Pulmonary Complications    No data to display       Dyana Perioperative Risk for Myocardial Infarction or Cardiac Arrest (SHERINE)    No data to display         Assessment and Plan:     {[facility] EMBEDDED ASSESSMENT AND PLAN:20105}

## 2025-02-13 ENCOUNTER — PRE-ADMISSION TESTING (OUTPATIENT)
Dept: PREADMISSION TESTING | Facility: HOSPITAL | Age: 80
End: 2025-02-13
Payer: MEDICARE

## 2025-02-13 VITALS
HEIGHT: 62 IN | BODY MASS INDEX: 23.19 KG/M2 | OXYGEN SATURATION: 95 % | WEIGHT: 126 LBS | TEMPERATURE: 97.5 F | SYSTOLIC BLOOD PRESSURE: 168 MMHG | HEART RATE: 92 BPM | DIASTOLIC BLOOD PRESSURE: 79 MMHG

## 2025-02-13 DIAGNOSIS — I10 HYPERTENSION, UNSPECIFIED TYPE: ICD-10-CM

## 2025-02-13 DIAGNOSIS — Z01.818 PREOPERATIVE EXAMINATION: Primary | ICD-10-CM

## 2025-02-13 DIAGNOSIS — C32.9 LARYNGEAL CANCER (MULTI): ICD-10-CM

## 2025-02-13 DIAGNOSIS — R91.1 LUNG NODULE: ICD-10-CM

## 2025-02-13 LAB
ABO GROUP (TYPE) IN BLOOD: NORMAL
ANION GAP SERPL CALC-SCNC: 15 MMOL/L (ref 10–20)
ANTIBODY SCREEN: NORMAL
BUN SERPL-MCNC: 26 MG/DL (ref 6–23)
CALCIUM SERPL-MCNC: 9.2 MG/DL (ref 8.6–10.6)
CHLORIDE SERPL-SCNC: 105 MMOL/L (ref 98–107)
CO2 SERPL-SCNC: 26 MMOL/L (ref 21–32)
CREAT SERPL-MCNC: 1.2 MG/DL (ref 0.5–1.05)
EGFRCR SERPLBLD CKD-EPI 2021: 46 ML/MIN/1.73M*2
ERYTHROCYTE [DISTWIDTH] IN BLOOD BY AUTOMATED COUNT: 12.6 % (ref 11.5–14.5)
GLUCOSE SERPL-MCNC: 99 MG/DL (ref 74–99)
HCT VFR BLD AUTO: 36.1 % (ref 36–46)
HGB BLD-MCNC: 11.6 G/DL (ref 12–16)
MCH RBC QN AUTO: 32 PG (ref 26–34)
MCHC RBC AUTO-ENTMCNC: 32.1 G/DL (ref 32–36)
MCV RBC AUTO: 99 FL (ref 80–100)
NRBC BLD-RTO: 0 /100 WBCS (ref 0–0)
PLATELET # BLD AUTO: 233 X10*3/UL (ref 150–450)
POTASSIUM SERPL-SCNC: 4.5 MMOL/L (ref 3.5–5.3)
RBC # BLD AUTO: 3.63 X10*6/UL (ref 4–5.2)
RH FACTOR (ANTIGEN D): NORMAL
SODIUM SERPL-SCNC: 141 MMOL/L (ref 136–145)
WBC # BLD AUTO: 4.9 X10*3/UL (ref 4.4–11.3)

## 2025-02-13 PROCEDURE — 80048 BASIC METABOLIC PNL TOTAL CA: CPT

## 2025-02-13 PROCEDURE — 85027 COMPLETE CBC AUTOMATED: CPT

## 2025-02-13 PROCEDURE — 86901 BLOOD TYPING SEROLOGIC RH(D): CPT

## 2025-02-13 PROCEDURE — 87081 CULTURE SCREEN ONLY: CPT

## 2025-02-13 PROCEDURE — 99203 OFFICE O/P NEW LOW 30 MIN: CPT

## 2025-02-13 PROCEDURE — 93005 ELECTROCARDIOGRAM TRACING: CPT

## 2025-02-13 ASSESSMENT — ENCOUNTER SYMPTOMS
UNEXPECTED WEIGHT CHANGE: 0
CHILLS: 0
VISUAL CHANGE: 0
WEAKNESS: 0
MYALGIAS: 0
NERVOUS/ANXIOUS: 0
COUGH: 1
WOUND: 0
POLYDIPSIA: 0
SHORTNESS OF BREATH: 0
JOINT SWELLING: 0
LIGHT-HEADEDNESS: 0
NECK PAIN: 0
DYSURIA: 0
SKIN CHANGES: 0
VERTIGO: 0
DIARRHEA: 0
BLOOD IN STOOL: 0
DOUBLE VISION: 0
FEVER: 0
VOMITING: 0
NECK SWELLING: 0
TROUBLE SWALLOWING: 0
EYE DISCHARGE: 0
HEMOPTYSIS: 0
DIFFICULTY URINATING: 0
ARTHRALGIAS: 0
BRUISES/BLEEDS EASILY: 0
EXCESSIVE BLEEDING: 0
DYSPNEA WITH EXERTION: 0
PND: 0
VOICE CHANGE: 1
SINUS CONGESTION: 0
WHEEZING: 0
NECK STIFFNESS: 0
ABDOMINAL PAIN: 0
NECK MASS: 0
LIMITED RANGE OF MOTION: 0
NUMBNESS: 0
NAUSEA: 0
TREMORS: 0
RHINORRHEA: 1
ABDOMINAL DISTENTION: 0
DYSPNEA AT REST: 0
CONFUSION: 0
PALPITATIONS: 0
CONSTIPATION: 0

## 2025-02-13 ASSESSMENT — DUKE ACTIVITY SCORE INDEX (DASI)
CAN YOU DO LIGHT WORK AROUND THE HOUSE LIKE DUSTING OR WASHING DISHES: YES
CAN YOU CLIMB A FLIGHT OF STAIRS OR WALK UP A HILL: YES
CAN YOU PARTICIPATE IN MODERATE RECREATIONAL ACTIVITIES LIKE GOLF, BOWLING, DANCING, DOUBLES TENNIS OR THROWING A BASEBALL OR FOOTBALL: NO
DASI METS SCORE: 5.1
CAN YOU HAVE SEXUAL RELATIONS: NO
CAN YOU DO YARD WORK LIKE RAKING LEAVES, WEEDING OR PUSHING A MOWER: NO
CAN YOU WALK INDOORS, SUCH AS AROUND YOUR HOUSE: YES
CAN YOU RUN A SHORT DISTANCE: NO
CAN YOU TAKE CARE OF YOURSELF (EAT, DRESS, BATHE, OR USE TOILET): YES
TOTAL_SCORE: 18.95
CAN YOU DO MODERATE WORK AROUND THE HOUSE LIKE VACUUMING, SWEEPING FLOORS OR CARRYING GROCERIES: YES
CAN YOU WALK A BLOCK OR TWO ON LEVEL GROUND: YES
CAN YOU PARTICIPATE IN STRENOUS SPORTS LIKE SWIMMING, SINGLES TENNIS, FOOTBALL, BASKETBALL, OR SKIING: NO
CAN YOU DO HEAVY WORK AROUND THE HOUSE LIKE SCRUBBING FLOORS OR LIFTING AND MOVING HEAVY FURNITURE: NO

## 2025-02-13 ASSESSMENT — LIFESTYLE VARIABLES: SMOKING_STATUS: NONSMOKER

## 2025-02-13 NOTE — CPM/PAT H&P
CPM/PAT Evaluation       Name: Padilla Campuzano (Padilla Campuzano)  /Age: 1945/79 y.o.     Visit Type:   In-Person       Chief Complaint: Perioperative Evaluation    HPI HPI: 78 y/o female scheduled for WEDGE RESECTION, LUNG, ROBOT-ASSISTED - Left  on 2025  with Dr. Abad Linda secondary to Lung nodule .   Presents to CPM today for perioperative risk stratification and optimization. PMHX includes CVA (2012, memory deficit), CAD, HTN, Lung Nodule, CKD3, Breast CA (s/p chemoradiation, lumpectomy), Colon CA (s/p colectomy), Rectal CA (s/p cherelle protocol), Laryngeal CA( SCC (T3N0), s/pchemo ), Pancreatic Insuffiencey, GERD, Vitamin D Deficiency.    PCP: Naheed Clements MD   Specialists:   Thoracic - Abad Linda MD   ENT - Hussain Vieira MD   Oncology - Malik Gordillo MD   GI -Jovani Panchal MD            Past Medical History:   Diagnosis Date    Acute kidney injury (CMS-HCC) 2015    Anal cancer (Multi)     (s/p cherelle protocol    Arthritis     hands    Breast cancer (Multi)     s/p chemoradiation, lumpectomy)    Cataract     Cerebral hemorrhage (Multi)     CKD (chronic kidney disease)     Colorectal cancer (Multi)     s/p colectomy    Diverticulosis of intestine, part unspecified, without perforation or abscess without bleeding     Diverticulosis    GERD (gastroesophageal reflux disease)     controlled    Hypertension     Irritable bowel syndrome     Laryngeal cancer (Multi)     SCC (T3N0), s/pchemo ),    Lung nodule     Malignant neoplasm of colon, unspecified     Colon cancer    Pancreatic insufficiency ([facility]-H[facility])     Stroke (Multi)        Past Surgical History:   Procedure Laterality Date    BI US GUIDED BREAST RIGHT CYST ASPIRATION Right 2019    BI BREAST CYST ASPIRATION RIGHT [facility]    BOWEL RESECTION  2001    large bowel resect for colon cancer    BREAST LUMPECTOMY  2012    Breast Surgery Lumpectomy    CHOLECYSTECTOMY  2014     CT GUIDED IMAGING FOR ABSCESS DRAIN  07/08/2015    CT GUIDED IMAGING FOR ABSCESS DRAIN [facility]    HYSTERECTOMY  1984    Hysterectomy    ILEOSTOMY  2014    Ileostomy    ILEOSTOMY CLOSURE  11/13/2015    Ileostomy Closure    OTHER SURGICAL HISTORY  2012    Cerebral artery coiling       Patient Sexual activity questions deferred to the physician.    Family History   Problem Relation Name Age of Onset    Colon cancer Mother      Liver cancer Mother      Kidney cancer Mother      Heart attack Father      Blood clot Father      Cancer Brother      Cancer Brother         Allergies   Allergen Reactions    Paclitaxel GI Upset and Nausea/vomiting     See adverse drug reaction note dated 7/9/24    Hydrocodone-Acetaminophen Dizziness    Oxycodone Hcl Nausea/vomiting    Oxycodone-Acetaminophen Nausea/vomiting    Aspirin GI Upset and Unknown    Sulfa (Sulfonamide Antibiotics) Rash       Prior to Admission medications    Medication Sig Start Date End Date Taking? Authorizing Provider   acetaminophen (Tylenol) 500 mg tablet Take 2 tablets (1,000 mg) by mouth every 6 hours if needed for mild pain (1 - 3).    Historical Provider, MD   biotin 5 mg capsule once every 24 hours.    Historical Provider, MD   cholecalciferol (Vitamin D-3) 50 mcg (2,000 unit) capsule 1 capsule (50 mcg) once every 24 hours.    Historical Provider, MD   cholestyramine (Questran) 4 gram powder MIX 1 SCOOP WITH AT LEAST 2 TO 6 OUNCES LIQUID AND DRINK ONCE  DAILY 11/13/24   Naheed Clements MD   diphenoxylate-atropine (LomotiL) 2.5-0.025 mg tablet Take 1 tablet by mouth 4 times a day as needed for diarrhea for up to 7 days. 8/26/24 12/19/24  ÁNGEL Maloney-CNP   lidocaine (Lidoderm) 5 % patch Place 1 patch over 12 hours on the skin once daily. Remove & discard patch within 12 hours or as directed by MD. 1/14/25 4/14/25  Naheed Clements MD   lipase-protease-amylase (Creon) 24,000-76,000 -120,000 unit capsule TAKE 3 CAPSULES BY  MOUTH 3 TIMES DAILY WITH MEALS AND 1 TO 2  CAPSULES WITH SNACKS. MAX 13  CAPSULES DAILY 2/6/25   Naheed Clements MD   loperamide (Imodium) 2 mg capsule TAKE 2 CAPSULES BY MOUTH WITH  THE FIRST EPISODE OF DIARRHEA  AND 1 CAPSULE WITH ANY  ADDITIONAL EPISODES. MAXIMUM 8  CAPSULES DAILY 1/15/25   Naheed Clements MD   losartan (Cozaar) 25 mg tablet TAKE 1 TABLET BY MOUTH ONCE  DAILY 1/15/25   Naheed Clements MD   magnesium oxide (Mag-Ox) 400 mg (241.3 mg magnesium) tablet Take 1 tablet (400 mg) by mouth once daily. 1/14/25 4/14/25  Naheed Clements MD   ondansetron (Zofran) 8 mg tablet Take 1 tablet (8 mg) by mouth every 8 hours if needed for nausea or vomiting. 7/1/24   POP Maloney   pantoprazole (ProtoNix) 40 mg EC tablet TAKE 1 TABLET BY MOUTH TWICE  DAILY 1/15/25   Naheed Clements MD   potassium chloride (Klor-Con) 20 mEq packet Take 20 mEq by mouth once daily.  Patient not taking: Reported on 12/19/2024 8/2/24 9/1/24  POP Maloney   prochlorperazine (Compazine) 10 mg tablet Take 1 tablet (10 mg) by mouth every 6 hours if needed for nausea or vomiting. 7/15/24   Kyunghee Burkitt, DO        PAT ROS:   Constitutional:    no fever   no chills   no unexpected weight change  Neuro/Psych:    no numbness   no weakness   no light-headedness   no tremors   no confusion   not nervous/anxious   no self-injury   no suicidal ideation  Eyes:    no discharge   no vision loss   no diplopia   no visual disturbance   no corrective lenses  Ears:    no ear pain   no hearing loss   no vertigo   no tinnitus   no hearing aides  Nose:    nasal discharge   no sinus congestion   no epistaxis  Mouth:    no dental issues   no mouth pain   no oral bleeding   no mouth lesions  Throat:    no throat pain   no dysphagia   voice change (hoarse since radiation for layrngeal cancer)  Neck:    no neck pain   neck swelling   no neck stiffness   no neck masses  Cardio:    no chest pain   no  palpitations   no peripheral edema   no dyspnea   no MARSHALL   no paroxysmal nocturnal dyspnea  Respiratory:    cough (dark brown phlegm, chronic since diagnosed with throat cancer, unchanged)   no wheezing   no hemoptysis   no shortness of breath  Endocrine:    no cold intolerance   no heat intolerance   no polydipsia  GI:    no abdominal distention   no abdominal pain   no constipation   no diarrhea   no nausea   no vomiting   no blood in stool  :    no difficulty urinating   no dysuria   no oliguria   no polyuria  Musculoskeletal:    no arthralgias   no myalgias   no decreased ROM   no swelling  Hematologic:    does not bruise/bleed easily   no excessive bleeding   no history of blood transfusion   no blood clots  Skin:   no skin changes   no sores/wound   no rash      Physical Exam  Vitals reviewed. Physical exam within normal limits.   Constitutional:       General: She is not in acute distress.     Appearance: Normal appearance. She is normal weight. She is not ill-appearing, toxic-appearing or diaphoretic.   HENT:      Head: Normocephalic.      Nose: Nose normal. No congestion or rhinorrhea.      Mouth/Throat:      Mouth: Mucous membranes are moist.      Pharynx: Oropharynx is clear. No oropharyngeal exudate or posterior oropharyngeal erythema.   Eyes:      General: No scleral icterus.        Right eye: No discharge.         Left eye: No discharge.      Conjunctiva/sclera: Conjunctivae normal.   Neck:      Vascular: No carotid bruit.   Cardiovascular:      Rate and Rhythm: Normal rate and regular rhythm.      Pulses: Normal pulses.      Heart sounds: Normal heart sounds. No murmur heard.     No friction rub. No gallop.   Pulmonary:      Effort: Pulmonary effort is normal. No respiratory distress.      Breath sounds: Normal breath sounds. No stridor. No wheezing, rhonchi or rales.   Chest:      Chest wall: No tenderness.   Musculoskeletal:         General: No swelling or tenderness. Normal range of motion.       "Cervical back: Normal range of motion and neck supple. No rigidity or tenderness.      Comments: Right side chest port   Skin:     General: Skin is warm and dry.   Neurological:      General: No focal deficit present.      Mental Status: She is alert.      Motor: No weakness.   Psychiatric:         Mood and Affect: Mood normal.         Behavior: Behavior normal.         Thought Content: Thought content normal.         Judgment: Judgment normal.          PAT AIRWAY:   Airway:     Mallampati::  II    TM distance::  >3 FB    Neck ROM::  Full   upper dentures and lower dentures      Visit Vitals  /79   Pulse 92   Temp 36.4 °C (97.5 °F) (Oral)   Ht 1.575 m (5' 2\")   Wt 57.2 kg (126 lb)   SpO2 95%   BMI 23.05 kg/m²   OB Status Hysterectomy   Smoking Status Former   BSA 1.58 m²       DASI Risk Score      Flowsheet Row Pre-Admission Testing from 2/13/2025 in [facility] Pre-Admission Testing from 5/15/2024 in [facility]   Can you take care of yourself (eat, dress, bathe, or use toilet)?  2.75 filed at 02/13/2025 1303 2.75 filed at 05/15/2024 1247   Can you walk indoors, such as around your house? 1.75 filed at 02/13/2025 1303 1.75 filed at 05/15/2024 1247   Can you walk a block or two on level ground?  2.75 filed at 02/13/2025 1303 2.75 filed at 05/15/2024 1247   Can you climb a flight of stairs or walk up a hill? 5.5 filed at 02/13/2025 1303 5.5 filed at 05/15/2024 1247   Can you run a short distance? 0 filed at 02/13/2025 1303 0 filed at 05/15/2024 1247   Can you do light work around the house like dusting or washing dishes? 2.7 filed at 02/13/2025 1303 2.7 filed at 05/15/2024 1247   Can you do moderate work around the house like vacuuming, sweeping floors or carrying groceries? 3.5 filed at 02/13/2025 1303 3.5 filed at 05/15/2024 1247   Can you do heavy work around the house like scrubbing floors or lifting and moving heavy furniture?  0 filed at 02/13/2025 1303 0 filed at 05/15/2024 " 1247   Can you do yard work like raking leaves, weeding or pushing a mower? 0 filed at 02/13/2025 1303 4.5 filed at 05/15/2024 1247   Can you have sexual relations? 0 filed at 02/13/2025 1303 0 filed at 05/15/2024 1247   Can you participate in moderate recreational activities like golf, bowling, dancing, doubles tennis or throwing a baseball or football? 0 filed at 02/13/2025 1303 0 filed at 05/15/2024 1247   Can you participate in strenous sports like swimming, singles tennis, football, basketball, or skiing? 0 filed at 02/13/2025 1303 0 filed at 05/15/2024 1247   DASI SCORE 18.95 filed at 02/13/2025 1303 23.45 filed at 05/15/2024 1247   METS Score (Will be calculated only when all the questions are answered) 5.1 filed at 02/13/2025 1303 5.6 filed at 05/15/2024 1247          Caprini DVT Assessment      Flowsheet Row Pre-Admission Testing from 2/13/2025 in [facility]   DVT Score (IF A SCORE IS NOT CALCULATING, MUST SELECT A BMI TO COMPLETE) 8 filed at 02/13/2025 1751   Medical Factors Present cancer, chemotherapy, or previous malignancy filed at 02/13/2025 1751   Surgical Factors Major surgery planned, including arthroscopic and laproscopic (1-2 hours) filed at 02/13/2025 1751   BMI (BMI MUST BE CHOSEN) 30 or less filed at 02/13/2025 1751          Modified Frailty Index      Flowsheet Row Pre-Admission Testing from 2/13/2025 in [facility]   Non-independent functional status (problems with dressing, bathing, personal grooming, or cooking) 0 filed at 02/13/2025 1753   History of diabetes mellitus  0 filed at 02/13/2025 1753   History of COPD 0 filed at 02/13/2025 1753   History of CHF No filed at 02/13/2025 1753   History of MI 0 filed at 02/13/2025 1753   History of Percutaneous Coronary Intervention, Cardiac Surgery, or Angina No filed at 02/13/2025 1753   Hypertension requiring the use of medication  0.0909 filed at 02/13/2025 1753   Peripheral vascular disease 0 filed at  02/13/2025 1753   Impaired sensorium (cognitive impairement or loss, clouding, or delirium) 0 filed at 02/13/2025 1753   TIA or CVA withouy residual deficit 0 filed at 02/13/2025 1753   Cerebrovascular accident with deficit 0.0909 filed at 02/13/2025 1753   Modified Frailty Index Calculator .1818 filed at 02/13/2025 1753          CHADS2 Stroke Risk  Current as of about an hour ago        N/A 3 to 100%: High Risk   2 to < 3%: Medium Risk   0 to < 2%: Low Risk     Last Change: N/A          This score determines the patient's risk of having a stroke if the patient has atrial fibrillation.        This score is not applicable to this patient. Components are not calculated.          Revised Cardiac Risk Index      Flowsheet Row Pre-Admission Testing from 2/13/2025 in [facility] Pre-Admission Testing from 5/15/2024 in [facility]   High-Risk Surgery (Intraperitoneal, Intrathoracic,Suprainguinal vascular) 1 filed at 02/13/2025 1750 0 filed at 05/15/2024 1312   History of ischemic heart disease (History of MI, History of positive exercuse test, Current chest paint considered due to myocardial ischemia, Use of nitrate therapy, ECG with pathological Q Waves) 0 filed at 02/13/2025 1750 0 filed at 05/15/2024 1312   History of congestive heart failure (pulmonary edemia, bilateral rales or S3 gallop, Paroxysmal nocturnal dyspnea, CXR showing pulmonary vascular redistribution) 0 filed at 02/13/2025 1750 0 filed at 05/15/2024 1312   History of cerebrovascular disease (Prior TIA or stroke) 1 filed at 02/13/2025 1750 1 filed at 05/15/2024 1312   Pre-operative insulin treatment 0 filed at 02/13/2025 1750 0 filed at 05/15/2024 1312   Pre-operative creatinine>2 mg/dl 0 filed at 02/13/2025 1750 0 filed at 05/15/2024 1312   Revised Cardiac Risk Calculator 2 filed at 02/13/2025 1750 1 filed at 05/15/2024 1312          Apfel Simplified Score      Flowsheet Row Pre-Admission Testing from 2/13/2025 in   [facility]   Smoking status 1 filed at 02/13/2025 1752   History of motion sickness or PONV  0 filed at 02/13/2025 1752   Use of postoperative opioids 1 filed at 02/13/2025 1752   Gender - Female 1=Yes filed at 02/13/2025 1752   Apfel Simplified Score Calculator 3 filed at 02/13/2025 1752          Risk Analysis Index Results This Encounter    No data found in the last 10 encounters.       Stop Bang Score      Flowsheet Row Pre-Admission Testing from 2/13/2025 in [facility] Pre-Admission Testing from 5/15/2024 in [facility] [place]   Do you snore loudly? 1 filed at 02/13/2025 1304 0 filed at 05/15/2024 1250   Do you often feel tired or fatigued after your sleep? 1 filed at 02/13/2025 1304 0 filed at 05/15/2024 1250   Has anyone ever observed you stop breathing in your sleep? 0 filed at 02/13/2025 1304 0 filed at 05/15/2024 1250   Do you have or are you being treated for high blood pressure? 1 filed at 02/13/2025 1304 1 filed at 05/15/2024 1250   Recent BMI (Calculated) 23.4 filed at 02/13/2025 1304 26 filed at 05/15/2024 1250   Is BMI greater than 35 kg/m2? 0=No filed at 02/13/2025 1304 0=No filed at 05/15/2024 1250   Age older than 50 years old? 1=Yes filed at 02/13/2025 1304 1=Yes filed at 05/15/2024 1250   Is your neck circumference greater than 17 inches (Male) or 16 inches (Female)? 0 filed at 02/13/2025 1304 0 filed at 05/15/2024 1250   Gender - Male 0=No filed at 02/13/2025 1304 0=No filed at 05/15/2024 1250   STOP-BANG Total Score 4 filed at 02/13/2025 1304 2 filed at 05/15/2024 1250          Prodigy: High Risk  Total Score: 12              Prodigy Age Score           ARISCAT Score for Postoperative Pulmonary Complications      Flowsheet Row Pre-Admission Testing from 2/13/2025 in [facility]   Age Calculated Score 3 filed at 02/13/2025 1751   Preoperative SpO2 8 filed at 02/13/2025 1751   Respiratory infection in the last month Either upper or lower  (i.e., URI, bronchitis, pneumonia), with fever and antibiotic treatment 0 filed at 02/13/2025 1751   Preoperative anemia (Hgb less than 10 g/dl) 0 filed at 02/13/2025 1751   Surgical incision  24 filed at 02/13/2025 1751   Duration of surgery  0 filed at 02/13/2025 1751   Emergency Procedure  0 filed at 02/13/2025 1751   ARISCAT Total Score  35 filed at 02/13/2025 1751          Dyana Perioperative Risk for Myocardial Infarction or Cardiac Arrest (SHERINE)    No data to display         Assessment and Plan:   Anesthesia/Airway:  No anesthesia complications      Neuro:    #CVA (2012, memory deficit) -patient had a stroke in 2012 and reports residual symptom of memory deficit, she no longer follows with neurology at this time.  No further perioperative intervention    Patient is at an increased risk for post operative delirium secondary to age >/= 65 and type and duration of surgery.  Preoperative brain exercise educational handout provided to patient.    The patient is at an increased risk for perioperative stroke secondary to previous CVA/TIA, chronic renal failure , cardiac disease, increased age, HTN, female sex , general anesthesia, and hypercoagulable state.       HEENT/Airway:    No HEENT diagnosis or significant findings on chart review or clinical presentation and evaluation. No further preoperative testing/intervention indicated at this time.      Cardiovascular:    #CAD, HTN, -  medicated with*last dose 2/18, potassium (continue) and follows with PCP. BP today is 168/79 and denies cardiovascular symptoms.  Patient reports that she has shortness of breath however states that this has been consistent since starting chemoradiation and her laryngeal cancer diagnosis.  Staffed with attending due to RCRI and low METS score.  Patient appears to be appropriate functional capacity and no further preoperative testing is indicated at this time. physical exam is benign.    METS are 5.1    RCRI  2 which is 10.1% 30 day risk  of MACE (risk for cardiac death, nonfatal myocardial infarction, and nonfactal cardiac arrest    SHERINE score which indicates a   0.5 % risk of intraoperative or 30-day postoperative MACE    EKG 25  Sinus rhythm otherwise normal ecg        Pulmonary:    #Lung Nodule-seeking surgical intervention with plastic surgery.  Patient reports shortness of breath which has been chronic and consistent.     preoperative deep breathing educational handout provided to patient.    No pulmonary diagnosis, however patient is at increased risk of perioperative complications secondary to  age > 60, site of surgery, types of anesthetic    ARISCAT:    35  points which is a intermediate (13.3%) risk of in-hospital post-op pulmonary complications     PRODIGY:  12  points which is a intermediate risk of post op opioid induced respiratory depression episodes    STOP BAN   points which is a intermediate risk for moderate to severe STACEY    Pumonary toilet education discussed, patient also provided deep breathing exercises and incentive spirometry educational handout      Renal:   #CKD3-entered by PCP, baseline creatinine 1.1-1.2 repeat labs today  Patient is at increase risk for perioperative renal complications secondary to  Age equal to or greater than 56, HTN, cerebral vascular disease, use of an ace, arb, or NSAID       Endocrine:   No endocrine diagnosis or significant findings on chart review or clinical presentation and evaluation. No further testing or intervention is indicated at this time.      Hematologic/Onc:      #Breast CA (s/p chemoradiation, lumpectomy), Colon CA (s/p colectomy), Rectal CA (s/p cherelle protocol), Laryngeal CA( SCC (T3N0), s/pchemo ),-continues to follow with oncology and reports that her last chemoradiation session was  2024.     Preoperative DVT educational handout provided to patient.  Caprini Score:  8  points which is a highest risk of perioperative VTE      Gastrointestinal:   #Pancreatic  Insuffiencey, GERD, -medicated with Compazine (continue), pantoprazole continue, Zofran (continue), Imodium (hold day of surgery), Creon (continue).  Patient follows with GI.  No further perioperative intervention    EAT-10 score of    8  : self-perceived oropharyngeal dysphagia scale (0-40)  (laryngeal cancer/hx radiation)    Apfel: 3 points 61% risk for post operative N/V      Infectious disease:     No infectious diagnosis or significant findings on chart review or clinical presentation and evaluation.   Prescription provided for CHG body wash and dental rinse. CHG use instructions reviewed and provided to patient.  Staph screen collected      Musculoskeletal:    No diagnosis or significant findings on chart review or clinical presentation and evaluation.       Other:     Hold all vitamins and supplements 7 days prior to surgery  Tylenol okay to continue, please hold Aleve/naproxen/ibuprofen (NSAIDs) for 7 days prior to surgery        Labs ordered  Recent Results (from the past 3 weeks)   Basic Metabolic Panel    Collection Time: 02/13/25  1:25 PM   Result Value Ref Range    Glucose 99 74 - 99 mg/dL    Sodium 141 136 - 145 mmol/L    Potassium 4.5 3.5 - 5.3 mmol/L    Chloride 105 98 - 107 mmol/L    Bicarbonate 26 21 - 32 mmol/L    Anion Gap 15 10 - 20 mmol/L    Urea Nitrogen 26 (H) 6 - 23 mg/dL    Creatinine 1.20 (H) 0.50 - 1.05 mg/dL    eGFR 46 (L) >60 mL/min/1.73m*2    Calcium 9.2 8.6 - 10.6 mg/dL   CBC    Collection Time: 02/13/25  1:25 PM   Result Value Ref Range    WBC 4.9 4.4 - 11.3 x10*3/uL    nRBC 0.0 0.0 - 0.0 /100 WBCs    RBC 3.63 (L) 4.00 - 5.20 x10*6/uL    Hemoglobin 11.6 (L) 12.0 - 16.0 g/dL    Hematocrit 36.1 36.0 - 46.0 %    MCV 99 80 - 100 fL    MCH 32.0 26.0 - 34.0 pg    MCHC 32.1 32.0 - 36.0 g/dL    RDW 12.6 11.5 - 14.5 %    Platelets 233 150 - 450 x10*3/uL   Type And Screen Is this order related to pregnancy or an upcoming surgery? Yes; Where will this surgery/delivery be performed? [facility]; What is the date of the surgery? 2/20/2025; Has this patient ever had a transfusion? No; Has t...    Collection Time: 02/13/25  1:25 PM   Result Value Ref Range    ABO TYPE A     Rh TYPE NEG     ANTIBODY SCREEN NEG            Medication instructions and NPO guidelines reviewed with the patient.  All questions or concerns discussed and addressed.      Anila Spaulding PA-C

## 2025-02-13 NOTE — PREPROCEDURE INSTRUCTIONS
"      Thank you for visiting [facility] ([facility]) today for your pre-procedure evaluation, you were seen by     Anila Spaulding PA-C   Department of Anesthesiology and Perioperative Medicine  Main phone [phone]  Fax [phone]      This summary includes instructions and information to aid you during your perioperative period.  Please read carefully. If you have any questions about your visit today, please call the number listed above.  If you become ill or have any changes to your health before your surgery, please contact your primary care provider and alert your surgeon.    Preparing for Surgery       Preoperative Fasting Guidelines    Why must I stop eating and drinking near surgery time?  With sedation, food or liquid in your stomach can enter your lungs causing serious complications  Food can increase nausea and vomiting  When do I need to stop eating and drinking before my surgery?  Do not eat any food after midnight the night before your surgery/procedure.  You may have up to 13.5 ounces of clear liquid until TWO hours before your instructed arrival time to the hospital.  This includes water, black tea/coffee, (no milk or cream) apple juice, and electrolyte drinks (Gatorade)  You may chew gum until TWO hours before your surgery/procedure    Tobacco and Alcohol;  Do not drink alcohol or smoke within 24 hours of surgery.  It is best to quit smoking for as long as possible before any surgery or procedure.       Other Instructions  Why did I have my nose, under my arms, and groin swabbed? The purpose of the swab is to identify Staphylococcus aureus inside your nose or on your skin.  The swab was sent to the laboratory for culture.  A positive swab/culture for Staphylococcus aureus is called colonization or carriage.   What is Staphylococcus aureus? Staphylococcus aureus, also known as \"staph\", is a germ found on the skin or in the nose of healthy people.  Sometimes Staphylococcus " aureus can get into the body and cause an infection.  This can be minor (such as pimples, boils, or other skin problems).  It might also be serious (such as a blood infection, pneumonia, or a surgical site infection). What is Staphylococcus aureus colonization or carriage? Colonization or carriage means that a person has the germ but is not sick from it.  These bacteria can be spread on the hands or when breathing or sneezing. How is Staphylococcus aureus spread? It is most often spread by close contact with a person or item that carries it. What happens if my culture is positive for Staphylococcus aureus? Your doctor/medical team will use this information to guide any antibiotic treatment which may be necessary.  Regardless of the culture results, we will clean the inside of your nose with a betadine swab just before you have your surgery. Will I get an infection if I have Staphylococcus aureus in my nose or on my skin? Anyone can get an infection with Staphylococcus aureus.  However, the best way to reduce your risk of infection is to follow the instructions provided to you for the use of your CHG soap and dental rinse.  , Body Wash; What is a home preoperative antibacterial shower? This shower is a way of cleaning the skin with a germ-killing solution before surgery.  The solution contains chlorhexidine, commonly known as CHG.  CHG is a skin cleanser with germ-killing ability.  Let your doctor know if you are allergic to chlorhexidine. Why do I need to take a preoperative antibacterial shower? Skin is not sterile.  It is best to try to make your skin as free of germs as possible before surgery.  Proper cleansing with a germ-killing soap before surgery can lower the number of germs on your skin.  This helps to reduce the risk of infection at the surgical site.  Following the instructions listed below will help you prepare your skin for surgery.   How do I use the solution? Steps:  Begin using your CHG soap 5 days  before your scheduled surgery on ___________.   First, wash and rinse your hair using the CHG soap. Keep CHG soap away from ear canals and eyes.  Rinse completely, do not condition.  Hair extensions should be removed. , Oral/Dental Rinse: What is oral/dental rinse?  It is a mouthwash. It is a way of cleaning the mouth with a germ-killing solution before your surgery.  The solution contains chlorhexidine, commonly known as CHG. It is used inside the mouth to kill a bacteria known as Staphylococcus aureus.  Let your doctor know if you are allergic to Chlorhexidine. Why do I need to use CHG oral/dental rinse? The CHG oral/dental rinse helps to kill a bacteria in your mouth known as Staphylococcus aureus.  This reduces the risk of infection at the surgical site.  Using your CHG oral/dental rinse STEPS: Use your CHG oral/dental rinse after you brush your teeth the night before (at bedtime) and the morning of your surgery.  Follow all directions on your prescription label.  Use the cap on the container to measure 15 ml.  Swish (gargle if you can) the mouthwash in your mouth for at least 30 seconds, (do not swallow) and spit out.  After you use your CHG rinse, do not rinse your mouth with water, drink or eat.  Please refer to the prescription label for the appropriate time to resume oral intake What side effects might I have using the CHG oral/dental rinse? CHG rinse will stick to plaque on the teeth.  Brush and floss just before use.  Teeth brushing will help avoid staining of plaque during use.       The Week before Surgery        Seven days before Surgery  Check your CPM medication instructions  Do the exercises provided to you by CPM   Arrange for a responsible, adult licensed  to take you home after surgery and stay with you for 24 hours.  You will not be permitted to drive yourself home if you have received any anesthetic/sedation  Five days before surgery  Check your CPM medication instructions  Do the  exercises provided to you by CPM   Start using Chlorhexidene (CHG) body wash if prescribed (Continue till day of surgery)      The Day before Surgery       Check your CPM medication and all other CPM instructions including when to stop eating and drinking  You will be called with your arrival time for surgery in the late afternoon.  If you do not receive a call please reach out to your surgeon's office.  Do not smoke or drink 24 hours before surgery  Prepare items to bring with you to the hospital  Shower with your chlorhexidine wash if prescribed  Brush your teeth and use your chlorhexidine dental rinse if prescribed    The Day of Surgery       Check your CPM medication instructions  Ensure you follow the instructions for when to stop eating and drinking  Shower, if prescribed use CHG.  Do not apply any lotions, creams, moisturizers, perfume or deodorant  Brush your teeth and use your CHG dental rinse if prescribed  Wear loose comfortable clothing  Avoid make-up  Remove  jewelry and piercings, consider professional piercing removal with a plastic spacer if needed  Bring photo ID and Insurance card  Bring an accurate medication list that includes medication dose, frequency and allergies  Bring a copy of your advanced directives (will, health care power of )  Bring any devices and controllers as well as medical devices you have been provided with for surgery (CPAP, slings, braces, etc.)  Dentures, eyeglasses, and contacts will be removed before surgery, please bring cases for contacts or glasses    Preoperative Deep Breathing Exercises    Why it is important to do deep breathing exercises before my surgery?  Deep breathing exercises strengthen your breathing muscles.  This helps you to recover after your surgery and decreases the chance of breathing complications.    How are the deep breathing exercises done?  Sit straight with your back supported.  Breathe in deeply and slowly through your nose. Your lower  rib cage should expand and your abdomen may move forward.  Hold that breath for 3 to 5 seconds.  Breathe out through pursed lips, slowly and completely.  Rest and repeat 10 times every hour while awake.  Rest longer if you become dizzy or lightheaded.    Preoperative Brain Exercises    What are brain exercises?  A brain exercise is any activity that engages your thinking (cognitive) skills.    What types of activities are considered brain exercises?  Jigsaw puzzles, crossword puzzles, word jumble, memory games, word search, and many more.  Many can be found free online or on your phone via a mobile salvador.    Why should I do brain exercises before my surgery?  More recent research has shown brain exercise before surgery can lower the risk of postoperative delirium (confusion) which can be especially important for older adults.  Patients who did brain exercises for 5 to 10 hours the days before surgery, cut their risk of postoperative delirium in half up to 1 week after surgery.    Sit-to-Stand Exercise    What is the sit-to-stand exercise?  The sit-to-stand exercise strengthens the muscles of your lower body and muscles in the center of your body (core muscles for stability) helping to maintain and improve your strength and mobility.  How do I do the sit-to-stand exercise?  The goal is to do this exercise without using your arms or hands.  If this is too difficult, use your arms and hands or a chair with armrests to help slowly push yourself to the standing position and lower yourself back to the sitting position. As the movement becomes easier use your arms and hands less.    Steps to the sit-to-stand exercise  Sit up tall in a sturdy chair, knees bent, feet flat on the floor shoulder-width apart.  Shift your hips/pelvis forward in the chair to correctly position yourself for the next movement.  Lean forward at your hips.  Stand up straight putting equal weight on both feet.  Check to be sure you are properly aligned  with the chair, in a slow controlled movement sit back down.  Repeat this exercise 10-15 times.  If needed you can do it fewer times until your strength improves.  Rest for 1 minute.  Do another 10-15 sit-to-stand exercises.  Try to do this in the morning and evening.       Simple things you can do to help prevent blood clots     Blood clots are blockages that can form in the body's veins. When a blood clot forms in your deep veins, it may be called a deep vein thrombosis, or DVT for short. Blood clots can happen in any part of the body where blood flows, but they are most common in the arms and legs. If a piece of a blood clot breaks free and travels to the lungs, it is called a pulmonary embolus (PE). A PE can be a very serious problem.      Being in the hospital or having surgery can raise your chances of getting a blood clot because you may not be well enough to move around as much as you normally do.         Ways you can help prevent blood clots in the hospital       Wearing SCDs  SCDs stands for Sequential Compression Devices.   SCDs are special sleeves that wrap around your legs. They attach to a pump that fills them with air to gently squeeze your legs every few minutes.  This helps return the blood in your legs to your heart.   SCDs should only be taken off when walking or bathing. SCDs may not be comfortable, but they can help save your life.              Pump SCD leg sleeves  Wearing compression stockings - if your doctor orders them. These special snug-fitting stockings gently squeeze your legs to help blood flow.       Walking. Walking helps move the blood in your legs.   If your doctor says it is ok, try walking the halls at least   5 times a day. Ask us to help you get up, so you don't fall.      Taking any blood-thinning medicines your doctor orders.              Ways you can help prevent blood clots at home         Wearing compression stockings - if your doctor orders them.   Walking - to help move  the blood in your legs.    Taking any blood-thinning medicines your doctor orders.      Signs of a blood clot or PE    Tell your doctor or nurse right away if you have any of the problems listed below.         If you are at home, seek medical care right away. Call 911 for chest pain or problems breathing.            Signs of a blood clot (DVT) - such as pain, swelling, redness, or warmth in your arm or legs.  Signs of a pulmonary embolism (PE) - such as chest pain or feeling short of breath

## 2025-02-15 LAB — STAPHYLOCOCCUS SPEC CULT: NORMAL

## 2025-02-17 ENCOUNTER — APPOINTMENT (OUTPATIENT)
Dept: OTOLARYNGOLOGY | Facility: CLINIC | Age: 80
End: 2025-02-17
Payer: MEDICARE

## 2025-02-17 VITALS — BODY MASS INDEX: 23.19 KG/M2 | HEIGHT: 62 IN | WEIGHT: 126 LBS

## 2025-02-17 DIAGNOSIS — C32.9 MALIGNANT NEOPLASM OF LARYNX: Primary | ICD-10-CM

## 2025-02-17 PROCEDURE — 1159F MED LIST DOCD IN RCRD: CPT | Performed by: OTOLARYNGOLOGY

## 2025-02-17 PROCEDURE — 31575 DIAGNOSTIC LARYNGOSCOPY: CPT | Performed by: OTOLARYNGOLOGY

## 2025-02-17 PROCEDURE — 1160F RVW MEDS BY RX/DR IN RCRD: CPT | Performed by: OTOLARYNGOLOGY

## 2025-02-17 PROCEDURE — 1036F TOBACCO NON-USER: CPT | Performed by: OTOLARYNGOLOGY

## 2025-02-17 PROCEDURE — 99213 OFFICE O/P EST LOW 20 MIN: CPT | Performed by: OTOLARYNGOLOGY

## 2025-02-17 RX ORDER — CHLORHEXIDINE GLUCONATE 40 MG/ML
SOLUTION TOPICAL DAILY PRN
Qty: 473 ML | Refills: 0 | Status: SHIPPED | OUTPATIENT
Start: 2025-02-17 | End: 2025-02-22 | Stop reason: HOSPADM

## 2025-02-17 RX ORDER — CHLORHEXIDINE GLUCONATE ORAL RINSE 1.2 MG/ML
15 SOLUTION DENTAL AS NEEDED
Qty: 120 ML | Refills: 0 | Status: SHIPPED | OUTPATIENT
Start: 2025-02-17 | End: 2025-02-22 | Stop reason: HOSPADM

## 2025-02-17 ASSESSMENT — PATIENT HEALTH QUESTIONNAIRE - PHQ9
2. FEELING DOWN, DEPRESSED OR HOPELESS: SEVERAL DAYS
1. LITTLE INTEREST OR PLEASURE IN DOING THINGS: SEVERAL DAYS
SUM OF ALL RESPONSES TO PHQ9 QUESTIONS 1 AND 2: 2
10. IF YOU CHECKED OFF ANY PROBLEMS, HOW DIFFICULT HAVE THESE PROBLEMS MADE IT FOR YOU TO DO YOUR WORK, TAKE CARE OF THINGS AT HOME, OR GET ALONG WITH OTHER PEOPLE: NOT DIFFICULT AT ALL

## 2025-02-17 NOTE — PROGRESS NOTES
ENT New Patient Visit    Chief complaint: vocal cord cancer    History Of Present Illness  Padilla Campuzano is a 79 y.o. 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 She has had hoarseness since about February of this. She had a CT chest that showed a left pulmonary nodule, FNA was neg but then she had a PET that showed it was hypermetabolic, but also found a vocal cord lesion.  She saw Dr. Peralta and was taken for a biopsy showing invasive scca.    She has no pain or trouble breathing, even with mild exertion. No bleeding and no trouble eating/drinking.    She has a history anal cancer, treated in 2018, breast cancer, and various cutaneous malignancies.    She smoked < 1ppd for 30 years, quit 15 years ago.      2/17/25: here for follow up. Completed chemo/RT and has followed up with Dr. Vieira. Post treatment PET scan shows some uptake on the right VC. Scope with Dr. Vieira overall normal post treatment changes.     Past Medical History  She has a past medical history of Acute kidney injury (CMS-HCC) (11/12/2015), Anal cancer (Multi) (2017), Arthritis, Breast cancer (Multi) (2012), Cataract, Cerebral hemorrhage (Multi) (2012), CKD (chronic kidney disease), Colorectal cancer (Multi), Diverticulosis of intestine, part unspecified, without perforation or abscess without bleeding, GERD (gastroesophageal reflux disease), Hypertension, Irritable bowel syndrome, Laryngeal cancer (Multi) (2024), Lung nodule, Malignant neoplasm of colon, unspecified, Pancreatic insufficiency (HHS-HCC), and Stroke (Multi) (2012).    Surgical History  She has a past surgical history that includes Breast lumpectomy (12/2012); Hysterectomy (1984); Ileostomy closure (11/13/2015); Ileostomy (2014); BI US breast right cyst aspiration (Right, 12/31/2019); CT guided imaging for abscess drain (07/08/2015); Cholecystectomy (09/11/2014); Other surgical history (2012); and Bowel resection (2001).     Social History  She reports that she quit  smoking about 14 years ago. Her smoking use included cigarettes. She has been exposed to tobacco smoke. She has never used smokeless tobacco. She reports that she does not drink alcohol and does not use drugs.    Family History  Family History   Problem Relation Name Age of Onset    Colon cancer Mother      Liver cancer Mother      Kidney cancer Mother      Heart attack Father      Blood clot Father      Cancer Brother      Cancer Brother          Allergies  Paclitaxel, Hydrocodone-acetaminophen, Oxycodone hcl, Oxycodone-acetaminophen, Aspirin, and Sulfa (sulfonamide antibiotics)     Physical Exam:  CONSTITUTIONAL:  No acute distress  VOICE:  voice breathy, rough, pitch breaks  RESPIRATION:  Breathing comfortably, no stridor  CV:  No clubbing/cyanosis/edema in hands  EYES:  EOM intact, sclera normal  NEURO:  Alert and oriented times 3, Cranial nerves II-XII grossly intact and symmetric bilaterally  HEAD AND FACE:  Symmetric facial features, no masses or lesions, sinuses non-tender to palpation  SALIVARY GLANDS:  Parotid and submandibular glands normal bilaterally  EARS:  Normal external ears, external auditory canals, and TMs to otoscopy, normal hearing to whispered voice.  NOSE:  External nose midline, anterior rhinoscopy is normal with limited visualization to the anterior aspect of the interior turbinates, no bleeding or drainage, no lesions  ORAL CAVITY/OROPHARYNX/LIPS:  Normal mucous membranes, normal floor of mouth/tongue/OP, no masses or lesions; full upper and lower dentures  PHARYNGEAL WALLS:  No masses or lesions  NECK/LYMPH:  No LAD, no thyroid masses, trachea midline  SKIN:  Neck skin is without scar or injury  PSYCH:  Alert and oriented with appropriate mood and affect    Procedure Note: Flexible Nasolaryngoscopy  Verbal informed consent was obtained from the patient/patient's guardian. 4% lidocaine mixed with phenylephrine was prepared and dripped into the nose. It was placed in the left naris.  "Following an appropriate amount of time to allow for adequate anesthesia, a flexible fiberoptic nasolaryngoscope was placed into the patient's left naris. The nasal cavity, nasopharynx, oropharynx, hypopharynx, and all endolaryngeal structures were visualized and were normal except as listed below. Significant findings included:  -airway is patent  -post treatment edema w/o concern for discrete mass     Last Recorded Vitals  Height 1.575 m (5' 2\"), weight 57.2 kg (126 lb).    Relevant Results  BI mammo bilateral screening tomosynthesis    Result Date: 5/31/2024  Interpreted By:  Solomon Santiago, STUDY: BI MAMMO BILATERAL SCREENING TOMOSYNTHESIS;  5/29/2024 1:16 pm   INDICATION: Screening.   COMPARISON: Mammograms dated 11/16/2021 and 09/14/2020   ORDERING CLINICIAN: FREDERICK READ   ACCESSION NUMBER(S): [id number]   TECHNIQUE: Tomosynthesis and 2D mammograms were reviewed at 1 mm slice thickness.   FINDINGS: Density:  There are areas of scattered fibroglandular tissue.   There is focal asymmetry in the central right breast at the mid to posterior depth. No suspicious mass or asymmetry is evident in the left breast.  No suspicious calcification or architectural distortion is evident.       1. Asymmetry in the right breast that requires further assessment with spot compression CC and MLO mammograms with tomosynthesis imaging and potentially ultrasound. 2. No mammographic finding is evident in the left breast to suggest malignancy.   BI-RADS CATEGORY: BI-RADS Category:  0 Incomplete; Need Additional Imaging Evaluation and/or Prior Mammograms for Comparison. Recommendation:  Additional Imaging Diagnostic Mammogram. Recommended Date:  Immediate. Laterality:  Right.   For any future breast imaging appointments, please call [phone] ([phone]).   MACRO: None   Signed by: Solomon Santiago 5/31/2024 2:45 PM Dictation workstation:   [id number]    Assessment and Plan  79 y.o. female with at least a T3N0M0 left " glottic/subglottic scca  Reviewed PET- uptake left vocal cord extending  to the right with limited subglottic extension. No obvious thyroid cartilage erosion on limited CT scan with PET, no yamel disease, left lung hypermetabolic nodule, fna was neg. Now s/p chemo/RT, post treatment PET with small asymmetric uptake to the right VC. She is planned for thorascopic wedge resection of lung lesion this week.     -expect that these changes seen on PET are post treatment related and not a result of persistent disease  -we will plan for DL at the time of her upcoming thoracic procedure this week to confirm  -consent obtained    Shira Dye MD

## 2025-02-19 ENCOUNTER — APPOINTMENT (OUTPATIENT)
Dept: HEMATOLOGY/ONCOLOGY | Facility: HOSPITAL | Age: 80
End: 2025-02-19
Payer: MEDICARE

## 2025-02-19 ENCOUNTER — ANESTHESIA EVENT (OUTPATIENT)
Dept: OPERATING ROOM | Facility: HOSPITAL | Age: 80
End: 2025-02-19
Payer: MEDICARE

## 2025-02-19 RX ORDER — MAGNESIUM HYDROXIDE 400 MG/5ML
1 SUSPENSION, ORAL (FINAL DOSE FORM) ORAL DAILY
COMMUNITY

## 2025-02-19 NOTE — PROGRESS NOTES
Pharmacy Medication History Review    Padilla Campuzano is a 79 y.o. female who is planned to be admitted for Lung nodule. Pharmacy called the patient prior to their scheduled procedure and reviewed the patient's [id number] medications for accuracy.    Medications ADDED:  Potassium gluconate 99mg  Medications CHANGED:  Potassium chloride 20meq TO potassium gluconate 99mg  Medications REMOVED:   Potassium chloride 20meq    Please review updated prior to admission medication list and comments regarding how patient may be taking medications differently by going to Admission tab --> Admission Orders --> Admit Orders / Review prior to admission medications.     Preferred pharmacy, last doses of medications, and allergies to be confirmed with patient by nursing the day of procedure.     Sources used to complete the med history include:  [facility]  Pharmacy dispense history  Patient interview  Chart Review  Care Everywhere     Below are additional concerns with the patient's PTA list.  Patient states they are taking #1 tablet of potassium gluconate 99mg (otc) daily.     Lucretia Lancaster [facility]  Please reach out via Secure Chat for questions

## 2025-02-20 ENCOUNTER — APPOINTMENT (OUTPATIENT)
Dept: CARDIOLOGY | Facility: HOSPITAL | Age: 80
DRG: 164 | End: 2025-02-20
Payer: MEDICARE

## 2025-02-20 ENCOUNTER — HOSPITAL ENCOUNTER (INPATIENT)
Facility: HOSPITAL | Age: 80
LOS: 2 days | Discharge: HOME | End: 2025-02-22
Attending: THORACIC SURGERY (CARDIOTHORACIC VASCULAR SURGERY) | Admitting: THORACIC SURGERY (CARDIOTHORACIC VASCULAR SURGERY)
Payer: MEDICARE

## 2025-02-20 ENCOUNTER — APPOINTMENT (OUTPATIENT)
Dept: RADIOLOGY | Facility: HOSPITAL | Age: 80
DRG: 164 | End: 2025-02-20
Payer: MEDICARE

## 2025-02-20 ENCOUNTER — ANESTHESIA (OUTPATIENT)
Dept: OPERATING ROOM | Facility: HOSPITAL | Age: 80
End: 2025-02-20
Payer: MEDICARE

## 2025-02-20 DIAGNOSIS — C34.32 MALIGNANT NEOPLASM OF LOWER LOBE, LEFT BRONCHUS OR LUNG: ICD-10-CM

## 2025-02-20 DIAGNOSIS — R91.1 LUNG NODULE: Primary | ICD-10-CM

## 2025-02-20 LAB
ABO GROUP (TYPE) IN BLOOD: NORMAL
RH FACTOR (ANTIGEN D): NORMAL

## 2025-02-20 PROCEDURE — 31525 DX LARYNGOSCOPY EXCL NB: CPT | Performed by: OTOLARYNGOLOGY

## 2025-02-20 PROCEDURE — 88342 IMHCHEM/IMCYTCHM 1ST ANTB: CPT | Performed by: STUDENT IN AN ORGANIZED HEALTH CARE EDUCATION/TRAINING PROGRAM

## 2025-02-20 PROCEDURE — 1200000002 HC GENERAL ROOM WITH TELEMETRY DAILY

## 2025-02-20 PROCEDURE — 2500000005 HC RX 250 GENERAL PHARMACY W/O HCPCS: Performed by: PHYSICIAN ASSISTANT

## 2025-02-20 PROCEDURE — 2500000001 HC RX 250 WO HCPCS SELF ADMINISTERED DRUGS (ALT 637 FOR MEDICARE OP): Performed by: PHYSICIAN ASSISTANT

## 2025-02-20 PROCEDURE — 88331 PATH CONSLTJ SURG 1 BLK 1SPC: CPT | Mod: TC,SUR | Performed by: PATHOLOGY

## 2025-02-20 PROCEDURE — 88381 MICRODISSECTION MANUAL: CPT | Performed by: STUDENT IN AN ORGANIZED HEALTH CARE EDUCATION/TRAINING PROGRAM

## 2025-02-20 PROCEDURE — 0BJ08ZZ INSPECTION OF TRACHEOBRONCHIAL TREE, VIA NATURAL OR ARTIFICIAL OPENING ENDOSCOPIC: ICD-10-PCS | Performed by: THORACIC SURGERY (CARDIOTHORACIC VASCULAR SURGERY)

## 2025-02-20 PROCEDURE — 88341 IMHCHEM/IMCYTCHM EA ADD ANTB: CPT | Performed by: STUDENT IN AN ORGANIZED HEALTH CARE EDUCATION/TRAINING PROGRAM

## 2025-02-20 PROCEDURE — 32666 THORACOSCOPY W/WEDGE RESECT: CPT | Performed by: THORACIC SURGERY (CARDIOTHORACIC VASCULAR SURGERY)

## 2025-02-20 PROCEDURE — 88305 TISSUE EXAM BY PATHOLOGIST: CPT | Performed by: STUDENT IN AN ORGANIZED HEALTH CARE EDUCATION/TRAINING PROGRAM

## 2025-02-20 PROCEDURE — 7100000002 HC RECOVERY ROOM TIME - EACH INCREMENTAL 1 MINUTE: Performed by: THORACIC SURGERY (CARDIOTHORACIC VASCULAR SURGERY)

## 2025-02-20 PROCEDURE — 96372 THER/PROPH/DIAG INJ SC/IM: CPT

## 2025-02-20 PROCEDURE — 8E0W4CZ ROBOTIC ASSISTED PROCEDURE OF TRUNK REGION, PERCUTANEOUS ENDOSCOPIC APPROACH: ICD-10-PCS | Performed by: THORACIC SURGERY (CARDIOTHORACIC VASCULAR SURGERY)

## 2025-02-20 PROCEDURE — 2500000005 HC RX 250 GENERAL PHARMACY W/O HCPCS: Performed by: OTOLARYNGOLOGY

## 2025-02-20 PROCEDURE — 71045 X-RAY EXAM CHEST 1 VIEW: CPT

## 2025-02-20 PROCEDURE — G0452 MOLECULAR PATHOLOGY INTERPR: HCPCS | Performed by: THORACIC SURGERY (CARDIOTHORACIC VASCULAR SURGERY)

## 2025-02-20 PROCEDURE — 88360 TUMOR IMMUNOHISTOCHEM/MANUAL: CPT | Performed by: STUDENT IN AN ORGANIZED HEALTH CARE EDUCATION/TRAINING PROGRAM

## 2025-02-20 PROCEDURE — 3700000002 HC GENERAL ANESTHESIA TIME - EACH INCREMENTAL 1 MINUTE: Performed by: THORACIC SURGERY (CARDIOTHORACIC VASCULAR SURGERY)

## 2025-02-20 PROCEDURE — 3600000018 HC OR TIME - INITIAL BASE CHARGE - PROCEDURE LEVEL SIX: Performed by: THORACIC SURGERY (CARDIOTHORACIC VASCULAR SURGERY)

## 2025-02-20 PROCEDURE — 3700000001 HC GENERAL ANESTHESIA TIME - INITIAL BASE CHARGE: Performed by: THORACIC SURGERY (CARDIOTHORACIC VASCULAR SURGERY)

## 2025-02-20 PROCEDURE — 7100000001 HC RECOVERY ROOM TIME - INITIAL BASE CHARGE: Performed by: THORACIC SURGERY (CARDIOTHORACIC VASCULAR SURGERY)

## 2025-02-20 PROCEDURE — 81458 SO GSAP DNA CPY NMBR&MCRSTL: CPT | Performed by: THORACIC SURGERY (CARDIOTHORACIC VASCULAR SURGERY)

## 2025-02-20 PROCEDURE — 2500000004 HC RX 250 GENERAL PHARMACY W/ HCPCS (ALT 636 FOR OP/ED): Mod: JZ,TB

## 2025-02-20 PROCEDURE — 93005 ELECTROCARDIOGRAM TRACING: CPT

## 2025-02-20 PROCEDURE — 2500000005 HC RX 250 GENERAL PHARMACY W/O HCPCS

## 2025-02-20 PROCEDURE — 88307 TISSUE EXAM BY PATHOLOGIST: CPT | Performed by: STUDENT IN AN ORGANIZED HEALTH CARE EDUCATION/TRAINING PROGRAM

## 2025-02-20 PROCEDURE — 07T74ZZ RESECTION OF THORAX LYMPHATIC, PERCUTANEOUS ENDOSCOPIC APPROACH: ICD-10-PCS | Performed by: THORACIC SURGERY (CARDIOTHORACIC VASCULAR SURGERY)

## 2025-02-20 PROCEDURE — 2500000004 HC RX 250 GENERAL PHARMACY W/ HCPCS (ALT 636 FOR OP/ED): Performed by: STUDENT IN AN ORGANIZED HEALTH CARE EDUCATION/TRAINING PROGRAM

## 2025-02-20 PROCEDURE — 2500000004 HC RX 250 GENERAL PHARMACY W/ HCPCS (ALT 636 FOR OP/ED): Performed by: THORACIC SURGERY (CARDIOTHORACIC VASCULAR SURGERY)

## 2025-02-20 PROCEDURE — 0CJS8ZZ INSPECTION OF LARYNX, VIA NATURAL OR ARTIFICIAL OPENING ENDOSCOPIC: ICD-10-PCS | Performed by: THORACIC SURGERY (CARDIOTHORACIC VASCULAR SURGERY)

## 2025-02-20 PROCEDURE — 2500000004 HC RX 250 GENERAL PHARMACY W/ HCPCS (ALT 636 FOR OP/ED)

## 2025-02-20 PROCEDURE — 88313 SPECIAL STAINS GROUP 2: CPT | Performed by: STUDENT IN AN ORGANIZED HEALTH CARE EDUCATION/TRAINING PROGRAM

## 2025-02-20 PROCEDURE — 3600000017 HC OR TIME - EACH INCREMENTAL 1 MINUTE - PROCEDURE LEVEL SIX: Performed by: THORACIC SURGERY (CARDIOTHORACIC VASCULAR SURGERY)

## 2025-02-20 PROCEDURE — 2500000004 HC RX 250 GENERAL PHARMACY W/ HCPCS (ALT 636 FOR OP/ED): Performed by: PHYSICIAN ASSISTANT

## 2025-02-20 PROCEDURE — 71045 X-RAY EXAM CHEST 1 VIEW: CPT | Performed by: RADIOLOGY

## 2025-02-20 PROCEDURE — 2720000007 HC OR 272 NO HCPCS: Performed by: THORACIC SURGERY (CARDIOTHORACIC VASCULAR SURGERY)

## 2025-02-20 PROCEDURE — 0BBJ4ZZ EXCISION OF LEFT LOWER LUNG LOBE, PERCUTANEOUS ENDOSCOPIC APPROACH: ICD-10-PCS | Performed by: THORACIC SURGERY (CARDIOTHORACIC VASCULAR SURGERY)

## 2025-02-20 PROCEDURE — 88307 TISSUE EXAM BY PATHOLOGIST: CPT | Mod: TC,SUR | Performed by: THORACIC SURGERY (CARDIOTHORACIC VASCULAR SURGERY)

## 2025-02-20 PROCEDURE — 32674 THORACOSCOPY LYMPH NODE EXC: CPT | Performed by: THORACIC SURGERY (CARDIOTHORACIC VASCULAR SURGERY)

## 2025-02-20 RX ORDER — ONDANSETRON HYDROCHLORIDE 2 MG/ML
INJECTION, SOLUTION INTRAVENOUS AS NEEDED
Status: DISCONTINUED | OUTPATIENT
Start: 2025-02-20 | End: 2025-02-20

## 2025-02-20 RX ORDER — LOSARTAN POTASSIUM 25 MG/1
25 TABLET ORAL DAILY
Status: DISCONTINUED | OUTPATIENT
Start: 2025-02-20 | End: 2025-02-22 | Stop reason: HOSPADM

## 2025-02-20 RX ORDER — ACETAMINOPHEN 325 MG/1
975 TABLET ORAL EVERY 8 HOURS
Status: DISCONTINUED | OUTPATIENT
Start: 2025-02-20 | End: 2025-02-22 | Stop reason: HOSPADM

## 2025-02-20 RX ORDER — PANTOPRAZOLE SODIUM 40 MG/1
40 TABLET, DELAYED RELEASE ORAL
Status: DISCONTINUED | OUTPATIENT
Start: 2025-02-21 | End: 2025-02-22 | Stop reason: HOSPADM

## 2025-02-20 RX ORDER — HYDROMORPHONE HYDROCHLORIDE 0.2 MG/ML
0.2 INJECTION INTRAMUSCULAR; INTRAVENOUS; SUBCUTANEOUS EVERY 5 MIN PRN
Status: DISCONTINUED | OUTPATIENT
Start: 2025-02-20 | End: 2025-02-20 | Stop reason: HOSPADM

## 2025-02-20 RX ORDER — DEXAMETHASONE SODIUM PHOSPHATE 10 MG/ML
INJECTION INTRAMUSCULAR; INTRAVENOUS AS NEEDED
Status: DISCONTINUED | OUTPATIENT
Start: 2025-02-20 | End: 2025-02-20

## 2025-02-20 RX ORDER — ROCURONIUM BROMIDE 10 MG/ML
INJECTION, SOLUTION INTRAVENOUS AS NEEDED
Status: DISCONTINUED | OUTPATIENT
Start: 2025-02-20 | End: 2025-02-20

## 2025-02-20 RX ORDER — LIDOCAINE 560 MG/1
1 PATCH PERCUTANEOUS; TOPICAL; TRANSDERMAL DAILY
Status: DISCONTINUED | OUTPATIENT
Start: 2025-02-20 | End: 2025-02-22 | Stop reason: HOSPADM

## 2025-02-20 RX ORDER — GABAPENTIN 100 MG/1
200 CAPSULE ORAL 2 TIMES DAILY
Status: DISCONTINUED | OUTPATIENT
Start: 2025-02-20 | End: 2025-02-22

## 2025-02-20 RX ORDER — HEPARIN SODIUM 5000 [USP'U]/ML
INJECTION, SOLUTION INTRAVENOUS; SUBCUTANEOUS AS NEEDED
Status: DISCONTINUED | OUTPATIENT
Start: 2025-02-20 | End: 2025-02-20

## 2025-02-20 RX ORDER — TRAMADOL HYDROCHLORIDE 50 MG/1
50 TABLET ORAL EVERY 6 HOURS PRN
Status: DISCONTINUED | OUTPATIENT
Start: 2025-02-20 | End: 2025-02-22

## 2025-02-20 RX ORDER — CEFAZOLIN 1 G/1
INJECTION, POWDER, FOR SOLUTION INTRAVENOUS AS NEEDED
Status: DISCONTINUED | OUTPATIENT
Start: 2025-02-20 | End: 2025-02-20

## 2025-02-20 RX ORDER — PROPOFOL 10 MG/ML
INJECTION, EMULSION INTRAVENOUS AS NEEDED
Status: DISCONTINUED | OUTPATIENT
Start: 2025-02-20 | End: 2025-02-20

## 2025-02-20 RX ORDER — CEFAZOLIN SODIUM 1 G/50ML
1 SOLUTION INTRAVENOUS EVERY 8 HOURS
Status: COMPLETED | OUTPATIENT
Start: 2025-02-20 | End: 2025-02-21

## 2025-02-20 RX ORDER — ONDANSETRON 4 MG/1
4 TABLET, ORALLY DISINTEGRATING ORAL EVERY 8 HOURS PRN
Status: DISCONTINUED | OUTPATIENT
Start: 2025-02-20 | End: 2025-02-22 | Stop reason: HOSPADM

## 2025-02-20 RX ORDER — PHENYLEPHRINE HCL IN 0.9% NACL 1 MG/10 ML
SYRINGE (ML) INTRAVENOUS AS NEEDED
Status: DISCONTINUED | OUTPATIENT
Start: 2025-02-20 | End: 2025-02-20

## 2025-02-20 RX ORDER — ACETAMINOPHEN 10 MG/ML
INJECTION, SOLUTION INTRAVENOUS AS NEEDED
Status: DISCONTINUED | OUTPATIENT
Start: 2025-02-20 | End: 2025-02-20

## 2025-02-20 RX ORDER — LOPERAMIDE HYDROCHLORIDE 2 MG/1
2 CAPSULE ORAL 3 TIMES DAILY PRN
Status: DISCONTINUED | OUTPATIENT
Start: 2025-02-20 | End: 2025-02-22 | Stop reason: HOSPADM

## 2025-02-20 RX ORDER — HEPARIN SODIUM 5000 [USP'U]/ML
5000 INJECTION, SOLUTION INTRAVENOUS; SUBCUTANEOUS EVERY 8 HOURS
Status: DISCONTINUED | OUTPATIENT
Start: 2025-02-20 | End: 2025-02-22 | Stop reason: HOSPADM

## 2025-02-20 RX ORDER — KETAMINE HYDROCHLORIDE 10 MG/ML
INJECTION, SOLUTION INTRAMUSCULAR; INTRAVENOUS CONTINUOUS PRN
Status: DISCONTINUED | OUTPATIENT
Start: 2025-02-20 | End: 2025-02-20

## 2025-02-20 RX ORDER — NALOXONE HYDROCHLORIDE 0.4 MG/ML
0.2 INJECTION, SOLUTION INTRAMUSCULAR; INTRAVENOUS; SUBCUTANEOUS EVERY 5 MIN PRN
Status: DISCONTINUED | OUTPATIENT
Start: 2025-02-20 | End: 2025-02-22 | Stop reason: HOSPADM

## 2025-02-20 RX ORDER — SODIUM CHLORIDE, SODIUM LACTATE, POTASSIUM CHLORIDE, CALCIUM CHLORIDE 600; 310; 30; 20 MG/100ML; MG/100ML; MG/100ML; MG/100ML
50 INJECTION, SOLUTION INTRAVENOUS CONTINUOUS
Status: DISCONTINUED | OUTPATIENT
Start: 2025-02-20 | End: 2025-02-20 | Stop reason: HOSPADM

## 2025-02-20 RX ORDER — CYCLOBENZAPRINE HCL 10 MG
5 TABLET ORAL 3 TIMES DAILY
Status: DISCONTINUED | OUTPATIENT
Start: 2025-02-20 | End: 2025-02-22 | Stop reason: HOSPADM

## 2025-02-20 RX ORDER — METHADONE IN SOD CHLOR,ISO-OSM 10 MG/ML
SYRINGE (ML) INTRAVENOUS AS NEEDED
Status: DISCONTINUED | OUTPATIENT
Start: 2025-02-20 | End: 2025-02-20

## 2025-02-20 RX ORDER — FENTANYL CITRATE 50 UG/ML
INJECTION, SOLUTION INTRAMUSCULAR; INTRAVENOUS AS NEEDED
Status: DISCONTINUED | OUTPATIENT
Start: 2025-02-20 | End: 2025-02-20

## 2025-02-20 RX ORDER — ONDANSETRON HYDROCHLORIDE 2 MG/ML
4 INJECTION, SOLUTION INTRAVENOUS ONCE AS NEEDED
Status: DISCONTINUED | OUTPATIENT
Start: 2025-02-20 | End: 2025-02-20 | Stop reason: HOSPADM

## 2025-02-20 RX ORDER — SODIUM CHLORIDE, SODIUM LACTATE, POTASSIUM CHLORIDE, CALCIUM CHLORIDE 600; 310; 30; 20 MG/100ML; MG/100ML; MG/100ML; MG/100ML
100 INJECTION, SOLUTION INTRAVENOUS CONTINUOUS
Status: CANCELLED | OUTPATIENT
Start: 2025-02-20 | End: 2025-02-21

## 2025-02-20 RX ORDER — HYDROMORPHONE HYDROCHLORIDE 0.2 MG/ML
0.2 INJECTION INTRAMUSCULAR; INTRAVENOUS; SUBCUTANEOUS EVERY 4 HOURS PRN
Status: DISCONTINUED | OUTPATIENT
Start: 2025-02-20 | End: 2025-02-22

## 2025-02-20 RX ORDER — LABETALOL HYDROCHLORIDE 5 MG/ML
10 INJECTION, SOLUTION INTRAVENOUS ONCE
Status: COMPLETED | OUTPATIENT
Start: 2025-02-20 | End: 2025-02-20

## 2025-02-20 RX ORDER — LIDOCAINE HCL/PF 100 MG/5ML
SYRINGE (ML) INTRAVENOUS AS NEEDED
Status: DISCONTINUED | OUTPATIENT
Start: 2025-02-20 | End: 2025-02-20

## 2025-02-20 RX ORDER — HYDROMORPHONE HYDROCHLORIDE 0.2 MG/ML
0.1 INJECTION INTRAMUSCULAR; INTRAVENOUS; SUBCUTANEOUS EVERY 5 MIN PRN
Status: DISCONTINUED | OUTPATIENT
Start: 2025-02-20 | End: 2025-02-20 | Stop reason: HOSPADM

## 2025-02-20 RX ORDER — LIDOCAINE HYDROCHLORIDE 10 MG/ML
0.1 INJECTION, SOLUTION INFILTRATION; PERINEURAL ONCE
Status: DISCONTINUED | OUTPATIENT
Start: 2025-02-20 | End: 2025-02-20 | Stop reason: HOSPADM

## 2025-02-20 RX ORDER — BUPIVACAINE HCL/EPINEPHRINE 0.25-.0005
VIAL (ML) INJECTION AS NEEDED
Status: DISCONTINUED | OUTPATIENT
Start: 2025-02-20 | End: 2025-02-20 | Stop reason: HOSPADM

## 2025-02-20 RX ORDER — ONDANSETRON HYDROCHLORIDE 2 MG/ML
4 INJECTION, SOLUTION INTRAVENOUS EVERY 8 HOURS PRN
Status: DISCONTINUED | OUTPATIENT
Start: 2025-02-20 | End: 2025-02-22 | Stop reason: HOSPADM

## 2025-02-20 RX ORDER — LIDOCAINE HYDROCHLORIDE 10 MG/ML
0.1 INJECTION, SOLUTION INFILTRATION; PERINEURAL ONCE
Status: CANCELLED | OUTPATIENT
Start: 2025-02-20 | End: 2025-02-20

## 2025-02-20 RX ADMIN — HYDROMORPHONE HYDROCHLORIDE 0.2 MG: 0.2 INJECTION, SOLUTION INTRAMUSCULAR; INTRAVENOUS; SUBCUTANEOUS at 14:05

## 2025-02-20 RX ADMIN — HEPARIN SODIUM 5000 UNITS: 5000 INJECTION, SOLUTION INTRAVENOUS; SUBCUTANEOUS at 20:05

## 2025-02-20 RX ADMIN — ACETAMINOPHEN 975 MG: 325 TABLET ORAL at 20:05

## 2025-02-20 RX ADMIN — HEPARIN SODIUM 5000 UNITS: 5000 INJECTION, SOLUTION INTRAVENOUS; SUBCUTANEOUS at 11:55

## 2025-02-20 RX ADMIN — CEFAZOLIN SODIUM 1 G: 1 INJECTION, SOLUTION INTRAVENOUS at 20:05

## 2025-02-20 RX ADMIN — CYCLOBENZAPRINE 5 MG: 10 TABLET, FILM COATED ORAL at 20:05

## 2025-02-20 RX ADMIN — LIDOCAINE HYDROCHLORIDE 60 MG: 20 INJECTION INTRAVENOUS at 11:50

## 2025-02-20 RX ADMIN — ROCURONIUM BROMIDE 50 MG: 10 INJECTION INTRAVENOUS at 11:50

## 2025-02-20 RX ADMIN — FENTANYL CITRATE 100 MCG: 50 INJECTION, SOLUTION INTRAMUSCULAR; INTRAVENOUS at 11:50

## 2025-02-20 RX ADMIN — LABETALOL HYDROCHLORIDE 10 MG: 5 INJECTION INTRAVENOUS at 14:12

## 2025-02-20 RX ADMIN — ROCURONIUM BROMIDE 10 MG: 10 INJECTION INTRAVENOUS at 12:47

## 2025-02-20 RX ADMIN — Medication 80 MCG: at 12:50

## 2025-02-20 RX ADMIN — ROCURONIUM BROMIDE 20 MG: 10 INJECTION INTRAVENOUS at 12:38

## 2025-02-20 RX ADMIN — PROPOFOL 150 MG: 10 INJECTION, EMULSION INTRAVENOUS at 11:50

## 2025-02-20 RX ADMIN — ONDANSETRON 4 MG: 2 INJECTION, SOLUTION INTRAMUSCULAR; INTRAVENOUS at 13:27

## 2025-02-20 RX ADMIN — Medication 160 MCG: at 12:58

## 2025-02-20 RX ADMIN — LIDOCAINE 4% 1 PATCH: 40 PATCH TOPICAL at 16:30

## 2025-02-20 RX ADMIN — GABAPENTIN 200 MG: 100 CAPSULE ORAL at 20:05

## 2025-02-20 RX ADMIN — Medication 120 MCG: at 12:35

## 2025-02-20 RX ADMIN — Medication 160 MCG: at 12:32

## 2025-02-20 RX ADMIN — ONDANSETRON 4 MG: 2 INJECTION INTRAMUSCULAR; INTRAVENOUS at 16:24

## 2025-02-20 RX ADMIN — Medication 10 MG: at 12:21

## 2025-02-20 RX ADMIN — Medication 3 L/MIN: at 14:00

## 2025-02-20 RX ADMIN — DEXAMETHASONE SODIUM PHOSPHATE 10 MG: 10 INJECTION INTRAMUSCULAR; INTRAVENOUS at 12:18

## 2025-02-20 RX ADMIN — CEFAZOLIN 2 G: 1 INJECTION, POWDER, FOR SOLUTION INTRAMUSCULAR; INTRAVENOUS at 11:56

## 2025-02-20 RX ADMIN — SUGAMMADEX 200 MG: 100 INJECTION, SOLUTION INTRAVENOUS at 13:35

## 2025-02-20 RX ADMIN — SODIUM CHLORIDE, SODIUM LACTATE, POTASSIUM CHLORIDE, AND CALCIUM CHLORIDE: 600; 310; 30; 20 INJECTION, SOLUTION INTRAVENOUS at 11:50

## 2025-02-20 RX ADMIN — ACETAMINOPHEN 1000 MG: 10 INJECTION, SOLUTION INTRAVENOUS at 13:19

## 2025-02-20 ASSESSMENT — PAIN SCALES - GENERAL
PAIN_LEVEL: 3
PAINLEVEL_OUTOF10: 0 - NO PAIN
PAINLEVEL_OUTOF10: 6
PAINLEVEL_OUTOF10: 5 - MODERATE PAIN
PAINLEVEL_OUTOF10: 7
PAINLEVEL_OUTOF10: 1
PAINLEVEL_OUTOF10: 0 - NO PAIN

## 2025-02-20 ASSESSMENT — PAIN DESCRIPTION - ORIENTATION: ORIENTATION: LEFT

## 2025-02-20 ASSESSMENT — PAIN - FUNCTIONAL ASSESSMENT
PAIN_FUNCTIONAL_ASSESSMENT: 0-10
PAIN_FUNCTIONAL_ASSESSMENT: UNABLE TO SELF-REPORT
PAIN_FUNCTIONAL_ASSESSMENT: 0-10
PAIN_FUNCTIONAL_ASSESSMENT: UNABLE TO SELF-REPORT
PAIN_FUNCTIONAL_ASSESSMENT: UNABLE TO SELF-REPORT
PAIN_FUNCTIONAL_ASSESSMENT: 0-10

## 2025-02-20 ASSESSMENT — PAIN DESCRIPTION - LOCATION: LOCATION: CHEST

## 2025-02-20 ASSESSMENT — PAIN DESCRIPTION - DESCRIPTORS: DESCRIPTORS: DISCOMFORT

## 2025-02-20 NOTE — BRIEF OP NOTE
Date: 2025  OR Location: [facility] OR    Name: Padilla Campuzano, : 1945, Age: 79 y.o., MRN: [id number], Sex: female    Diagnosis  Pre-op Diagnosis      * Lung nodule [R91.1] Post-op Diagnosis     * Lung nodule [R91.1]     Procedures  WEDGE RESECTION, LUNG, ROBOT-ASSISTED  01568 - CT THORACOSCOPY W/THERA WEDGE RESEXN INITIAL UNILAT    WEDGE RESECTION, LUNG, ROBOT-ASSISTED   - CT SURGICAL TECHNIQUES REQUIRING USE OF ROBOTIC SURGICAL SYSTEM (LIST SEPARATELY IN ADDITION TO CODE FOR PRIMARY PROCEDURE)    LARYNGOSCOPY, DIRECT  87075 - CT LARYNGOSCOPY W/WO TRACHEOSCOPY DX EXCEPT       Surgeons   Panel 1:     * Abad Linda - Primary  Panel 2:     * Ferny Rhoades - Primary    Resident/Fellow/Other Assistant:  Surgeons and Role:  Panel 1:     * Mairja Adame PA-C - Assisting     * Dejah Morales MD - Resident - Assisting    Staff:   Circulator: Edie  Circulator: Mela  Relief Scrub: Lucretia  Scrub Person: Annie Wolf Circulator: Lucretia    Anesthesia Staff: Anesthesiologist: Corby Longoria MD  Anesthesia Resident: Lama Alex MD    Procedure Summary  Anesthesia: General  ASA: III  Estimated Blood Loss: 15mL  Intra-op Medications:   Administrations occurring from 1040 to 1345 on 25:   Medication Name Total Dose   BUPivacaine-EPINEPHrine (Marcaine w/EPI) 0.25 %-1:200,000 injection 30 mL   surgical lubricant gel 1 Application   acetaminophen (Ofirmev) injection 1,000 mg   ceFAZolin (Ancef) 1 g 2 g   dexAMETHasone (Decadron) 10 mg/mL 10 mg   fentaNYL (Sublimaze) injection 50 mcg/mL 100 mcg   heparin injection 5,000 units/mL 5,000 Units   LR bolus Cannot be calculated   lidocaine (cardiac) injection 2% prefilled syringe 60 mg   methadone (Dolophine) injection 10 mg   ondansetron (Zofran) 2 mg/mL injection 4 mg   phenylephrine 100 mcg/mL syringe 10 mL (prefilled) 520 mcg   propofol (Diprivan) injection 10 mg/mL 150 mg   rocuronium (ZeMuron) 50 mg/5 mL injection 80 mg               Anesthesia Record               Intraprocedure I/O Totals       None           Specimen:   ID Type Source Tests Collected by Time   1 : left lower lobe, Diagnosis and Margins Tissue LUNG LOBECTOMY/SEGMENTECTOMY LEFT (SPECIFY SITE) SURGICAL PATHOLOGY EXAM Abad Linda MD 2/20/2025 1247   2 : level 10 lymph node Tissue LYMPH NODE PULMONARY (SPECIFY SITE) SURGICAL PATHOLOGY EXAM Abad Linda MD 2/20/2025 1255   3 : level 7 lymph node Tissue LYMPH NODE PULMONARY (SPECIFY SITE) SURGICAL PATHOLOGY EXAM Abad Linda MD 2/20/2025 1256   4 : level 6 lymph node Tissue LYMPH NODE PULMONARY (SPECIFY SITE) SURGICAL PATHOLOGY EXAM Abad Linda MD 2/20/2025 1258   5 : level 5 lymph node Tissue LYMPH NODE PULMONARY (SPECIFY SITE) SURGICAL PATHOLOGY EXAM Abad Linda MD 2/20/2025 1259        Findings: Cavity lesions on the anterior aspect of the LLL; excised with a wedge resection, and confirmed intra-op to have 6mm negative margins with a diagnosis of NSCLC. MLND dissection carried out with specimens sent from stations: 5, 6, 7, 10; no LNs seen on stations 8 and 9    Complications:  None; patient tolerated the procedure well.     Disposition: PACU - hemodynamically stable.  Condition: stable    Specimens Collected:   ID Type Source Tests Collected by Time   1 : left lower lobe, Diagnosis and Margins Tissue LUNG LOBECTOMY/SEGMENTECTOMY LEFT (SPECIFY SITE) SURGICAL PATHOLOGY EXAM Abad Linda MD 2/20/2025 1247   2 : level 10 lymph node Tissue LYMPH NODE PULMONARY (SPECIFY SITE) SURGICAL PATHOLOGY EXAM Abad Linda MD 2/20/2025 1255   3 : level 7 lymph node Tissue LYMPH NODE PULMONARY (SPECIFY SITE) SURGICAL PATHOLOGY EXAM Abad Linda MD 2/20/2025 1256   4 : level 6 lymph node Tissue LYMPH NODE PULMONARY (SPECIFY SITE) SURGICAL PATHOLOGY EXAM Abad Linda MD 2/20/2025 1258   5 : level 5 lymph node Tissue LYMPH NODE PULMONARY (SPECIFY  SITE) SURGICAL PATHOLOGY EXAM Abad Linda MD 2/20/2025 0513     Attending Attestation: I was present and scrubbed for the entire procedure.    Abad Linda  Phone Number: [phone]

## 2025-02-20 NOTE — ANESTHESIA PREPROCEDURE EVALUATION
Patient: Padilla Campuzano    Procedure Information       Date/Time: 02/20/25 1040    Procedures:       WEDGE RESECTION, LUNG, ROBOT-ASSISTED (Left: Chest) - Robotic assisted left lower lobe wedge resection with mediastinal lymphadenectomy; ROBOT approved by [facility]. **DV5 ROBOT WILL BE USED*      LARYNGOSCOPY, DIRECT - Direct laryngoscopy with biopsy    Location: [facility] OR 14 / [facility] OR    Surgeons: Abad Linda MD; Ferny Rhoades MD            Relevant Problems   Anesthesia (within normal limits)      Cardiac   (+) Breast pain in female   (+) Mastodynia      Pulmonary   (+) Malignant neoplasm of lower lobe, left bronchus or lung      Neuro   (+) Cerebrovascular accident (CVA) (Multi)      GI   (+) Chronic diarrhea   (+) GERD (gastroesophageal reflux disease)   (+) Malignant neoplasm of colon      /Renal   (+) Acute UTI   (+) Acute kidney injury (nontraumatic) (CMS-HCC)      Liver   (+) Malignant neoplasm of colon      Musculoskeletal   (+) Chronic midline low back pain without sciatica      ID   (+) Acute UTI      Skin   (+) Localized macular rash      GYN   (+) Breast cancer (Multi)       Clinical information reviewed:   Tobacco  Allergies  Meds   Med Hx  Surg Hx   Fam Hx  Soc Hx        NPO Detail:  NPO/Void Status  Carbohydrate Drink Given Prior to Surgery? : N  Date of Last Liquid: 02/20/25  Time of Last Liquid: 0800  Date of Last Solid: 02/20/25  Time of Last Solid: 1700  Last Intake Type: Clear fluids  Time of Last Void: 0945         Physical Exam    Airway  Mallampati: II     Cardiovascular - normal exam     Dental   (+) upper dentures, lower dentures     Pulmonary - normal exam     Abdominal            Anesthesia Plan    History of general anesthesia?: yes  History of complications of general anesthesia?: no    ASA 3     general     Anesthetic plan and risks discussed with patient.  Use of blood products discussed with patient who consented to blood products.    Plan discussed  with resident and attending.

## 2025-02-20 NOTE — OP NOTE
OPERATIVE NOTE     Date:  2025 OR Location: [facility] OR    Name: Padilla Campuzano : 1945, Age: 79 y.o., MRN: [id number], Sex: female      Surgeons   Abad Linda MD    Resident/Fellow/Other Assistant:  None were associated with this case.    Anesthesia: General  ASA: III  Anesthesia Staff: Anesthesiologist: Corby Longoria MD  Anesthesia Resident: Lama Alex MD  Staff: Circulator: Edie Gerber RN; Mela Sepulveda RN  Relief Circulator: Lucretia Slade RN  Relief Scrub: Lucretia Slade RN  Scrub Person: Annie Bruner RN      Preoperative Diagnosis:  Vocal cord cancer     Postoperative Diagnosis:  Same    Procedure:  Direct laryngoscopy     Findings:  Left vocal cord with no normal cord remaining, just a scar band.  Right side appears normal with no suspicious lesions    A bougie was placed and anesthesia was able to intubate over it.    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 On follow up Pet there is asymmetric activity on the PET on the right side which is likely due to loss of tissue on the left. She is undergoing a lung resection today so I was planning to just perform a laryngoscopy for  better exam.    The patient was met in the preoperative area and informed consent was confirmed after discussing the risks, benefits, and alternatives of the procedure. The patient was then brought to the operating room and transferred to the table. A preoperative huddle was performed, confirming the correct patient, procedure, laterality, and allergies. The patient was induced under general anesthesia and turned towards the surgical team.     I used the anterior commissure scope to examine the vocal cord and larynx. The findings are described as above. No suspicious lesions were seen. I placed a bougie and turned the patient over the  anesthesia for intubation.    I was present for the critical portions of the procedure.

## 2025-02-20 NOTE — ANESTHESIA PROCEDURE NOTES
Peripheral IV  Date/Time: 2/20/2025 11:48 AM      Placement  Needle size: 20 G  Laterality: right  Location: hand  Local anesthetic: none  Site prep: chlorhexidine  Technique: anatomical landmarks  Attempts: 1

## 2025-02-20 NOTE — INTERVAL H&P NOTE
H&P reviewed. The patient was examined and there are no changes to the H&P.    Dejah Morales MD, MSc  Thoracic Surgery  Pager [phone]

## 2025-02-20 NOTE — OP NOTE
WEDGE RESECTION, LUNG, ROBOT-ASSISTED (L) Operative Note     Date: 2025  OR Location: [facility] OR    Name: Padilla Campuzano, : 1945, Age: 79 y.o., MRN: [id number], Sex: female    Diagnosis  Pre-op Diagnosis      * Lung nodule [R91.1] Post-op Diagnosis     * Lung nodule [R91.1]     Procedures  WEDGE RESECTION, LUNG, ROBOT-ASSISTED  19457 - GA THORACOSCOPY W/THERA WEDGE RESEXN INITIAL UNILAT    WEDGE RESECTION, LUNG, ROBOT-ASSISTED   - GA SURGICAL TECHNIQUES REQUIRING USE OF ROBOTIC SURGICAL SYSTEM (LIST SEPARATELY IN ADDITION TO CODE FOR PRIMARY PROCEDURE)    LARYNGOSCOPY, DIRECT  78514 - GA LARYNGOSCOPY W/WO TRACHEOSCOPY DX EXCEPT     Flexible bronchoscopy  Robotic assisted left lower lobe wedge resection  Robotic assisted mediastinal lymphadenectomy  Multilevel rib blocks    Surgeons   Panel 1:     * Abad Linda - Primary  Panel 2:     * Ferny Rhoades - Primary    Resident/Fellow/Other Assistant:  Surgeons and Role:  Panel 1:     * Marija Adame PA-C - Assisting     * Dejah Morales MD - Resident - Assisting    Staff:   Circulator: Edie  Circulator: Mela  Relief Scrub: Lucretia  Scrub Person: Annie  Relief Circulator: Lucretia    Anesthesia Staff: Anesthesiologist: Croby Longoria MD  Anesthesia Resident: Lama Alex MD    Procedure Summary  Anesthesia: General  ASA: III  Estimated Blood Loss: 15 mL  Intra-op Medications:   Administrations occurring from 1040 to 1345 on 25:   Medication Name Total Dose   BUPivacaine-EPINEPHrine (Marcaine w/EPI) 0.25 %-1:200,000 injection 30 mL   surgical lubricant gel 1 Application   acetaminophen (Ofirmev) injection 1,000 mg   ceFAZolin (Ancef) 1 g 2 g   dexAMETHasone (Decadron) 10 mg/mL 10 mg   fentaNYL (Sublimaze) injection 50 mcg/mL 100 mcg   heparin injection 5,000 units/mL 5,000 Units   LR bolus Cannot be calculated   lidocaine (cardiac) injection 2% prefilled syringe 60 mg   methadone (Dolophine) injection 10 mg    ondansetron (Zofran) 2 mg/mL injection 4 mg   phenylephrine 100 mcg/mL syringe 10 mL (prefilled) 520 mcg   propofol (Diprivan) injection 10 mg/mL 150 mg   rocuronium (ZeMuron) 50 mg/5 mL injection 80 mg   sugammadex (Bridion) 200 mg/2 mL injection 200 mg              Anesthesia Record               Intraprocedure I/O Totals          Intake    Ketamine 0.00 mL    The total shown is the total volume documented since Anesthesia Start was filed.    LR bolus 1000.00 mL    Total Intake 1000 mL          Specimen:   ID Type Source Tests Collected by Time   1 : left lower lobe, Diagnosis and Margins Tissue LUNG LOBECTOMY/SEGMENTECTOMY LEFT (SPECIFY SITE) SURGICAL PATHOLOGY EXAM Abad Linda MD 2/20/2025 1247   2 : level 10 lymph node Tissue LYMPH NODE PULMONARY (SPECIFY SITE) SURGICAL PATHOLOGY EXAM Abad Linda MD 2/20/2025 1255   3 : level 7 lymph node Tissue LYMPH NODE PULMONARY (SPECIFY SITE) SURGICAL PATHOLOGY EXAM Abad Linda MD 2/20/2025 1256   4 : level 6 lymph node Tissue LYMPH NODE PULMONARY (SPECIFY SITE) SURGICAL PATHOLOGY EXAM Abad Linda MD 2/20/2025 1258   5 : level 5 lymph node Tissue LYMPH NODE PULMONARY (SPECIFY SITE) SURGICAL PATHOLOGY EXAM Abad Linda MD 2/20/2025 1259                 Drains and/or Catheters:   Chest Tube 1 Left Pleural 28 Fr (Active)   Function To water seal 02/20/25 1500   Chest Tube Air Leak No 02/20/25 1344   Drainage Description Serosanguineous 02/20/25 1344   Dressing Status Clean;Dry;Occlusive 02/20/25 1500   Site Assessment Clean;Dry;Intact 02/20/25 1344   Output (mL) 60 mL 02/20/25 1500       Tourniquet Times:         Implants:     Findings: Section revealing non-small cell lung cancer with negative margins.    Indications: Padilla Campuzano is an 79 y.o. female who is having surgery for Lung nodule [R91.1].  PET avid lung nodule here for resection.    The patient was seen in the preoperative area. The risks, benefits,  complications, treatment options, non-operative alternatives, expected recovery and outcomes were discussed with the patient. The possibilities of reaction to medication, pulmonary aspiration, injury to surrounding structures, bleeding, recurrent infection, the need for additional procedures, failure to diagnose a condition, and creating a complication requiring transfusion or operation were discussed with the patient. The patient concurred with the proposed plan, giving informed consent.  The site of surgery was properly noted/marked if necessary per policy. The patient has been actively warmed in preoperative area. Preoperative antibiotics have been ordered and given within 1 hours of incision. Venous thrombosis prophylaxis have been ordered including bilateral sequential compression devices and chemical prophylaxis    Procedure Details: After identifying the patient in the preoperative area, the patient was brought to the room.  Patient placed in the supine position.  Timeout performed.  General anesthesia induced with a single-lumen tube and bronchial blocker.  Flexible bronchoscopy performed to assess position of endotracheal tube and also to assess the airway.  No endobronchial lesions were identified.  Patient placed in the right lateral decubitus with the left side up.  All bony prominences were padded.  Chest was prepped and draped in surgical fashion.  A 1 cm incision was performed in the eighth intercostal space with midaxillary line.  After gaining access to the chest cavity, multilevel rib blocks with Marcaine were done under direct visualization.  1 to 2 cc of quarter percent Marcaine injected into each interspace from spaces 3-8.  Additional ports were placed in the same interspace 1 anterior and 2 posterior.  An assist port was placed between arms 2 and 3.  Robot was docked. I proceeded to scrub out and moved to the console.  Nodule identified in the left lower lobe and using a combination of black  loads of the robotic stapler proceeded to perform a wedge resection.  Specimen was removed in an Endo Catch bag and sent for frozen section.  Attention was turned to the inferior pulmonary ligament which was dissected no Station 9 or 8 lymph nodes were found in this area.  The dissection was taken posteriorly to the level of the hilum and also the subcarinal space lymph nodes were harvested and sent for permanent section.  Station 5 and 6 lymph nodes were harvested and sent for permanent section.  Frozen section revealed non-small cell lung cancer with negative margins.  Hemostasis was achieved at all times.  28 [place] chest tube placed robot undocked.  Lung insufflated under direct visualization.  All wounds were closed in layers.  Patient tolerated procedure well and transferred to the postanesthesia care unit in adequate conditions.  All counts were correct.  No qualified resident or fellow to bedside assist in this robotic case so Radha Adame acted as a first assistant during this robotic case helping with port placement, robotic docking, robotic instrument exchange, and wound closure.  Complications:  None; patient tolerated the procedure well.    Disposition: PACU - hemodynamically stable.  Condition: stable         Attending Attestation: I was present and scrubbed for the entire procedure.    Abad Linda  Phone Number: [phone]

## 2025-02-20 NOTE — ANESTHESIA POSTPROCEDURE EVALUATION
Patient: Padilla Campuzano    Procedure Summary       Date: 02/20/25 Room / Location: [facility] OR 14 / [facility] OR    Anesthesia Start: 1135 Anesthesia Stop: 1348    Procedures:       WEDGE RESECTION, LUNG, ROBOT-ASSISTED (Left: Chest)      LARYNGOSCOPY, DIRECT (Mouth) Diagnosis:       Lung nodule      (Lung nodule [R91.1])    Surgeons: Abad Linda MD; Ferny Rhoades MD Responsible Provider: Corby Longoria MD    Anesthesia Type: general ASA Status: 3            Anesthesia Type: general    Vitals Value Taken Time   /72 02/20/25 1348   Temp 36 02/20/25 1348   Pulse 82 02/20/25 1348   Resp 14 02/20/25 1348   SpO2 95 02/20/25 1348       Anesthesia Post Evaluation    Patient location during evaluation: PACU  Patient participation: complete - patient participated  Level of consciousness: sleepy but conscious  Pain score: 3  Pain management: adequate  Airway patency: patent  Cardiovascular status: acceptable  Respiratory status: acceptable  Hydration status: acceptable  Postoperative Nausea and Vomiting: none        There were no known notable events for this encounter.

## 2025-02-20 NOTE — ANESTHESIA PROCEDURE NOTES
Airway  Date/Time: 2/20/2025 11:54 AM  Urgency: elective    Airway not difficult    Staffing  Performed: resident   Authorized by: Corby Longoria MD    Performed by: Lama Alex MD  Patient location during procedure: OR    Indications and Patient Condition  Indications for airway management: anesthesia  Spontaneous ventilation: present  Sedation level: deep  Preoxygenated: yes  Patient position: sniffing  Mask difficulty assessment: 1 - vent by mask  Planned trial extubation    Final Airway Details  Final airway type: endotracheal airway      Successful airway: ETT  Cuffed: yes   Successful intubation technique: direct laryngoscopy  Facilitating devices/methods: bronchial blockers and Bougie  Endotracheal tube insertion site: oral  ETT size (mm): 7.5  Cormack-Lehane Classification: grade IV - neither glottis nor epiglottis seen  Placement verified by: chest auscultation, bronchoscopy and capnometry   Measured from: teeth  ETT to teeth (cm): 21  Number of attempts at approach: 1    Additional Comments  ENT assisted with direct laryngoscopy and inserted a bougie. A 7.5 ETT was advanced successfully and then a 9 fr bronchial blocker was used to isolate left lung for OLV.    Grade I DL by staff

## 2025-02-20 NOTE — ANESTHESIA PROCEDURE NOTES
Peripheral IV  Date/Time: 2/20/2025 12:18 PM      Placement  Needle size: 18 G  Laterality: left  Location: antecubital  Local anesthetic: none  Site prep: chlorhexidine  Technique: anatomical landmarks  Attempts: 1

## 2025-02-21 ENCOUNTER — APPOINTMENT (OUTPATIENT)
Dept: RADIOLOGY | Facility: HOSPITAL | Age: 80
End: 2025-02-21
Payer: MEDICARE

## 2025-02-21 ENCOUNTER — APPOINTMENT (OUTPATIENT)
Dept: RADIOLOGY | Facility: HOSPITAL | Age: 80
DRG: 164 | End: 2025-02-21
Payer: MEDICARE

## 2025-02-21 LAB
ANION GAP SERPL CALC-SCNC: 14 MMOL/L (ref 10–20)
APPEARANCE UR: CLEAR
BILIRUB UR STRIP.AUTO-MCNC: NEGATIVE MG/DL
BUN SERPL-MCNC: 27 MG/DL (ref 6–23)
CALCIUM SERPL-MCNC: 8.6 MG/DL (ref 8.6–10.6)
CHLORIDE SERPL-SCNC: 105 MMOL/L (ref 98–107)
CO2 SERPL-SCNC: 23 MMOL/L (ref 21–32)
COLOR UR: ABNORMAL
CREAT SERPL-MCNC: 1.24 MG/DL (ref 0.5–1.05)
EGFRCR SERPLBLD CKD-EPI 2021: 44 ML/MIN/1.73M*2
ERYTHROCYTE [DISTWIDTH] IN BLOOD BY AUTOMATED COUNT: 12.9 % (ref 11.5–14.5)
GLUCOSE BLD MANUAL STRIP-MCNC: 134 MG/DL (ref 74–99)
GLUCOSE SERPL-MCNC: 137 MG/DL (ref 74–99)
GLUCOSE UR STRIP.AUTO-MCNC: NORMAL MG/DL
HCT VFR BLD AUTO: 29.6 % (ref 36–46)
HGB BLD-MCNC: 10.1 G/DL (ref 12–16)
HYALINE CASTS #/AREA URNS AUTO: ABNORMAL /LPF
KETONES UR STRIP.AUTO-MCNC: ABNORMAL MG/DL
LEUKOCYTE ESTERASE UR QL STRIP.AUTO: NEGATIVE
MCH RBC QN AUTO: 34 PG (ref 26–34)
MCHC RBC AUTO-ENTMCNC: 34.1 G/DL (ref 32–36)
MCV RBC AUTO: 100 FL (ref 80–100)
MUCOUS THREADS #/AREA URNS AUTO: ABNORMAL /LPF
NITRITE UR QL STRIP.AUTO: NEGATIVE
NRBC BLD-RTO: 0 /100 WBCS (ref 0–0)
PH UR STRIP.AUTO: 6 [PH]
PLATELET # BLD AUTO: 245 X10*3/UL (ref 150–450)
POTASSIUM SERPL-SCNC: 4.1 MMOL/L (ref 3.5–5.3)
PROT UR STRIP.AUTO-MCNC: ABNORMAL MG/DL
RBC # BLD AUTO: 2.97 X10*6/UL (ref 4–5.2)
RBC # UR STRIP.AUTO: ABNORMAL MG/DL
RBC #/AREA URNS AUTO: ABNORMAL /HPF
SODIUM SERPL-SCNC: 138 MMOL/L (ref 136–145)
SP GR UR STRIP.AUTO: 1.02
SQUAMOUS #/AREA URNS AUTO: ABNORMAL /HPF
UROBILINOGEN UR STRIP.AUTO-MCNC: NORMAL MG/DL
WBC # BLD AUTO: 9.6 X10*3/UL (ref 4.4–11.3)
WBC #/AREA URNS AUTO: ABNORMAL /HPF

## 2025-02-21 PROCEDURE — 71045 X-RAY EXAM CHEST 1 VIEW: CPT

## 2025-02-21 PROCEDURE — 85027 COMPLETE CBC AUTOMATED: CPT | Performed by: PHYSICIAN ASSISTANT

## 2025-02-21 PROCEDURE — 2500000004 HC RX 250 GENERAL PHARMACY W/ HCPCS (ALT 636 FOR OP/ED)

## 2025-02-21 PROCEDURE — 80048 BASIC METABOLIC PNL TOTAL CA: CPT | Performed by: PHYSICIAN ASSISTANT

## 2025-02-21 PROCEDURE — 2500000005 HC RX 250 GENERAL PHARMACY W/O HCPCS: Performed by: PHYSICIAN ASSISTANT

## 2025-02-21 PROCEDURE — 81001 URINALYSIS AUTO W/SCOPE: CPT | Performed by: PHYSICIAN ASSISTANT

## 2025-02-21 PROCEDURE — 2500000004 HC RX 250 GENERAL PHARMACY W/ HCPCS (ALT 636 FOR OP/ED): Performed by: PHYSICIAN ASSISTANT

## 2025-02-21 PROCEDURE — 97162 PT EVAL MOD COMPLEX 30 MIN: CPT | Mod: GP

## 2025-02-21 PROCEDURE — 82947 ASSAY GLUCOSE BLOOD QUANT: CPT

## 2025-02-21 PROCEDURE — 71045 X-RAY EXAM CHEST 1 VIEW: CPT | Performed by: RADIOLOGY

## 2025-02-21 PROCEDURE — 2500000001 HC RX 250 WO HCPCS SELF ADMINISTERED DRUGS (ALT 637 FOR MEDICARE OP): Performed by: PHYSICIAN ASSISTANT

## 2025-02-21 PROCEDURE — 2500000004 HC RX 250 GENERAL PHARMACY W/ HCPCS (ALT 636 FOR OP/ED): Performed by: NURSE PRACTITIONER

## 2025-02-21 PROCEDURE — 97165 OT EVAL LOW COMPLEX 30 MIN: CPT | Mod: GO

## 2025-02-21 PROCEDURE — 97530 THERAPEUTIC ACTIVITIES: CPT | Mod: GO

## 2025-02-21 PROCEDURE — 1200000002 HC GENERAL ROOM WITH TELEMETRY DAILY

## 2025-02-21 PROCEDURE — 97530 THERAPEUTIC ACTIVITIES: CPT | Mod: GP

## 2025-02-21 RX ADMIN — PANTOPRAZOLE SODIUM 40 MG: 40 TABLET, DELAYED RELEASE ORAL at 09:21

## 2025-02-21 RX ADMIN — GABAPENTIN 200 MG: 100 CAPSULE ORAL at 20:15

## 2025-02-21 RX ADMIN — TRAMADOL HYDROCHLORIDE 50 MG: 50 TABLET, COATED ORAL at 01:50

## 2025-02-21 RX ADMIN — SODIUM CHLORIDE, POTASSIUM CHLORIDE, SODIUM LACTATE AND CALCIUM CHLORIDE 250 ML: 600; 310; 30; 20 INJECTION, SOLUTION INTRAVENOUS at 12:37

## 2025-02-21 RX ADMIN — CYCLOBENZAPRINE 5 MG: 10 TABLET, FILM COATED ORAL at 20:15

## 2025-02-21 RX ADMIN — CYCLOBENZAPRINE 5 MG: 10 TABLET, FILM COATED ORAL at 09:14

## 2025-02-21 RX ADMIN — PANCRELIPASE 3 CAPSULE: 120000; 24000; 76000 CAPSULE, DELAYED RELEASE PELLETS ORAL at 09:14

## 2025-02-21 RX ADMIN — SODIUM CHLORIDE, POTASSIUM CHLORIDE, SODIUM LACTATE AND CALCIUM CHLORIDE 250 ML: 600; 310; 30; 20 INJECTION, SOLUTION INTRAVENOUS at 16:09

## 2025-02-21 RX ADMIN — ACETAMINOPHEN 975 MG: 325 TABLET ORAL at 12:09

## 2025-02-21 RX ADMIN — LIDOCAINE 4% 1 PATCH: 40 PATCH TOPICAL at 09:15

## 2025-02-21 RX ADMIN — PANCRELIPASE 3 CAPSULE: 120000; 24000; 76000 CAPSULE, DELAYED RELEASE PELLETS ORAL at 12:09

## 2025-02-21 RX ADMIN — HEPARIN SODIUM 5000 UNITS: 5000 INJECTION, SOLUTION INTRAVENOUS; SUBCUTANEOUS at 12:09

## 2025-02-21 RX ADMIN — CEFAZOLIN SODIUM 1 G: 1 INJECTION, SOLUTION INTRAVENOUS at 04:53

## 2025-02-21 RX ADMIN — PANCRELIPASE 3 CAPSULE: 120000; 24000; 76000 CAPSULE, DELAYED RELEASE PELLETS ORAL at 16:09

## 2025-02-21 RX ADMIN — HEPARIN SODIUM 5000 UNITS: 5000 INJECTION, SOLUTION INTRAVENOUS; SUBCUTANEOUS at 04:53

## 2025-02-21 RX ADMIN — GABAPENTIN 200 MG: 100 CAPSULE ORAL at 09:14

## 2025-02-21 RX ADMIN — ONDANSETRON 4 MG: 2 INJECTION INTRAMUSCULAR; INTRAVENOUS at 04:52

## 2025-02-21 ASSESSMENT — COGNITIVE AND FUNCTIONAL STATUS - GENERAL
HELP NEEDED FOR BATHING: A LITTLE
EATING MEALS: A LITTLE
TOILETING: A LOT
DAILY ACTIVITIY SCORE: 16
DAILY ACTIVITIY SCORE: 20
WALKING IN HOSPITAL ROOM: A LITTLE
TOILETING: A LITTLE
HELP NEEDED FOR BATHING: A LITTLE
TURNING FROM BACK TO SIDE WHILE IN FLAT BAD: A LITTLE
TURNING FROM BACK TO SIDE WHILE IN FLAT BAD: A LITTLE
DRESSING REGULAR UPPER BODY CLOTHING: A LITTLE
MOVING TO AND FROM BED TO CHAIR: A LITTLE
MOBILITY SCORE: 17
MOBILITY SCORE: 18
PERSONAL GROOMING: A LITTLE
DRESSING REGULAR UPPER BODY CLOTHING: A LITTLE
STANDING UP FROM CHAIR USING ARMS: A LITTLE
DRESSING REGULAR LOWER BODY CLOTHING: A LOT
MOVING FROM LYING ON BACK TO SITTING ON SIDE OF FLAT BED WITH BEDRAILS: A LITTLE
DRESSING REGULAR LOWER BODY CLOTHING: A LITTLE
MOVING TO AND FROM BED TO CHAIR: A LITTLE
MOVING FROM LYING ON BACK TO SITTING ON SIDE OF FLAT BED WITH BEDRAILS: A LITTLE
WALKING IN HOSPITAL ROOM: A LITTLE
CLIMB 3 TO 5 STEPS WITH RAILING: A LOT
STANDING UP FROM CHAIR USING ARMS: A LITTLE
CLIMB 3 TO 5 STEPS WITH RAILING: A LITTLE

## 2025-02-21 ASSESSMENT — PAIN DESCRIPTION - DESCRIPTORS
DESCRIPTORS: DISCOMFORT
DESCRIPTORS: DISCOMFORT

## 2025-02-21 ASSESSMENT — PAIN - FUNCTIONAL ASSESSMENT
PAIN_FUNCTIONAL_ASSESSMENT: 0-10

## 2025-02-21 ASSESSMENT — ENCOUNTER SYMPTOMS
ABDOMINAL DISTENTION: 0
PALPITATIONS: 0
HEADACHES: 0
DIZZINESS: 1
CHEST TIGHTNESS: 0
VOMITING: 0
APPETITE CHANGE: 0
FATIGUE: 0
COUGH: 0
NAUSEA: 1
LIGHT-HEADEDNESS: 1
SHORTNESS OF BREATH: 0
FEVER: 0

## 2025-02-21 ASSESSMENT — PAIN SCALES - GENERAL
PAINLEVEL_OUTOF10: 4
PAINLEVEL_OUTOF10: 6
PAINLEVEL_OUTOF10: 4
PAINLEVEL_OUTOF10: 3
PAINLEVEL_OUTOF10: 1
PAINLEVEL_OUTOF10: 0 - NO PAIN

## 2025-02-21 ASSESSMENT — ACTIVITIES OF DAILY LIVING (ADL)
BATHING_ASSISTANCE: MINIMAL
ADL_ASSISTANCE: INDEPENDENT
ADL_ASSISTANCE: INDEPENDENT

## 2025-02-21 NOTE — SIGNIFICANT EVENT
Rapid Response Nurse Note: Rapid Response    Pager time:   Arrival time:   Event end time:   Location:  309  [] Triage by phone or secure messaging    Rapid response initiated by:  [] Rapid response RN [] Family [] Nursing Supervisor [] Physician   [] RADAR auto page [] Sepsis auto-page [x] RN [] RT   [] NP/PA [] Other:     Primary reason for call:   [x] BAT [] New CPAP/BiPAP [] Bleeding [] Change in mental status   [] Chest pain [] Code blue [] FiO2 >/= 50% [] HR </= 40 bpm   [] HR >/= 130 bpm [] Hyperglycemia [] Hypoglycemia [] RADAR    [] RR </= 8 bpm [] RR >/= 30 bpm [] SBP </= 90 mmHg [] SpO2 < 90%   [] Seizure [] Sepsis [] Shortness of breath  [] Staff concern: see comments     Initial VS and/or RADAR VS: T  °C; HR 78; RR 16; /76; SPO2 92%.    Providers present at bedside (if applicable): NIKOLAY GARSIA - Thoracic, Primary RN, Rapid Response, Stroke Team    Name of ICU Provider contacted (if applicable):     Interventions:  [x] None [] ABG/VBG [] Assist w/ICU transfer [] BAT paged    [] Bag mask [] Blood [] Cardioversion [] Code Blue   [] Code blue for intubation [] Code status changed [] Chest x-ray [] EKG   [] IV fluid/bolus [] KUB x-ray [] Labs/cultures [] Medication   [] Nebulizer treatment [] NIPPV (CPAP/BiPAP) [] Oxygen [] Oral airway   [] Peripheral IV [] Palliative care consult [] CT/MRI [] Sepsis protocol    [] Suctioned [] Other:     Outcome:  [] Coded and  [] Code blue for intubation [] Coded and transferred to ICU []  on division   [x] Remained on division (no change) [] Remained on division + additional monitoring [] Remained in ED [] Transferred to ED   [] Transferred to ICU [] Transferred to inpatient status [] Transferred for interventions (procedure) [] Transferred to ICU stepdown    [] Transferred to surgery [] Transferred to telemetry [] Sepsis protocol [] STEMI protocol   [] Stroke protocol [x] Bedside nurse instructed to page rapid response for any  "concerns or acute change in condition/VS     Additional Comments:     Approached by the primary RN due to concern for acute change in mental status.  LKW was at ~ 1755.  Blood sugar is 134.    Upon examination at the bedside, she is somnolent but awakens easily.  Is AO x 4 but confused with states such as \"what are all you doing in my house?\"  Patient with bilaterally eye ptosis but able to overcome to gravity.    BAT called.  No focal deficits found - NIHSS 0.  Suspect acute delirium.    RN to contact Rapid Respons with any future concerns or signs of clinical decompensation.     NIHSS  "

## 2025-02-21 NOTE — PROGRESS NOTES
Physical Therapy    Physical Therapy Evaluation & Treatment    Patient Name: Padilla Campuzano  MRN: [id number]  Department: [place] [address]  Room: [address] [place] [address]  Today's Date: 2/21/2025   Time Calculation  Start Time: 1411  Stop Time: 1440  Time Calculation (min): 29 min    Assessment/Plan   PT Assessment  PT Assessment Results: Decreased endurance, Decreased mobility, Impaired balance, Decreased strength  Rehab Prognosis: Good  Barriers to Discharge Home: Caregiver assistance, Cognition needs, Physical needs  Caregiver Assistance: Caregiver assistance needed per identified barriers - however, level of patient's required assistance exceeds assistance available at home  Cognition Needs: 24hr supervision for safety awareness needed, Insight of patient limited regarding functional ability/needs  Physical Needs: Ambulating household distances limited by function/safety, Stair navigation to access bed limited by function/safety, High falls risk due to function or environment  Evaluation/Treatment Tolerance: Patient tolerated treatment well  Medical Staff Made Aware: Yes  End of Session Communication: Bedside nurse  Assessment Comment: Patient is a 78yo F pulmonary nodule s/p RA wedge resection on 2/20 with MLND. Patient is indep at baseline and lives with . Patient is min A with FWW for mobility at this time. Patient is positive for orthostatic hypotension. Patient is a high risk for falls. dc rec mod intensity at this time.  End of Session Patient Position: Up in chair, Alarm on (RN aware)   IP OR SWING BED PT PLAN  Inpatient or Swing Bed: Inpatient  PT Plan  Treatment/Interventions: Bed mobility, Transfer training, Gait training, Stair training, Balance training, Neuromuscular re-education, Strengthening, Endurance training, Range of motion, Therapeutic exercise, Therapeutic activity  PT Plan: Ongoing PT  PT Frequency: 3 times per week  PT Discharge Recommendations: Moderate intensity level of continued care  PT Recommended  Transfer Status: Assist x1  PT - OK to Discharge: Yes (indicates PT eval complete and dc rec determined)      Subjective     General Visit Information:  General  Reason for Referral: pulmonary nodule s/p RA wedge resection on 2/20 with MLND  Past Medical History Relevant to Rehab: breast cancer colon cancer and anal cancer  Family/Caregiver Present: Yes  Caregiver Feedback:  present and supportive in session  Co-Treatment: OT  Co-Treatment Reason: maximize pt safety with mobility  Prior to Session Communication: Bedside nurse  Patient Position Received: Bed, 3 rail up, Alarm on  General Comment: pt supine in bed, RN cleared. pleasant and cooperative.  Home Living:  Home Living  Type of Home: House  Lives With: Spouse  Home Adaptive Equipment: None  Home Layout: One level  Home Access: Stairs to enter without rails  Entrance Stairs-Number of Steps: 2  Bathroom Shower/Tub: Tub/shower unit  Bathroom Equipment: Grab bars in shower, Shower chair with back  Prior Level of Function:  Prior Function Per Pt/Caregiver Report  Level of [place]: Independent with ADLs and functional transfers, Independent with homemaking with ambulation  ADL Assistance: Independent  Homemaking Assistance: Independent  Ambulatory Assistance: Independent  Prior Function Comments: reports 1 fall last year, - drive  Precautions:  Precautions  Medical Precautions: Fall precautions     Date/Time Vitals Session Patient Position Pulse Resp SpO2 BP MAP (mmHg)    02/21/25 1412 Pre PT  Lying  91  --  91 %  127/62  --     02/21/25 1413 During PT  Sitting  80  --  87 %  116/57  --     02/21/25 1414 During PT  Standing  98  --  --  96/56  --     02/21/25 1415 During PT  Standing  94  --  --  92/59  --     02/21/25 1416 During PT  Sitting  77  --  --  124/62  --     02/21/25 1417 During PT  Sitting  88  17  90 %  124/62  --     02/21/25 1418 During PT  Standing  --  --  --  93/52  --     02/21/25 1419 Post PT  --  89  --  --  110/60  --            Vital Signs Comment: left in chair, RN aware. continues to be orthostatic upon standing.    Objective   Pain:  Pain Assessment  Pain Assessment: 0-10  0-10 (Numeric) Pain Score: 4  Pain Type: Acute pain, Surgical pain  Pain Location:  (thoracic)  Pain Interventions: Repositioned, Ambulation/increased activity  Cognition:  Cognition  Overall Cognitive Status: Impaired  Orientation Level:  (pt AO to self and month/year with increased time. Patient sometimes making comments about being at home)  Attention:  (easily distracted)  Impulsive: Mildly    General Assessments:     Activity Tolerance  Endurance: Tolerates 10 - 20 min exercise with multiple rests    Sensation  Light Touch:  (denies)  Functional Assessments:  Bed Mobility  Bed Mobility: Yes  Bed Mobility 1  Bed Mobility 1: Supine to sitting  Level of Assistance 1: Minimum assistance    Transfers  Transfer: Yes  Transfer 1  Transfer From 1: Sit to, Stand to  Transfer to 1: Stand, Sit  Technique 1: Sit to stand, Stand to sit  Transfer Device 1: Walker  Transfer Level of Assistance 1: Minimum assistance  Trials/Comments 1: stood from EOB, cues for hand placement. slightly impulsive. Patient first stand attempt without FWW unable.    Ambulation/Gait Training  Ambulation/Gait Training Performed: Yes  Ambulation/Gait Training 1  Surface 1: Level tile  Device 1: Rolling walker  Assistance 1: Minimum assistance, Minimal verbal cues  Quality of Gait 1: Shuffling gait, Decreased step length (decreased jaiden)  Comments/Distance (ft) 1: pt ambulated 2' lateral, and 3' to chair  Extremity/Trunk Assessments:  RLE   RLE : Within Functional Limits  LLE   LLE : Within Functional Limits  Treatments:  Therapeutic Activity  Therapeutic Activity Performed: Yes  Therapeutic Activity 1: pt sup > sit, stood from EOB, lateral steps. seated rest.  Therapeutic Activity 2: stood again from EOB and transferred to chair 3'. cues throughout session  Therapeutic Activity 3: BP taken in  supine, sitting and standing multiple times and monitored throughout session.  Outcome Measures:  [facility] Basic Mobility  Turning from your back to your side while in a flat bed without using bedrails: A little  Moving from lying on your back to sitting on the side of a flat bed without using bedrails: A little  Moving to and from bed to chair (including a wheelchair): A little  Standing up from a chair using your arms (e.g. wheelchair or bedside chair): A little  To walk in hospital room: A little  Climbing 3-5 steps with railing: A lot  Basic Mobility - Total Score: 17    Encounter Problems       Encounter Problems (Active)       Balance       Tinetti score > 24 to indicate low risk for falls  (Progressing)       Start:  02/21/25    Expected End:  03/07/25               Mobility       STG - Patient will ambulate > 100' with LRAD modif indep  (Progressing)       Start:  02/21/25    Expected End:  03/07/25            STG - Patient will ascend and descend 2 steps no HR, CGA (Progressing)       Start:  02/21/25    Expected End:  03/07/25               PT Transfers       STG - Patient will perform bed mobility indep (Progressing)       Start:  02/21/25    Expected End:  03/07/25            STG - Patient will transfer sit to and from stand LRAD modif indep  (Progressing)       Start:  02/21/25    Expected End:  03/07/25                   Education Documentation  Precautions, taught by Kiya Lobato PT at 2/21/2025  3:07 PM.  Learner: Patient  Readiness: Acceptance  Method: Explanation  Response: Verbalizes Understanding  Comment: edu on role of PT, dc plan and safety.    Mobility Training, taught by Kiya Lobato PT at 2/21/2025  3:07 PM.  Learner: Patient  Readiness: Acceptance  Method: Explanation  Response: Verbalizes Understanding  Comment: edu on role of PT, dc plan and safety.    Education Comments  No comments found.

## 2025-02-21 NOTE — PROGRESS NOTES
02/21/25 1034   Discharge Planning   Living Arrangements Spouse/significant other   Support Systems Spouse/significant other   Assistance Needed independent   Type of Residence Private residence   Number of Stairs Within Residence   (to basement)   Do you have animals or pets at home? No   Who is requesting discharge planning? Provider   Home or Post Acute Services None   Expected Discharge Disposition Home   Does the patient need discharge transport arranged? No     Met with patient and introduced myself as Care Coordinator and member of the discharge planning team.  Patient is s/p left lung wedge. She is planning to discharge to home today with her . Her PCP is Dr. Clements. She uses pharmacy. No home care needs were identified. Care Coordinator will continue to follow for home going needs.

## 2025-02-21 NOTE — PROGRESS NOTES
Occupational Therapy    Evaluation/Treatment    Patient Name: Padilla Campuzano  MRN: [id number]  Department: [place] [address]  Room: [facility]  Today's Date: 02/21/25  Time Calculation  Start Time: 1411  Stop Time: 1440  Time Calculation (min): 29 min       Assessment:  OT Assessment: Pt will benefit from continued skilled OT to increase independence in ADLs, functional mobility, activity tolerance, safety, and strength.  Prognosis: Good  Barriers to Discharge Home: Caregiver assistance, Physical needs, Cognition needs  Caregiver Assistance: Caregiver assistance needed per identified barriers - however, level of patient's required assistance exceeds assistance available at home  Cognition Needs: 24hr supervision for safety awareness needed  Physical Needs: 24hr mobility assistance needed, 24hr ADL assistance needed  Evaluation/Treatment Tolerance: Patient tolerated treatment well  Medical Staff Made Aware: Yes  End of Session Communication: Bedside nurse  End of Session Patient Position: Up in chair, Alarm on  OT Assessment Results: Decreased ADL status, Decreased upper extremity strength, Decreased safe judgment during ADL, Decreased cognition, Decreased endurance, Decreased functional mobility, Decreased IADLs  Prognosis: Good  Evaluation/Treatment Tolerance: Patient tolerated treatment well  Medical Staff Made Aware: Yes  Strengths: Attitude of self  Barriers to Participation: Comorbidities  Plan:  Treatment Interventions: ADL retraining, Functional transfer training, UE strengthening/ROM, Endurance training, Cognitive reorientation, Patient/family training, Equipment evaluation/education, Compensatory technique education  OT Frequency: 3 times per week  OT Discharge Recommendations: Moderate intensity level of continued care  Equipment Recommended upon Discharge:  (TBD at next level of care)  OT Recommended Transfer Status: Assist of 1  OT - OK to Discharge: Yes (upon medical clearance)  Treatment Interventions: ADL  retraining, Functional transfer training, UE strengthening/ROM, Endurance training, Cognitive reorientation, Patient/family training, Equipment evaluation/education, Compensatory technique education    Subjective   Current Problem:  1. Lung nodule  Surgical Pathology Exam    Surgical Pathology Exam        General:   OT Received On: 02/21/25  General  Reason for Referral: WEDGE RESECTION, LUNG, ROBOT-ASSISTED on 2/21  Past Medical History Relevant to Rehab: breast cancer colon cancer and anal cancer  Family/Caregiver Present: Yes  Caregiver Feedback:  present and supportive in session  Co-Treatment: PT  Co-Treatment Reason: maximize pt safety with mobility  Prior to Session Communication: Bedside nurse  Patient Position Received: Bed, 3 rail up, Alarm on  General Comment: Pt supine in bed upon arrival, agreeable to OT   Precautions:  Medical Precautions: Fall precautions     Date/Time Vitals Session Patient Position Pulse Resp SpO2 BP MAP (mmHg)    02/21/25 1412 Pre OT  Lying  91  --  91 %  127/62  --     02/21/25 1413 During OT  Sitting  80  --  87 %  116/57  --     02/21/25 1414 During OT  Standing  98  --  --  96/56  --     02/21/25 1415 During OT  Standing  94  --  --  92/59  --     02/21/25 1416 During OT  Sitting  77  --  --  124/62  --     02/21/25 1417 During OT  Sitting  88  17  90 %  124/62  --     02/21/25 1418 During OT  Standing  --  --  --  93/52  --     02/21/25 1419 Post OT  --  89  --  --  110/60  --      Pain:  Pain Assessment  Pain Assessment: 0-10  0-10 (Numeric) Pain Score: 4  Pain Type: Acute pain, Surgical pain  Pain Location: Incision  Pain Interventions: Repositioned, Distraction    Objective   Cognition:  Overall Cognitive Status: Impaired  Orientation Level:  (pt AO to self and month/year with increased time. Patient sometimes making comments about being at home)  Cognition Comments: pt with intermittant confusion throughout session  Insight: Mild  Impulsive: Mildly  Processing  Speed: Delayed     Home Living:  Type of Home: House  Lives With: Spouse  Home Adaptive Equipment: None  Home Layout: One level  Home Access: Stairs to enter without rails  Entrance Stairs-Number of Steps: 2  Bathroom Shower/Tub: Tub/shower unit  Bathroom Equipment: Grab bars in shower, Shower chair without back  Home Living Comments: denies falls  Prior Function:  Level of [place]: Independent with ADLs and functional transfers, Independent with homemaking with ambulation  ADL Assistance: Independent  Homemaking Assistance: Independent  Ambulatory Assistance: Independent  IADL History:  Homemaking Responsibilities: Yes  ADL:  Eating Assistance: Stand by (anticipated)  Grooming Assistance: Stand by (anticipated)  Bathing Assistance: Minimal (anticipated seated)  UE Dressing Assistance: Minimal (gown management standing)  LE Dressing Assistance: Moderate (anticipated)  Toileting Assistance with Device: Moderate (anticipated)    Activity Tolerance:  Endurance: Tolerates 10 - 20 min exercise with multiple rests (limited by BP)    Bed Mobility/Transfers: Bed Mobility  Bed Mobility: Yes  Bed Mobility 1  Bed Mobility 1: Supine to sitting  Level of Assistance 1: Minimum assistance, Minimal verbal cues  Bed Mobility Comments 1: VCs for sequencing    Transfers  Transfer: Yes  Transfer 1  Transfer From 1: Sit to, Stand to  Transfer to 1: Stand, Sit  Technique 1: Sit to stand, Stand to sit  Transfer Device 1: Walker  Transfer Level of Assistance 1: Minimum assistance, Minimal verbal cues  Trials/Comments 1: 2x trials    Functional Mobility:  Functional Mobility  Functional Mobility Performed: Yes  Functional Mobility 1  Surface 1: Level tile  Device 1: Rolling walker  Assistance 1: Minimum assistance, Minimal verbal cues  Comments 1: forward/backward steps, bed > chair  Sitting Balance:  Static Sitting Balance  Static Sitting-Balance Support: Feet supported  Static Sitting-Level of Assistance: Close supervision  Dynamic  Sitting Balance  Dynamic Sitting-Balance Support: Feet supported  Dynamic Sitting-Level of Assistance: Close supervision  Standing Balance:  Static Standing Balance  Static Standing-Balance Support: Bilateral upper extremity supported  Static Standing-Level of Assistance: Minimum assistance  Dynamic Standing Balance  Dynamic Standing-Balance Support: Bilateral upper extremity supported  Dynamic Standing-Level of Assistance: Minimum assistance    Modalities:  Modalities Used: No    Therapy/Activity:      Therapeutic Activity  Therapeutic Activity Performed: Yes  Therapeutic Activity 1: bed mob, transfers, fxnl mob, extended time to complete 2/2 vitals taken    Sensation:  Light Touch: No apparent deficits  Strength:  Strength Comments: BUE at least 3/5 grossly, observed through functional observation    Perception:  Inattention/Neglect: Appears intact  Coordination:  Movements are Fluid and Coordinated: Yes   Hand Function:  Hand Function  Gross Grasp: Functional  Coordination: Functional  Extremities: RUE   RUE : Within Functional Limits and LUE   LUE: Within Functional Limits    Outcome Measures: [facility] Daily Activity  Putting on and taking off regular lower body clothing: A lot  Bathing (including washing, rinsing, drying): A little  Putting on and taking off regular upper body clothing: A little  Toileting, which includes using toilet, bedpan or urinal: A lot  Taking care of personal grooming such as brushing teeth: A little  Eating Meals: A little  Daily Activity - Total Score: 16    Education Documentation  Body Mechanics, taught by Nj Hillman OT at 2/21/2025  3:14 PM.  Learner: Patient  Readiness: Acceptance  Method: Explanation  Response: Verbalizes Understanding, Needs Reinforcement    Precautions, taught by Nj Hillman OT at 2/21/2025  3:14 PM.  Learner: Patient  Readiness: Acceptance  Method: Explanation  Response: Verbalizes Understanding, Needs Reinforcement    ADL Training, taught by Nj  BILL Hillman at 2/21/2025  3:14 PM.  Learner: Patient  Readiness: Acceptance  Method: Explanation  Response: Verbalizes Understanding, Needs Reinforcement    Education Comments  No comments found.      Goals:  Encounter Problems       Encounter Problems (Active)       ADLs       Patient with complete lower body dressing with modified independent level of assistance donning and doffing all LE clothes  with PRN adaptive equipment while supported sitting and standing (Progressing)       Start:  02/21/25    Expected End:  03/14/25            Patient will complete toileting including hygiene clothing management/hygiene with modified independent level of assistance and grab bars. (Progressing)       Start:  02/21/25    Expected End:  03/14/25               BALANCE       Pt will maintain dynamic standing balance during ADL task with modified independent level of assistance in order to demonstrate decreased risk of falling and improved postural control. (Progressing)       Start:  02/21/25    Expected End:  03/14/25               COGNITION/SAFETY       Patient will score WFL on standardized cognitive assessment with verbal cues and within reasonable time frame (Progressing)       Start:  02/21/25    Expected End:  03/14/25               MOBILITY       Patient will perform Functional mobility max Household distances/Community Distances with modified independent level of assistance and least restrictive device in order to improve safety and functional mobility. (Progressing)       Start:  02/21/25    Expected End:  03/14/25               TRANSFERS       Patient will complete sit to stand transfer with modified independent level of assistance and least restrictive device in order to improve safety and prepare for out of bed mobility. (Progressing)       Start:  02/21/25    Expected End:  03/14/25               ROSEMARIE Christina/BERTA

## 2025-02-21 NOTE — PROGRESS NOTES
"GENERAL SURGERY PROGRESS NOTE    Padilla Campuzano   1945   [id number]     Padilla Campuzano is a 79 y.o. female on day 1 of admission presenting with Lung nodule.    WEDGE RESECTION, LUNG, ROBOT-ASSISTED on 2/21, 1 Day Post-Op    Subjective  PT CAYETANO, overnight had some nausea and little PO intake. This AM chest tube to water seal w/o air leak and 186 ml serosanguinous drainage recorded. Patient states pain is controlled. AM CXR w/o PTX or significant effusion    Review of Systems:  Review of Systems   Constitutional:  Negative for appetite change, fatigue and fever.   Respiratory:  Negative for cough, chest tightness and shortness of breath.    Cardiovascular:  Negative for chest pain and palpitations.   Gastrointestinal:  Positive for nausea. Negative for abdominal distention and vomiting.   Neurological:  Positive for dizziness and light-headedness. Negative for headaches.       Objective    Last Recorded Vitals  Blood pressure 110/61, pulse 82, temperature 36.5 °C (97.7 °F), resp. rate 17, height 1.575 m (5' 2\"), weight 56.5 kg (124 lb 9 oz), SpO2 93%.    Intake/Output last 3 Shifts:  I/O last 3 completed shifts:  In: 2240 (39.6 mL/kg) [P.O.:240; I.V.:1000 (17.7 mL/kg); IV Piggyback:1000]  Out: 686 (12.1 mL/kg) [Urine:475 (0.2 mL/kg/hr); Emesis/NG output:25; Chest Tube:186]  Weight: 56.5 kg     Intake/Output Summary (Last 24 hours) at 2/21/2025 1303  Last data filed at 2/21/2025 0451  Gross per 24 hour   Intake 2240 ml   Output 686 ml   Net 1554 ml       Physical Exam  Vitals reviewed.   Constitutional:       General: She is not in acute distress.     Appearance: She is normal weight.   HENT:      Mouth/Throat:      Mouth: Mucous membranes are moist.      Pharynx: Oropharynx is clear.   Cardiovascular:      Rate and Rhythm: Normal rate.      Pulses: Normal pulses.   Pulmonary:      Comments: Wedge resection incisions c/d/I chest tube to water seal with no air leak and serosanguinous drainage. Patient on room air " breathing well. Minimal tenderness  Abdominal:      General: Abdomen is flat. There is no distension.      Palpations: Abdomen is soft.      Tenderness: There is no abdominal tenderness. There is no guarding.   Skin:     General: Skin is warm and dry.   Neurological:      Mental Status: She is alert.         Relevant Results  Labs:  Results for orders placed or performed during the hospital encounter of 02/20/25 (from the past 24 hours)   Basic metabolic panel   Result Value Ref Range    Glucose 137 (H) 74 - 99 mg/dL    Sodium 138 136 - 145 mmol/L    Potassium 4.1 3.5 - 5.3 mmol/L    Chloride 105 98 - 107 mmol/L    Bicarbonate 23 21 - 32 mmol/L    Anion Gap 14 10 - 20 mmol/L    Urea Nitrogen 27 (H) 6 - 23 mg/dL    Creatinine 1.24 (H) 0.50 - 1.05 mg/dL    eGFR 44 (L) >60 mL/min/1.73m*2    Calcium 8.6 8.6 - 10.6 mg/dL     *Note: Due to a large number of results and/or encounters for the requested time period, some results have not been displayed. A complete set of results can be found in Results Review.       Images:  XR chest 1 view   Final Result   1. Subsegmental atelectasis within the retrocardiac left lower lobe   2. Improved interstitial pulmonary edema compared to previous study.                  Signed by: Virgil Larkin 2/21/2025 12:14 PM   Dictation workstation:   [id number]      XR chest 1 view   Final Result   1. Postoperative findings   2. Focal ground-glass opacity within the left lower lobe   3. Small right pleural effusion and subsegmental atelectasis above   the right diaphragm   4. Trace left pleural effusion and subsegmental atelectasis                  Signed by: Virgil Larkin 2/21/2025 7:11 AM   Dictation workstation:   [id number]      XR chest 1 view    (Results Pending)       Assessment and Plan  Assessment & Plan  Lung nodule    79 y.o. female with pulmonary nodule s/p RA wedge resection on 2/20 with MLND     - Chest tube removed and post pull CXR w/p PTX or effusion  - leave dressing in place for 48  hours  - OK for Regular diet  - PRN pain and nausea medications  - Pulm toilet and IS use, wean down nasal cannula  - patient plans to go home  on DC  - Patient reported light headedness with positive orthostatics, will volume resuscitate and have patient work with PT   - Continue to monitor I/O's     Discussed with attending Dr. Roddy Dick, DO - PGY3  Thoracic Surgery   P [id number]

## 2025-02-22 ENCOUNTER — APPOINTMENT (OUTPATIENT)
Dept: RADIOLOGY | Facility: HOSPITAL | Age: 80
DRG: 164 | End: 2025-02-22
Payer: MEDICARE

## 2025-02-22 ENCOUNTER — APPOINTMENT (OUTPATIENT)
Dept: RADIOLOGY | Facility: HOSPITAL | Age: 80
End: 2025-02-22
Payer: MEDICARE

## 2025-02-22 VITALS
WEIGHT: 124.9 LBS | RESPIRATION RATE: 19 BRPM | SYSTOLIC BLOOD PRESSURE: 123 MMHG | TEMPERATURE: 99 F | OXYGEN SATURATION: 94 % | DIASTOLIC BLOOD PRESSURE: 58 MMHG | HEIGHT: 62 IN | HEART RATE: 86 BPM | BODY MASS INDEX: 22.98 KG/M2

## 2025-02-22 LAB — HOLD SPECIMEN: NORMAL

## 2025-02-22 PROCEDURE — 2500000005 HC RX 250 GENERAL PHARMACY W/O HCPCS: Performed by: PHYSICIAN ASSISTANT

## 2025-02-22 PROCEDURE — 71045 X-RAY EXAM CHEST 1 VIEW: CPT | Performed by: RADIOLOGY

## 2025-02-22 PROCEDURE — 71045 X-RAY EXAM CHEST 1 VIEW: CPT

## 2025-02-22 PROCEDURE — 2500000001 HC RX 250 WO HCPCS SELF ADMINISTERED DRUGS (ALT 637 FOR MEDICARE OP): Performed by: PHYSICIAN ASSISTANT

## 2025-02-22 PROCEDURE — 2500000004 HC RX 250 GENERAL PHARMACY W/ HCPCS (ALT 636 FOR OP/ED): Performed by: PHYSICIAN ASSISTANT

## 2025-02-22 RX ORDER — ACETAMINOPHEN 325 MG/1
650 TABLET ORAL EVERY 6 HOURS PRN
Qty: 40 TABLET | Refills: 0 | Status: SHIPPED | OUTPATIENT
Start: 2025-02-22

## 2025-02-22 RX ORDER — CYCLOBENZAPRINE HCL 5 MG
5 TABLET ORAL 3 TIMES DAILY
Qty: 20 TABLET | Refills: 0 | Status: SHIPPED | OUTPATIENT
Start: 2025-02-22

## 2025-02-22 RX ADMIN — PANCRELIPASE 3 CAPSULE: 120000; 24000; 76000 CAPSULE, DELAYED RELEASE PELLETS ORAL at 14:12

## 2025-02-22 RX ADMIN — HEPARIN SODIUM 5000 UNITS: 5000 INJECTION, SOLUTION INTRAVENOUS; SUBCUTANEOUS at 11:08

## 2025-02-22 RX ADMIN — LIDOCAINE 4% 1 PATCH: 40 PATCH TOPICAL at 11:06

## 2025-02-22 RX ADMIN — PANCRELIPASE 3 CAPSULE: 120000; 24000; 76000 CAPSULE, DELAYED RELEASE PELLETS ORAL at 11:00

## 2025-02-22 RX ADMIN — ACETAMINOPHEN 975 MG: 325 TABLET ORAL at 14:13

## 2025-02-22 ASSESSMENT — PAIN SCALES - GENERAL: PAINLEVEL_OUTOF10: 6

## 2025-02-22 ASSESSMENT — PAIN - FUNCTIONAL ASSESSMENT: PAIN_FUNCTIONAL_ASSESSMENT: 0-10

## 2025-02-22 NOTE — SIGNIFICANT EVENT
"NEUROLOGY CANCELLED BAT NOTE  Padilla Campuzano is a 79 y.o. female with PMH of multiple cancers and a stroke (2016, ruptured aneurysm s/p clipping) who presented to [facility] on 2/20/2025 for a biopsy and is now POD #1 from wedge resection, pt seen by neurology as a BAT 02/21/25.    LKW 20 minutes prior. BAT was called at 18:24 for AMS. Initial POCG 134, /50. NIHSS 5 (see breakdown below). Patient did not receive TNK due to low NIH score and non-disabling symptoms. Pt did not get a CTA as there were no cortical signs on exam suggesting LVO, so was not considered for MT.    Exam was very non-focal, she was AAOx4. Per  pt does often \"sundown\" and get more confused in the evenings, presentation is most c/w a delirium. BAT was cancelled, please reach out to neurology with any further questions/concerns.    NIHSS  Level of consciousness: 0 = Alert  LOC Question: 0 = Both correct  LOC Commands: 0 = Obeys both  Best Gaze: 0 = Normal  Visual Field: 0 = No visual loss  Facial Paresis: 1 = Minor paresis  LUE: 1 = Drift  RUE: 1 = Drift  LLE: 1 = Drift  RLE: 1 = Drift  Dysarthria: 0 = Normal  Best Language: 0 = No aphasia  Limb Ataxia: 0 = Absent  Sensory: 0 = Absent  Neglect: 0 = None  NIHSS Score: 5    Ashli Cox MD   PGY-3 Neurology  Stroke [phone]   "

## 2025-02-22 NOTE — SIGNIFICANT EVENT
Rapid Response Nurse Note: RADAR alert: 6    Pager time: [phone]  Event end time: 515  Location:   [] Triage by phone or secure messaging    Rapid response initiated by:  [] Rapid response RN [] Family [] Nursing Supervisor [] Physician   [x] RADAR auto page [] Sepsis auto-page [] RN [] RT   [] NP/PA [] Other:     Primary reason for call:   [] BAT [] New CPAP/BiPAP [] Bleeding [] Change in mental status   [] Chest pain [] Code blue [] FiO2 >/= 50% [] HR </= 40 bpm   [] HR >/= 130 bpm [] Hyperglycemia [] Hypoglycemia [x] RADAR    [] RR </= 8 bpm [] RR >/= 30 bpm [] SBP </= 90 mmHg [] SpO2 < 90%   [] Seizure [] Sepsis [] Shortness of breath  [] Staff concern: see comments     Initial VS and/or RADAR VS:   Vitals:    25 2045 25 0209 25   BP: 159/74 154/73  157/66   BP Location: Right arm Right arm  Right arm   Patient Position: Lying Lying  Lying   Pulse: 109 82  91   Resp: 19 15  16   Temp: 36.4 °C (97.5 °F) 36.8 °C (98.2 °F)  36.7 °C (98.1 °F)   TempSrc: Temporal Temporal  Temporal   SpO2: 92% 93%  90%   Weight:   56.7 kg (124 lb 14.4 oz)    Height:           Providers present at bedside (if applicable): n/a    Name of ICU Provider contacted (if applicable): n/a    Interventions:  [x] None [] ABG/VBG [] Assist w/ICU transfer [] BAT paged    [] Bag mask [] Blood [] Cardioversion [] Code Blue   [] Code blue for intubation [] Code status changed [] Chest x-ray [] EKG   [] IV fluid/bolus [] KUB x-ray [] Labs/cultures [] Medication   [] Nebulizer treatment [] NIPPV (CPAP/BiPAP) [] Oxygen [] Oral airway   [] Peripheral IV [] Palliative care consult [] CT/MRI [] Sepsis protocol    [] Suctioned [] Other:     Outcome:  [] Coded and  [] Code blue for intubation [] Coded and transferred to ICU []  on division   [x] Remained on division (no change) [] Remained on division + additional monitoring [] Remained in ED [] Transferred to ED   [] Transferred to ICU [] Transferred to  inpatient status [] Transferred for interventions (procedure) [] Transferred to ICU stepdown    [] Transferred to surgery [] Transferred to telemetry [] Sepsis protocol [] STEMI protocol   [] Stroke protocol [x] Bedside nurse instructed to page rapid response for any concerns or acute change in condition/VS     Additional Comments: Reviewed above RADAR VS with bedside RN.  VS within patient's current trends.  Patient denied pain, shortness of breath, dizziness or lightheadedness.  No interventions by rapid response team indicated at this time.  Staff to page rapid response for any concerns or acute change in condition/VS.

## 2025-02-22 NOTE — SIGNIFICANT EVENT
Called to bedside by RN for acute delirium. 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 Patient is alert, oriented to self. Confused to place and time. Able to be redirected, somewhat agitated. No focal deficits on exam, NIH 1 (confusion). BAT team called for acute MS changed with h/o CVA. Assessed and does not recommend CT head at this time.  called and reports she has a history of intermittent confusion, exacerbated by hospitalization. VSS. UA sent. Will monitor closely.

## 2025-02-22 NOTE — DISCHARGE SUMMARY
Discharge Diagnosis  Lung nodule    Issues Requiring Follow-Up  Follow up with Dr. Pereyra  Follow up pathology    Test Results Pending At Discharge  Pending Labs       Order Current Status    Surgical Pathology Exam In process            Hospital Course   Ms. Campuzano is a 80yo F who underwent robotic LLL wedge and MLND on 2/20 with Dr. Pereyra. Operative course uncomplicated. CT removed POD1 with no clinically significant ptx post pull. Postop course complicated by delirium likely 2/2 anesthesia (UA non infectious and no other likely source of delirium), which resolved completely on POD2. At time of discharge patient's pain was well controlled, and she was ambulating independently and saturating appropriately on RA. Discharged home on POD2 with outpatient follow up scheduled with Dr. Pereyra.    Pertinent Physical Exam At Time of Discharge  Physical Exam  Gen: sitting comfortably in chair, NAD  CV: RRR  Pulm/Thorax: nonlabored effort on RA, L chest incisions well-approximated, occlusive dressing in place over prior L CT site  Abd: soft, ND  MSK: MORRELL  Neuro: awake and alert, no focal deficits  Skin: warm and dry    Home Medications     Medication List      START taking these medications     cyclobenzaprine 5 mg tablet; Commonly known as: Flexeril; Take 1 tablet   (5 mg) by mouth 3 times a day.     CHANGE how you take these medications     * acetaminophen 500 mg tablet; Commonly known as: Tylenol; What changed:   Another medication with the same name was added. Make sure you understand   how and when to take each.   * acetaminophen 325 mg tablet; Commonly known as: Tylenol; Take 2   tablets (650 mg) by mouth every 6 hours if needed for mild pain (1 - 3).;   What changed: You were already taking a medication with the same name, and   this prescription was added. Make sure you understand how and when to take   each.  * This list has 2 medication(s) that are the same as other medications   prescribed for you. Read the  directions carefully, and ask your doctor or   other care provider to review them with you.     CONTINUE taking these medications     biotin 5 mg capsule   cholecalciferol 50 mcg (2,000 unit) capsule; Commonly known as: Vitamin   D-3   cholestyramine 4 gram powder; Commonly known as: Questran; MIX 1 SCOOP   WITH AT LEAST 2 TO 6 OUNCES LIQUID AND DRINK ONCE  DAILY   Creon 24,000-76,000 -120,000 unit capsule; Generic drug:   lipase-protease-amylase; TAKE 3 CAPSULES BY MOUTH 3 TIMES DAILY WITH MEALS   AND 1 TO 2  CAPSULES WITH SNACKS. MAX 13  CAPSULES DAILY   diphenoxylate-atropine 2.5-0.025 mg tablet; Commonly known as: LomotiL;   Take 1 tablet by mouth 4 times a day as needed for diarrhea for up to 7   days.   lidocaine 5 % patch; Commonly known as: Lidoderm; Place 1 patch over 12   hours on the skin once daily. Remove & discard patch within 12 hours or as   directed by MD.   losartan 25 mg tablet; Commonly known as: Cozaar; TAKE 1 TABLET BY MOUTH   ONCE  DAILY   magnesium oxide 400 mg (241.3 mg magnesium) tablet; Commonly known as:   Mag-Ox; Take 1 tablet (400 mg) by mouth once daily.   pantoprazole 40 mg EC tablet; Commonly known as: ProtoNix; TAKE 1 TABLET   BY MOUTH TWICE  DAILY   potassium chloride 20 mEq packet; Commonly known as: Klor-Con; Take 20   mEq by mouth once daily.     STOP taking these medications     chlorhexidine 0.12 % solution; Commonly known as: Peridex   chlorhexidine 4 % external liquid; Commonly known as: Hibiclens     ASK your doctor about these medications     loperamide 2 mg capsule; Commonly known as: Imodium; TAKE 2 CAPSULES BY   MOUTH WITH  THE FIRST EPISODE OF DIARRHEA  AND 1 CAPSULE WITH ANY    ADDITIONAL EPISODES. MAXIMUM 8  CAPSULES DAILY   ondansetron 8 mg tablet; Commonly known as: Zofran; Take 1 tablet (8 mg)   by mouth every 8 hours if needed for nausea or vomiting.   potassium gluconate 595 mg (99 mg) tablet   prochlorperazine 10 mg tablet; Commonly known as: Compazine; Take 1    tablet (10 mg) by mouth every 6 hours if needed for nausea or vomiting.       Outpatient Follow-Up  Future Appointments   Date Time Provider Department Center   3/11/2025 10:15 AM Abad Linda MD [id number] [facility]   3/12/2025 11:00 AM GEN CT 1 GENCT [facility]   3/19/2025 11:40 AM Malik Gordillo MD [id number] [facility]   3/19/2025 12:00 PM INF 07 GENEVA GENSCCINF [facility]   4/15/2025  1:15 PM Naheed Clements MD [id number] [facility]       Lucille Bridges MD

## 2025-02-22 NOTE — CARE PLAN
The patient's goals for the shift include  rest.    The clinical goals for the shift include  patient will remain free from acute confusion throughout shift.     Problem: Pain - Adult  Goal: Verbalizes/displays adequate comfort level or baseline comfort level  Outcome: Progressing     Problem: Safety - Adult  Goal: Free from fall injury  Outcome: Progressing     Problem: Discharge Planning  Goal: Discharge to home or other facility with appropriate resources  Outcome: Progressing     Problem: Chronic Conditions and Co-morbidities  Goal: Patient's chronic conditions and co-morbidity symptoms are monitored and maintained or improved  Outcome: Progressing     Problem: Nutrition  Goal: Nutrient intake appropriate for maintaining nutritional needs  Outcome: Progressing

## 2025-02-22 NOTE — CARE PLAN
The patient's goals for the shift include      The clinical goals for the shift includeDischarge

## 2025-02-24 ENCOUNTER — TELEPHONE (OUTPATIENT)
Dept: PRIMARY CARE | Facility: CLINIC | Age: 80
End: 2025-02-24
Payer: MEDICARE

## 2025-02-24 NOTE — TELEPHONE ENCOUNTER
Transition of Care    Inpatient facility: [facility]   Discharge diagnosis: Lung Nodule surgery   Discharged to: home   Discharge date: 2/22/2025  Initial Call date: 2/24/2025  Spoke with patient/caregiver: patient                                                                      Do you need assistance  visits prior to your PCP visit: No  Home health care ordered: No  Have you been contacted by home care and have a start of care date: No  Are you taking medications as prescribed at discharge: Yes    Referral to [facility] Pharmacist: No  Patient advised to bring all medications to PCP follow-up appointment.  Patient advised to follow discharge instructions until provider follow-up.  TCM visit date: 3/6/2025  TCM provider visit with: NIKOLAY Clements MD

## 2025-03-03 DIAGNOSIS — K52.9 CHRONIC DIARRHEA: ICD-10-CM

## 2025-03-05 DIAGNOSIS — I63.9 CEREBROVASCULAR ACCIDENT (CVA), UNSPECIFIED MECHANISM (MULTI): Primary | ICD-10-CM

## 2025-03-05 LAB
ELECTRONICALLY SIGNED BY: NORMAL
FOCUSED SOLID TUMOR DNA/RNA RESULTS: NORMAL
LAB AP ASR DISCLAIMER: NORMAL
LAB AP BLOCK FOR ADDITIONAL STUDIES: NORMAL
LABORATORY COMMENT REPORT: NORMAL
Lab: NORMAL
PATH REPORT.COMMENTS IMP SPEC: NORMAL
PATH REPORT.FINAL DX SPEC: NORMAL
PATH REPORT.GROSS SPEC: NORMAL
PATH REPORT.RELEVANT HX SPEC: NORMAL
PATH REPORT.TOTAL CANCER: NORMAL
PATHOLOGY SYNOPTIC REPORT: NORMAL
RESIDENT REVIEW: NORMAL

## 2025-03-06 ENCOUNTER — APPOINTMENT (OUTPATIENT)
Dept: PRIMARY CARE | Facility: CLINIC | Age: 80
End: 2025-03-06
Payer: MEDICARE

## 2025-03-06 RX ORDER — PANCRELIPASE 24000; 76000; 120000 [USP'U]/1; [USP'U]/1; [USP'U]/1
CAPSULE, DELAYED RELEASE PELLETS ORAL
Qty: 300 CAPSULE | Refills: 0 | Status: SHIPPED | OUTPATIENT
Start: 2025-03-06 | End: 2025-04-07

## 2025-03-07 DIAGNOSIS — C32.0 SQUAMOUS CELL CARCINOMA OF LEFT VOCAL CORD (MULTI): ICD-10-CM

## 2025-03-07 DIAGNOSIS — I10 HYPERTENSION, UNSPECIFIED TYPE: ICD-10-CM

## 2025-03-07 DIAGNOSIS — K52.9 CHRONIC DIARRHEA: ICD-10-CM

## 2025-03-10 ENCOUNTER — APPOINTMENT (OUTPATIENT)
Dept: PRIMARY CARE | Facility: CLINIC | Age: 80
End: 2025-03-10
Payer: MEDICARE

## 2025-03-10 VITALS
HEART RATE: 63 BPM | BODY MASS INDEX: 22.17 KG/M2 | OXYGEN SATURATION: 96 % | TEMPERATURE: 97.2 F | SYSTOLIC BLOOD PRESSURE: 116 MMHG | DIASTOLIC BLOOD PRESSURE: 62 MMHG | WEIGHT: 121.2 LBS

## 2025-03-10 DIAGNOSIS — K52.1 CHEMOTHERAPY INDUCED DIARRHEA: ICD-10-CM

## 2025-03-10 DIAGNOSIS — Z85.048 HISTORY OF ANAL CANCER: ICD-10-CM

## 2025-03-10 DIAGNOSIS — Z85.3 H/O MALIGNANT NEOPLASM OF FEMALE BREAST: ICD-10-CM

## 2025-03-10 DIAGNOSIS — C32.0 SQUAMOUS CELL CARCINOMA OF LEFT VOCAL CORD (MULTI): ICD-10-CM

## 2025-03-10 DIAGNOSIS — Z85.038 HISTORY OF MALIGNANT NEOPLASM OF COLON: ICD-10-CM

## 2025-03-10 DIAGNOSIS — I10 HYPERTENSION, UNSPECIFIED TYPE: ICD-10-CM

## 2025-03-10 DIAGNOSIS — K52.9 CHRONIC DIARRHEA: ICD-10-CM

## 2025-03-10 DIAGNOSIS — N18.31 STAGE 3A CHRONIC KIDNEY DISEASE (MULTI): ICD-10-CM

## 2025-03-10 DIAGNOSIS — T45.1X5A CHEMOTHERAPY INDUCED DIARRHEA: ICD-10-CM

## 2025-03-10 DIAGNOSIS — C34.32 MALIGNANT NEOPLASM OF LOWER LOBE, LEFT BRONCHUS OR LUNG: Primary | ICD-10-CM

## 2025-03-10 PROCEDURE — 1111F DSCHRG MED/CURRENT MED MERGE: CPT | Performed by: INTERNAL MEDICINE

## 2025-03-10 PROCEDURE — 99214 OFFICE O/P EST MOD 30 MIN: CPT | Performed by: INTERNAL MEDICINE

## 2025-03-10 PROCEDURE — 3078F DIAST BP <80 MM HG: CPT | Performed by: INTERNAL MEDICINE

## 2025-03-10 PROCEDURE — 1036F TOBACCO NON-USER: CPT | Performed by: INTERNAL MEDICINE

## 2025-03-10 PROCEDURE — G2211 COMPLEX E/M VISIT ADD ON: HCPCS | Performed by: INTERNAL MEDICINE

## 2025-03-10 PROCEDURE — 3074F SYST BP LT 130 MM HG: CPT | Performed by: INTERNAL MEDICINE

## 2025-03-10 PROCEDURE — 1159F MED LIST DOCD IN RCRD: CPT | Performed by: INTERNAL MEDICINE

## 2025-03-10 PROCEDURE — 1160F RVW MEDS BY RX/DR IN RCRD: CPT | Performed by: INTERNAL MEDICINE

## 2025-03-10 RX ORDER — DIPHENOXYLATE HYDROCHLORIDE AND ATROPINE SULFATE 2.5; .025 MG/1; MG/1
1 TABLET ORAL 4 TIMES DAILY PRN
Qty: 28 TABLET | Refills: 0 | Status: SHIPPED | OUTPATIENT
Start: 2025-03-10 | End: 2025-03-17

## 2025-03-10 RX ORDER — PANTOPRAZOLE SODIUM 40 MG/1
TABLET, DELAYED RELEASE ORAL
Qty: 180 TABLET | Refills: 0 | OUTPATIENT
Start: 2025-03-10

## 2025-03-10 RX ORDER — LOSARTAN POTASSIUM 25 MG/1
25 TABLET ORAL DAILY
Qty: 90 TABLET | Refills: 0 | OUTPATIENT
Start: 2025-03-10

## 2025-03-10 RX ORDER — LOSARTAN POTASSIUM 25 MG/1
25 TABLET ORAL DAILY
Qty: 90 TABLET | Refills: 0 | Status: SHIPPED | OUTPATIENT
Start: 2025-03-10

## 2025-03-10 RX ORDER — PANTOPRAZOLE SODIUM 40 MG/1
TABLET, DELAYED RELEASE ORAL
Qty: 180 TABLET | Refills: 0 | Status: SHIPPED | OUTPATIENT
Start: 2025-03-10

## 2025-03-10 ASSESSMENT — ENCOUNTER SYMPTOMS
DIZZINESS: 1
BACK PAIN: 1

## 2025-03-10 NOTE — PROGRESS NOTES
Subjective   Patient ID: Padilla Campuzano is a 79 y.o. female who presents for Hospital Follow-up, Dizziness (When she gets up ), Procedure (2-3 weeks since surgery ), and Back Pain (Since surgery  //Left side back pain ).  Dizziness    Back Pain      Spouse / Bryson present H&P    Post hospital    Dx lung nodule        Post op sx       discussed        Oncology following    Rec's rev'd     Doing as well as can under circumstances     Chronic back pain without sciatica  Uses lidocaie patches     Hypertension stable on rx no side effects  On losartan 25 mg daily     CKD stage 3a  GFR 47  10-24     Hypomagnesemia on supplement     Follow up CT chest  CT/PET rev'd / abnormal  oncology following     Patient presented in December 2023  with laryngitis.  She was seen and evaluated in the [place] urgent care.  Diagnosed with laryngeal cancer  Voice has still not returned.  ENT following  Had XRT, chemo  Now receiving infusions     H/o lung nodule on CT 2-23  Bx negative  PET scan worse  h/o smoker     Chronic diarrhea on creon     History of breast cancer  mammogram ordered     History of colon cancer     History of anal cancer  Chronic diarrhea on rx     History of cerebral hemorrhage 2012.     Diet and exercise reviewed    Review of Systems   Musculoskeletal:  Positive for back pain.   Neurological:  Positive for dizziness.   All other systems reviewed and are negative.      Objective   /62   Pulse 63   Temp 36.2 °C (97.2 °F)   Wt 55 kg (121 lb 3.2 oz)   SpO2 96%   BMI 22.17 kg/m²   Lab Results   Component Value Date    WBC 9.6 02/21/2025    HGB 10.1 (L) 02/21/2025    HCT 29.6 (L) 02/21/2025     02/21/2025    CHOL 123 05/01/2024    TRIG 130 05/01/2024    HDL 44.8 05/01/2024    ALT 5 (L) 12/11/2024    AST 15 12/11/2024     02/21/2025    K 4.1 02/21/2025     02/21/2025    CREATININE 1.24 (H) 02/21/2025    BUN 27 (H) 02/21/2025    CO2 23 02/21/2025    TSH 4.06 (H) 06/21/2024    INR 1.1 07/31/2024     JEAN-PIERRE 80 (H) 01/10/2023           Physical Exam  Vitals reviewed.   Constitutional:       Appearance: Normal appearance. She is normal weight.   HENT:      Head: Normocephalic and atraumatic.      Mouth/Throat:      Pharynx: No posterior oropharyngeal erythema.   Eyes:      General: No scleral icterus.     Conjunctiva/sclera: Conjunctivae normal.      Pupils: Pupils are equal, round, and reactive to light.   Cardiovascular:      Rate and Rhythm: Normal rate and regular rhythm.      Heart sounds: Normal heart sounds.   Pulmonary:      Effort: No respiratory distress.      Breath sounds: No wheezing.   Abdominal:      General: Abdomen is flat. Bowel sounds are normal. There is no distension.      Palpations: Abdomen is soft. There is no mass.      Tenderness: There is no abdominal tenderness. There is no rebound.   Musculoskeletal:         General: Normal range of motion.      Cervical back: Normal range of motion and neck supple.   Skin:     General: Skin is warm and dry.   Neurological:      General: No focal deficit present.      Mental Status: She is alert and oriented to person, place, and time. Mental status is at baseline.   Psychiatric:         Mood and Affect: Mood normal.         Behavior: Behavior normal.         Thought Content: Thought content normal.         Judgment: Judgment normal.         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    History of malignant neoplasm of colon     Z85.038    H/O malignant neoplasm of female breast     Z85.3    Hypertension,  unspecified type     I10    Relevant Medications    losartan (Cozaar) 25 mg tablet    Stage 3a chronic kidney disease (Multi)     N18.31    BMI 22.0-22.9, adult     Z68.22          Assessment/Plan   Spouse / Bryson present H&P    Post hospital    Dx lung nodule adenocarcinoma left        Post op sx       discussed        Oncology following    Rec's rev'd     Doing as well as can under circumstances     Chronic back pain without sciatica  Uses lidocaie patches     Hypertension stable on rx no side effects  On losartan 25 mg daily     CKD stage 3a  GFR 47  10-24     Hypomagnesemia on supplement     Patient presented in December 2023  with laryngitis.  She was seen and evaluated in the [place] urgent care.  Diagnosed with laryngeal cancer  Voice has still not returned.  ENT following  Had XRT, chemo  Now receiving infusions     H/o lung nodule on CT 2-23  Bx negative  PET scan worse  h/o smoker     Chronic diarrhea on creon     History of breast cancer  mammogram ordered     History of colon cancer     History of anal cancer  Chronic diarrhea on rx     History of cerebral hemorrhage 2012.     Diet and exercise reviewed    Mammogram  11-21 / ordered not done  Dexa n/a  Colonoscopy n/a  CT chest lung cancer screening n/a  GYN n/a  immunizations rev'd RSV, shingrix, pneumo  BMI 22.1    Follow up as scheduled  / medicare physical

## 2025-03-11 ENCOUNTER — OFFICE VISIT (OUTPATIENT)
Dept: CARDIAC SURGERY | Facility: CLINIC | Age: 80
End: 2025-03-11
Payer: MEDICARE

## 2025-03-11 VITALS
OXYGEN SATURATION: 96 % | DIASTOLIC BLOOD PRESSURE: 69 MMHG | HEIGHT: 62 IN | RESPIRATION RATE: 18 BRPM | BODY MASS INDEX: 22.26 KG/M2 | WEIGHT: 121 LBS | HEART RATE: 72 BPM | SYSTOLIC BLOOD PRESSURE: 125 MMHG

## 2025-03-11 DIAGNOSIS — C34.32 ADENOCARCINOMA OF LOWER LOBE OF LEFT LUNG (MULTI): Primary | ICD-10-CM

## 2025-03-11 PROCEDURE — 1125F AMNT PAIN NOTED PAIN PRSNT: CPT | Performed by: PHYSICIAN ASSISTANT

## 2025-03-11 PROCEDURE — 99213 OFFICE O/P EST LOW 20 MIN: CPT | Performed by: PHYSICIAN ASSISTANT

## 2025-03-11 PROCEDURE — 1036F TOBACCO NON-USER: CPT | Performed by: PHYSICIAN ASSISTANT

## 2025-03-11 PROCEDURE — 1111F DSCHRG MED/CURRENT MED MERGE: CPT | Performed by: PHYSICIAN ASSISTANT

## 2025-03-11 PROCEDURE — 1159F MED LIST DOCD IN RCRD: CPT | Performed by: PHYSICIAN ASSISTANT

## 2025-03-11 ASSESSMENT — LIFESTYLE VARIABLES
HOW OFTEN DO YOU HAVE A DRINK CONTAINING ALCOHOL: NEVER
AUDIT-C TOTAL SCORE: 0
SKIP TO QUESTIONS 9-10: 1
HOW MANY STANDARD DRINKS CONTAINING ALCOHOL DO YOU HAVE ON A TYPICAL DAY: PATIENT DOES NOT DRINK
HOW OFTEN DO YOU HAVE SIX OR MORE DRINKS ON ONE OCCASION: NEVER

## 2025-03-11 ASSESSMENT — PAIN SCALES - GENERAL: PAINLEVEL_OUTOF10: 2

## 2025-03-11 ASSESSMENT — ENCOUNTER SYMPTOMS
LOSS OF SENSATION IN FEET: 0
OCCASIONAL FEELINGS OF UNSTEADINESS: 0
DEPRESSION: 0

## 2025-03-11 NOTE — ADDENDUM NOTE
Addended by: FREDERICK READ on: 3/10/2025 09:24 PM     Modules accepted: Orders, Level of Service

## 2025-03-11 NOTE — PROGRESS NOTES
CARDIOTHORACIC SURGERY FOLLOW-UP PROGRESS NOTE    Subjective   Patient is a 79 y.o. female who presents for routine post-operative follow up. She comes in today complaining of nothing. Her activity level has been good.       Objective   Physical Exam  There were no vitals taken for this visit.  Heart: regular rate and rhythm, no murmurs  Lungs: clear to auscultation bilaterally  Extremities: warm and well-perfused, peripheral pulses intact, no cyanosis, clubbing, or edema  Incision(s):  Healing well. No signs of infection    Data Review:    X-ray: Patient is having a CT scan of chest tomorrow. We will review tomorrow.    Assessment/Plan   There are no diagnoses linked to this encounter.  Patient presents for her post op appointment. She is s/p robot assisted LLL wedge resection with lymph node sampling on 2/20/25. She is doing well. No issues identified. Her pathology was positive for adenocarcinoma with out lymphnode mets and clear margins 2.3cm . T1c pN0.  We will follow in 6 months with a CT chest.  Isha Calvert PA-C

## 2025-03-12 ENCOUNTER — HOSPITAL ENCOUNTER (OUTPATIENT)
Dept: RADIOLOGY | Facility: HOSPITAL | Age: 80
Discharge: HOME | End: 2025-03-12
Payer: MEDICARE

## 2025-03-12 DIAGNOSIS — R91.1 LUNG NODULE: ICD-10-CM

## 2025-03-12 DIAGNOSIS — C32.9 LARYNGEAL CANCER (MULTI): ICD-10-CM

## 2025-03-12 PROCEDURE — 71250 CT THORAX DX C-: CPT

## 2025-03-13 DIAGNOSIS — R19.7 DIARRHEA, UNSPECIFIED TYPE: ICD-10-CM

## 2025-03-13 RX ORDER — CHOLESTYRAMINE 4 G/9G
POWDER, FOR SUSPENSION ORAL
Qty: 756 G | Refills: 0 | Status: SHIPPED | OUTPATIENT
Start: 2025-03-13 | End: 2025-06-11

## 2025-03-13 NOTE — DOCUMENTATION CLARIFICATION NOTE
PATIENT:               RONALD LENNON  ACCT #:                  [id number]  MRN:                       [id number]  :                       1945  ADMIT DATE:       2025 8:21 AM  DISCH DATE:        2025 4:15 PM  RESPONDING PROVIDER #:        [id number]          PROVIDER RESPONSE TEXT:    I concur with the pathology report findings and they are clinically significant    CDI QUERY TEXT:    Clarification    Instruction:    Based on your assessment of the patient and the clinical information, please provide the requested documentation by clicking on the appropriate radio button and enter any additional information if prompted.    Question: Please document whether you concur or do not concur with the pathology report findings    When answering this query, please exercise your independent professional judgment. The fact that a question is being asked, does not imply that any particular answer is desired or expected.    The patient's clinical indicators include:  Clinical Information: Pt is a 79 y.o. female who presented with a lung nodule.    Clinical Indicators and Pathology Findings: Pt is s/p left lower lobe wedge resection and mediastinal lymphadenectomy on 25. Path findings 3/5/25: ?A. left lung, lower lobe, wedge resection:  - Invasive adenocarcinoma, papillary and micropapillary patterns (2.3 cm), involving lung parenchyma.?    Treatment: biopsy    Risk Factors: hx of malignancy  Options provided:  -- I concur with the pathology report findings and they are clinically significant  -- I do not concur with the pathology report findings  -- Other - I will add my own diagnosis  -- Refer to Clinical Documentation Reviewer    Query created by: Candida Valverde on 3/13/2025 4:06 PM      Electronically signed by:  LACI JIMÉNEZ MD 3/13/2025 4:49 PM

## 2025-03-17 DIAGNOSIS — Z85.038 HISTORY OF COLON CANCER: Primary | ICD-10-CM

## 2025-03-17 DIAGNOSIS — K52.9 CHRONIC DIARRHEA: ICD-10-CM

## 2025-03-17 RX ORDER — LOPERAMIDE HYDROCHLORIDE 2 MG/1
CAPSULE ORAL
Qty: 240 CAPSULE | Refills: 0 | Status: SHIPPED | OUTPATIENT
Start: 2025-03-17

## 2025-03-19 ENCOUNTER — OFFICE VISIT (OUTPATIENT)
Dept: HEMATOLOGY/ONCOLOGY | Facility: HOSPITAL | Age: 80
End: 2025-03-19
Payer: MEDICARE

## 2025-03-19 ENCOUNTER — APPOINTMENT (OUTPATIENT)
Dept: HEMATOLOGY/ONCOLOGY | Facility: HOSPITAL | Age: 80
End: 2025-03-19
Payer: MEDICARE

## 2025-03-19 VITALS
SYSTOLIC BLOOD PRESSURE: 125 MMHG | DIASTOLIC BLOOD PRESSURE: 66 MMHG | WEIGHT: 121.61 LBS | TEMPERATURE: 96.4 F | RESPIRATION RATE: 16 BRPM | HEART RATE: 69 BPM | HEIGHT: 62 IN | OXYGEN SATURATION: 97 % | BODY MASS INDEX: 22.38 KG/M2

## 2025-03-19 DIAGNOSIS — C32.9 LARYNGEAL CANCER (MULTI): ICD-10-CM

## 2025-03-19 DIAGNOSIS — R91.1 LUNG NODULE: ICD-10-CM

## 2025-03-19 DIAGNOSIS — Z98.890 HISTORY OF LUNG SURGERY: ICD-10-CM

## 2025-03-19 DIAGNOSIS — C32.9 LARYNGEAL CANCER (MULTI): Primary | ICD-10-CM

## 2025-03-19 LAB
ALBUMIN SERPL BCP-MCNC: 3.3 G/DL (ref 3.4–5)
ALP SERPL-CCNC: 62 U/L (ref 33–136)
ALT SERPL W P-5'-P-CCNC: 5 U/L (ref 7–45)
ANION GAP SERPL CALC-SCNC: 11 MMOL/L (ref 10–20)
AST SERPL W P-5'-P-CCNC: 12 U/L (ref 9–39)
BASOPHILS # BLD AUTO: 0.03 X10*3/UL (ref 0–0.1)
BASOPHILS NFR BLD AUTO: 0.5 %
BILIRUB SERPL-MCNC: 0.3 MG/DL (ref 0–1.2)
BUN SERPL-MCNC: 24 MG/DL (ref 6–23)
CALCIUM SERPL-MCNC: 8.6 MG/DL (ref 8.6–10.3)
CHLORIDE SERPL-SCNC: 109 MMOL/L (ref 98–107)
CO2 SERPL-SCNC: 25 MMOL/L (ref 21–32)
CREAT SERPL-MCNC: 1.25 MG/DL (ref 0.5–1.05)
EGFRCR SERPLBLD CKD-EPI 2021: 44 ML/MIN/1.73M*2
EOSINOPHIL # BLD AUTO: 0.11 X10*3/UL (ref 0–0.4)
EOSINOPHIL NFR BLD AUTO: 1.9 %
ERYTHROCYTE [DISTWIDTH] IN BLOOD BY AUTOMATED COUNT: 12.8 % (ref 11.5–14.5)
ERYTHROCYTE [SEDIMENTATION RATE] IN BLOOD BY WESTERGREN METHOD: 3 MM/H (ref 0–30)
FERRITIN SERPL-MCNC: 137 NG/ML (ref 8–150)
FOLATE SERPL-MCNC: 14.9 NG/ML
GLUCOSE SERPL-MCNC: 89 MG/DL (ref 74–99)
HCT VFR BLD AUTO: 33.4 % (ref 36–46)
HGB BLD-MCNC: 10.8 G/DL (ref 12–16)
IMM GRANULOCYTES # BLD AUTO: 0.01 X10*3/UL (ref 0–0.5)
IMM GRANULOCYTES NFR BLD AUTO: 0.2 % (ref 0–0.9)
IRON SATN MFR SERPL: 29 % (ref 25–45)
IRON SERPL-MCNC: 82 UG/DL (ref 35–150)
LYMPHOCYTES # BLD AUTO: 1.64 X10*3/UL (ref 0.8–3)
LYMPHOCYTES NFR BLD AUTO: 29 %
MCH RBC QN AUTO: 32.8 PG (ref 26–34)
MCHC RBC AUTO-ENTMCNC: 32.3 G/DL (ref 32–36)
MCV RBC AUTO: 102 FL (ref 80–100)
MONOCYTES # BLD AUTO: 0.57 X10*3/UL (ref 0.05–0.8)
MONOCYTES NFR BLD AUTO: 10.1 %
NEUTROPHILS # BLD AUTO: 3.3 X10*3/UL (ref 1.6–5.5)
NEUTROPHILS NFR BLD AUTO: 58.3 %
NRBC BLD-RTO: 0 /100 WBCS (ref 0–0)
PLATELET # BLD AUTO: 246 X10*3/UL (ref 150–450)
POTASSIUM SERPL-SCNC: 3.8 MMOL/L (ref 3.5–5.3)
PROT SERPL-MCNC: 6.4 G/DL (ref 6.4–8.2)
RBC # BLD AUTO: 3.29 X10*6/UL (ref 4–5.2)
SODIUM SERPL-SCNC: 141 MMOL/L (ref 136–145)
TIBC SERPL-MCNC: 285 UG/DL (ref 240–445)
UIBC SERPL-MCNC: 203 UG/DL (ref 110–370)
VIT B12 SERPL-MCNC: 241 PG/ML (ref 211–911)
WBC # BLD AUTO: 5.7 X10*3/UL (ref 4.4–11.3)

## 2025-03-19 PROCEDURE — 80053 COMPREHEN METABOLIC PANEL: CPT | Performed by: INTERNAL MEDICINE

## 2025-03-19 PROCEDURE — 85652 RBC SED RATE AUTOMATED: CPT | Performed by: INTERNAL MEDICINE

## 2025-03-19 PROCEDURE — 82746 ASSAY OF FOLIC ACID SERUM: CPT | Mod: GENLAB | Performed by: INTERNAL MEDICINE

## 2025-03-19 PROCEDURE — 1125F AMNT PAIN NOTED PAIN PRSNT: CPT | Performed by: INTERNAL MEDICINE

## 2025-03-19 PROCEDURE — G2211 COMPLEX E/M VISIT ADD ON: HCPCS | Performed by: INTERNAL MEDICINE

## 2025-03-19 PROCEDURE — 82728 ASSAY OF FERRITIN: CPT | Performed by: INTERNAL MEDICINE

## 2025-03-19 PROCEDURE — 1159F MED LIST DOCD IN RCRD: CPT | Performed by: INTERNAL MEDICINE

## 2025-03-19 PROCEDURE — 99215 OFFICE O/P EST HI 40 MIN: CPT | Performed by: INTERNAL MEDICINE

## 2025-03-19 PROCEDURE — 2500000004 HC RX 250 GENERAL PHARMACY W/ HCPCS (ALT 636 FOR OP/ED): Performed by: NURSE PRACTITIONER

## 2025-03-19 PROCEDURE — 96523 IRRIG DRUG DELIVERY DEVICE: CPT

## 2025-03-19 PROCEDURE — 83550 IRON BINDING TEST: CPT | Performed by: INTERNAL MEDICINE

## 2025-03-19 PROCEDURE — 82607 VITAMIN B-12: CPT | Mod: GENLAB | Performed by: INTERNAL MEDICINE

## 2025-03-19 PROCEDURE — 85025 COMPLETE CBC W/AUTO DIFF WBC: CPT | Performed by: INTERNAL MEDICINE

## 2025-03-19 PROCEDURE — 1111F DSCHRG MED/CURRENT MED MERGE: CPT | Performed by: INTERNAL MEDICINE

## 2025-03-19 PROCEDURE — 36591 DRAW BLOOD OFF VENOUS DEVICE: CPT

## 2025-03-19 RX ORDER — HEPARIN 100 UNIT/ML
500 SYRINGE INTRAVENOUS AS NEEDED
Status: DISCONTINUED | OUTPATIENT
Start: 2025-03-19 | End: 2025-03-19 | Stop reason: HOSPADM

## 2025-03-19 RX ORDER — HEPARIN SODIUM,PORCINE/PF 10 UNIT/ML
50 SYRINGE (ML) INTRAVENOUS AS NEEDED
OUTPATIENT
Start: 2025-03-19

## 2025-03-19 RX ORDER — HEPARIN 100 UNIT/ML
500 SYRINGE INTRAVENOUS AS NEEDED
OUTPATIENT
Start: 2025-03-19

## 2025-03-19 RX ADMIN — HEPARIN 500 UNITS: 100 SYRINGE at 12:45

## 2025-03-19 ASSESSMENT — ENCOUNTER SYMPTOMS
CARDIOVASCULAR NEGATIVE: 1
RESPIRATORY NEGATIVE: 1
FEVER: 0
GASTROINTESTINAL NEGATIVE: 1
FATIGUE: 1

## 2025-03-19 ASSESSMENT — PAIN SCALES - GENERAL: PAINLEVEL_OUTOF10: 3

## 2025-03-19 NOTE — PROGRESS NOTES
Patient ID: Padilla Campuzano is a 79 y.o. female.    Subjective:  Returns for follow up for laryngeal and lung cancers.   Feels very tired since lung surgery last month. Dyspnea worse somewhat.      Heme/Onc History:  Past cancer:  Early stage colon cancer in 2001, s/p surgery alone  Breast cancer in 2012. S/p surgery, chemo, and radiation. Had significant side effects due to chemo. Had not received hormonal therapy  Anal cancer in 2017. S/p Tru protocol  Laryngeal cancer in 2024. See below.  Lung cancer in 2025. See below    - p/w hoarseness in early 2024. Found to have a vocal cord mass. Scans showed a LLL lung nodule.   - PET/CT (Apr 2024): Uptake in LLL lung nodule. 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 Bx: SCC  - Started chemoRT with carbo/taxol (Jul - Aug 2024): After 2 doses fo chemo, could not tolerate it and declined further chemo then continued with weekly cetuximab  - CT Chest (Dec 2024): LLL lung nodule increased in size again => PET/CT in Jan 2025 showed progression and increased uptake in LLL lung nodule  - LLL lobectomy (Feb 2025): 2.3 cm, papillary adenoCa. Margins neg. 0/4 lymph nodes. TTF-1 and CK7 positive. PD-L1 90%.   - Did not require adjuvant treatment    Assessment/Plan:  ? Laryngeal cancer: s/p chemoRT with carbo/taxol then cetuximab. Completed therapy in August. Doing better now. CT Neck does not show sign of recurrence. Direct laryngoscopy in Jan and Feb 2025 are negative. Will follow up with ENT.    ? Lung cancer: Slowly growing lung nodule resected in Feb 2025 => [id number]. As per NCCN, does not require adjuvant treatment. She had not tolerated chemotherapy before either. In case of recurrence in future, she would likely benefit from immunotherapy considering 90% PD-L1 positivity in tumor.     For now, will continue with surveillance.  "Dr. Linda planning CT chest in summer 2025.     Fatigue: Will check labs today.     Review Of Systems:  Review of Systems   Constitutional:  Positive for fatigue. Negative for fever.   HENT:  Negative.     Respiratory: Negative.     Cardiovascular: Negative.    Gastrointestinal: Negative.        Physical Exam:  /66 (BP Location: Left arm, Patient Position: Sitting, BP Cuff Size: Adult)   Pulse 69   Temp 35.8 °C (96.4 °F) (Temporal)   Resp 16   Ht 1.575 m (5' 2\")   Wt 55.2 kg (121 lb 9.7 oz)   SpO2 97%   BMI 22.24 kg/m²   BSA: 1.55 meters squared  Performance Status: Asymptomatic  Physical Exam  HENT:      Head: Normocephalic and atraumatic.   Eyes:      General: No scleral icterus.  Pulmonary:      Effort: Pulmonary effort is normal.   Musculoskeletal:         General: Normal range of motion.      Cervical back: No rigidity or tenderness.   Lymphadenopathy:      Cervical: No cervical adenopathy.   Skin:     Coloration: Skin is not jaundiced.   Neurological:      General: No focal deficit present.      Mental Status: She is alert and oriented to person, place, and time.         Results:  Diagnostic Results   Lab Results   Component Value Date    WBC 5.7 03/19/2025    HGB 10.8 (L) 03/19/2025    HCT 33.4 (L) 03/19/2025     (H) 03/19/2025     03/19/2025     Lab Results   Component Value Date    CALCIUM 8.6 02/21/2025     02/21/2025    K 4.1 02/21/2025    CO2 23 02/21/2025     02/21/2025    BUN 27 (H) 02/21/2025    CREATININE 1.24 (H) 02/21/2025    ALT 5 (L) 12/11/2024    AST 15 12/11/2024       Current Outpatient Medications:     acetaminophen (Tylenol) 325 mg tablet, Take 2 tablets (650 mg) by mouth every 6 hours if needed for mild pain (1 - 3)., Disp: 40 tablet, Rfl: 0    acetaminophen (Tylenol) 500 mg tablet, Take 2 tablets (1,000 mg) by mouth every 6 hours if needed for mild pain (1 - 3)., Disp: , Rfl:     biotin 5 mg capsule, Take 1 capsule (5 mg) by mouth once every 24 " hours., Disp: , Rfl:     cholecalciferol (Vitamin D-3) 50 mcg (2,000 unit) capsule, 1 capsule (50 mcg) once every 24 hours., Disp: , Rfl:     cholestyramine (Questran) 4 gram powder, MIX 1 SCOOP WITH AT LEAST 2 TO 6 OUNCES LIQUID AND DRINK ONCE  DAILY, Disp: 756 g, Rfl: 0    cyclobenzaprine (Flexeril) 5 mg tablet, Take 1 tablet (5 mg) by mouth 3 times a day., Disp: 20 tablet, Rfl: 0    diphenoxylate-atropine (LomotiL) 2.5-0.025 mg tablet, Take 1 tablet by mouth 4 times a day as needed for diarrhea for up to 7 days., Disp: 28 tablet, Rfl: 0    lidocaine (Lidoderm) 5 % patch, Place 1 patch over 12 hours on the skin once daily. Remove & discard patch within 12 hours or as directed by MD., Disp: 90 patch, Rfl: 0    lipase-protease-amylase (Creon) 24,000-76,000 -120,000 unit capsule, TAKE 3 CAPSULES BY MOUTH 3 TIMES DAILY WITH MEALS AND 1 TO 2  CAPSULES BY MOUTH WITH SNACKS.  MAX 13 CAPSULE DAILY, Disp: 300 capsule, Rfl: 0    loperamide (Imodium) 2 mg capsule, TAKE 2 CAPSULES BY MOUTH WITH  THE FIRST EPISODE OF DIARRHEA  AND 1 CAPSULE BY MOUTH WITH ANY  ADDITIONAL EPISODES. MAXIMUM 8  CAPSULES BY MOUTH DAILY, Disp: 240 capsule, Rfl: 0    losartan (Cozaar) 25 mg tablet, Take 1 tablet (25 mg) by mouth once daily., Disp: 90 tablet, Rfl: 0    magnesium oxide (Mag-Ox) 400 mg (241.3 mg magnesium) tablet, Take 1 tablet (400 mg) by mouth once daily., Disp: 90 tablet, Rfl: 0    ondansetron (Zofran) 8 mg tablet, Take 1 tablet (8 mg) by mouth every 8 hours if needed for nausea or vomiting., Disp: 30 tablet, Rfl: 5    pantoprazole (ProtoNix) 40 mg EC tablet, TAKE 1 TABLET BY MOUTH TWICE  DAILY, Disp: 180 tablet, Rfl: 0    prochlorperazine (Compazine) 10 mg tablet, Take 1 tablet (10 mg) by mouth every 6 hours if needed for nausea or vomiting., Disp: 30 tablet, Rfl: 5    potassium chloride (Klor-Con) 20 mEq packet, Take 20 mEq by mouth once daily. (Patient not taking: Reported on 3/19/2025), Disp: 30 packet, Rfl: 0    potassium  gluconate 595 mg (99 mg) tablet, Take 1 tablet by mouth once daily. (Patient not taking: Reported on 3/19/2025), Disp: , Rfl:   No current facility-administered medications for this visit.    Facility-Administered Medications Ordered in Other Visits:     alteplase (Cathflo Activase) injection 2 mg, 2 mg, intra-catheter, PRN, Megan L Boggins, APRN-CNP    alteplase (Cathflo Activase) injection 2 mg, 2 mg, intra-catheter, PRN, Megan L Boggins, APRN-CNP    heparin flush 10 unit/mL syringe 50 Units, 50 Units, intra-catheter, PRN, Megan L Boggins, APRN-CNP    heparin flush 100 unit/mL syringe 500 Units, 500 Units, intra-catheter, PRN, Megan L Boggins, APRN-CNP, 500 Units at 08/20/24 1510    heparin flush 100 unit/mL syringe 500 Units, 500 Units, intra-catheter, PRN, Megan L Boggins, APRN-CNP, 500 Units at 03/19/25 1245     Past Surgical History:   Procedure Laterality Date    BI US GUIDED BREAST RIGHT CYST ASPIRATION Right 12/31/2019    BI BREAST CYST ASPIRATION RIGHT [facility]    BOWEL RESECTION  2001    large bowel resect for colon cancer    BREAST LUMPECTOMY  12/2012    Breast Surgery Lumpectomy    CHOLECYSTECTOMY  09/11/2014    CT GUIDED IMAGING FOR ABSCESS DRAIN  07/08/2015    CT GUIDED IMAGING FOR ABSCESS DRAIN [facility]    HYSTERECTOMY  1984    Hysterectomy    ILEOSTOMY  2014    Ileostomy    ILEOSTOMY CLOSURE  11/13/2015    Ileostomy Closure    OTHER SURGICAL HISTORY  2012    Cerebral artery coiling     Family History   Problem Relation Name Age of Onset    Colon cancer Mother      Liver cancer Mother      Kidney cancer Mother      Heart attack Father      Blood clot Father      Cancer Brother      Cancer Brother        reports that she quit smoking about 14 years ago. Her smoking use included cigarettes. She has been exposed to tobacco smoke. She has never used smokeless tobacco.    Diagnoses and all orders for this visit:  Laryngeal cancer (Multi)  -     Clinic Appointment  Request  -     CBC and Auto Differential; Future  -     Comprehensive Metabolic Panel; Future  -     Sedimentation Rate; Future  -     Vitamin B12; Future  -     Ferritin; Future  -     Folate; Future  -     Iron and TIBC; Future  -     Clinic Appointment Request; Future  -     CBC and Auto Differential; Future  -     Comprehensive Metabolic Panel; Future  Lung nodule  -     Clinic Appointment Request  -     CBC and Auto Differential; Future  -     Comprehensive Metabolic Panel; Future  -     Sedimentation Rate; Future  -     Vitamin B12; Future  -     Ferritin; Future  -     Folate; Future  -     Iron and TIBC; Future  -     Clinic Appointment Request; Future  -     CBC and Auto Differential; Future  -     Comprehensive Metabolic Panel; Future       I spent more than 40 minutes for the patient today, including face-to-face conversation, pre-visit preparation, post-visit orders, and others.   Malik Gordillo MD

## 2025-03-25 DIAGNOSIS — K52.9 CHRONIC DIARRHEA: ICD-10-CM

## 2025-03-25 RX ORDER — PANCRELIPASE 24000; 76000; 120000 [USP'U]/1; [USP'U]/1; [USP'U]/1
CAPSULE, DELAYED RELEASE PELLETS ORAL
Qty: 300 CAPSULE | Refills: 0 | Status: SHIPPED | OUTPATIENT
Start: 2025-03-25

## 2025-04-09 DIAGNOSIS — G89.29 CHRONIC MIDLINE LOW BACK PAIN WITHOUT SCIATICA: ICD-10-CM

## 2025-04-09 DIAGNOSIS — K52.9 CHRONIC DIARRHEA: ICD-10-CM

## 2025-04-09 DIAGNOSIS — M54.50 CHRONIC MIDLINE LOW BACK PAIN WITHOUT SCIATICA: ICD-10-CM

## 2025-04-09 RX ORDER — LIDOCAINE 50 MG/G
PATCH TOPICAL
Qty: 30 PATCH | Refills: 0 | Status: SHIPPED | OUTPATIENT
Start: 2025-04-09

## 2025-04-09 RX ORDER — PANCRELIPASE 24000; 76000; 120000 [USP'U]/1; [USP'U]/1; [USP'U]/1
CAPSULE, DELAYED RELEASE PELLETS ORAL
Qty: 300 CAPSULE | Refills: 0 | Status: SHIPPED | OUTPATIENT
Start: 2025-04-09

## 2025-04-15 ENCOUNTER — APPOINTMENT (OUTPATIENT)
Dept: PRIMARY CARE | Facility: CLINIC | Age: 80
End: 2025-04-15
Payer: MEDICARE

## 2025-04-15 VITALS
SYSTOLIC BLOOD PRESSURE: 154 MMHG | BODY MASS INDEX: 22.12 KG/M2 | WEIGHT: 120.2 LBS | OXYGEN SATURATION: 96 % | HEART RATE: 62 BPM | DIASTOLIC BLOOD PRESSURE: 70 MMHG | TEMPERATURE: 96.6 F | HEIGHT: 62 IN

## 2025-04-15 DIAGNOSIS — C50.919 MALIGNANT NEOPLASM OF FEMALE BREAST, UNSPECIFIED ESTROGEN RECEPTOR STATUS, UNSPECIFIED LATERALITY, UNSPECIFIED SITE OF BREAST: ICD-10-CM

## 2025-04-15 DIAGNOSIS — E55.9 VITAMIN D DEFICIENCY: ICD-10-CM

## 2025-04-15 DIAGNOSIS — E11.8 CONTROLLED TYPE 2 DIABETES MELLITUS WITH COMPLICATION, WITHOUT LONG-TERM CURRENT USE OF INSULIN (MULTI): Primary | ICD-10-CM

## 2025-04-15 DIAGNOSIS — E78.00 HYPERCHOLESTEREMIA: ICD-10-CM

## 2025-04-15 DIAGNOSIS — C34.32 MALIGNANT NEOPLASM OF LOWER LOBE, LEFT BRONCHUS OR LUNG: ICD-10-CM

## 2025-04-15 DIAGNOSIS — E83.42 HYPOMAGNESEMIA: Primary | ICD-10-CM

## 2025-04-15 DIAGNOSIS — Z85.038 HISTORY OF COLON CANCER: ICD-10-CM

## 2025-04-15 DIAGNOSIS — Z85.048 HISTORY OF ANAL CANCER: ICD-10-CM

## 2025-04-15 DIAGNOSIS — C32.9 LARYNGEAL CANCER (MULTI): ICD-10-CM

## 2025-04-15 DIAGNOSIS — I10 HYPERTENSION, UNSPECIFIED TYPE: ICD-10-CM

## 2025-04-15 DIAGNOSIS — K52.9 CHRONIC DIARRHEA: ICD-10-CM

## 2025-04-15 DIAGNOSIS — Z00.00 ENCOUNTER FOR SUBSEQUENT ANNUAL WELLNESS VISIT (AWV) IN MEDICARE PATIENT: Primary | ICD-10-CM

## 2025-04-15 DIAGNOSIS — N18.31 STAGE 3A CHRONIC KIDNEY DISEASE (MULTI): ICD-10-CM

## 2025-04-15 PROCEDURE — 1124F ACP DISCUSS-NO DSCNMKR DOCD: CPT | Performed by: INTERNAL MEDICINE

## 2025-04-15 PROCEDURE — G2211 COMPLEX E/M VISIT ADD ON: HCPCS | Performed by: INTERNAL MEDICINE

## 2025-04-15 PROCEDURE — 3078F DIAST BP <80 MM HG: CPT | Performed by: INTERNAL MEDICINE

## 2025-04-15 PROCEDURE — 1159F MED LIST DOCD IN RCRD: CPT | Performed by: INTERNAL MEDICINE

## 2025-04-15 PROCEDURE — G0439 PPPS, SUBSEQ VISIT: HCPCS | Performed by: INTERNAL MEDICINE

## 2025-04-15 PROCEDURE — 1160F RVW MEDS BY RX/DR IN RCRD: CPT | Performed by: INTERNAL MEDICINE

## 2025-04-15 PROCEDURE — 3077F SYST BP >= 140 MM HG: CPT | Performed by: INTERNAL MEDICINE

## 2025-04-15 PROCEDURE — 1170F FXNL STATUS ASSESSED: CPT | Performed by: INTERNAL MEDICINE

## 2025-04-15 PROCEDURE — 99214 OFFICE O/P EST MOD 30 MIN: CPT | Performed by: INTERNAL MEDICINE

## 2025-04-15 PROCEDURE — 1036F TOBACCO NON-USER: CPT | Performed by: INTERNAL MEDICINE

## 2025-04-15 RX ORDER — PANCRELIPASE 24000; 76000; 120000 [USP'U]/1; [USP'U]/1; [USP'U]/1
CAPSULE, DELAYED RELEASE PELLETS ORAL
Qty: 810 CAPSULE | Refills: 0 | Status: SHIPPED | OUTPATIENT
Start: 2025-04-15

## 2025-04-15 ASSESSMENT — ENCOUNTER SYMPTOMS
LOSS OF SENSATION IN FEET: 0
OCCASIONAL FEELINGS OF UNSTEADINESS: 0
DEPRESSION: 0

## 2025-04-15 ASSESSMENT — ACTIVITIES OF DAILY LIVING (ADL)
GROCERY_SHOPPING: INDEPENDENT
MANAGING_FINANCES: INDEPENDENT
DRESSING: INDEPENDENT
BATHING: INDEPENDENT
DOING_HOUSEWORK: INDEPENDENT
TAKING_MEDICATION: INDEPENDENT

## 2025-04-15 NOTE — PROGRESS NOTES
"Subjective   Reason for Visit: Padilla Campuzano is an 79 y.o. female here for a Medicare Wellness visit and follow up    Past Medical, Surgical, and Family History reviewed and updated in chart.    Reviewed all medications by prescribing practitioner or clinical pharmacist (such as prescriptions, OTCs, herbal therapies and supplements) and documented in the medical record.    HPI    Medicare wellness    Follow up    Spouse / Bryson present H&P    lung nodule adenocarcinoma left removed  Oncology following     Doing as well as can under circumstances     Chronic back pain without sciatica  Uses lidocaie patches     Hypertension stable on rx no side effects  On losartan 25 mg daily     CKD stage 3a  GFR 47  10-24     Hypomagnesemia on supplement     Patient presented in December 2023  with laryngitis.  She was seen and evaluated in the [place] urgent care.  Diagnosed with laryngeal cancer  Voice has still not returned.  ENT following  Had XRT, chemo  Now receiving infusions     H/o lung nodule on CT 2-23  Bx negative  PET scan worse  h/o smoker     Chronic diarrhea on creon     History of breast cancer  mammogram ordered     History of colon cancer     History of anal cancer  Chronic diarrhea on rx     History of cerebral hemorrhage 2012.     Diet and exercise reviewed    Patient Care Team:  Naheed Clements MD as PCP - General (Internal Medicine)  DO Carroll Calabrese MD as Consulting Physician (Hematology and Oncology)  Kyunghee Burkitt, DO as Consulting Physician (Hematology and Oncology)  Eden Moss MD as Medical Oncologist (Hematology and Oncology)  Malik Gordillo MD as Medical Oncologist (Hematology and Oncology)     Review of Systems   All other systems reviewed and are negative.      Objective   Vitals:  /70   Pulse 62   Temp 35.9 °C (96.6 °F)   Ht 1.575 m (5' 2\")   Wt 54.5 kg (120 lb 3.2 oz)   SpO2 96%   BMI 21.98 kg/m²       Physical Exam  Vitals reviewed. "   Constitutional:       Appearance: Normal appearance. She is normal weight.   HENT:      Head: Normocephalic and atraumatic.      Mouth/Throat:      Pharynx: No posterior oropharyngeal erythema.   Eyes:      General: No scleral icterus.     Conjunctiva/sclera: Conjunctivae normal.      Pupils: Pupils are equal, round, and reactive to light.   Cardiovascular:      Rate and Rhythm: Normal rate and regular rhythm.      Heart sounds: Normal heart sounds.   Pulmonary:      Effort: No respiratory distress.      Breath sounds: No wheezing.   Abdominal:      General: Abdomen is flat. Bowel sounds are normal. There is no distension.      Palpations: Abdomen is soft. There is no mass.      Tenderness: There is no abdominal tenderness. There is no rebound.   Musculoskeletal:         General: Normal range of motion.      Cervical back: Normal range of motion and neck supple.   Skin:     General: Skin is warm and dry.   Neurological:      General: No focal deficit present.      Mental Status: She is alert and oriented to person, place, and time. Mental status is at baseline.   Psychiatric:         Mood and Affect: Mood normal.         Behavior: Behavior normal.         Thought Content: Thought content normal.         Judgment: Judgment normal.         Assessment & Plan  Encounter for subsequent annual wellness visit (AWV) in Medicare patient         Hypercholesteremia    Orders:    Comprehensive Metabolic Panel; Future    Lipid Panel; Future    TSH with reflex to Free T4 if abnormal; Future    Hypertension, unspecified type         Stage 3a chronic kidney disease (Multi)    Orders:    CBC and Auto Differential; Future    Chronic diarrhea    Orders:    lipase-protease-amylase (Creon) 24,000-76,000 -120,000 unit capsule; TAKE 3 CAPSULES BY MOUTH 3 TIMES DAILY WITH MEALS AND 1 TO 2  CAPSULES BY MOUTH WITH SNACKS.  MAX 13 CAPSULES DAILY    History of colon cancer         Malignant neoplasm of lower lobe, left bronchus or  lung    Orders:    Disability Placard    Malignant neoplasm of female breast, unspecified estrogen receptor status, unspecified laterality, unspecified site of breast         Laryngeal cancer (Multi)         History of anal cancer         Vitamin D deficiency    Orders:    Vitamin D 25-Hydroxy,Total (for eval of Vitamin D levels); Future    BMI 21.0-21.9, adult                     Medicare wellness    Follow up    Spouse / Bryson present H&P    lung nodule adenocarcinoma left removed  Oncology following     Doing as well as can under circumstances     Chronic back pain without sciatica  Uses lidocaie patches     Hypertension stable on rx no side effects  On losartan 25 mg daily     CKD stage 3a  GFR 47  10-24     Hypomagnesemia on supplement     Patient presented in December 2023  with laryngitis.  She was seen and evaluated in the [place] urgent care.  Diagnosed with laryngeal cancer  Voice has still not returned.  ENT following  Had XRT, chemo  Now receiving infusions     H/o lung nodule on CT 2-23  Bx negative  PET scan worse  h/o smoker     Chronic diarrhea on creon     History of breast cancer  mammogram ordered     History of colon cancer     History of anal cancer  Chronic diarrhea on rx     History of cerebral hemorrhage 2012.     Diet and exercise reviewed    Mammogram  11-21 / ordered not done  Dexa n/a  Colonoscopy n/a  CT chest lung cancer screening n/a  GYN n/a  immunizations rev'd UTD  BMI 21.9    Check labs before appt    Follow up 3 months

## 2025-04-15 NOTE — ASSESSMENT & PLAN NOTE
Orders:    lipase-protease-amylase (Creon) 24,000-76,000 -120,000 unit capsule; TAKE 3 CAPSULES BY MOUTH 3 TIMES DAILY WITH MEALS AND 1 TO 2  CAPSULES BY MOUTH WITH SNACKS.  MAX 13 CAPSULES DAILY

## 2025-04-30 ENCOUNTER — APPOINTMENT (OUTPATIENT)
Dept: HEMATOLOGY/ONCOLOGY | Facility: HOSPITAL | Age: 80
End: 2025-04-30
Payer: MEDICARE

## 2025-04-30 VITALS
TEMPERATURE: 96.4 F | DIASTOLIC BLOOD PRESSURE: 63 MMHG | SYSTOLIC BLOOD PRESSURE: 142 MMHG | OXYGEN SATURATION: 96 % | HEART RATE: 79 BPM | RESPIRATION RATE: 16 BRPM

## 2025-04-30 DIAGNOSIS — C32.9 LARYNGEAL CANCER (MULTI): ICD-10-CM

## 2025-04-30 LAB
25(OH)D3 SERPL-MCNC: 64 NG/ML (ref 30–100)
ALBUMIN SERPL BCP-MCNC: 3.8 G/DL (ref 3.4–5)
ALP SERPL-CCNC: 52 U/L (ref 33–136)
ALT SERPL W P-5'-P-CCNC: 5 U/L (ref 7–45)
ANION GAP SERPL CALC-SCNC: 12 MMOL/L (ref 10–20)
AST SERPL W P-5'-P-CCNC: 12 U/L (ref 9–39)
BASOPHILS # BLD AUTO: 0.03 X10*3/UL (ref 0–0.1)
BASOPHILS NFR BLD AUTO: 0.5 %
BILIRUB SERPL-MCNC: 0.3 MG/DL (ref 0–1.2)
BUN SERPL-MCNC: 27 MG/DL (ref 6–23)
CALCIUM SERPL-MCNC: 8.9 MG/DL (ref 8.6–10.3)
CHLORIDE SERPL-SCNC: 107 MMOL/L (ref 98–107)
CO2 SERPL-SCNC: 25 MMOL/L (ref 21–32)
CREAT SERPL-MCNC: 1.24 MG/DL (ref 0.5–1.05)
EGFRCR SERPLBLD CKD-EPI 2021: 44 ML/MIN/1.73M*2
EOSINOPHIL # BLD AUTO: 0.09 X10*3/UL (ref 0–0.4)
EOSINOPHIL NFR BLD AUTO: 1.5 %
ERYTHROCYTE [DISTWIDTH] IN BLOOD BY AUTOMATED COUNT: 13.4 % (ref 11.5–14.5)
GLUCOSE SERPL-MCNC: 92 MG/DL (ref 74–99)
HCT VFR BLD AUTO: 34.7 % (ref 36–46)
HGB BLD-MCNC: 11.2 G/DL (ref 12–16)
IMM GRANULOCYTES # BLD AUTO: 0.01 X10*3/UL (ref 0–0.5)
IMM GRANULOCYTES NFR BLD AUTO: 0.2 % (ref 0–0.9)
LYMPHOCYTES # BLD AUTO: 1.62 X10*3/UL (ref 0.8–3)
LYMPHOCYTES NFR BLD AUTO: 27.2 %
MAGNESIUM SERPL-MCNC: 1.53 MG/DL (ref 1.6–2.4)
MCH RBC QN AUTO: 32.8 PG (ref 26–34)
MCHC RBC AUTO-ENTMCNC: 32.3 G/DL (ref 32–36)
MCV RBC AUTO: 102 FL (ref 80–100)
MONOCYTES # BLD AUTO: 0.58 X10*3/UL (ref 0.05–0.8)
MONOCYTES NFR BLD AUTO: 9.7 %
NEUTROPHILS # BLD AUTO: 3.63 X10*3/UL (ref 1.6–5.5)
NEUTROPHILS NFR BLD AUTO: 60.9 %
NRBC BLD-RTO: 0 /100 WBCS (ref 0–0)
PLATELET # BLD AUTO: 235 X10*3/UL (ref 150–450)
POTASSIUM SERPL-SCNC: 3.6 MMOL/L (ref 3.5–5.3)
PROT SERPL-MCNC: 6.4 G/DL (ref 6.4–8.2)
RBC # BLD AUTO: 3.41 X10*6/UL (ref 4–5.2)
SODIUM SERPL-SCNC: 140 MMOL/L (ref 136–145)
T4 FREE SERPL-MCNC: 1.28 NG/DL (ref 0.61–1.12)
TSH SERPL-ACNC: 5.3 MIU/L (ref 0.44–3.98)
WBC # BLD AUTO: 6 X10*3/UL (ref 4.4–11.3)

## 2025-04-30 PROCEDURE — 82306 VITAMIN D 25 HYDROXY: CPT | Performed by: INTERNAL MEDICINE

## 2025-04-30 PROCEDURE — 2500000004 HC RX 250 GENERAL PHARMACY W/ HCPCS (ALT 636 FOR OP/ED): Mod: JZ | Performed by: NURSE PRACTITIONER

## 2025-04-30 PROCEDURE — 85025 COMPLETE CBC W/AUTO DIFF WBC: CPT | Performed by: INTERNAL MEDICINE

## 2025-04-30 PROCEDURE — 80053 COMPREHEN METABOLIC PANEL: CPT | Performed by: INTERNAL MEDICINE

## 2025-04-30 PROCEDURE — 36591 DRAW BLOOD OFF VENOUS DEVICE: CPT

## 2025-04-30 PROCEDURE — 84443 ASSAY THYROID STIM HORMONE: CPT | Performed by: INTERNAL MEDICINE

## 2025-04-30 PROCEDURE — 83735 ASSAY OF MAGNESIUM: CPT | Performed by: INTERNAL MEDICINE

## 2025-04-30 PROCEDURE — 84439 ASSAY OF FREE THYROXINE: CPT | Performed by: INTERNAL MEDICINE

## 2025-04-30 RX ORDER — HEPARIN 100 UNIT/ML
500 SYRINGE INTRAVENOUS AS NEEDED
OUTPATIENT
Start: 2025-04-30

## 2025-04-30 RX ORDER — HEPARIN SODIUM,PORCINE/PF 10 UNIT/ML
50 SYRINGE (ML) INTRAVENOUS AS NEEDED
OUTPATIENT
Start: 2025-04-30

## 2025-04-30 RX ORDER — HEPARIN 100 UNIT/ML
500 SYRINGE INTRAVENOUS AS NEEDED
Status: DISCONTINUED | OUTPATIENT
Start: 2025-04-30 | End: 2025-04-30 | Stop reason: HOSPADM

## 2025-04-30 RX ADMIN — HEPARIN 500 UNITS: 100 SYRINGE at 12:04

## 2025-04-30 ASSESSMENT — PAIN SCALES - GENERAL: PAINLEVEL_OUTOF10: 0-NO PAIN

## 2025-05-15 ENCOUNTER — APPOINTMENT (OUTPATIENT)
Dept: PRIMARY CARE | Facility: CLINIC | Age: 80
End: 2025-05-15
Payer: MEDICARE

## 2025-05-15 VITALS
DIASTOLIC BLOOD PRESSURE: 78 MMHG | BODY MASS INDEX: 22.24 KG/M2 | WEIGHT: 121.6 LBS | HEART RATE: 75 BPM | SYSTOLIC BLOOD PRESSURE: 138 MMHG | TEMPERATURE: 97.4 F | OXYGEN SATURATION: 96 %

## 2025-05-15 DIAGNOSIS — I10 HYPERTENSION, UNSPECIFIED TYPE: ICD-10-CM

## 2025-05-15 DIAGNOSIS — K52.9 CHRONIC DIARRHEA: ICD-10-CM

## 2025-05-15 DIAGNOSIS — R79.89 ABNORMAL TSH: ICD-10-CM

## 2025-05-15 DIAGNOSIS — D50.8 IRON DEFICIENCY ANEMIA SECONDARY TO INADEQUATE DIETARY IRON INTAKE: ICD-10-CM

## 2025-05-15 DIAGNOSIS — K21.9 GASTROESOPHAGEAL REFLUX DISEASE WITHOUT ESOPHAGITIS: ICD-10-CM

## 2025-05-15 DIAGNOSIS — E83.42 HYPOMAGNESEMIA: ICD-10-CM

## 2025-05-15 DIAGNOSIS — R53.83 OTHER FATIGUE: ICD-10-CM

## 2025-05-15 DIAGNOSIS — N18.32 STAGE 3B CHRONIC KIDNEY DISEASE (MULTI): ICD-10-CM

## 2025-05-15 DIAGNOSIS — Z71.2 ENCOUNTER TO DISCUSS TEST RESULTS: Primary | ICD-10-CM

## 2025-05-15 PROCEDURE — G2211 COMPLEX E/M VISIT ADD ON: HCPCS | Performed by: INTERNAL MEDICINE

## 2025-05-15 PROCEDURE — 1160F RVW MEDS BY RX/DR IN RCRD: CPT | Performed by: INTERNAL MEDICINE

## 2025-05-15 PROCEDURE — 3078F DIAST BP <80 MM HG: CPT | Performed by: INTERNAL MEDICINE

## 2025-05-15 PROCEDURE — 1159F MED LIST DOCD IN RCRD: CPT | Performed by: INTERNAL MEDICINE

## 2025-05-15 PROCEDURE — 99214 OFFICE O/P EST MOD 30 MIN: CPT | Performed by: INTERNAL MEDICINE

## 2025-05-15 PROCEDURE — 1036F TOBACCO NON-USER: CPT | Performed by: INTERNAL MEDICINE

## 2025-05-15 PROCEDURE — 3075F SYST BP GE 130 - 139MM HG: CPT | Performed by: INTERNAL MEDICINE

## 2025-05-15 RX ORDER — PANTOPRAZOLE SODIUM 40 MG/1
TABLET, DELAYED RELEASE ORAL
Qty: 180 TABLET | Refills: 0 | Status: SHIPPED | OUTPATIENT
Start: 2025-05-15

## 2025-05-15 ASSESSMENT — ENCOUNTER SYMPTOMS
BACK PAIN: 1
SORE THROAT: 1

## 2025-05-15 NOTE — PROGRESS NOTES
Subjective   Patient ID: Padilla Campuzano is a 79 y.o. female who presents for Follow-up (2 weeks ), Results, Sore Throat (Since February x has not gotten worse but still sore ), and Back Pain (Upper - between shoulder blades x months ).  Sore Throat     Back Pain        Follow up labs     Spouse / Bryson present H&P    Abnormal TSH  Recheck today    Anemia slowly improving     lung nodule adenocarcinoma left removed  Oncology following     Doing as well as can under circumstances     Chronic back pain without sciatica  Uses lidocaie patches     Hypertension stable on rx no side effects  On losartan 25 mg daily     CKD stage 3b  GFR 44  4-25     Hypomagnesemia on supplement  Increase to 2 tabs 2 days a week and 1 the rest of the week     Patient presented in December 2023  with laryngitis.  She was seen and evaluated in the [place] urgent care.  Diagnosed with laryngeal cancer  Voice has still not returned.  ENT following  Had XRT, chemo     H/o lung nodule on CT 2-23  Bx negative  PET scan worse  h/o smoker     Chronic diarrhea on creon     History of breast cancer  mammogram ordered     History of colon cancer     History of anal cancer  Chronic diarrhea on rx     History of cerebral hemorrhage 2012.     Diet and exercise reviewed    Review of Systems   HENT:  Positive for sore throat.    Musculoskeletal:  Positive for back pain.   All other systems reviewed and are negative.      Objective   /78   Pulse 75   Temp 36.3 °C (97.4 °F)   Wt 55.2 kg (121 lb 9.6 oz)   SpO2 96%   BMI 22.24 kg/m²   Lab Results   Component Value Date    WBC 6.0 04/30/2025    HGB 11.2 (L) 04/30/2025    HCT 34.7 (L) 04/30/2025     04/30/2025    CHOL 123 05/01/2024    TRIG 130 05/01/2024    HDL 44.8 05/01/2024    ALT 5 (L) 04/30/2025    AST 12 04/30/2025     04/30/2025    K 3.6 04/30/2025     04/30/2025    CREATININE 1.24 (H) 04/30/2025    BUN 27 (H) 04/30/2025    CO2 25 04/30/2025    TSH 5.30 (H) 04/30/2025    INR  1.1 07/31/2024    JEAN-PIERRE 80 (H) 01/10/2023           Physical Exam  Vitals reviewed.   Constitutional:       Appearance: Normal appearance. She is normal weight.   HENT:      Head: Normocephalic and atraumatic.      Mouth/Throat:      Pharynx: No posterior oropharyngeal erythema.   Eyes:      General: No scleral icterus.     Conjunctiva/sclera: Conjunctivae normal.      Pupils: Pupils are equal, round, and reactive to light.   Cardiovascular:      Rate and Rhythm: Normal rate and regular rhythm.      Heart sounds: Normal heart sounds.   Pulmonary:      Effort: No respiratory distress.      Breath sounds: No wheezing.   Abdominal:      General: Abdomen is flat. Bowel sounds are normal. There is no distension.      Palpations: Abdomen is soft. There is no mass.      Tenderness: There is no abdominal tenderness. There is no rebound.   Musculoskeletal:         General: Normal range of motion.      Cervical back: Normal range of motion and neck supple.   Skin:     General: Skin is warm and dry.   Neurological:      General: No focal deficit present.      Mental Status: She is alert and oriented to person, place, and time. Mental status is at baseline.   Psychiatric:         Mood and Affect: Mood normal.         Behavior: Behavior normal.         Thought Content: Thought content normal.         Judgment: Judgment normal.         Problem List Items Addressed This Visit           ICD-10-CM    GERD (gastroesophageal reflux disease) K21.9    Relevant Medications    pantoprazole (ProtoNix) 40 mg EC tablet    Chronic diarrhea K52.9    Relevant Orders    TSH with reflex to Free T4 if abnormal    Hypomagnesemia E83.42     Other Visit Diagnoses         Codes      Encounter to discuss test results    -  Primary Z71.2      Hypertension, unspecified type     I10      Stage 3b chronic kidney disease (Multi)     N18.32      Iron deficiency anemia secondary to inadequate dietary iron intake     D50.8      Abnormal TSH     R79.89     Relevant Orders    TSH with reflex to Free T4 if abnormal      Other fatigue     R53.83    Relevant Orders    TSH with reflex to Free T4 if abnormal      BMI 22.0-22.9, adult     Z68.22          Assessment/Plan     Follow up labs     Spouse / Bryson present H&P    Abnormal TSH  Recheck today    Anemia slowly improving     lung nodule adenocarcinoma left removed  Oncology following     Doing as well as can under circumstances     Chronic back pain without sciatica  Uses lidocaie patches     Hypertension stable on rx no side effects  On losartan 25 mg daily     CKD stage 3b  GFR 44  4-25     Hypomagnesemia on supplement  Increase to 2 tabs 2 days a week and 1 the rest of the week     Patient presented in December 2023  with laryngitis.  She was seen and evaluated in the [place] urgent care.  Diagnosed with laryngeal cancer  Voice has still not returned.  ENT following  Had XRT, chemo     H/o lung nodule on CT 2-23  Bx negative  PET scan worse  h/o smoker     Chronic diarrhea on creon     History of breast cancer  mammogram ordered     History of colon cancer     History of anal cancer  Chronic diarrhea on rx     History of cerebral hemorrhage 2012.     Diet and exercise reviewed    Mammogram  11-21 / ordered not done  Dexa n/a  Colonoscopy n/a  CT chest lung cancer screening n/a  GYN n/a  immunizations rev'd UTD  BMI 22.2    Check lab today    Follow up 3 months

## 2025-05-18 LAB
T4 FREE SERPL-MCNC: 1 NG/DL (ref 0.8–1.8)
TSH SERPL-ACNC: 5.06 MIU/L (ref 0.4–4.5)

## 2025-06-06 ENCOUNTER — TELEPHONE (OUTPATIENT)
Dept: HEMATOLOGY/ONCOLOGY | Facility: HOSPITAL | Age: 80
End: 2025-06-06
Payer: MEDICARE

## 2025-06-09 ENCOUNTER — LAB (OUTPATIENT)
Dept: LAB | Facility: HOSPITAL | Age: 80
End: 2025-06-09
Payer: MEDICARE

## 2025-06-09 DIAGNOSIS — R91.1 SOLITARY PULMONARY NODULE: Primary | ICD-10-CM

## 2025-06-09 LAB
ALBUMIN SERPL BCP-MCNC: 3.9 G/DL (ref 3.4–5)
ALP SERPL-CCNC: 64 U/L (ref 33–136)
ALT SERPL W P-5'-P-CCNC: 7 U/L (ref 7–45)
ANION GAP SERPL CALC-SCNC: 9 MMOL/L (ref 10–20)
AST SERPL W P-5'-P-CCNC: 15 U/L (ref 9–39)
BASOPHILS # BLD AUTO: 0.03 X10*3/UL (ref 0–0.1)
BASOPHILS NFR BLD AUTO: 0.7 %
BILIRUB SERPL-MCNC: 0.4 MG/DL (ref 0–1.2)
BUN SERPL-MCNC: 28 MG/DL (ref 6–23)
CALCIUM SERPL-MCNC: 9 MG/DL (ref 8.6–10.3)
CHLORIDE SERPL-SCNC: 110 MMOL/L (ref 98–107)
CO2 SERPL-SCNC: 29 MMOL/L (ref 21–32)
CREAT SERPL-MCNC: 1.22 MG/DL (ref 0.5–1.05)
EGFRCR SERPLBLD CKD-EPI 2021: 45 ML/MIN/1.73M*2
EOSINOPHIL # BLD AUTO: 0.11 X10*3/UL (ref 0–0.4)
EOSINOPHIL NFR BLD AUTO: 2.6 %
ERYTHROCYTE [DISTWIDTH] IN BLOOD BY AUTOMATED COUNT: 13.4 % (ref 11.5–14.5)
GLUCOSE SERPL-MCNC: 89 MG/DL (ref 74–99)
HCT VFR BLD AUTO: 33.9 % (ref 36–46)
HGB BLD-MCNC: 11 G/DL (ref 12–16)
IMM GRANULOCYTES # BLD AUTO: 0.01 X10*3/UL (ref 0–0.5)
IMM GRANULOCYTES NFR BLD AUTO: 0.2 % (ref 0–0.9)
LYMPHOCYTES # BLD AUTO: 1.58 X10*3/UL (ref 0.8–3)
LYMPHOCYTES NFR BLD AUTO: 36.7 %
MAGNESIUM SERPL-MCNC: 2 MG/DL (ref 1.5–2.5)
MCH RBC QN AUTO: 33.7 PG (ref 26–34)
MCHC RBC AUTO-ENTMCNC: 32.4 G/DL (ref 32–36)
MCV RBC AUTO: 104 FL (ref 80–100)
MONOCYTES # BLD AUTO: 0.47 X10*3/UL (ref 0.05–0.8)
MONOCYTES NFR BLD AUTO: 10.9 %
NEUTROPHILS # BLD AUTO: 2.1 X10*3/UL (ref 1.6–5.5)
NEUTROPHILS NFR BLD AUTO: 48.9 %
NRBC BLD-RTO: 0 /100 WBCS (ref 0–0)
PLATELET # BLD AUTO: 268 X10*3/UL (ref 150–450)
POTASSIUM SERPL-SCNC: 4.1 MMOL/L (ref 3.5–5.3)
PROT SERPL-MCNC: 6.2 G/DL (ref 6.4–8.2)
RBC # BLD AUTO: 3.26 X10*6/UL (ref 4–5.2)
SODIUM SERPL-SCNC: 144 MMOL/L (ref 136–145)
WBC # BLD AUTO: 4.3 X10*3/UL (ref 4.4–11.3)

## 2025-06-09 PROCEDURE — 85025 COMPLETE CBC W/AUTO DIFF WBC: CPT

## 2025-06-09 PROCEDURE — 80053 COMPREHEN METABOLIC PANEL: CPT

## 2025-06-10 ENCOUNTER — APPOINTMENT (OUTPATIENT)
Dept: CARDIAC SURGERY | Facility: CLINIC | Age: 80
End: 2025-06-10
Payer: MEDICARE

## 2025-06-10 DIAGNOSIS — K52.9 CHRONIC DIARRHEA: ICD-10-CM

## 2025-06-10 RX ORDER — PANCRELIPASE 24000; 76000; 120000 [USP'U]/1; [USP'U]/1; [USP'U]/1
CAPSULE, DELAYED RELEASE PELLETS ORAL
Qty: 800 CAPSULE | Refills: 0 | Status: SHIPPED | OUTPATIENT
Start: 2025-06-10

## 2025-06-11 ENCOUNTER — APPOINTMENT (OUTPATIENT)
Dept: HEMATOLOGY/ONCOLOGY | Facility: HOSPITAL | Age: 80
End: 2025-06-11
Payer: MEDICARE

## 2025-06-11 ENCOUNTER — OFFICE VISIT (OUTPATIENT)
Dept: HEMATOLOGY/ONCOLOGY | Facility: HOSPITAL | Age: 80
End: 2025-06-11
Payer: MEDICARE

## 2025-06-11 VITALS
BODY MASS INDEX: 22.14 KG/M2 | RESPIRATION RATE: 16 BRPM | TEMPERATURE: 96.8 F | WEIGHT: 121.03 LBS | OXYGEN SATURATION: 97 % | HEART RATE: 66 BPM | DIASTOLIC BLOOD PRESSURE: 55 MMHG | SYSTOLIC BLOOD PRESSURE: 121 MMHG

## 2025-06-11 DIAGNOSIS — C32.9 LARYNGEAL CANCER (MULTI): ICD-10-CM

## 2025-06-11 DIAGNOSIS — R91.1 LUNG NODULE: ICD-10-CM

## 2025-06-11 DIAGNOSIS — C34.32 MALIGNANT NEOPLASM OF LOWER LOBE, LEFT BRONCHUS OR LUNG: Primary | ICD-10-CM

## 2025-06-11 PROCEDURE — G2211 COMPLEX E/M VISIT ADD ON: HCPCS | Performed by: INTERNAL MEDICINE

## 2025-06-11 PROCEDURE — 1126F AMNT PAIN NOTED NONE PRSNT: CPT | Performed by: INTERNAL MEDICINE

## 2025-06-11 PROCEDURE — 96523 IRRIG DRUG DELIVERY DEVICE: CPT

## 2025-06-11 PROCEDURE — 99215 OFFICE O/P EST HI 40 MIN: CPT | Performed by: INTERNAL MEDICINE

## 2025-06-11 PROCEDURE — 2500000004 HC RX 250 GENERAL PHARMACY W/ HCPCS (ALT 636 FOR OP/ED): Performed by: NURSE PRACTITIONER

## 2025-06-11 PROCEDURE — 1159F MED LIST DOCD IN RCRD: CPT | Performed by: INTERNAL MEDICINE

## 2025-06-11 RX ORDER — HEPARIN 100 UNIT/ML
500 SYRINGE INTRAVENOUS AS NEEDED
Status: DISCONTINUED | OUTPATIENT
Start: 2025-06-11 | End: 2025-06-11 | Stop reason: HOSPADM

## 2025-06-11 RX ORDER — HEPARIN SODIUM,PORCINE/PF 10 UNIT/ML
50 SYRINGE (ML) INTRAVENOUS AS NEEDED
Status: DISCONTINUED | OUTPATIENT
Start: 2025-06-11 | End: 2025-06-11 | Stop reason: HOSPADM

## 2025-06-11 RX ORDER — HEPARIN 100 UNIT/ML
500 SYRINGE INTRAVENOUS AS NEEDED
OUTPATIENT
Start: 2025-06-11

## 2025-06-11 RX ORDER — HEPARIN SODIUM,PORCINE/PF 10 UNIT/ML
50 SYRINGE (ML) INTRAVENOUS AS NEEDED
OUTPATIENT
Start: 2025-06-11

## 2025-06-11 RX ADMIN — Medication 500 UNITS: at 14:58

## 2025-06-11 ASSESSMENT — ENCOUNTER SYMPTOMS
CARDIOVASCULAR NEGATIVE: 1
RESPIRATORY NEGATIVE: 1
FEVER: 0
FATIGUE: 1
GASTROINTESTINAL NEGATIVE: 1

## 2025-06-11 ASSESSMENT — PAIN SCALES - GENERAL: PAINLEVEL_OUTOF10: 0-NO PAIN

## 2025-06-11 NOTE — PROGRESS NOTES
Patient ID: Padilla Campuzano is a 79 y.o. female.    Subjective:  Returns for follow up for laryngeal and lung cancers.   Feels better than a few months. Recovering from surgery. Feels hoarse.     Heme/Onc History:  Past cancer:  Early stage colon cancer in 2001, s/p surgery alone  Breast cancer in 2012. S/p surgery, chemo, and radiation. Had significant side effects due to chemo. Had not received hormonal therapy  Anal cancer in 2017. S/p Tru protocol  Laryngeal cancer in 2024. See below.  Lung cancer in 2025. See below    - p/w hoarseness in early 2024. Found to have a vocal cord mass. Scans showed a LLL lung nodule.   - PET/CT (Apr 2024): Uptake in LLL lung nodule. 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 Bx: SCC  - Started chemoRT with carbo/taxol (Jul - Aug 2024): After 2 doses fo chemo, could not tolerate it and declined further chemo then continued with weekly cetuximab  - CT Chest (Dec 2024): LLL lung nodule increased in size again => PET/CT in Jan 2025 showed progression and increased uptake in LLL lung nodule  - LLL lobectomy (Feb 2025): 2.3 cm, papillary adenoCa. Margins neg. 0/4 lymph nodes. TTF-1 and CK7 positive. PD-L1 90%.   - Did not require adjuvant treatment    Assessment/Plan:  ? Laryngeal cancer: s/p chemoRT with carbo/taxol then cetuximab. Completed therapy in August. Doing better now. CT Neck does not show sign of recurrence. Direct laryngoscopy in Jan and Feb 2025 are negative.   I asked for ENT eval again. On exam, no neck LAPs. We will get a CT neck with the next CT Chest in September.    ? Lung cancer: Slowly growing lung nodule resected in Feb 2025 => [id number]. As per NCCN, does not require adjuvant treatment. She had not tolerated chemotherapy before either. In case of recurrence in future, she would likely benefit from immunotherapy  considering 90% PD-L1 positivity in tumor.     For now, will continue with surveillance. Dr. Linda planning CT chest in Sep 2025.      Fatigue: TSH is elevated at 10 => I will start her on levothyroxine 25 mcg daily.    Review Of Systems:  Review of Systems   Constitutional:  Positive for fatigue. Negative for fever.   HENT:  Negative.     Respiratory: Negative.     Cardiovascular: Negative.    Gastrointestinal: Negative.        Physical Exam:  /55 (BP Location: Left arm, Patient Position: Sitting)   Pulse 66   Temp 36 °C (96.8 °F) (Temporal)   Resp 16   Wt 54.9 kg (121 lb 0.5 oz)   SpO2 97%   BMI 22.14 kg/m²   BSA: 1.55 meters squared  Performance Status: Asymptomatic  Physical Exam  HENT:      Head: Normocephalic and atraumatic.   Eyes:      General: No scleral icterus.  Pulmonary:      Effort: Pulmonary effort is normal.   Musculoskeletal:         General: Normal range of motion.      Cervical back: No rigidity or tenderness.   Lymphadenopathy:      Cervical: No cervical adenopathy.   Skin:     Coloration: Skin is not jaundiced.   Neurological:      General: No focal deficit present.      Mental Status: She is alert and oriented to person, place, and time.         Results:  Diagnostic Results   Lab Results   Component Value Date    WBC 4.2 06/09/2025    HGB 10.8 (L) 06/09/2025    HCT 34.6 (L) 06/09/2025    .5 (H) 06/09/2025     06/09/2025     Lab Results   Component Value Date    CALCIUM 8.8 06/09/2025     06/09/2025    K 4.2 06/09/2025    CO2 26 06/09/2025     06/09/2025    BUN 29 (H) 06/09/2025    CREATININE 1.19 (H) 06/09/2025    ALT 7 06/09/2025    AST 13 06/09/2025       Current Outpatient Medications:     acetaminophen (Tylenol) 500 mg tablet, Take 2 tablets (1,000 mg) by mouth every 6 hours if needed for mild pain (1 - 3)., Disp: , Rfl:     biotin 5 mg capsule, Take 1 capsule (5 mg) by mouth once every 24 hours., Disp: , Rfl:     cholecalciferol (Vitamin D-3) 50 mcg  (2,000 unit) capsule, 1 capsule (50 mcg) once every 24 hours., Disp: , Rfl:     cholestyramine (Questran) 4 gram powder, MIX 1 SCOOP WITH AT LEAST 2 TO 6 OUNCES LIQUID AND DRINK ONCE  DAILY, Disp: 756 g, Rfl: 0    Creon 24,000-76,000 -120,000 unit capsule, TAKE 3 CAPSULES BY MOUTH 3 TIMES DAILY WITH MEALS AND 1 TO 2  CAPSULES BY MOUTH WITH SNACKS.  MAX 13 CAPSULES DAILY, Disp: 800 capsule, Rfl: 0    loperamide (Imodium) 2 mg capsule, TAKE 2 CAPSULES BY MOUTH WITH  THE FIRST EPISODE OF DIARRHEA  AND 1 CAPSULE BY MOUTH WITH ANY  ADDITIONAL EPISODES. MAXIMUM 8  CAPSULES BY MOUTH DAILY, Disp: 240 capsule, Rfl: 0    losartan (Cozaar) 25 mg tablet, Take 1 tablet (25 mg) by mouth once daily., Disp: 90 tablet, Rfl: 0    pantoprazole (ProtoNix) 40 mg EC tablet, TAKE 1 TABLET BY MOUTH TWICE  DAILY, Disp: 180 tablet, Rfl: 0    potassium gluconate 595 mg (99 mg) tablet, Take 1 tablet by mouth once daily., Disp: , Rfl:     acetaminophen (Tylenol) 325 mg tablet, Take 2 tablets (650 mg) by mouth every 6 hours if needed for mild pain (1 - 3). (Patient not taking: Reported on 6/11/2025), Disp: 40 tablet, Rfl: 0    cyclobenzaprine (Flexeril) 5 mg tablet, Take 1 tablet (5 mg) by mouth 3 times a day. (Patient not taking: Reported on 6/11/2025), Disp: 20 tablet, Rfl: 0    diphenoxylate-atropine (LomotiL) 2.5-0.025 mg tablet, Take 1 tablet by mouth 4 times a day as needed for diarrhea for up to 7 days., Disp: 28 tablet, Rfl: 0    lidocaine (Lidoderm) 5 % patch, APPLY 1 PATCH TOPICALLY TO SKIN  ONCE DAILY . REMOVE AND DISCARD  PATCH WITHIN 12 HOURS OR AS  DIRECTED BY MD (Patient not taking: Reported on 6/11/2025), Disp: 30 patch, Rfl: 0    magnesium oxide (Mag-Ox) 400 mg (241.3 mg magnesium) tablet, Take 1 tablet (400 mg) by mouth once daily., Disp: 90 tablet, Rfl: 0    ondansetron (Zofran) 8 mg tablet, Take 1 tablet (8 mg) by mouth every 8 hours if needed for nausea or vomiting. (Patient not taking: Reported on 6/11/2025), Disp: 30  tablet, Rfl: 5    potassium chloride (Klor-Con) 20 mEq packet, Take 20 mEq by mouth once daily. (Patient not taking: Reported on 3/19/2025), Disp: 30 packet, Rfl: 0    prochlorperazine (Compazine) 10 mg tablet, Take 1 tablet (10 mg) by mouth every 6 hours if needed for nausea or vomiting. (Patient not taking: Reported on 6/11/2025), Disp: 30 tablet, Rfl: 5  No current facility-administered medications for this visit.    Facility-Administered Medications Ordered in Other Visits:     alteplase (Cathflo Activase) injection 2 mg, 2 mg, intra-catheter, PRN, Megan L Boggins, APRN-CNP    alteplase (Cathflo Activase) injection 2 mg, 2 mg, intra-catheter, PRN, Megan L Boggins, APRN-CNP    heparin flush 10 unit/mL syringe 50 Units, 50 Units, intra-catheter, PRN, Megan L Boggins, APRN-CNP    heparin flush 10 unit/mL syringe 50 Units, 50 Units, intra-catheter, PRN, Megan L Boggins, APRN-CNP    heparin flush 100 unit/mL syringe 500 Units, 500 Units, intra-catheter, PRN, Megan L Boggins, APRN-CNP, 500 Units at 08/20/24 1510    heparin flush 100 unit/mL syringe 500 Units, 500 Units, intra-catheter, PRN, Megan L Boggins, APRN-CNP     Past Surgical History:   Procedure Laterality Date    BI US GUIDED BREAST RIGHT CYST ASPIRATION Right 12/31/2019    BI BREAST CYST ASPIRATION RIGHT [facility]ICAL LEGAC[facility]    BOWEL RESECTION  2001    large bowel resect for colon cancer    BREAST LUMPECTOMY  12/2012    Breast Surgery Lumpectomy    CHOLECYSTECTOMY  09/11/2014    CT GUIDED IMAGING FOR ABSCESS DRAIN  07/08/2015    CT GUIDED IMAGING FOR ABSCESS DRAIN [facility] LEGAC[facility]    HYSTERECTOMY  1984    Hysterectomy    ILEOSTOMY  2014    Ileostomy    ILEOSTOMY CLOSURE  11/13/2015    Ileostomy Closure    OTHER SURGICAL HISTORY  2012    Cerebral artery coiling     Family History   Problem Relation Name Age of Onset    Colon cancer Mother      Liver cancer Mother      Kidney cancer Mother      Heart attack Father      Blood clot Father       Cancer Brother      Cancer Brother        reports that she quit smoking about 14 years ago. Her smoking use included cigarettes. She has been exposed to tobacco smoke. She has never used smokeless tobacco.    Diagnoses and all orders for this visit:  Malignant neoplasm of lower lobe, left bronchus or lung  -     CT soft tissue neck wo IV contrast; Future  -     Clinic Appointment Request; Future  -     CBC and Auto Differential; Future  -     Comprehensive Metabolic Panel; Future  Laryngeal cancer (Multi)  -     CT soft tissue neck wo IV contrast; Future  -     Clinic Appointment Request; Future  -     CBC and Auto Differential; Future  -     Comprehensive Metabolic Panel; Future       I spent more than 40 minutes for the patient today, including face-to-face conversation, pre-visit preparation, post-visit orders, and others.   Malik Gordillo MD

## 2025-06-12 DIAGNOSIS — E03.9 HYPOTHYROIDISM, UNSPECIFIED TYPE: ICD-10-CM

## 2025-06-12 DIAGNOSIS — Z00.00 ANNUAL PHYSICAL EXAM: Primary | ICD-10-CM

## 2025-06-12 LAB
25(OH)D3+25(OH)D2 SERPL-MCNC: 62 NG/ML (ref 30–100)
ALBUMIN SERPL-MCNC: 3.8 G/DL (ref 3.6–5.1)
ALP SERPL-CCNC: 63 U/L (ref 37–153)
ALT SERPL-CCNC: 7 U/L (ref 6–29)
ANION GAP SERPL CALCULATED.4IONS-SCNC: 8 MMOL/L (CALC) (ref 7–17)
AST SERPL-CCNC: 13 U/L (ref 10–35)
BASOPHILS # BLD AUTO: 29 CELLS/UL (ref 0–200)
BASOPHILS NFR BLD AUTO: 0.7 %
BILIRUB SERPL-MCNC: 0.4 MG/DL (ref 0.2–1.2)
BUN SERPL-MCNC: 29 MG/DL (ref 7–25)
CALCIUM SERPL-MCNC: 8.8 MG/DL (ref 8.6–10.4)
CHLORIDE SERPL-SCNC: 109 MMOL/L (ref 98–110)
CHOLEST SERPL-MCNC: 115 MG/DL
CHOLEST/HDLC SERPL: 2.4 (CALC)
CO2 SERPL-SCNC: 26 MMOL/L (ref 20–32)
CREAT SERPL-MCNC: 1.19 MG/DL (ref 0.6–1)
EGFRCR SERPLBLD CKD-EPI 2021: 47 ML/MIN/1.73M2
EOSINOPHIL # BLD AUTO: 92 CELLS/UL (ref 15–500)
EOSINOPHIL NFR BLD AUTO: 2.2 %
ERYTHROCYTE [DISTWIDTH] IN BLOOD BY AUTOMATED COUNT: 13.9 % (ref 11–15)
GLUCOSE SERPL-MCNC: 88 MG/DL (ref 65–99)
HCT VFR BLD AUTO: 34.6 % (ref 35–45)
HDLC SERPL-MCNC: 48 MG/DL
HGB BLD-MCNC: 10.8 G/DL (ref 11.7–15.5)
LDLC SERPL CALC-MCNC: 47 MG/DL (CALC)
LYMPHOCYTES # BLD AUTO: 1407 CELLS/UL (ref 850–3900)
LYMPHOCYTES NFR BLD AUTO: 33.5 %
MCH RBC QN AUTO: 32.6 PG (ref 27–33)
MCHC RBC AUTO-ENTMCNC: 31.2 G/DL (ref 32–36)
MCV RBC AUTO: 104.5 FL (ref 80–100)
MONOCYTES # BLD AUTO: 504 CELLS/UL (ref 200–950)
MONOCYTES NFR BLD AUTO: 12 %
NEUTROPHILS # BLD AUTO: 2167 CELLS/UL (ref 1500–7800)
NEUTROPHILS NFR BLD AUTO: 51.6 %
NONHDLC SERPL-MCNC: 67 MG/DL (CALC)
PLATELET # BLD AUTO: 263 THOUSAND/UL (ref 140–400)
PMV BLD REES-ECKER: 9.3 FL (ref 7.5–12.5)
POTASSIUM SERPL-SCNC: 4.2 MMOL/L (ref 3.5–5.3)
PROT SERPL-MCNC: 6.1 G/DL (ref 6.1–8.1)
RBC # BLD AUTO: 3.31 MILLION/UL (ref 3.8–5.1)
SODIUM SERPL-SCNC: 143 MMOL/L (ref 135–146)
T4 FREE SERPL-MCNC: 1.1 NG/DL (ref 0.8–1.8)
THYROPEROXIDASE AB SERPL-ACNC: 2 IU/ML
TRIGL SERPL-MCNC: 118 MG/DL
TSH SERPL-ACNC: 10.07 MIU/L (ref 0.4–4.5)
WBC # BLD AUTO: 4.2 THOUSAND/UL (ref 3.8–10.8)

## 2025-06-12 RX ORDER — LEVOTHYROXINE SODIUM 25 UG/1
25 TABLET ORAL DAILY
Qty: 90 TABLET | Refills: 3 | Status: SHIPPED | OUTPATIENT
Start: 2025-06-12 | End: 2026-06-12

## 2025-06-17 ENCOUNTER — TELEPHONE (OUTPATIENT)
Dept: HEMATOLOGY/ONCOLOGY | Facility: HOSPITAL | Age: 80
End: 2025-06-17
Payer: MEDICARE

## 2025-06-17 NOTE — TELEPHONE ENCOUNTER
Received below in-basket message from Dr. Gordillo:     Her TSH is elevatred. I prescribed her levothyroxine 25 mcg daily which may help with her fatigue.   Please let her know       Called patient to let her know TSH was elevated and a new med has been sent to her pharmacy to help. Gave patient information on medication including to take once daily in the morning on an empty stomach. Requested patient please call office with any additional questions or issues with medication. Patient verbalized understanding.

## 2025-06-26 DIAGNOSIS — I10 HYPERTENSION, UNSPECIFIED TYPE: ICD-10-CM

## 2025-06-26 RX ORDER — LOSARTAN POTASSIUM 25 MG/1
25 TABLET ORAL DAILY
Qty: 90 TABLET | Refills: 0 | Status: SHIPPED | OUTPATIENT
Start: 2025-06-26

## 2025-07-03 DIAGNOSIS — R19.7 DIARRHEA, UNSPECIFIED TYPE: ICD-10-CM

## 2025-07-06 RX ORDER — CHOLESTYRAMINE 4 G/9G
POWDER, FOR SUSPENSION ORAL
Qty: 756 G | Refills: 1 | Status: SHIPPED | OUTPATIENT
Start: 2025-07-06 | End: 2025-10-04

## 2025-07-11 ENCOUNTER — APPOINTMENT (OUTPATIENT)
Dept: OTOLARYNGOLOGY | Facility: CLINIC | Age: 80
End: 2025-07-11
Payer: MEDICARE

## 2025-07-12 DIAGNOSIS — K21.9 GASTROESOPHAGEAL REFLUX DISEASE WITHOUT ESOPHAGITIS: ICD-10-CM

## 2025-07-14 ENCOUNTER — APPOINTMENT (OUTPATIENT)
Dept: OTOLARYNGOLOGY | Facility: CLINIC | Age: 80
End: 2025-07-14
Payer: MEDICARE

## 2025-07-14 RX ORDER — PANTOPRAZOLE SODIUM 40 MG/1
40 TABLET, DELAYED RELEASE ORAL 2 TIMES DAILY
Qty: 60 TABLET | Refills: 1 | Status: SHIPPED | OUTPATIENT
Start: 2025-07-14

## 2025-07-16 ENCOUNTER — APPOINTMENT (OUTPATIENT)
Dept: PRIMARY CARE | Facility: CLINIC | Age: 80
End: 2025-07-16
Payer: MEDICARE

## 2025-07-21 ENCOUNTER — APPOINTMENT (OUTPATIENT)
Dept: OTOLARYNGOLOGY | Facility: CLINIC | Age: 80
End: 2025-07-21
Payer: MEDICARE

## 2025-07-21 DIAGNOSIS — Z85.89 ENCOUNTER FOR FOLLOW-UP SURVEILLANCE OF HEAD AND NECK CANCER: Primary | ICD-10-CM

## 2025-07-21 DIAGNOSIS — Z08 ENCOUNTER FOR FOLLOW-UP SURVEILLANCE OF HEAD AND NECK CANCER: Primary | ICD-10-CM

## 2025-07-21 PROCEDURE — 31575 DIAGNOSTIC LARYNGOSCOPY: CPT | Performed by: OTOLARYNGOLOGY

## 2025-07-21 PROCEDURE — 99213 OFFICE O/P EST LOW 20 MIN: CPT | Performed by: OTOLARYNGOLOGY

## 2025-07-21 PROCEDURE — 1159F MED LIST DOCD IN RCRD: CPT | Performed by: OTOLARYNGOLOGY

## 2025-07-21 NOTE — LETTER
July 21, 2025     Malik Gordillo MD  [address] [facility] [address]    Patient: Padilla Campuzano   YOB: 1945   Date of Visit: 7/21/2025       Dear Dr. Malik Gordillo MD:    Thank you for referring Padilla Campuzano to me for evaluation. Below are my notes for this consultation.  If you have questions, please do not hesitate to call me. I look forward to following your patient along with you.       Sincerely,     Hussain Vieira MD      CC: No Recipients  ______________________________________________________________________________________    History Of Present Illness    07/21/2025:  Comes for follow up. No changes in her voice. No difficulty with breathing. On today's examination, right vocal cord looks intact and mobile. I have not seen any mass or ulceration. Left arytenoid and ventricular band are severely swollen. Left vocal cord could not be observed, could have been destroyed by the tumor and chemoradiation.     No palpable neck lymphadenopathy.    Dx:  1- stable appearing larynx, severe edema of left arytenoid and left ventricular band. Minimal mobility of left side of larynx, but her left vocal cord was fixed at time of diagnosis.    Recommendation:  1- follow up in 3 months  _________________________________________________________________    Padilla Campuzano is a 79 y.o. 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 She had chemoradiation, finished 09.19.2024. She is kindly referred by Dr. Gordillo.    Brief Past History:    - PET/CT (Apr 2024): Uptake in LLL lung nodule. Uptake in vocal cords  - ENT eval (Apr 2024): L vocal cord fixed  - Lung bx (Apr 2024): Neg  - Direct laryngoscopy (May 2024): L true vocal cord mucosal irregularity extending into subglottis, anterior commissure and to under surface of right vocal cord. Bx: SCC  - Started chemoRT with carbo/taxol (Jul - Aug 2024): After 2  doses of chemo, could not tolerate it and declined further chemo then continued with weekly cetuximab  - CT Chest (Dec 2024): LLL lung nodule increased in size again => Plan for PET/CT and surgery/rad Onc eval.  - PET CT (Jan 2025):  Newly developed FDG avidity at the right true vocal cord with SUV max of 12.4, No FDG avid cervical lymphadenopathy is present.     1. Interval increase in size and FDG avidity of a cavitary lesion in the left lower lobe, concerning for a primary lung neoplasm, even though previous biopsy was inconclusive.    2. Near-complete metabolic resolution of FDG avidity in the left vocal cord. The remaining FDG avidity likely represents either residual viable disease or post-treatment inflammation, attention on follow-up study.    3.  Newly developed FDG avidity in the right true vocal cord,     Fiberoptic endoscopy shows that nasal septum is deviated to right.  Endoscope was advanced through patient's left nasal cavity.  On examination, left arytenoid, left aryepiglottic fold and left ventricular band are severely swollen. Left arytenoid and ventricular band are fixated. The left vocal cord could not be visualized due to the severe edema-swelling of left ventricular band, arytenoid and aryepiglottic fold. There could be submucosal tumor under these structures, or this could be secondary to RT. No visible vegetative mass or ulceration. Right vocal cord looks anatomically normal and mobile, but I'd like to note that previous fiberoptic exam in June 2024 has shown that disease was extending towards the anterior 1/4 of right vocal cord.      No palpable neck nodes.    Plan:   1-referral to head and neck surgical oncology for further evaluation. Increased metabolic activity of right vocal cord, exam looks normal.     Past Medical History  She has a past medical history of Acute kidney injury (11/12/2015), Anal cancer (Multi) (2017), Arthritis, Breast cancer (2012), Cataract, Cerebral hemorrhage  (Multi) (2012), CKD (chronic kidney disease), Colorectal cancer (Multi), Diverticulosis of intestine, part unspecified, without perforation or abscess without bleeding, GERD (gastroesophageal reflux disease), Hypertension, Irritable bowel syndrome, Laryngeal cancer (Multi) (2024), Lung nodule, Malignant neoplasm of colon, unspecified, Pancreatic insufficiency ([facility]-HCC), and Stroke (Multi) (2012).    Surgical History  She has a past surgical history that includes Breast lumpectomy (12/2012); Hysterectomy (1984); Ileostomy closure (11/13/2015); Ileostomy (2014); BI US breast right cyst aspiration (Right, 12/31/2019); CT guided imaging for abscess drain (07/08/2015); Cholecystectomy (09/11/2014); Other surgical history (2012); and Bowel resection (2001).     Social History  She reports that she quit smoking about 14 years ago. Her smoking use included cigarettes. She has been exposed to tobacco smoke. She has never used smokeless tobacco. She reports that she does not drink alcohol and does not use drugs.    Family History  Family History   Problem Relation Name Age of Onset   • Colon cancer Mother     • Liver cancer Mother     • Kidney cancer Mother     • Heart attack Father     • Blood clot Father     • Cancer Brother     • Cancer Brother          Allergies  Paclitaxel, Hydrocodone-acetaminophen, Oxycodone hcl, Oxycodone-acetaminophen, Aspirin, and Sulfa (sulfonamide antibiotics)    Review of Systems   Follow up T3 laryngeal SCC     Physical Exam (initial exam)    General appearance: Healthy-appearing, well-nourished, well groomed, in no acute distress.     Head and Face: Atraumatic with no masses, lesions, or scarring.      Salivary glands: No tenderness of the parotid glands or parotid masses.     No tenderness of the submandibular glands or submandibular masses.      Facial strength: Normal strength and symmetry, no synkinesis or facial tic.     Eyes: Conjunctivas look non-hyperemic bilaterally    Ears:  Bilaterally ear canals look normal. Tympanic membranes look intact, no hyperemia, fluid or retraction.     Nose: Please see endoscopy note.    Oral Cavity/Mouth: Lips and tongue look normal.     Throat: No postnasal discharge. No tonsil hypertrophy. No hyperemia.    Neck: Symmetrical, trachea midline.     Pulmonary: Normal respiratory effort.     Lymphatic: No palpable pathologic lymph nodes at neck.     Neurological/Psychiatric Orientation to person, place, and time: Normal.     Mood and affect: Normal.      Extremities: No clubbing.     Skin: No significant skin lesions were noted at face or neck      Procedure  FLEXIBLE ENDOSCOPY  07/21/2025  After obtaining verbal consent of the patient, 4% lidocaine was sprayed into nasal cavities. Flexible fiberoptic laryngopharyngoscopy was advanced afterwards. Nasal septum is severely deviated to right. Endoscope was advanced through patient's left nasal cavity.      No mass or ulceration was seen in left nasal cavity of nasopharynx. Base of tongue looks normal. Left arytenoid, left aryepiglottic fold and left ventricular band are still severely swollen. Left arytenoid is minimally mobile. Left ventricular band looks fixated. The left vocal cord looks eroded. No visible ulceration. Right vocal cord looks anatomically normal and mobile,     _________________________________________________________________    FLEXIBLE ENDOSCOPY 01.18.2025  After application of topical lidocaine and decongestant, flexible endoscope was advanced through patient's nasal cavities. Nasal septum is deviated to right.  Endoscope was advanced through patient's left nasal cavity.      On examination, left arytenoid, left aryepiglottic fold and left ventricular are severely swollen. Left arytenoid and ventricular band are fixated. The left vocal cord could not be visualized due to the severe edema-swelling of left ventricular band, arytenoid and aryepiglottic fold. There could be submucosal tumor under  these structures, or this could be secondary to RT. No visible ulceration. Right vocal cord looks anatomically normal and mobile, but I'd like to note that previous direct laryngoscopy (May 2024) has shown that disease was extending towards the anterior 1/4 of right vocal cord.         Last Recorded Vitals  There were no vitals taken for this visit.    Relevant Results  Prior to Admission medications    Medication Sig Start Date End Date Taking? Authorizing Provider   acetaminophen (Tylenol) 500 mg tablet Take 2 tablets (1,000 mg) by mouth every 6 hours if needed for mild pain (1 - 3).    Historical Provider, MD   biotin 5 mg capsule once every 24 hours.    Historical Provider, MD   cholecalciferol (Vitamin D-3) 50 mcg (2,000 unit) capsule 1 capsule (50 mcg) once every 24 hours.    Historical Provider, MD   cholestyramine (Questran) 4 gram powder MIX 1 SCOOP WITH AT LEAST 2 TO 6 OUNCES LIQUID AND DRINK ONCE  DAILY 11/13/24   Naheed Clements MD   clindamycin (Cleocin T) 1 % lotion Apply topically to affected area twice daily. 7/15/24   Kyunghee Burkitt, DO   diphenoxylate-atropine (LomotiL) 2.5-0.025 mg tablet Take 1 tablet by mouth 4 times a day as needed for diarrhea for up to 7 days. 8/26/24 12/19/24  ÁNGEL Maloney-CNP   doxycycline (Vibramycin) 100 mg capsule Take 1 capsule (100 mg) by mouth 2 times a day. For rash prevention. Take with at least 8 ounces (large glass) of water, do not lie down for 30 minutes after 7/15/24   Kyunghee Burkitt, DO   hydrocortisone 1 % cream Apply topically to face, hands, feet, neck, back, and chest once daily at bedtime for rash prevention. 7/15/24   Kyunghee Burkitt, DO   lidocaine (Lidoderm) 5 % patch Place 1 patch over 12 hours on the skin once daily. Remove & discard patch within 12 hours or as directed by MD. 1/14/25 4/14/25  Naheed Clements MD   lipase-protease-amylase (Creon) 24,000-76,000 -120,000 unit capsule TAKE 3 CAPSULES BY MOUTH 3 TIMES DAILY WITH  MEALS AND 1 TO 2  CAPSULES BY MOUTH WITH SNACKS.  MAX 13 CAPSULE DAILY 1/14/25   Naheed Clements MD   loperamide (Imodium) 2 mg capsule TAKE 2 CAPSULES BY MOUTH WITH  THE FIRST EPISODE OF DIARRHEA  AND 1 CAPSULE WITH ANY  ADDITIONAL EPISODES. MAXIMUM 8  CAPSULES DAILY 1/15/25   Naheed Clements MD   losartan (Cozaar) 25 mg tablet TAKE 1 TABLET BY MOUTH ONCE  DAILY 1/15/25   Naheed Clements MD   magnesium oxide (Mag-Ox) 400 mg (241.3 mg magnesium) tablet Take 1 tablet (400 mg) by mouth once daily. 1/14/25 4/14/25  Naheed Clements MD   ondansetron (Zofran) 8 mg tablet Take 1 tablet (8 mg) by mouth every 8 hours if needed for nausea or vomiting. 7/1/24   POP Maloney   pantoprazole (ProtoNix) 40 mg EC tablet TAKE 1 TABLET BY MOUTH TWICE  DAILY 1/15/25   Naheed Clements MD   potassium chloride (Klor-Con) 20 mEq packet Take 20 mEq by mouth once daily.  Patient not taking: Reported on 12/19/2024 8/2/24 9/1/24  POP Maloney   prochlorperazine (Compazine) 10 mg tablet Take 1 tablet (10 mg) by mouth every 6 hours if needed for nausea or vomiting. 7/15/24   Kyunghee Burkitt, DO   lidocaine (Lidoderm) 5 % patch Place 1 patch over 12 hours on the skin once daily for 21 days. Remove & discard patch within 12 hours or as directed by MD. 8/26/24 1/14/25  POP Maloney   lipase-protease-amylase (Creon) 24,000-76,000 -120,000 unit capsule TAKE 3 CAPSULES BY MOUTH 3 TIMES DAILY WITH MEALS AND 1 TO 2  CAPSULES BY MOUTH WITH SNACKS.  MAX 13 CAPSULE DAILY 12/22/23 1/14/25  Leander Sun MD   loperamide (Imodium) 2 mg capsule Take 2 capsules (4 mg) by mouth with the first episode of diarrhea and 1 capsule (2 mg) by mouth with any additional episodes. Maximum 8 capsules (16 mg) per day. 9/12/24 1/15/25  Naheed Cleemnts MD   losartan (Cozaar) 25 mg tablet TAKE 1 TABLET BY MOUTH ONCE  DAILY 11/13/24 1/15/25  Naheed Clements MD   magnesium oxide (Mag-Ox) 400 mg  (241.3 mg magnesium) tablet Take 1 tablet (400 mg) by mouth 2 times a day. 9/26/24 1/14/25  Kaya Matthew MD   pantoprazole (ProtoNix) 40 mg EC tablet TAKE 1 TABLET BY MOUTH TWICE  DAILY 10/21/24 1/15/25  Naheed Clements MD     NM PET CT bone skull base to mid thigh    Result Date: 1/9/2025  Interpreted By:  Nohelia Rhoades and Bera Kaustav STUDY: NM PET CT SKULL BASE TO MID THIGH;  1/8/2025 11:57 am   INDICATION: Signs/Symptoms:Lung cancer?.   COMPARISON: CT chest and neck with contrast on 12/11/2024 PET-CT on 04/03/2024   ACCESSION NUMBER(S): [id number]   ORDERING CLINICIAN: KAYA MATTHEW   TECHNIQUE: DIVISION OF NUCLEAR MEDICINE POSITRON EMISSION TOMOGRAPHY (PET-CT)   The patient received an intravenous dose of 12.5 mCi of Fluorine-18 fluorodeoxyglucose (FDG).  Positron emission tomographic (PET) images from mid thigh to skull base were then acquired after a one hour delay. Additional PET images of the head was also obtained with the patient lying still. Also acquired was a contemporaneous low dose non-contrast CT scan performed for attenuation correction of PET images and anatomic localization.  The PET and CT images were digitally fused for display.  All images were acquired on a combined PET-CT scanner unit.  Some areas of FDG accumulation may be described in standardized uptake value (SUV) units.   CODING: Subsequent Treatment Strategy (PS)   CALIBRATION: Dose Injection-to-Scan Interval (mins): 58 min Mediastinal bloodpool SUV (normal 1.5-2.5): 2.2 Blood glucose: 97 mg/dL   FINDINGS: HEAD AND NECK: *No evidence of focal FDG avid lesion in the partially visualized brain parenchyma, noting that evaluation is limited because of the expected physiologic diffuse FDG uptake in the brain. *Nearly complete metabolic resolution of FDG avidity in the left vocal cord with SUV max 4.2 versus 16 previously. Newly developed FDG avidity at the right true vocal cord with SUV max of 12.4, likely secondary to left  laryngeal nerve paralysis. * No FDG avid cervical lymphadenopathy is present. * No paranasal sinus diease. * Thyroid gland is unremarkable.   CHEST: *Interval increase in size and FDG avidity of a cavitary lesion in the left lower lobe with SUV max of 6.6, as compared to 5.4 previously, which was previously biopsied and shown to be nonmalignant. Right lung is unremarkable. *No FDG avid mediastinal, hilar or axillary lymphadenopathy. *Right chest wall MediPort in place. *Non FDG avid hypoattenuating lesion right breast, stable in comparison to prior study. Left breast is unremarkable.   ABDOMEN AND PELVIS: *No FDG avid  lymphadenopathy in the abdomen or pelvis. *Bilateral adrenal glands are unremarkable. *Cholecystectomy. Postsurgical changes from partial colectomy with a left lower quadrant anastomosis, without focal FDG activity at the surgical bed to suggest recurrent/residual disease. Diverticulosis without diverticulitis. *Again seen, focal hypermetabolic activity at the anal region with SUV max of 6.9, which is mildly decreased in size and extent as compared to prior where SUV max of 7.5. Previously seen hypermetabolic focus in the small bowel of the inferior margin of the liver is again seen, and likely represents physiologic bowel activity. *Physiologic radiotracer uptake is present in the liver and spleen with excretion into the bowel loops and the genitourinary tract.   MUSCULOSKELETAL: *No concerning FDG avid bone lesion throughout axial and appendicular skeleton to suggest osseous metastasis.       1. Interval increase in size and FDG avidity of a cavitary lesion in the left lower lobe, concerning for a primary lung neoplasm, even though previous biopsy was inconclusive. 2. Near-complete metabolic resolution of FDG avidity in the left vocal cord. The remaining FDG avidity likely represents either residual viable disease or post-treatment inflammation, attention on follow-up study. 3.  Newly developed FDG  avidity in the right true vocal cord, likely secondary to left laryngeal nerve paralysis.   I personally reviewed the image(s) / study and agree with the findings and interpretation as stated. This study was interpreted at [facility].   Signed by: Nohelia Rhoades 1/9/2025 1:38 PM Dictation workstation:   [id number]    Narrative & Impression   Interpreted By:  Solitario Watson  and Vannesa Cisneros   STUDY:  CT SOFT TISSUE NECK W IV CONTRAST;  12/11/2024 12:25 pm      INDICATION:  Signs/Symptoms:laryngeal cancer.      ,E83.42 Hypomagnesemia,C32.9 Malignant neoplasm of larynx,  unspecified,R91.1 Solitary pulmonary nodule      Status post left vocal cord mass chemoradiotherapy ending August 20, 2024      COMPARISON:  CT of the chest obtained September 23, 2024 as well as performed  concurrently. MRI of the cervical spine obtained July 8, 2024.  PET-CT dated 04/03/2024.      ACCESSION NUMBER(S):  [id number]      ORDERING CLINICIAN:  KAYA MATTHEW      TECHNIQUE:  Helical CT images of the neck were obtained.  The patient received 60  ML of Omnipaque 350 intravenous contrast agent. The images were  reformatted in angled axial, coronal and sagittal planes.      FINDINGS:  Right chemotherapy chest port observed without complete visualization  of tubing terminus.      Oral Cavity, Pharynx and Larynx:  Nasopharyngeal soft tissues appear  normal. Patient is edentulous. Base of the tongue and floor of the  mouth appears normal. Circumferential pharyngeal soft tissue  thickening/edema observed, which appears to narrow the oropharyngeal  airway. Epiglottic vallecula appears mostly effaced. Piriform sinuses  not well visualized. There is thickening of the epiglottis and  aryepiglottic folds as well. Asymmetric appearance of the vocal  cords. Thickening and leftward deviation of the right true vocal cord  is observed (axial series 201, image 75). Atrophic appearance of the  left vocal cord. There is  thickening and distortion of the  postcricoid space. Subglottic soft tissues and airway appear normal.      Retropharyngeal and Prevertebral Soft Tissues: Posttreatment change  suggested with mildly edematous appearance. No discrete fluid  collection. No evidence of pathologically enlarged lymph node or mass.      Lymph nodes: No morphologically abnormal or pathologically enlarged  cervical station lymph nodes observed.      Neck vessels: Bilateral neck vessels appear patent. 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 Predominantly mild multifocal  athero sclerotic calcification. More focal plaque components at the  right subclavian artery origin with possible flow significant  stenosis.      Thyroid gland: The thyroid gland is unremarkable in size and  appearance.      Parotid and submandibular glands: Mild atrophy of the salivary  glands. Bilateral parotid and submandibular glands are otherwise  unremarkable in appearance.      Paranasal Sinuses and Mastoids: Small amount of fluid within the  right mastoid. Visualized paranasal sinuses and left mastoids are  clear.      Visualized orbital structures are unremarkable.      Thoracic findings detailed separately. There is redemonstrated  moderate emphysema. As compared with prior examination there is an 8  mm irregular nodular subpleural opacity along the mesial aspect of  the right upper lobe (series 203, image 220).      No acute or aggressive appearing osseous abnormalities of the  cervical spine.      IMPRESSION:  1. Edematous appearance of the laryngeal and glottic soft tissues  consistent with recent chemoradiotherapy. There is asymmetry of the  vocal cords raising the possibility of underlying vocal cord  paresis/paralysis. No discrete residual laryngeal mass appreciated on  this examination. There is no evidence  of morphologically abnormal or  pathologically enlarged cervical lymph nodes.  2. Irregular left upper lobe pulmonary nodule, which appears new from  the September 23 2024 CT of the chest. For full assessment of the  chest, please see same day CT of the chest under a separate accession  number.      I personally reviewed the images/study and I agree with the findings  as stated. This study was interpreted at [facility], [place], [place].      MACRO:  None      Signed by: Solitario Watson 12/11/2024 1:21 PM  Dictation workstation:   [id number]       Assessment and Plan:      07/21/2025:  Comes for follow up. No changes in her voice. No difficulty with breathing. On today's examination, right vocal cords is mobile. I have not seen any mass or ulceration of right vocal cord. Left arytenoid is severely swollen, minimally mobile. There is swelling fullness of left ventricular band. Left vocal cord could not be observed, it could be destroyed by the tumor and chemoradiation.     No palpable neck lymphadenopathy.    Dx:  1- stable appearing larynx, severe edema of left arytenoid and ventricular band. Minimal mobility, but her left vocal cord was fixed at time of diagnosis.    Recommendation:  1- follow up in 3 months  _________________________________________________________________    Padilla YARBROUGH Justen is a 79 y.o. 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 She had chemoradiation, finished 09.19.2024. She is kindly referred by Dr. Gordillo.    Brief Past History:    - PET/CT (Apr 2024): Uptake in LLL lung nodule. Uptake in vocal cords  - ENT eval (Apr 2024): L vocal cord fixed  - Lung bx (Apr 2024): Neg  - Direct laryngoscopy (May 2024): L true vocal cord mucosal irregularity extending into subglottis, anterior commissure and to under surface of right vocal cord. Bx: SCC  - Started  chemoRT with carbo/taxol (Jul - Aug 2024): After 2 doses of chemo, could not tolerate it and declined further chemo then continued with weekly cetuximab  - CT Chest (Dec 2024): LLL lung nodule increased in size again => Plan for PET/CT and surgery/rad Onc eval.  - PET?CT (Jan 2025):  Newly developed FDG avidity at the right true vocal cord with SUV max of 12.4, No FDG avid cervical lymphadenopathy is present.     1. Interval increase in size and FDG avidity of a cavitary lesion in the left lower lobe, concerning for a primary lung neoplasm, even though previous biopsy was inconclusive.    2. Near-complete metabolic resolution of FDG avidity in the left vocal cord. The remaining FDG avidity likely represents either residual viable disease or post-treatment inflammation, attention on follow-up study.    3.  Newly developed FDG avidity in the right true vocal cord,     Fiberoptic endoscopy shows that nasal septum is deviated to right.  Endoscope was advanced through patient's left nasal cavity.  On examination, left arytenoid, left aryepiglottic fold and left ventricular are severely swollen. Left arytenoid and ventricular band are fixated. The left vocal cord could not be visualized due to the severe edema-swelling of left ventricular band, arytenoid and aryepiglottic fold. There could be submucosal tumor under these structures, or this could be secondary to RT. No visible vegetative mass or ulceration. Right vocal cord looks anatomically normal and mobile, but I'd like to note that previous fiberoptic exam in June 2024 has shown that disease was extending towards the anterior 1/4 of right vocal cord.      No palpable neck nodes.    Plan:   1-referral to head and neck surgical oncology for further evaluation. Increased metabolic activity of right vocal cord, exam looks normal.     Hussain Vieira MD  Otolaryngology - Head & Neck Surgery    History Of Present  Illness  07.21.2025.  ______________________________________________________________________    Padilla Campuzano is a 79 y.o. 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 She had chemoradiation, finished 09.19.2024. She is kindly referred by Dr. Gordillo.    Brief Past History:    - PET/CT (Apr 2024): Uptake in LLL lung nodule. Uptake in vocal cords  - ENT eval (Apr 2024): L vocal cord fixed  - Lung bx (Apr 2024): Neg  - Direct laryngoscopy (May 2024): L true vocal cord mucosal irregularity extending into subglottis, anterior commissure and to under surface of right vocal cord. Bx: SCC  - Started chemoRT with carbo/taxol (Jul - Aug 2024): After 2 doses of chemo, could not tolerate it and declined further chemo then continued with weekly cetuximab  - CT Chest (Dec 2024): LLL lung nodule increased in size again => Plan for PET/CT and surgery/rad Onc eval.  - PET?CT (Jan 2025):  Newly developed FDG avidity at the right true vocal cord with SUV max of 12.4, No FDG avid cervical lymphadenopathy is present.     1. Interval increase in size and FDG avidity of a cavitary lesion in the left lower lobe, concerning for a primary lung neoplasm, even though previous biopsy was inconclusive.    2. Near-complete metabolic resolution of FDG avidity in the left vocal cord. The remaining FDG avidity likely represents either residual viable disease or post-treatment inflammation, attention on follow-up study.    3.  Newly developed FDG avidity in the right true vocal cord,     Fiberoptic endoscopy shows that nasal septum is deviated to right.  Endoscope was advanced through patient's left nasal cavity.  On examination, left arytenoid, left aryepiglottic fold and left ventricular are severely swollen. Left arytenoid and ventricular band are fixated. The left vocal cord could not be visualized due to the severe edema-swelling of left  ventricular band, arytenoid and aryepiglottic fold. There could be submucosal tumor under these structures, or this could be secondary to RT. No visible vegetative mass or ulceration. Right vocal cord looks anatomically normal and mobile, but I'd like to note that previous fiberoptic exam in June 2024 has shown that disease was extending towards the anterior 1/4 of right vocal cord.      No palpable neck nodes.    Plan:   1-referral to head and neck surgical oncology for further evaluation. Increased metabolic activity of right vocal cord, exam looks normal.     Past Medical History  She has a past medical history of Acute kidney injury (11/12/2015), Anal cancer (Multi) (2017), Arthritis, Breast cancer (2012), Cataract, Cerebral hemorrhage (Multi) (2012), CKD (chronic kidney disease), Colorectal cancer (Multi), Diverticulosis of intestine, part unspecified, without perforation or abscess without bleeding, GERD (gastroesophageal reflux disease), Hypertension, Irritable bowel syndrome, Laryngeal cancer (Multi) (2024), Lung nodule, Malignant neoplasm of colon, unspecified, Pancreatic insufficiency ([facility]), and Stroke (Multi) (2012).    Surgical History  She has a past surgical history that includes Breast lumpectomy (12/2012); Hysterectomy (1984); Ileostomy closure (11/13/2015); Ileostomy (2014); BI US breast right cyst aspiration (Right, 12/31/2019); CT guided imaging for abscess drain (07/08/2015); Cholecystectomy (09/11/2014); Other surgical history (2012); and Bowel resection (2001).     Social History  She reports that she quit smoking about 14 years ago. Her smoking use included cigarettes. She has been exposed to tobacco smoke. She has never used smokeless tobacco. She reports that she does not drink alcohol and does not use drugs.    Family History  Family History   Problem Relation Name Age of Onset   • Colon cancer Mother     • Liver cancer Mother     • Kidney cancer Mother     • Heart attack Father     •  Blood clot Father     • Cancer Brother     • Cancer Brother          Allergies  Paclitaxel, Hydrocodone-acetaminophen, Oxycodone hcl, Oxycodone-acetaminophen, Aspirin, and Sulfa (sulfonamide antibiotics)    Review of Systems   Follow up T3 laryngeal SCC     Physical Exam    General appearance: Healthy-appearing, well-nourished, well groomed, in no acute distress.     Head and Face: Atraumatic with no masses, lesions, or scarring.      Salivary glands: No tenderness of the parotid glands or parotid masses.     No tenderness of the submandibular glands or submandibular masses.      Facial strength: Normal strength and symmetry, no synkinesis or facial tic.     Eyes: Conjunctivas look non-hyperemic bilaterally    Ears: Bilaterally ear canals look normal. Tympanic membranes look intact, no hyperemia, fluid or retraction.     Nose: Please see endoscopy note.    Oral Cavity/Mouth: Lips and tongue look normal.     Throat: No postnasal discharge. No tonsil hypertrophy. No hyperemia.    Neck: Symmetrical, trachea midline.     Pulmonary: Normal respiratory effort.     Lymphatic: No palpable pathologic lymph nodes at neck.     Neurological/Psychiatric Orientation to person, place, and time: Normal.     Mood and affect: Normal.      Extremities: No clubbing.     Skin: No significant skin lesions were noted at face or neck        Procedure  FLEXIBLE ENDOSCOPY 01.18.2025  After application of topical lidocaine and decongestant, flexible endoscope was advanced through patient's nasal cavities. Nasal septum is deviated to right.  Endoscope was advanced through patient's left nasal cavity.      On examination, left arytenoid, left aryepiglottic fold and left ventricular are severely swollen. Left arytenoid and ventricular band are fixated. The left vocal cord could not be visualized due to the severe edema-swelling of left ventricular band, arytenoid and aryepiglottic fold. There could be submucosal tumor under these structures, or  this could be secondary to RT. No visible ulceration. Right vocal cord looks anatomically normal and mobile, but I'd like to note that previous direct laryngoscopy (May 2024) has shown that disease was extending towards the anterior 1/4 of right vocal cord.         Last Recorded Vitals  There were no vitals taken for this visit.    Relevant Results  Prior to Admission medications    Medication Sig Start Date End Date Taking? Authorizing Provider   acetaminophen (Tylenol) 500 mg tablet Take 2 tablets (1,000 mg) by mouth every 6 hours if needed for mild pain (1 - 3).    Historical Provider, MD   biotin 5 mg capsule once every 24 hours.    Historical Provider, MD   cholecalciferol (Vitamin D-3) 50 mcg (2,000 unit) capsule 1 capsule (50 mcg) once every 24 hours.    Historical Provider, MD   cholestyramine (Questran) 4 gram powder MIX 1 SCOOP WITH AT LEAST 2 TO 6 OUNCES LIQUID AND DRINK ONCE  DAILY 11/13/24   Naheed Clements MD   clindamycin (Cleocin T) 1 % lotion Apply topically to affected area twice daily. 7/15/24   Kyunghee Burkitt, DO   diphenoxylate-atropine (LomotiL) 2.5-0.025 mg tablet Take 1 tablet by mouth 4 times a day as needed for diarrhea for up to 7 days. 8/26/24 12/19/24  ÁNGEL Maloney-CNP   doxycycline (Vibramycin) 100 mg capsule Take 1 capsule (100 mg) by mouth 2 times a day. For rash prevention. Take with at least 8 ounces (large glass) of water, do not lie down for 30 minutes after 7/15/24   Kyunghee Burkitt, DO   hydrocortisone 1 % cream Apply topically to face, hands, feet, neck, back, and chest once daily at bedtime for rash prevention. 7/15/24   Kyunghee Burkitt, DO   lidocaine (Lidoderm) 5 % patch Place 1 patch over 12 hours on the skin once daily. Remove & discard patch within 12 hours or as directed by MD. 1/14/25 4/14/25  Naheed Clements MD   lipase-protease-amylase (Creon) 24,000-76,000 -120,000 unit capsule TAKE 3 CAPSULES BY MOUTH 3 TIMES DAILY WITH MEALS AND 1 TO 2   CAPSULES BY MOUTH WITH SNACKS.  MAX 13 CAPSULE DAILY 1/14/25   Naheed Clements MD   loperamide (Imodium) 2 mg capsule TAKE 2 CAPSULES BY MOUTH WITH  THE FIRST EPISODE OF DIARRHEA  AND 1 CAPSULE WITH ANY  ADDITIONAL EPISODES. MAXIMUM 8  CAPSULES DAILY 1/15/25   Naheed Clements MD   losartan (Cozaar) 25 mg tablet TAKE 1 TABLET BY MOUTH ONCE  DAILY 1/15/25   Naheed Clements MD   magnesium oxide (Mag-Ox) 400 mg (241.3 mg magnesium) tablet Take 1 tablet (400 mg) by mouth once daily. 1/14/25 4/14/25  Naheed Clements MD   ondansetron (Zofran) 8 mg tablet Take 1 tablet (8 mg) by mouth every 8 hours if needed for nausea or vomiting. 7/1/24   POP Maloney   pantoprazole (ProtoNix) 40 mg EC tablet TAKE 1 TABLET BY MOUTH TWICE  DAILY 1/15/25   Naheed Clements MD   potassium chloride (Klor-Con) 20 mEq packet Take 20 mEq by mouth once daily.  Patient not taking: Reported on 12/19/2024 8/2/24 9/1/24  POP Maloney   prochlorperazine (Compazine) 10 mg tablet Take 1 tablet (10 mg) by mouth every 6 hours if needed for nausea or vomiting. 7/15/24   Kyunghee Burkitt, DO   lidocaine (Lidoderm) 5 % patch Place 1 patch over 12 hours on the skin once daily for 21 days. Remove & discard patch within 12 hours or as directed by MD. 8/26/24 1/14/25  POP Maloney   lipase-protease-amylase (Creon) 24,000-76,000 -120,000 unit capsule TAKE 3 CAPSULES BY MOUTH 3 TIMES DAILY WITH MEALS AND 1 TO 2  CAPSULES BY MOUTH WITH SNACKS.  MAX 13 CAPSULE DAILY 12/22/23 1/14/25  Leander Sun MD   loperamide (Imodium) 2 mg capsule Take 2 capsules (4 mg) by mouth with the first episode of diarrhea and 1 capsule (2 mg) by mouth with any additional episodes. Maximum 8 capsules (16 mg) per day. 9/12/24 1/15/25  Naheed Clements MD   losartan (Cozaar) 25 mg tablet TAKE 1 TABLET BY MOUTH ONCE  DAILY 11/13/24 1/15/25  Naheed Clements MD   magnesium oxide (Mag-Ox) 400 mg (241.3 mg  magnesium) tablet Take 1 tablet (400 mg) by mouth 2 times a day. 9/26/24 1/14/25  Kaya Matthew MD   pantoprazole (ProtoNix) 40 mg EC tablet TAKE 1 TABLET BY MOUTH TWICE  DAILY 10/21/24 1/15/25  Naheed Clements MD     NM PET CT bone skull base to mid thigh    Result Date: 1/9/2025  Interpreted By:  Nohelia Rhoades and Bera Kaustav STUDY: NM PET CT SKULL BASE TO MID THIGH;  1/8/2025 11:57 am   INDICATION: Signs/Symptoms:Lung cancer?.   COMPARISON: CT chest and neck with contrast on 12/11/2024 PET-CT on 04/03/2024   ACCESSION NUMBER(S): [id number]   ORDERING CLINICIAN: KAYA MATTHEW   TECHNIQUE: DIVISION OF NUCLEAR MEDICINE POSITRON EMISSION TOMOGRAPHY (PET-CT)   The patient received an intravenous dose of 12.5 mCi of Fluorine-18 fluorodeoxyglucose (FDG).  Positron emission tomographic (PET) images from mid thigh to skull base were then acquired after a one hour delay. Additional PET images of the head was also obtained with the patient lying still. Also acquired was a contemporaneous low dose non-contrast CT scan performed for attenuation correction of PET images and anatomic localization.  The PET and CT images were digitally fused for display.  All images were acquired on a combined PET-CT scanner unit.  Some areas of FDG accumulation may be described in standardized uptake value (SUV) units.   CODING: Subsequent Treatment Strategy (PS)   CALIBRATION: Dose Injection-to-Scan Interval (mins): 58 min Mediastinal bloodpool SUV (normal 1.5-2.5): 2.2 Blood glucose: 97 mg/dL   FINDINGS: HEAD AND NECK: *No evidence of focal FDG avid lesion in the partially visualized brain parenchyma, noting that evaluation is limited because of the expected physiologic diffuse FDG uptake in the brain. *Nearly complete metabolic resolution of FDG avidity in the left vocal cord with SUV max 4.2 versus 16 previously. Newly developed FDG avidity at the right true vocal cord with SUV max of 12.4, likely secondary to left laryngeal  nerve paralysis. * No FDG avid cervical lymphadenopathy is present. * No paranasal sinus diease. * Thyroid gland is unremarkable.   CHEST: *Interval increase in size and FDG avidity of a cavitary lesion in the left lower lobe with SUV max of 6.6, as compared to 5.4 previously, which was previously biopsied and shown to be nonmalignant. Right lung is unremarkable. *No FDG avid mediastinal, hilar or axillary lymphadenopathy. *Right chest wall MediPort in place. *Non FDG avid hypoattenuating lesion right breast, stable in comparison to prior study. Left breast is unremarkable.   ABDOMEN AND PELVIS: *No FDG avid  lymphadenopathy in the abdomen or pelvis. *Bilateral adrenal glands are unremarkable. *Cholecystectomy. Postsurgical changes from partial colectomy with a left lower quadrant anastomosis, without focal FDG activity at the surgical bed to suggest recurrent/residual disease. Diverticulosis without diverticulitis. *Again seen, focal hypermetabolic activity at the anal region with SUV max of 6.9, which is mildly decreased in size and extent as compared to prior where SUV max of 7.5. Previously seen hypermetabolic focus in the small bowel of the inferior margin of the liver is again seen, and likely represents physiologic bowel activity. *Physiologic radiotracer uptake is present in the liver and spleen with excretion into the bowel loops and the genitourinary tract.   MUSCULOSKELETAL: *No concerning FDG avid bone lesion throughout axial and appendicular skeleton to suggest osseous metastasis.       1. Interval increase in size and FDG avidity of a cavitary lesion in the left lower lobe, concerning for a primary lung neoplasm, even though previous biopsy was inconclusive. 2. Near-complete metabolic resolution of FDG avidity in the left vocal cord. The remaining FDG avidity likely represents either residual viable disease or post-treatment inflammation, attention on follow-up study. 3.  Newly developed FDG avidity in  the right true vocal cord, likely secondary to left laryngeal nerve paralysis.   I personally reviewed the image(s) / study and agree with the findings and interpretation as stated. This study was interpreted at [facility].   Signed by: Nohelia Rhoades 1/9/2025 1:38 PM Dictation workstation:   [id number]    Narrative & Impression   Interpreted By:  Solitario Watson,  Nelly Cisneros   STUDY:  CT SOFT TISSUE NECK W IV CONTRAST;  12/11/2024 12:25 pm      INDICATION:  Signs/Symptoms:laryngeal cancer.      ,E83.42 Hypomagnesemia,C32.9 Malignant neoplasm of larynx,  unspecified,R91.1 Solitary pulmonary nodule      Status post left vocal cord mass chemoradiotherapy ending August 20, 2024      COMPARISON:  CT of the chest obtained September 23, 2024 as well as performed  concurrently. MRI of the cervical spine obtained July 8, 2024.  PET-CT dated 04/03/2024.      ACCESSION NUMBER(S):  [id number]      ORDERING CLINICIAN:  KAYA MATTHEW      TECHNIQUE:  Helical CT images of the neck were obtained.  The patient received 60  ML of Omnipaque 350 intravenous contrast agent. The images were  reformatted in angled axial, coronal and sagittal planes.      FINDINGS:  Right chemotherapy chest port observed without complete visualization  of tubing terminus.      Oral Cavity, Pharynx and Larynx:  Nasopharyngeal soft tissues appear  normal. Patient is edentulous. Base of the tongue and floor of the  mouth appears normal. Circumferential pharyngeal soft tissue  thickening/edema observed, which appears to narrow the oropharyngeal  airway. Epiglottic vallecula appears mostly effaced. Piriform sinuses  not well visualized. There is thickening of the epiglottis and  aryepiglottic folds as well. Asymmetric appearance of the vocal  cords. Thickening and leftward deviation of the right true vocal cord  is observed (axial series 201, image 75). Atrophic appearance of the  left vocal cord. There is thickening  and distortion of the  postcricoid space. Subglottic soft tissues and airway appear normal.      Retropharyngeal and Prevertebral Soft Tissues: Posttreatment change  suggested with mildly edematous appearance. No discrete fluid  collection. No evidence of pathologically enlarged lymph node or mass.      Lymph nodes: No morphologically abnormal or pathologically enlarged  cervical station lymph nodes observed.      Neck vessels: Bilateral neck vessels appear patent. 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 Predominantly mild multifocal  athero sclerotic calcification. More focal plaque components at the  right subclavian artery origin with possible flow significant  stenosis.      Thyroid gland: The thyroid gland is unremarkable in size and  appearance.      Parotid and submandibular glands: Mild atrophy of the salivary  glands. Bilateral parotid and submandibular glands are otherwise  unremarkable in appearance.      Paranasal Sinuses and Mastoids: Small amount of fluid within the  right mastoid. Visualized paranasal sinuses and left mastoids are  clear.      Visualized orbital structures are unremarkable.      Thoracic findings detailed separately. There is redemonstrated  moderate emphysema. As compared with prior examination there is an 8  mm irregular nodular subpleural opacity along the mesial aspect of  the right upper lobe (series 203, image 220).      No acute or aggressive appearing osseous abnormalities of the  cervical spine.      IMPRESSION:  1. Edematous appearance of the laryngeal and glottic soft tissues  consistent with recent chemoradiotherapy. There is asymmetry of the  vocal cords raising the possibility of underlying vocal cord  paresis/paralysis. No discrete residual laryngeal mass appreciated on  this examination. There is no evidence of  morphologically abnormal or  pathologically enlarged cervical lymph nodes.  2. Irregular left upper lobe pulmonary nodule, which appears new from  the September 23 2024 CT of the chest. For full assessment of the  chest, please see same day CT of the chest under a separate accession  number.      I personally reviewed the images/study and I agree with the findings  as stated. This study was interpreted at [facility], [place], [place].      MACRO:  None      Signed by: Solitario Watson 12/11/2024 1:21 PM  Dictation workstation:   [id number]       Assessment and Plan:    07/21/2025:  Comes for follow up. No changes in her voice. No difficulty with breathing. On today's examination, right vocal cord looks intact and mobile. I have not seen any mass or ulceration. Left arytenoid and ventricular band are severely swollen. Left vocal cord could not be observed, could have been destroyed by the tumor and chemoradiation.     No palpable neck lymphadenopathy.    Dx:  1- stable appearing larynx, severe edema of left arytenoid and left ventricular band. Minimal mobility of left side of larynx, but her left vocal cord was fixed at time of diagnosis.  _________________________________________________________________    Padilla LINNEA Campuzano is a 79 y.o. 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 She had chemoradiation, finished 09.19.2024. She is kindly referred by Dr. Gordillo.    Brief Past History:    - PET/CT (Apr 2024): Uptake in LLL lung nodule. Uptake in vocal cords  - ENT eval (Apr 2024): L vocal cord fixed  - Lung bx (Apr 2024): Neg  - Direct laryngoscopy (May 2024): L true vocal cord mucosal irregularity extending into subglottis, anterior commissure and to under surface of right vocal cord. Bx: SCC  - Started chemoRT with carbo/taxol (Jul - Aug 2024): After 2 doses of chemo, could not  tolerate it and declined further chemo then continued with weekly cetuximab  - CT Chest (Dec 2024): LLL lung nodule increased in size again => Plan for PET/CT and surgery/rad Onc eval.  - PET?CT (Jan 2025):  Newly developed FDG avidity at the right true vocal cord with SUV max of 12.4, No FDG avid cervical lymphadenopathy is present.     1. Interval increase in size and FDG avidity of a cavitary lesion in the left lower lobe, concerning for a primary lung neoplasm, even though previous biopsy was inconclusive.    2. Near-complete metabolic resolution of FDG avidity in the left vocal cord. The remaining FDG avidity likely represents either residual viable disease or post-treatment inflammation, attention on follow-up study.    3.  Newly developed FDG avidity in the right true vocal cord,     Fiberoptic endoscopy shows that nasal septum is deviated to right.  Endoscope was advanced through patient's left nasal cavity.  On examination, left arytenoid, left aryepiglottic fold and left ventricular are severely swollen. Left arytenoid and ventricular band are fixated. The left vocal cord could not be visualized due to the severe edema-swelling of left ventricular band, arytenoid and aryepiglottic fold. There could be submucosal tumor under these structures, or this could be secondary to RT. No visible vegetative mass or ulceration. Right vocal cord looks anatomically normal and mobile, but I'd like to note that previous fiberoptic exam in June 2024 has shown that disease was extending towards the anterior 1/4 of right vocal cord.      No palpable neck nodes.    Plan:   1-referral to head and neck surgical oncology for further evaluation. Increased metabolic activity of right vocal cord, exam looks normal.     Mai Martinez  Otolaryngology - Head & Neck Surgery

## 2025-07-21 NOTE — PROGRESS NOTES
History Of Present Illness  07.21.2025.  ______________________________________________________________________    Padilla Campuzano is a 79 y.o. 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 She had chemoradiation, finished 09.19.2024. She is kindly referred by Dr. Gordillo.    Brief Past History:    - PET/CT (Apr 2024): Uptake in LLL lung nodule. Uptake in vocal cords  - ENT eval (Apr 2024): L vocal cord fixed  - Lung bx (Apr 2024): Neg  - Direct laryngoscopy (May 2024): L true vocal cord mucosal irregularity extending into subglottis, anterior commissure and to under surface of right vocal cord. Bx: SCC  - Started chemoRT with carbo/taxol (Jul - Aug 2024): After 2 doses of chemo, could not tolerate it and declined further chemo then continued with weekly cetuximab  - CT Chest (Dec 2024): LLL lung nodule increased in size again => Plan for PET/CT and surgery/rad Onc eval.  - PET?CT (Jan 2025):  Newly developed FDG avidity at the right true vocal cord with SUV max of 12.4, No FDG avid cervical lymphadenopathy is present.     1. Interval increase in size and FDG avidity of a cavitary lesion in the left lower lobe, concerning for a primary lung neoplasm, even though previous biopsy was inconclusive.    2. Near-complete metabolic resolution of FDG avidity in the left vocal cord. The remaining FDG avidity likely represents either residual viable disease or post-treatment inflammation, attention on follow-up study.    3.  Newly developed FDG avidity in the right true vocal cord,     Fiberoptic endoscopy shows that nasal septum is deviated to right.  Endoscope was advanced through patient's left nasal cavity.  On examination, left arytenoid, left aryepiglottic fold and left ventricular are severely swollen. Left arytenoid and ventricular band are fixated. The left vocal cord could not be visualized due to the severe  edema-swelling of left ventricular band, arytenoid and aryepiglottic fold. There could be submucosal tumor under these structures, or this could be secondary to RT. No visible vegetative mass or ulceration. Right vocal cord looks anatomically normal and mobile, but I'd like to note that previous fiberoptic exam in June 2024 has shown that disease was extending towards the anterior 1/4 of right vocal cord.      No palpable neck nodes.    Plan:   1-referral to head and neck surgical oncology for further evaluation. Increased metabolic activity of right vocal cord, exam looks normal.     Past Medical History  She has a past medical history of Acute kidney injury (11/12/2015), Anal cancer (Multi) (2017), Arthritis, Breast cancer (2012), Cataract, Cerebral hemorrhage (Multi) (2012), CKD (chronic kidney disease), Colorectal cancer (Multi), Diverticulosis of intestine, part unspecified, without perforation or abscess without bleeding, GERD (gastroesophageal reflux disease), Hypertension, Irritable bowel syndrome, Laryngeal cancer (Multi) (2024), Lung nodule, Malignant neoplasm of colon, unspecified, Pancreatic insufficiency ([facility]), and Stroke (Multi) (2012).    Surgical History  She has a past surgical history that includes Breast lumpectomy (12/2012); Hysterectomy (1984); Ileostomy closure (11/13/2015); Ileostomy (2014); BI US breast right cyst aspiration (Right, 12/31/2019); CT guided imaging for abscess drain (07/08/2015); Cholecystectomy (09/11/2014); Other surgical history (2012); and Bowel resection (2001).     Social History  She reports that she quit smoking about 14 years ago. Her smoking use included cigarettes. She has been exposed to tobacco smoke. She has never used smokeless tobacco. She reports that she does not drink alcohol and does not use drugs.    Family History  Family History   Problem Relation Name Age of Onset    Colon cancer Mother      Liver cancer Mother      Kidney cancer Mother      Heart  attack Father      Blood clot Father      Cancer Brother      Cancer Brother          Allergies  Paclitaxel, Hydrocodone-acetaminophen, Oxycodone hcl, Oxycodone-acetaminophen, Aspirin, and Sulfa (sulfonamide antibiotics)    Review of Systems   Follow up T3 laryngeal SCC     Physical Exam    General appearance: Healthy-appearing, well-nourished, well groomed, in no acute distress.     Head and Face: Atraumatic with no masses, lesions, or scarring.      Salivary glands: No tenderness of the parotid glands or parotid masses.     No tenderness of the submandibular glands or submandibular masses.      Facial strength: Normal strength and symmetry, no synkinesis or facial tic.     Eyes: Conjunctivas look non-hyperemic bilaterally    Ears: Bilaterally ear canals look normal. Tympanic membranes look intact, no hyperemia, fluid or retraction.     Nose: Please see endoscopy note.    Oral Cavity/Mouth: Lips and tongue look normal.     Throat: No postnasal discharge. No tonsil hypertrophy. No hyperemia.    Neck: Symmetrical, trachea midline.     Pulmonary: Normal respiratory effort.     Lymphatic: No palpable pathologic lymph nodes at neck.     Neurological/Psychiatric Orientation to person, place, and time: Normal.     Mood and affect: Normal.      Extremities: No clubbing.     Skin: No significant skin lesions were noted at face or neck        Procedure  FLEXIBLE ENDOSCOPY 01.18.2025  After application of topical lidocaine and decongestant, flexible endoscope was advanced through patient's nasal cavities. Nasal septum is deviated to right.  Endoscope was advanced through patient's left nasal cavity.      On examination, left arytenoid, left aryepiglottic fold and left ventricular are severely swollen. Left arytenoid and ventricular band are fixated. The left vocal cord could not be visualized due to the severe edema-swelling of left ventricular band, arytenoid and aryepiglottic fold. There could be submucosal tumor under  these structures, or this could be secondary to RT. No visible ulceration. Right vocal cord looks anatomically normal and mobile, but I'd like to note that previous direct laryngoscopy (May 2024) has shown that disease was extending towards the anterior 1/4 of right vocal cord.         Last Recorded Vitals  There were no vitals taken for this visit.    Relevant Results  Prior to Admission medications    Medication Sig Start Date End Date Taking? Authorizing Provider   acetaminophen (Tylenol) 500 mg tablet Take 2 tablets (1,000 mg) by mouth every 6 hours if needed for mild pain (1 - 3).    Historical Provider, MD   biotin 5 mg capsule once every 24 hours.    Historical Provider, MD   cholecalciferol (Vitamin D-3) 50 mcg (2,000 unit) capsule 1 capsule (50 mcg) once every 24 hours.    Historical Provider, MD   cholestyramine (Questran) 4 gram powder MIX 1 SCOOP WITH AT LEAST 2 TO 6 OUNCES LIQUID AND DRINK ONCE  DAILY 11/13/24   Naheed Clements MD   clindamycin (Cleocin T) 1 % lotion Apply topically to affected area twice daily. 7/15/24   Kyunghee Burkitt, DO   diphenoxylate-atropine (LomotiL) 2.5-0.025 mg tablet Take 1 tablet by mouth 4 times a day as needed for diarrhea for up to 7 days. 8/26/24 12/19/24  ÁNGEL Maloney-CNP   doxycycline (Vibramycin) 100 mg capsule Take 1 capsule (100 mg) by mouth 2 times a day. For rash prevention. Take with at least 8 ounces (large glass) of water, do not lie down for 30 minutes after 7/15/24   Kyunghee Burkitt, DO   hydrocortisone 1 % cream Apply topically to face, hands, feet, neck, back, and chest once daily at bedtime for rash prevention. 7/15/24   Kyunghee Burkitt, DO   lidocaine (Lidoderm) 5 % patch Place 1 patch over 12 hours on the skin once daily. Remove & discard patch within 12 hours or as directed by MD. 1/14/25 4/14/25  Naheed Clements MD   lipase-protease-amylase (Creon) 24,000-76,000 -120,000 unit capsule TAKE 3 CAPSULES BY MOUTH 3 TIMES DAILY WITH  MEALS AND 1 TO 2  CAPSULES BY MOUTH WITH SNACKS.  MAX 13 CAPSULE DAILY 1/14/25   Naheed Clements MD   loperamide (Imodium) 2 mg capsule TAKE 2 CAPSULES BY MOUTH WITH  THE FIRST EPISODE OF DIARRHEA  AND 1 CAPSULE WITH ANY  ADDITIONAL EPISODES. MAXIMUM 8  CAPSULES DAILY 1/15/25   Naheed Clements MD   losartan (Cozaar) 25 mg tablet TAKE 1 TABLET BY MOUTH ONCE  DAILY 1/15/25   Naheed Clements MD   magnesium oxide (Mag-Ox) 400 mg (241.3 mg magnesium) tablet Take 1 tablet (400 mg) by mouth once daily. 1/14/25 4/14/25  Naheed Clements MD   ondansetron (Zofran) 8 mg tablet Take 1 tablet (8 mg) by mouth every 8 hours if needed for nausea or vomiting. 7/1/24   POP Maloney   pantoprazole (ProtoNix) 40 mg EC tablet TAKE 1 TABLET BY MOUTH TWICE  DAILY 1/15/25   Naheed Clements MD   potassium chloride (Klor-Con) 20 mEq packet Take 20 mEq by mouth once daily.  Patient not taking: Reported on 12/19/2024 8/2/24 9/1/24  POP Maloney   prochlorperazine (Compazine) 10 mg tablet Take 1 tablet (10 mg) by mouth every 6 hours if needed for nausea or vomiting. 7/15/24   Kyunghee Burkitt, DO   lidocaine (Lidoderm) 5 % patch Place 1 patch over 12 hours on the skin once daily for 21 days. Remove & discard patch within 12 hours or as directed by MD. 8/26/24 1/14/25  POP Maloney   lipase-protease-amylase (Creon) 24,000-76,000 -120,000 unit capsule TAKE 3 CAPSULES BY MOUTH 3 TIMES DAILY WITH MEALS AND 1 TO 2  CAPSULES BY MOUTH WITH SNACKS.  MAX 13 CAPSULE DAILY 12/22/23 1/14/25  Leander Sun MD   loperamide (Imodium) 2 mg capsule Take 2 capsules (4 mg) by mouth with the first episode of diarrhea and 1 capsule (2 mg) by mouth with any additional episodes. Maximum 8 capsules (16 mg) per day. 9/12/24 1/15/25  Naheed Clements MD   losartan (Cozaar) 25 mg tablet TAKE 1 TABLET BY MOUTH ONCE  DAILY 11/13/24 1/15/25  Naheed Clements MD   magnesium oxide (Mag-Ox) 400 mg  (241.3 mg magnesium) tablet Take 1 tablet (400 mg) by mouth 2 times a day. 9/26/24 1/14/25  Kaya Matthew MD   pantoprazole (ProtoNix) 40 mg EC tablet TAKE 1 TABLET BY MOUTH TWICE  DAILY 10/21/24 1/15/25  Naheed Clements MD     NM PET CT bone skull base to mid thigh    Result Date: 1/9/2025  Interpreted By:  Nohelia Rhoades and Bera Kaustav STUDY: NM PET CT SKULL BASE TO MID THIGH;  1/8/2025 11:57 am   INDICATION: Signs/Symptoms:Lung cancer?.   COMPARISON: CT chest and neck with contrast on 12/11/2024 PET-CT on 04/03/2024   ACCESSION NUMBER(S): [id number]   ORDERING CLINICIAN: KAYA MATTHEW   TECHNIQUE: DIVISION OF NUCLEAR MEDICINE POSITRON EMISSION TOMOGRAPHY (PET-CT)   The patient received an intravenous dose of 12.5 mCi of Fluorine-18 fluorodeoxyglucose (FDG).  Positron emission tomographic (PET) images from mid thigh to skull base were then acquired after a one hour delay. Additional PET images of the head was also obtained with the patient lying still. Also acquired was a contemporaneous low dose non-contrast CT scan performed for attenuation correction of PET images and anatomic localization.  The PET and CT images were digitally fused for display.  All images were acquired on a combined PET-CT scanner unit.  Some areas of FDG accumulation may be described in standardized uptake value (SUV) units.   CODING: Subsequent Treatment Strategy (PS)   CALIBRATION: Dose Injection-to-Scan Interval (mins): 58 min Mediastinal bloodpool SUV (normal 1.5-2.5): 2.2 Blood glucose: 97 mg/dL   FINDINGS: HEAD AND NECK: *No evidence of focal FDG avid lesion in the partially visualized brain parenchyma, noting that evaluation is limited because of the expected physiologic diffuse FDG uptake in the brain. *Nearly complete metabolic resolution of FDG avidity in the left vocal cord with SUV max 4.2 versus 16 previously. Newly developed FDG avidity at the right true vocal cord with SUV max of 12.4, likely secondary to left  laryngeal nerve paralysis. * No FDG avid cervical lymphadenopathy is present. * No paranasal sinus diease. * Thyroid gland is unremarkable.   CHEST: *Interval increase in size and FDG avidity of a cavitary lesion in the left lower lobe with SUV max of 6.6, as compared to 5.4 previously, which was previously biopsied and shown to be nonmalignant. Right lung is unremarkable. *No FDG avid mediastinal, hilar or axillary lymphadenopathy. *Right chest wall MediPort in place. *Non FDG avid hypoattenuating lesion right breast, stable in comparison to prior study. Left breast is unremarkable.   ABDOMEN AND PELVIS: *No FDG avid  lymphadenopathy in the abdomen or pelvis. *Bilateral adrenal glands are unremarkable. *Cholecystectomy. Postsurgical changes from partial colectomy with a left lower quadrant anastomosis, without focal FDG activity at the surgical bed to suggest recurrent/residual disease. Diverticulosis without diverticulitis. *Again seen, focal hypermetabolic activity at the anal region with SUV max of 6.9, which is mildly decreased in size and extent as compared to prior where SUV max of 7.5. Previously seen hypermetabolic focus in the small bowel of the inferior margin of the liver is again seen, and likely represents physiologic bowel activity. *Physiologic radiotracer uptake is present in the liver and spleen with excretion into the bowel loops and the genitourinary tract.   MUSCULOSKELETAL: *No concerning FDG avid bone lesion throughout axial and appendicular skeleton to suggest osseous metastasis.       1. Interval increase in size and FDG avidity of a cavitary lesion in the left lower lobe, concerning for a primary lung neoplasm, even though previous biopsy was inconclusive. 2. Near-complete metabolic resolution of FDG avidity in the left vocal cord. The remaining FDG avidity likely represents either residual viable disease or post-treatment inflammation, attention on follow-up study. 3.  Newly developed FDG  avidity in the right true vocal cord, likely secondary to left laryngeal nerve paralysis.   I personally reviewed the image(s) / study and agree with the findings and interpretation as stated. This study was interpreted at [facility].   Signed by: Nohelia Rhoades 1/9/2025 1:38 PM Dictation workstation:   [id number]    Narrative & Impression   Interpreted By:  Solitario Watson  and Vannesa Cisneros   STUDY:  CT SOFT TISSUE NECK W IV CONTRAST;  12/11/2024 12:25 pm      INDICATION:  Signs/Symptoms:laryngeal cancer.      ,E83.42 Hypomagnesemia,C32.9 Malignant neoplasm of larynx,  unspecified,R91.1 Solitary pulmonary nodule      Status post left vocal cord mass chemoradiotherapy ending August 20, 2024      COMPARISON:  CT of the chest obtained September 23, 2024 as well as performed  concurrently. MRI of the cervical spine obtained July 8, 2024.  PET-CT dated 04/03/2024.      ACCESSION NUMBER(S):  [id number]      ORDERING CLINICIAN:  KAYA MATTHEW      TECHNIQUE:  Helical CT images of the neck were obtained.  The patient received 60  ML of Omnipaque 350 intravenous contrast agent. The images were  reformatted in angled axial, coronal and sagittal planes.      FINDINGS:  Right chemotherapy chest port observed without complete visualization  of tubing terminus.      Oral Cavity, Pharynx and Larynx:  Nasopharyngeal soft tissues appear  normal. Patient is edentulous. Base of the tongue and floor of the  mouth appears normal. Circumferential pharyngeal soft tissue  thickening/edema observed, which appears to narrow the oropharyngeal  airway. Epiglottic vallecula appears mostly effaced. Piriform sinuses  not well visualized. There is thickening of the epiglottis and  aryepiglottic folds as well. Asymmetric appearance of the vocal  cords. Thickening and leftward deviation of the right true vocal cord  is observed (axial series 201, image 75). Atrophic appearance of the  left vocal cord. There is  thickening and distortion of the  postcricoid space. Subglottic soft tissues and airway appear normal.      Retropharyngeal and Prevertebral Soft Tissues: Posttreatment change  suggested with mildly edematous appearance. No discrete fluid  collection. No evidence of pathologically enlarged lymph node or mass.      Lymph nodes: No morphologically abnormal or pathologically enlarged  cervical station lymph nodes observed.      Neck vessels: Bilateral neck vessels appear patent. 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 Predominantly mild multifocal  athero sclerotic calcification. More focal plaque components at the  right subclavian artery origin with possible flow significant  stenosis.      Thyroid gland: The thyroid gland is unremarkable in size and  appearance.      Parotid and submandibular glands: Mild atrophy of the salivary  glands. Bilateral parotid and submandibular glands are otherwise  unremarkable in appearance.      Paranasal Sinuses and Mastoids: Small amount of fluid within the  right mastoid. Visualized paranasal sinuses and left mastoids are  clear.      Visualized orbital structures are unremarkable.      Thoracic findings detailed separately. There is redemonstrated  moderate emphysema. As compared with prior examination there is an 8  mm irregular nodular subpleural opacity along the mesial aspect of  the right upper lobe (series 203, image 220).      No acute or aggressive appearing osseous abnormalities of the  cervical spine.      IMPRESSION:  1. Edematous appearance of the laryngeal and glottic soft tissues  consistent with recent chemoradiotherapy. There is asymmetry of the  vocal cords raising the possibility of underlying vocal cord  paresis/paralysis. No discrete residual laryngeal mass appreciated on  this examination. There is no evidence  of morphologically abnormal or  pathologically enlarged cervical lymph nodes.  2. Irregular left upper lobe pulmonary nodule, which appears new from  the September 23 2024 CT of the chest. For full assessment of the  chest, please see same day CT of the chest under a separate accession  number.      I personally reviewed the images/study and I agree with the findings  as stated. This study was interpreted at [facility], [place], [place].      MACRO:  None      Signed by: Solitario Watson 12/11/2024 1:21 PM  Dictation workstation:   [id number]       Assessment and Plan:  Padilla Campuzano is a 79 y.o. 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 She had chemoradiation, finished 09.19.2024. She is kindly referred by Dr. Gordillo.    Brief Past History:    - PET/CT (Apr 2024): Uptake in LLL lung nodule. Uptake in vocal cords  - ENT eval (Apr 2024): L vocal cord fixed  - Lung bx (Apr 2024): Neg  - Direct laryngoscopy (May 2024): L true vocal cord mucosal irregularity extending into subglottis, anterior commissure and to under surface of right vocal cord. Bx: SCC  - Started chemoRT with carbo/taxol (Jul - Aug 2024): After 2 doses of chemo, could not tolerate it and declined further chemo then continued with weekly cetuximab  - CT Chest (Dec 2024): LLL lung nodule increased in size again => Plan for PET/CT and surgery/rad Onc eval.  - PET?CT (Jan 2025):  Newly developed FDG avidity at the right true vocal cord with SUV max of 12.4, No FDG avid cervical lymphadenopathy is present.     1. Interval increase in size and FDG avidity of a cavitary lesion in the left lower lobe, concerning for a primary lung neoplasm, even though previous biopsy was inconclusive.    2. Near-complete metabolic resolution of FDG avidity in the left vocal cord. The remaining FDG avidity likely represents either  residual viable disease or post-treatment inflammation, attention on follow-up study.    3.  Newly developed FDG avidity in the right true vocal cord,     Fiberoptic endoscopy shows that nasal septum is deviated to right.  Endoscope was advanced through patient's left nasal cavity.  On examination, left arytenoid, left aryepiglottic fold and left ventricular are severely swollen. Left arytenoid and ventricular band are fixated. The left vocal cord could not be visualized due to the severe edema-swelling of left ventricular band, arytenoid and aryepiglottic fold. There could be submucosal tumor under these structures, or this could be secondary to RT. No visible vegetative mass or ulceration. Right vocal cord looks anatomically normal and mobile, but I'd like to note that previous fiberoptic exam in June 2024 has shown that disease was extending towards the anterior 1/4 of right vocal cord.      No palpable neck nodes.    Plan:   1-referral to head and neck surgical oncology for further evaluation. Increased metabolic activity of right vocal cord, exam looks normal.     Mai Martinez  Otolaryngology - Head & Neck Surgery

## 2025-07-21 NOTE — PROGRESS NOTES
History Of Present Illness    07/21/2025:  Comes for follow up. No changes in her voice. No difficulty with breathing. On today's examination, right vocal cord looks intact and mobile. I have not seen any mass or ulceration. Left arytenoid and ventricular band are severely swollen. Left vocal cord could not be observed, could have been destroyed by the tumor and chemoradiation.     No palpable neck lymphadenopathy.    Dx:  1- stable appearing larynx, severe edema of left arytenoid and left ventricular band. Minimal mobility of left side of larynx, but her left vocal cord was fixed at time of diagnosis.    Recommendation:  1- follow up in 3 months  _________________________________________________________________    Padilla LINNEA Campuzano is a 79 y.o. 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 She had chemoradiation, finished 09.19.2024. She is kindly referred by Dr. Gordillo.    Brief Past History:    - PET/CT (Apr 2024): Uptake in LLL lung nodule. Uptake in vocal cords  - ENT eval (Apr 2024): L vocal cord fixed  - Lung bx (Apr 2024): Neg  - Direct laryngoscopy (May 2024): L true vocal cord mucosal irregularity extending into subglottis, anterior commissure and to under surface of right vocal cord. Bx: SCC  - Started chemoRT with carbo/taxol (Jul - Aug 2024): After 2 doses of chemo, could not tolerate it and declined further chemo then continued with weekly cetuximab  - CT Chest (Dec 2024): LLL lung nodule increased in size again => Plan for PET/CT and surgery/rad Onc eval.  - PET CT (Jan 2025):  Newly developed FDG avidity at the right true vocal cord with SUV max of 12.4, No FDG avid cervical lymphadenopathy is present.     1. Interval increase in size and FDG avidity of a cavitary lesion in the left lower lobe, concerning for a primary lung neoplasm, even though previous biopsy was inconclusive.    2.  Near-complete metabolic resolution of FDG avidity in the left vocal cord. The remaining FDG avidity likely represents either residual viable disease or post-treatment inflammation, attention on follow-up study.    3.  Newly developed FDG avidity in the right true vocal cord,     Fiberoptic endoscopy shows that nasal septum is deviated to right.  Endoscope was advanced through patient's left nasal cavity.  On examination, left arytenoid, left aryepiglottic fold and left ventricular band are severely swollen. Left arytenoid and ventricular band are fixated. The left vocal cord could not be visualized due to the severe edema-swelling of left ventricular band, arytenoid and aryepiglottic fold. There could be submucosal tumor under these structures, or this could be secondary to RT. No visible vegetative mass or ulceration. Right vocal cord looks anatomically normal and mobile, but I'd like to note that previous fiberoptic exam in June 2024 has shown that disease was extending towards the anterior 1/4 of right vocal cord.      No palpable neck nodes.    Plan:   1-referral to head and neck surgical oncology for further evaluation. Increased metabolic activity of right vocal cord, exam looks normal.     Past Medical History  She has a past medical history of Acute kidney injury (11/12/2015), Anal cancer (Multi) (2017), Arthritis, Breast cancer (2012), Cataract, Cerebral hemorrhage (Multi) (2012), CKD (chronic kidney disease), Colorectal cancer (Multi), Diverticulosis of intestine, part unspecified, without perforation or abscess without bleeding, GERD (gastroesophageal reflux disease), Hypertension, Irritable bowel syndrome, Laryngeal cancer (Multi) (2024), Lung nodule, Malignant neoplasm of colon, unspecified, Pancreatic insufficiency ([facility]), and Stroke (Multi) (2012).    Surgical History  She has a past surgical history that includes Breast lumpectomy (12/2012); Hysterectomy (1984); Ileostomy closure (11/13/2015);  Ileostomy (2014);  US breast right cyst aspiration (Right, 12/31/2019); CT guided imaging for abscess drain (07/08/2015); Cholecystectomy (09/11/2014); Other surgical history (2012); and Bowel resection (2001).     Social History  She reports that she quit smoking about 14 years ago. Her smoking use included cigarettes. She has been exposed to tobacco smoke. She has never used smokeless tobacco. She reports that she does not drink alcohol and does not use drugs.    Family History  Family History   Problem Relation Name Age of Onset    Colon cancer Mother      Liver cancer Mother      Kidney cancer Mother      Heart attack Father      Blood clot Father      Cancer Brother      Cancer Brother          Allergies  Paclitaxel, Hydrocodone-acetaminophen, Oxycodone hcl, Oxycodone-acetaminophen, Aspirin, and Sulfa (sulfonamide antibiotics)    Review of Systems   Follow up T3 laryngeal SCC     Physical Exam (initial exam)    General appearance: Healthy-appearing, well-nourished, well groomed, in no acute distress.     Head and Face: Atraumatic with no masses, lesions, or scarring.      Salivary glands: No tenderness of the parotid glands or parotid masses.     No tenderness of the submandibular glands or submandibular masses.      Facial strength: Normal strength and symmetry, no synkinesis or facial tic.     Eyes: Conjunctivas look non-hyperemic bilaterally    Ears: Bilaterally ear canals look normal. Tympanic membranes look intact, no hyperemia, fluid or retraction.     Nose: Please see endoscopy note.    Oral Cavity/Mouth: Lips and tongue look normal.     Throat: No postnasal discharge. No tonsil hypertrophy. No hyperemia.    Neck: Symmetrical, trachea midline.     Pulmonary: Normal respiratory effort.     Lymphatic: No palpable pathologic lymph nodes at neck.     Neurological/Psychiatric Orientation to person, place, and time: Normal.     Mood and affect: Normal.      Extremities: No clubbing.     Skin: No significant  skin lesions were noted at face or neck      Procedure  FLEXIBLE ENDOSCOPY  07/21/2025  After obtaining verbal consent of the patient, 4% lidocaine was sprayed into nasal cavities. Flexible fiberoptic laryngopharyngoscopy was advanced afterwards. Nasal septum is severely deviated to right. Endoscope was advanced through patient's left nasal cavity.      No mass or ulceration was seen in left nasal cavity of nasopharynx. Base of tongue looks normal. Left arytenoid, left aryepiglottic fold and left ventricular band are still severely swollen. Left arytenoid is minimally mobile. Left ventricular band looks fixated. The left vocal cord looks eroded. No visible ulceration. Right vocal cord looks anatomically normal and mobile,     _________________________________________________________________    FLEXIBLE ENDOSCOPY 01.18.2025  After application of topical lidocaine and decongestant, flexible endoscope was advanced through patient's nasal cavities. Nasal septum is deviated to right.  Endoscope was advanced through patient's left nasal cavity.      On examination, left arytenoid, left aryepiglottic fold and left ventricular are severely swollen. Left arytenoid and ventricular band are fixated. The left vocal cord could not be visualized due to the severe edema-swelling of left ventricular band, arytenoid and aryepiglottic fold. There could be submucosal tumor under these structures, or this could be secondary to RT. No visible ulceration. Right vocal cord looks anatomically normal and mobile, but I'd like to note that previous direct laryngoscopy (May 2024) has shown that disease was extending towards the anterior 1/4 of right vocal cord.         Last Recorded Vitals  There were no vitals taken for this visit.    Relevant Results  Prior to Admission medications    Medication Sig Start Date End Date Taking? Authorizing Provider   acetaminophen (Tylenol) 500 mg tablet Take 2 tablets (1,000 mg) by mouth every 6 hours if  needed for mild pain (1 - 3).    Historical Provider, MD   biotin 5 mg capsule once every 24 hours.    Historical Provider, MD   cholecalciferol (Vitamin D-3) 50 mcg (2,000 unit) capsule 1 capsule (50 mcg) once every 24 hours.    Historical Provider, MD   cholestyramine (Questran) 4 gram powder MIX 1 SCOOP WITH AT LEAST 2 TO 6 OUNCES LIQUID AND DRINK ONCE  DAILY 11/13/24   Naheed Clements MD   clindamycin (Cleocin T) 1 % lotion Apply topically to affected area twice daily. 7/15/24   Kyunghee Burkitt, DO   diphenoxylate-atropine (LomotiL) 2.5-0.025 mg tablet Take 1 tablet by mouth 4 times a day as needed for diarrhea for up to 7 days. 8/26/24 12/19/24  ÁNGEL Maloney-CNP   doxycycline (Vibramycin) 100 mg capsule Take 1 capsule (100 mg) by mouth 2 times a day. For rash prevention. Take with at least 8 ounces (large glass) of water, do not lie down for 30 minutes after 7/15/24   Kyunghee Burkitt, DO   hydrocortisone 1 % cream Apply topically to face, hands, feet, neck, back, and chest once daily at bedtime for rash prevention. 7/15/24   Kyunghee Burkitt, DO   lidocaine (Lidoderm) 5 % patch Place 1 patch over 12 hours on the skin once daily. Remove & discard patch within 12 hours or as directed by MD. 1/14/25 4/14/25  Naheed Clements MD   lipase-protease-amylase (Creon) 24,000-76,000 -120,000 unit capsule TAKE 3 CAPSULES BY MOUTH 3 TIMES DAILY WITH MEALS AND 1 TO 2  CAPSULES BY MOUTH WITH SNACKS.  MAX 13 CAPSULE DAILY 1/14/25   Naheed Clements MD   loperamide (Imodium) 2 mg capsule TAKE 2 CAPSULES BY MOUTH WITH  THE FIRST EPISODE OF DIARRHEA  AND 1 CAPSULE WITH ANY  ADDITIONAL EPISODES. MAXIMUM 8  CAPSULES DAILY 1/15/25   Naheed Clements MD   losartan (Cozaar) 25 mg tablet TAKE 1 TABLET BY MOUTH ONCE  DAILY 1/15/25   Naheed Clements MD   magnesium oxide (Mag-Ox) 400 mg (241.3 mg magnesium) tablet Take 1 tablet (400 mg) by mouth once daily. 1/14/25 4/14/25  Naheed Clements MD    ondansetron (Zofran) 8 mg tablet Take 1 tablet (8 mg) by mouth every 8 hours if needed for nausea or vomiting. 7/1/24   POP Maloney   pantoprazole (ProtoNix) 40 mg EC tablet TAKE 1 TABLET BY MOUTH TWICE  DAILY 1/15/25   Naheed Clements MD   potassium chloride (Klor-Con) 20 mEq packet Take 20 mEq by mouth once daily.  Patient not taking: Reported on 12/19/2024 8/2/24 9/1/24  POP Maloney   prochlorperazine (Compazine) 10 mg tablet Take 1 tablet (10 mg) by mouth every 6 hours if needed for nausea or vomiting. 7/15/24   Kyunghee Burkitt, DO   lidocaine (Lidoderm) 5 % patch Place 1 patch over 12 hours on the skin once daily for 21 days. Remove & discard patch within 12 hours or as directed by MD. 8/26/24 1/14/25  POP Maloney   lipase-protease-amylase (Creon) 24,000-76,000 -120,000 unit capsule TAKE 3 CAPSULES BY MOUTH 3 TIMES DAILY WITH MEALS AND 1 TO 2  CAPSULES BY MOUTH WITH SNACKS.  MAX 13 CAPSULE DAILY 12/22/23 1/14/25  Leander Sun MD   loperamide (Imodium) 2 mg capsule Take 2 capsules (4 mg) by mouth with the first episode of diarrhea and 1 capsule (2 mg) by mouth with any additional episodes. Maximum 8 capsules (16 mg) per day. 9/12/24 1/15/25  Naheed Clements MD   losartan (Cozaar) 25 mg tablet TAKE 1 TABLET BY MOUTH ONCE  DAILY 11/13/24 1/15/25  Naheed Clements MD   magnesium oxide (Mag-Ox) 400 mg (241.3 mg magnesium) tablet Take 1 tablet (400 mg) by mouth 2 times a day. 9/26/24 1/14/25  Malik Gordillo MD   pantoprazole (ProtoNix) 40 mg EC tablet TAKE 1 TABLET BY MOUTH TWICE  DAILY 10/21/24 1/15/25  Naheed Clements MD     NM PET CT bone skull base to mid thigh    Result Date: 1/9/2025  Interpreted By:  Nohelia Rhoades and Bera Kaustav STUDY: NM PET CT SKULL BASE TO MID THIGH;  1/8/2025 11:57 am   INDICATION: Signs/Symptoms:Lung cancer?.   COMPARISON: CT chest and neck with contrast on 12/11/2024 PET-CT on 04/03/2024   ACCESSION NUMBER(S):  [id number]   ORDERING CLINICIAN: KAYA MATTHEW   TECHNIQUE: DIVISION OF NUCLEAR MEDICINE POSITRON EMISSION TOMOGRAPHY (PET-CT)   The patient received an intravenous dose of 12.5 mCi of Fluorine-18 fluorodeoxyglucose (FDG).  Positron emission tomographic (PET) images from mid thigh to skull base were then acquired after a one hour delay. Additional PET images of the head was also obtained with the patient lying still. Also acquired was a contemporaneous low dose non-contrast CT scan performed for attenuation correction of PET images and anatomic localization.  The PET and CT images were digitally fused for display.  All images were acquired on a combined PET-CT scanner unit.  Some areas of FDG accumulation may be described in standardized uptake value (SUV) units.   CODING: Subsequent Treatment Strategy (PS)   CALIBRATION: Dose Injection-to-Scan Interval (mins): 58 min Mediastinal bloodpool SUV (normal 1.5-2.5): 2.2 Blood glucose: 97 mg/dL   FINDINGS: HEAD AND NECK: *No evidence of focal FDG avid lesion in the partially visualized brain parenchyma, noting that evaluation is limited because of the expected physiologic diffuse FDG uptake in the brain. *Nearly complete metabolic resolution of FDG avidity in the left vocal cord with SUV max 4.2 versus 16 previously. 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 * No FDG avid cervical lymphadenopathy is present. * No paranasal sinus diease. * Thyroid gland is unremarkable.   CHEST: *Interval increase in size and FDG avidity of a cavitary lesion in the left lower lobe with SUV max of 6.6, as compared to 5.4 previously, which was previously biopsied and shown to be nonmalignant. Right lung is unremarkable. *No FDG avid mediastinal, hilar or axillary lymphadenopathy. *Right chest wall MediPort in place. *Non FDG avid hypoattenuating lesion right breast, stable in comparison to prior  study. Left breast is unremarkable.   ABDOMEN AND PELVIS: *No FDG avid  lymphadenopathy in the abdomen or pelvis. *Bilateral adrenal glands are unremarkable. *Cholecystectomy. Postsurgical changes from partial colectomy with a left lower quadrant anastomosis, without focal FDG activity at the surgical bed to suggest recurrent/residual disease. Diverticulosis without diverticulitis. *Again seen, focal hypermetabolic activity at the anal region with SUV max of 6.9, which is mildly decreased in size and extent as compared to prior where SUV max of 7.5. Previously seen hypermetabolic focus in the small bowel of the inferior margin of the liver is again seen, and likely represents physiologic bowel activity. *Physiologic radiotracer uptake is present in the liver and spleen with excretion into the bowel loops and the genitourinary tract.   MUSCULOSKELETAL: *No concerning FDG avid bone lesion throughout axial and appendicular skeleton to suggest osseous metastasis.       1. Interval increase in size and FDG avidity of a cavitary lesion in the left lower lobe, concerning for a primary lung neoplasm, even though previous biopsy was inconclusive. 2. Near-complete metabolic resolution of FDG avidity in the left vocal cord. The remaining FDG avidity likely represents either residual viable disease or post-treatment inflammation, attention on follow-up study. 3.  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   I personally reviewed the image(s) / study and agree with the findings and interpretation as stated. This study was interpreted at [facility].   Signed by: Nohelia Rhoades 1/9/2025 1:38 PM Dictation workstation:   [id number]    Narrative & Impression   Interpreted By:  Solitario Watson and Hooper Grayson   STUDY:  CT SOFT TISSUE NECK W IV CONTRAST;  12/11/2024 12:25 pm      INDICATION:  Signs/Symptoms:laryngeal cancer.       ,E83.42 Hypomagnesemia,C32.9 Malignant neoplasm of larynx,  unspecified,R91.1 Solitary pulmonary nodule      Status post left vocal cord mass chemoradiotherapy ending August 20, 2024      COMPARISON:  CT of the chest obtained September 23, 2024 as well as performed  concurrently. MRI of the cervical spine obtained July 8, 2024.  PET-CT dated 04/03/2024.      ACCESSION NUMBER(S):  [id number]      ORDERING CLINICIAN:  KAYA MATTHEW      TECHNIQUE:  Helical CT images of the neck were obtained.  The patient received 60  ML of Omnipaque 350 intravenous contrast agent. The images were  reformatted in angled axial, coronal and sagittal planes.      FINDINGS:  Right chemotherapy chest port observed without complete visualization  of tubing terminus.      Oral Cavity, Pharynx and Larynx:  Nasopharyngeal soft tissues appear  normal. Patient is edentulous. Base of the tongue and floor of the  mouth appears normal. Circumferential pharyngeal soft tissue  thickening/edema observed, which appears to narrow the oropharyngeal  airway. Epiglottic vallecula appears mostly effaced. Piriform sinuses  not well visualized. There is thickening of the epiglottis and  aryepiglottic folds as well. Asymmetric appearance of the vocal  cords. Thickening and leftward deviation of the right true vocal cord  is observed (axial series 201, image 75). Atrophic appearance of the  left vocal cord. There is thickening and distortion of the  postcricoid space. Subglottic soft tissues and airway appear normal.      Retropharyngeal and Prevertebral Soft Tissues: Posttreatment change  suggested with mildly edematous appearance. No discrete fluid  collection. No evidence of pathologically enlarged lymph node or mass.      Lymph nodes: No morphologically abnormal or pathologically enlarged  cervical station lymph nodes observed.      Neck vessels: Bilateral neck vessels appear patent. 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 Predominantly mild multifocal  athero sclerotic calcification. More focal plaque components at the  right subclavian artery origin with possible flow significant  stenosis.      Thyroid gland: The thyroid gland is unremarkable in size and  appearance.      Parotid and submandibular glands: Mild atrophy of the salivary  glands. Bilateral parotid and submandibular glands are otherwise  unremarkable in appearance.      Paranasal Sinuses and Mastoids: Small amount of fluid within the  right mastoid. Visualized paranasal sinuses and left mastoids are  clear.      Visualized orbital structures are unremarkable.      Thoracic findings detailed separately. There is redemonstrated  moderate emphysema. As compared with prior examination there is an 8  mm irregular nodular subpleural opacity along the mesial aspect of  the right upper lobe (series 203, image 220).      No acute or aggressive appearing osseous abnormalities of the  cervical spine.      IMPRESSION:  1. Edematous appearance of the laryngeal and glottic soft tissues  consistent with recent chemoradiotherapy. There is asymmetry of the  vocal cords raising the possibility of underlying vocal cord  paresis/paralysis. No discrete residual laryngeal mass appreciated on  this examination. There is no evidence of morphologically abnormal or  pathologically enlarged cervical lymph nodes.  2. Irregular left upper lobe pulmonary nodule, which appears new from  the September 23 2024 CT of the chest. For full assessment of the  chest, please see same day CT of the chest under a separate accession  number.      I personally reviewed the images/study and I agree with the findings  as stated. This study was interpreted at [facility], [place], [place].      MACRO:  None      Signed by: Solitario Watson 12/11/2024 1:21  PM  Dictation workstation:   [id number]       Assessment and Plan:      07/21/2025:  Comes for follow up. No changes in her voice. No difficulty with breathing. On today's examination, right vocal cord looks intact and mobile. I have not seen any mass or ulceration. Left arytenoid and ventricular band are severely swollen. Left vocal cord could not be observed, could have been destroyed by the tumor and chemoradiation.     No palpable neck lymphadenopathy.    Dx:  1- stable appearing larynx, severe edema of left arytenoid and left ventricular band. Minimal mobility of left side of larynx, but her left vocal cord was fixed at time of diagnosis.    Recommendation:  1- follow up in 3 months  _________________________________________________________________    Padilla YARBROUGH Justen is a 79 y.o. 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 She had chemoradiation, finished 09.19.2024. She is kindly referred by Dr. Gordillo.    Brief Past History:    - PET/CT (Apr 2024): Uptake in LLL lung nodule. Uptake in vocal cords  - ENT eval (Apr 2024): L vocal cord fixed  - Lung bx (Apr 2024): Neg  - Direct laryngoscopy (May 2024): L true vocal cord mucosal irregularity extending into subglottis, anterior commissure and to under surface of right vocal cord. Bx: SCC  - Started chemoRT with carbo/taxol (Jul - Aug 2024): After 2 doses of chemo, could not tolerate it and declined further chemo then continued with weekly cetuximab  - CT Chest (Dec 2024): LLL lung nodule increased in size again => Plan for PET/CT and surgery/rad Onc eval.  - PET?CT (Jan 2025):  Newly developed FDG avidity at the right true vocal cord with SUV max of 12.4, No FDG avid cervical lymphadenopathy is present.     1. Interval increase in size and FDG avidity of a cavitary lesion in the left lower lobe, concerning for a primary lung neoplasm, even though previous  biopsy was inconclusive.    2. Near-complete metabolic resolution of FDG avidity in the left vocal cord. The remaining FDG avidity likely represents either residual viable disease or post-treatment inflammation, attention on follow-up study.    3.  Newly developed FDG avidity in the right true vocal cord,     Fiberoptic endoscopy shows that nasal septum is deviated to right.  Endoscope was advanced through patient's left nasal cavity.  On examination, left arytenoid, left aryepiglottic fold and left ventricular are severely swollen. Left arytenoid and ventricular band are fixated. The left vocal cord could not be visualized due to the severe edema-swelling of left ventricular band, arytenoid and aryepiglottic fold. There could be submucosal tumor under these structures, or this could be secondary to RT. No visible vegetative mass or ulceration. Right vocal cord looks anatomically normal and mobile, but I'd like to note that previous fiberoptic exam in June 2024 has shown that disease was extending towards the anterior 1/4 of right vocal cord.      No palpable neck nodes.    Plan:   1-referral to head and neck surgical oncology for further evaluation. Increased metabolic activity of right vocal cord, exam looks normal.     Hussain Vieira MD  Otolaryngology - Head & Neck Surgery

## 2025-07-23 ENCOUNTER — APPOINTMENT (OUTPATIENT)
Dept: HEMATOLOGY/ONCOLOGY | Facility: HOSPITAL | Age: 80
End: 2025-07-23
Payer: MEDICARE

## 2025-07-23 VITALS
TEMPERATURE: 97.3 F | OXYGEN SATURATION: 96 % | HEART RATE: 76 BPM | RESPIRATION RATE: 16 BRPM | DIASTOLIC BLOOD PRESSURE: 71 MMHG | SYSTOLIC BLOOD PRESSURE: 127 MMHG

## 2025-07-23 DIAGNOSIS — C32.9 LARYNGEAL CANCER (MULTI): ICD-10-CM

## 2025-07-23 LAB
T4 FREE SERPL-MCNC: 1.73 NG/DL (ref 0.61–1.12)
TSH SERPL-ACNC: 4.56 MIU/L (ref 0.44–3.98)

## 2025-07-23 PROCEDURE — 36591 DRAW BLOOD OFF VENOUS DEVICE: CPT

## 2025-07-23 PROCEDURE — 84439 ASSAY OF FREE THYROXINE: CPT | Performed by: INTERNAL MEDICINE

## 2025-07-23 PROCEDURE — 2500000004 HC RX 250 GENERAL PHARMACY W/ HCPCS (ALT 636 FOR OP/ED): Performed by: INTERNAL MEDICINE

## 2025-07-23 PROCEDURE — 84443 ASSAY THYROID STIM HORMONE: CPT | Performed by: INTERNAL MEDICINE

## 2025-07-23 RX ORDER — HEPARIN 100 UNIT/ML
500 SYRINGE INTRAVENOUS AS NEEDED
Status: DISCONTINUED | OUTPATIENT
Start: 2025-07-23 | End: 2025-07-23 | Stop reason: HOSPADM

## 2025-07-23 RX ORDER — HEPARIN SODIUM,PORCINE/PF 10 UNIT/ML
50 SYRINGE (ML) INTRAVENOUS AS NEEDED
OUTPATIENT
Start: 2025-07-23

## 2025-07-23 RX ORDER — HEPARIN 100 UNIT/ML
500 SYRINGE INTRAVENOUS AS NEEDED
OUTPATIENT
Start: 2025-07-23

## 2025-07-23 RX ADMIN — Medication 500 UNITS: at 13:45

## 2025-07-23 ASSESSMENT — PAIN SCALES - GENERAL: PAINLEVEL_OUTOF10: 0-NO PAIN

## 2025-08-14 ENCOUNTER — OFFICE VISIT (OUTPATIENT)
Dept: PRIMARY CARE | Facility: CLINIC | Age: 80
End: 2025-08-14
Payer: MEDICARE

## 2025-08-14 ENCOUNTER — LAB REQUISITION (OUTPATIENT)
Dept: LAB | Facility: HOSPITAL | Age: 80
End: 2025-08-14

## 2025-08-14 VITALS
WEIGHT: 119.2 LBS | BODY MASS INDEX: 21.8 KG/M2 | TEMPERATURE: 96 F | OXYGEN SATURATION: 90 % | SYSTOLIC BLOOD PRESSURE: 144 MMHG | DIASTOLIC BLOOD PRESSURE: 70 MMHG | HEART RATE: 46 BPM

## 2025-08-14 DIAGNOSIS — R39.9 UNSPECIFIED SYMPTOMS AND SIGNS INVOLVING THE GENITOURINARY SYSTEM: ICD-10-CM

## 2025-08-14 DIAGNOSIS — R39.9 UTI SYMPTOMS: Primary | ICD-10-CM

## 2025-08-14 DIAGNOSIS — I10 HYPERTENSION, UNSPECIFIED TYPE: ICD-10-CM

## 2025-08-14 DIAGNOSIS — N18.32 STAGE 3B CHRONIC KIDNEY DISEASE (MULTI): ICD-10-CM

## 2025-08-14 DIAGNOSIS — R79.89 ABNORMAL TSH: ICD-10-CM

## 2025-08-14 LAB
ALBUMIN SERPL BCP-MCNC: 4 G/DL (ref 3.4–5)
ALP SERPL-CCNC: 56 U/L (ref 33–136)
ALT SERPL W P-5'-P-CCNC: 6 U/L (ref 7–45)
ANION GAP SERPL CALC-SCNC: 10 MMOL/L (ref 10–20)
APPEARANCE UR: CLEAR
AST SERPL W P-5'-P-CCNC: 16 U/L (ref 9–39)
BACTERIA #/AREA URNS AUTO: ABNORMAL /HPF
BASOPHILS # BLD AUTO: 0.02 X10*3/UL (ref 0–0.1)
BASOPHILS NFR BLD AUTO: 0.4 %
BILIRUB SERPL-MCNC: 0.3 MG/DL (ref 0–1.2)
BILIRUB UR STRIP.AUTO-MCNC: NEGATIVE MG/DL
BUN SERPL-MCNC: 29 MG/DL (ref 6–23)
CALCIUM SERPL-MCNC: 9 MG/DL (ref 8.6–10.3)
CHLORIDE SERPL-SCNC: 106 MMOL/L (ref 98–107)
CO2 SERPL-SCNC: 27 MMOL/L (ref 21–32)
COLOR UR: YELLOW
CREAT SERPL-MCNC: 1.28 MG/DL (ref 0.5–1.05)
EGFRCR SERPLBLD CKD-EPI 2021: 43 ML/MIN/1.73M*2
EOSINOPHIL # BLD AUTO: 0.04 X10*3/UL (ref 0–0.4)
EOSINOPHIL NFR BLD AUTO: 0.9 %
ERYTHROCYTE [DISTWIDTH] IN BLOOD BY AUTOMATED COUNT: 12.5 % (ref 11.5–14.5)
GLUCOSE SERPL-MCNC: 83 MG/DL (ref 74–99)
GLUCOSE UR STRIP.AUTO-MCNC: NORMAL MG/DL
HCT VFR BLD AUTO: 33.4 % (ref 36–46)
HGB BLD-MCNC: 10.9 G/DL (ref 12–16)
HYALINE CASTS #/AREA URNS AUTO: ABNORMAL /LPF
IMM GRANULOCYTES # BLD AUTO: 0.01 X10*3/UL (ref 0–0.5)
IMM GRANULOCYTES NFR BLD AUTO: 0.2 % (ref 0–0.9)
KETONES UR STRIP.AUTO-MCNC: NEGATIVE MG/DL
LEUKOCYTE ESTERASE UR QL STRIP.AUTO: ABNORMAL
LYMPHOCYTES # BLD AUTO: 1.47 X10*3/UL (ref 0.8–3)
LYMPHOCYTES NFR BLD AUTO: 32.5 %
MCH RBC QN AUTO: 33.7 PG (ref 26–34)
MCHC RBC AUTO-ENTMCNC: 32.6 G/DL (ref 32–36)
MCV RBC AUTO: 103 FL (ref 80–100)
MONOCYTES # BLD AUTO: 0.57 X10*3/UL (ref 0.05–0.8)
MONOCYTES NFR BLD AUTO: 12.6 %
MUCOUS THREADS #/AREA URNS AUTO: ABNORMAL /LPF
NEUTROPHILS # BLD AUTO: 2.41 X10*3/UL (ref 1.6–5.5)
NEUTROPHILS NFR BLD AUTO: 53.4 %
NITRITE UR QL STRIP.AUTO: NEGATIVE
NRBC BLD-RTO: 0 /100 WBCS (ref 0–0)
PH UR STRIP.AUTO: 5.5 [PH]
PLATELET # BLD AUTO: 252 X10*3/UL (ref 150–450)
POTASSIUM SERPL-SCNC: 4 MMOL/L (ref 3.5–5.3)
PROT SERPL-MCNC: 6.7 G/DL (ref 6.4–8.2)
PROT UR STRIP.AUTO-MCNC: ABNORMAL MG/DL
RBC # BLD AUTO: 3.23 X10*6/UL (ref 4–5.2)
RBC # UR STRIP.AUTO: ABNORMAL MG/DL
RBC #/AREA URNS AUTO: ABNORMAL /HPF
SODIUM SERPL-SCNC: 139 MMOL/L (ref 136–145)
SP GR UR STRIP.AUTO: 1.03
SQUAMOUS #/AREA URNS AUTO: ABNORMAL /HPF
UROBILINOGEN UR STRIP.AUTO-MCNC: NORMAL MG/DL
WBC # BLD AUTO: 4.5 X10*3/UL (ref 4.4–11.3)
WBC #/AREA URNS AUTO: ABNORMAL /HPF

## 2025-08-14 PROCEDURE — 87086 URINE CULTURE/COLONY COUNT: CPT

## 2025-08-14 PROCEDURE — 3078F DIAST BP <80 MM HG: CPT | Performed by: INTERNAL MEDICINE

## 2025-08-14 PROCEDURE — 3077F SYST BP >= 140 MM HG: CPT | Performed by: INTERNAL MEDICINE

## 2025-08-14 PROCEDURE — G2211 COMPLEX E/M VISIT ADD ON: HCPCS | Performed by: INTERNAL MEDICINE

## 2025-08-14 PROCEDURE — 85025 COMPLETE CBC W/AUTO DIFF WBC: CPT

## 2025-08-14 PROCEDURE — 99214 OFFICE O/P EST MOD 30 MIN: CPT | Performed by: INTERNAL MEDICINE

## 2025-08-14 PROCEDURE — 80053 COMPREHEN METABOLIC PANEL: CPT

## 2025-08-14 PROCEDURE — 81001 URINALYSIS AUTO W/SCOPE: CPT

## 2025-08-14 PROCEDURE — 1160F RVW MEDS BY RX/DR IN RCRD: CPT | Performed by: INTERNAL MEDICINE

## 2025-08-14 PROCEDURE — 1159F MED LIST DOCD IN RCRD: CPT | Performed by: INTERNAL MEDICINE

## 2025-08-14 RX ORDER — NITROFURANTOIN 25; 75 MG/1; MG/1
100 CAPSULE ORAL 2 TIMES DAILY
Qty: 14 CAPSULE | Refills: 0 | Status: SHIPPED | OUTPATIENT
Start: 2025-08-14 | End: 2025-08-21

## 2025-08-14 ASSESSMENT — ENCOUNTER SYMPTOMS
FREQUENCY: 1
BACK PAIN: 1

## 2025-08-15 LAB — HOLD SPECIMEN: NORMAL

## 2025-08-16 LAB — BACTERIA UR CULT: NORMAL

## 2025-08-19 ENCOUNTER — APPOINTMENT (OUTPATIENT)
Dept: PRIMARY CARE | Facility: CLINIC | Age: 80
End: 2025-08-19
Payer: MEDICARE

## 2025-08-27 DIAGNOSIS — I10 HYPERTENSION, UNSPECIFIED TYPE: ICD-10-CM

## 2025-08-27 DIAGNOSIS — K21.9 GASTROESOPHAGEAL REFLUX DISEASE WITHOUT ESOPHAGITIS: ICD-10-CM

## 2025-08-27 RX ORDER — LOSARTAN POTASSIUM 25 MG/1
25 TABLET ORAL DAILY
Qty: 30 TABLET | Refills: 0 | Status: SHIPPED | OUTPATIENT
Start: 2025-08-27

## 2025-08-27 RX ORDER — PANTOPRAZOLE SODIUM 40 MG/1
40 TABLET, DELAYED RELEASE ORAL 2 TIMES DAILY
Qty: 60 TABLET | Refills: 0 | Status: SHIPPED | OUTPATIENT
Start: 2025-08-27

## 2025-09-03 ENCOUNTER — APPOINTMENT (OUTPATIENT)
Dept: HEMATOLOGY/ONCOLOGY | Facility: HOSPITAL | Age: 80
End: 2025-09-03
Payer: MEDICARE

## 2025-09-03 ASSESSMENT — PATIENT HEALTH QUESTIONNAIRE - PHQ9
SUM OF ALL RESPONSES TO PHQ9 QUESTIONS 1 & 2: 0
2. FEELING DOWN, DEPRESSED OR HOPELESS: NOT AT ALL
1. LITTLE INTEREST OR PLEASURE IN DOING THINGS: NOT AT ALL

## 2025-09-03 ASSESSMENT — ENCOUNTER SYMPTOMS
LOSS OF SENSATION IN FEET: 0
DEPRESSION: 0
OCCASIONAL FEELINGS OF UNSTEADINESS: 0

## 2025-09-03 ASSESSMENT — PAIN SCALES - GENERAL: PAINLEVEL_OUTOF10: 0-NO PAIN

## 2025-09-09 ENCOUNTER — APPOINTMENT (OUTPATIENT)
Facility: CLINIC | Age: 80
End: 2025-09-09
Payer: MEDICARE

## 2025-09-18 ENCOUNTER — APPOINTMENT (OUTPATIENT)
Dept: PRIMARY CARE | Facility: CLINIC | Age: 80
End: 2025-09-18
Payer: MEDICARE

## 2025-10-15 ENCOUNTER — APPOINTMENT (OUTPATIENT)
Dept: HEMATOLOGY/ONCOLOGY | Facility: HOSPITAL | Age: 80
End: 2025-10-15
Payer: MEDICARE

## 2025-10-20 ENCOUNTER — APPOINTMENT (OUTPATIENT)
Dept: OTOLARYNGOLOGY | Facility: CLINIC | Age: 80
End: 2025-10-20
Payer: MEDICARE

## 2025-11-26 ENCOUNTER — APPOINTMENT (OUTPATIENT)
Dept: HEMATOLOGY/ONCOLOGY | Facility: HOSPITAL | Age: 80
End: 2025-11-26
Payer: MEDICARE

## (undated) DEVICE — SPONGE, HEMOSTATIC, CELLULOSE, SURGICEL, NU-KNIT, 3 X 4 IN

## (undated) DEVICE — MANIFOLD, 4 PORT NEPTUNE STANDARD

## (undated) DEVICE — SOLUTION, IRRIGATION, X RX SODIUM CHL 0.9%, 1000ML BTL

## (undated) DEVICE — BAG, TISSUE RETRIEVAL, 10MM, ANCHOR

## (undated) DEVICE — TUBE SET, INSUFFLATION, SMOKE EVAC, DAVINCI

## (undated) DEVICE — DRESSING, TRANSPARENT, TEGADERM, 4 X 4-3/4 IN

## (undated) DEVICE — COLLECTION UNIT, DRAINAGE, THORACIC, SINGLE TUBE, DRY SUCTION, ATS COMPATIBLE, OASIS 3600, LF

## (undated) DEVICE — SUTURE, VICRYL, 4-0, 18 IN, PS2, UNDYED

## (undated) DEVICE — GRASPER, FENESTRATED TIP-UP XI

## (undated) DEVICE — DRESSING, ADHESIVE, ISLAND, TELFA, 2 X 3.75 IN, LF

## (undated) DEVICE — KIT, ROBOTIC, CUSTOM UHC

## (undated) DEVICE — STAPLER, SUREFORM 45, DAVINCI XI

## (undated) DEVICE — Device

## (undated) DEVICE — SUTURE, ETHIBOND, XTRA, 30 IN, 0, CT-1, GREEN

## (undated) DEVICE — TOWEL, SURGICAL, NEURO, O/R, 16 X 26, BLUE, STERILE

## (undated) DEVICE — SLEEVE, VASO PRESS, CALF GARMENT, MEDIUM, GREEN

## (undated) DEVICE — BANDAGE, ELASTIC, MATRIX, SELF-CLOSURE, 4 IN X 5 YD, LF

## (undated) DEVICE — DRESSING, SPONGE, GAUZE, CURITY, 4 X 4 IN, STERILE

## (undated) DEVICE — GRASPER, LONG, BIPOLAR, DAVINCI XI

## (undated) DEVICE — GOWN, ASTOUND, XL

## (undated) DEVICE — SEAL, UNIVERSAL, 5-12MM

## (undated) DEVICE — DRAPE, SHEET, ENDOSCOPY, GENERAL, FENESTRATED, ARMBOARD COVER, 98 X 123.5 IN, DISPOSABLE, LF, STERILE

## (undated) DEVICE — OBTURATOR, BLADELESS , SU

## (undated) DEVICE — GAUZE, KITTNER ROLL, STERILE

## (undated) DEVICE — DRAPE, ARM XI

## (undated) DEVICE — ELECTRODE, ELECTROSURGICAL, BLADE, INSULATED, ENT/IMA, STERILE

## (undated) DEVICE — FORCEPS, CADIERE, DAVINCI XI

## (undated) DEVICE — DRAPE, TAPE, ORTHO

## (undated) DEVICE — TUBING, SUCTION, CONNECTING, STERILE 0.25 X 120 IN., LF

## (undated) DEVICE — GLOVE, SURGICAL, PROTEXIS PI , 7.5, PF, LF

## (undated) DEVICE — RELOAD, SUREFORM 45, 4.6 BLACK

## (undated) DEVICE — COVER, CART, 45 X 27 X 48 IN, CLEAR

## (undated) DEVICE — LOOP, VESSEL, MAXI, RED

## (undated) DEVICE — DRAPE, COLUMN, DAVINCI XI

## (undated) DEVICE — DRAPE, INCISE, ANTIMICROBIAL, IOBAN 2, LARGE, 17 X 23 IN, DISPOSABLE, STERILE

## (undated) DEVICE — TUBING, SUCTION, 6MM X 10, CLEAN N-COND

## (undated) DEVICE — SUTURE, VICRYL, 2-0, 36 IN, CT-1, UNDYED

## (undated) DEVICE — DRESSING, NON-ADHERENT, CURAD, ABSORBENT, 3 X 8 IN, STERILE

## (undated) DEVICE — CATHETER, THORACIC, STRAIGHT, ADULT, 28 FR, PVC

## (undated) DEVICE — DRAPE, SHEET, FAN FOLDED, HALF, 44 X 58 IN, DISPOSABLE, LF, STERILE

## (undated) DEVICE — STRIP, SKIN CLOSURE, STERI STRIP, REINFORCED, 0.5 X 4 IN